# Patient Record
Sex: MALE | Race: WHITE | NOT HISPANIC OR LATINO | Employment: UNEMPLOYED | URBAN - METROPOLITAN AREA
[De-identification: names, ages, dates, MRNs, and addresses within clinical notes are randomized per-mention and may not be internally consistent; named-entity substitution may affect disease eponyms.]

---

## 2019-08-14 ENCOUNTER — APPOINTMENT (OUTPATIENT)
Dept: CT IMAGING | Facility: HOSPITAL | Age: 64
End: 2019-08-14

## 2019-08-14 ENCOUNTER — APPOINTMENT (OUTPATIENT)
Dept: GENERAL RADIOLOGY | Facility: HOSPITAL | Age: 64
End: 2019-08-14

## 2019-08-14 ENCOUNTER — HOSPITAL ENCOUNTER (INPATIENT)
Facility: HOSPITAL | Age: 64
LOS: 6 days | End: 2019-08-20
Attending: EMERGENCY MEDICINE | Admitting: HOSPITALIST

## 2019-08-14 DIAGNOSIS — I48.91 ATRIAL FIBRILLATION WITH RAPID VENTRICULAR RESPONSE (HCC): ICD-10-CM

## 2019-08-14 DIAGNOSIS — F20.9 CHRONIC SCHIZOPHRENIA (HCC): ICD-10-CM

## 2019-08-14 DIAGNOSIS — I95.1 ORTHOSTATIC HYPOTENSION: ICD-10-CM

## 2019-08-14 DIAGNOSIS — R55 SYNCOPE AND COLLAPSE: Primary | ICD-10-CM

## 2019-08-14 PROBLEM — I10 HYPERTENSION: Chronic | Status: ACTIVE | Noted: 2019-08-14

## 2019-08-14 LAB
ALBUMIN SERPL-MCNC: 3.6 G/DL (ref 3.5–5.2)
ALBUMIN/GLOB SERPL: 1.4 G/DL
ALP SERPL-CCNC: 86 U/L (ref 39–117)
ALT SERPL W P-5'-P-CCNC: 46 U/L (ref 1–41)
ANION GAP SERPL CALCULATED.3IONS-SCNC: 10.3 MMOL/L (ref 5–15)
AST SERPL-CCNC: 27 U/L (ref 1–40)
BASOPHILS # BLD AUTO: 0.02 10*3/MM3 (ref 0–0.2)
BASOPHILS NFR BLD AUTO: 0.3 % (ref 0–1.5)
BILIRUB SERPL-MCNC: 0.6 MG/DL (ref 0.2–1.2)
BILIRUB UR QL STRIP: NEGATIVE
BUN BLD-MCNC: 11 MG/DL (ref 8–23)
BUN/CREAT SERPL: 15.1 (ref 7–25)
CALCIUM SPEC-SCNC: 8.9 MG/DL (ref 8.6–10.5)
CHLORIDE SERPL-SCNC: 101 MMOL/L (ref 98–107)
CLARITY UR: CLEAR
CO2 SERPL-SCNC: 26.7 MMOL/L (ref 22–29)
COLOR UR: YELLOW
CREAT BLD-MCNC: 0.73 MG/DL (ref 0.76–1.27)
DEPRECATED RDW RBC AUTO: 49.8 FL (ref 37–54)
EOSINOPHIL # BLD AUTO: 0.19 10*3/MM3 (ref 0–0.4)
EOSINOPHIL NFR BLD AUTO: 2.5 % (ref 0.3–6.2)
ERYTHROCYTE [DISTWIDTH] IN BLOOD BY AUTOMATED COUNT: 14.4 % (ref 12.3–15.4)
GFR SERPL CREATININE-BSD FRML MDRD: 109 ML/MIN/1.73
GFR SERPL CREATININE-BSD FRML MDRD: 132 ML/MIN/1.73
GLOBULIN UR ELPH-MCNC: 2.5 GM/DL
GLUCOSE BLD-MCNC: 103 MG/DL (ref 65–99)
GLUCOSE UR STRIP-MCNC: NEGATIVE MG/DL
HCT VFR BLD AUTO: 42.1 % (ref 37.5–51)
HGB BLD-MCNC: 13.7 G/DL (ref 13–17.7)
HGB UR QL STRIP.AUTO: NEGATIVE
HOLD SPECIMEN: NORMAL
HOLD SPECIMEN: NORMAL
IMM GRANULOCYTES # BLD AUTO: 0.07 10*3/MM3 (ref 0–0.05)
IMM GRANULOCYTES NFR BLD AUTO: 0.9 % (ref 0–0.5)
INR PPP: 1.03 (ref 0.9–1.1)
KETONES UR QL STRIP: NEGATIVE
LEUKOCYTE ESTERASE UR QL STRIP.AUTO: NEGATIVE
LYMPHOCYTES # BLD AUTO: 0.94 10*3/MM3 (ref 0.7–3.1)
LYMPHOCYTES NFR BLD AUTO: 12.5 % (ref 19.6–45.3)
MAGNESIUM SERPL-MCNC: 2.1 MG/DL (ref 1.6–2.4)
MCH RBC QN AUTO: 30.7 PG (ref 26.6–33)
MCHC RBC AUTO-ENTMCNC: 32.5 G/DL (ref 31.5–35.7)
MCV RBC AUTO: 94.4 FL (ref 79–97)
MONOCYTES # BLD AUTO: 0.61 10*3/MM3 (ref 0.1–0.9)
MONOCYTES NFR BLD AUTO: 8.1 % (ref 5–12)
NEUTROPHILS # BLD AUTO: 5.67 10*3/MM3 (ref 1.7–7)
NEUTROPHILS NFR BLD AUTO: 75.7 % (ref 42.7–76)
NITRITE UR QL STRIP: NEGATIVE
NRBC BLD AUTO-RTO: 0 /100 WBC (ref 0–0.2)
PH UR STRIP.AUTO: 5.5 [PH] (ref 5–8)
PLATELET # BLD AUTO: 181 10*3/MM3 (ref 140–450)
PMV BLD AUTO: 12.4 FL (ref 6–12)
POTASSIUM BLD-SCNC: 3.7 MMOL/L (ref 3.5–5.2)
PROT SERPL-MCNC: 6.1 G/DL (ref 6–8.5)
PROT UR QL STRIP: NEGATIVE
PROTHROMBIN TIME: 13.2 SECONDS (ref 11.7–14.2)
RBC # BLD AUTO: 4.46 10*6/MM3 (ref 4.14–5.8)
SODIUM BLD-SCNC: 138 MMOL/L (ref 136–145)
SP GR UR STRIP: 1.01 (ref 1–1.03)
T4 FREE SERPL-MCNC: 1.54 NG/DL (ref 0.93–1.7)
TROPONIN T SERPL-MCNC: <0.01 NG/ML (ref 0–0.03)
TSH SERPL DL<=0.05 MIU/L-ACNC: 3.59 MIU/ML (ref 0.27–4.2)
UROBILINOGEN UR QL STRIP: NORMAL
WBC NRBC COR # BLD: 7.5 10*3/MM3 (ref 3.4–10.8)
WHOLE BLOOD HOLD SPECIMEN: NORMAL
WHOLE BLOOD HOLD SPECIMEN: NORMAL

## 2019-08-14 PROCEDURE — 85610 PROTHROMBIN TIME: CPT | Performed by: EMERGENCY MEDICINE

## 2019-08-14 PROCEDURE — 71045 X-RAY EXAM CHEST 1 VIEW: CPT

## 2019-08-14 PROCEDURE — 93005 ELECTROCARDIOGRAM TRACING: CPT | Performed by: EMERGENCY MEDICINE

## 2019-08-14 PROCEDURE — 84439 ASSAY OF FREE THYROXINE: CPT | Performed by: EMERGENCY MEDICINE

## 2019-08-14 PROCEDURE — 83735 ASSAY OF MAGNESIUM: CPT | Performed by: EMERGENCY MEDICINE

## 2019-08-14 PROCEDURE — 73502 X-RAY EXAM HIP UNI 2-3 VIEWS: CPT

## 2019-08-14 PROCEDURE — 81003 URINALYSIS AUTO W/O SCOPE: CPT | Performed by: EMERGENCY MEDICINE

## 2019-08-14 PROCEDURE — 80053 COMPREHEN METABOLIC PANEL: CPT | Performed by: EMERGENCY MEDICINE

## 2019-08-14 PROCEDURE — 99285 EMERGENCY DEPT VISIT HI MDM: CPT

## 2019-08-14 PROCEDURE — 70450 CT HEAD/BRAIN W/O DYE: CPT

## 2019-08-14 PROCEDURE — 84484 ASSAY OF TROPONIN QUANT: CPT | Performed by: EMERGENCY MEDICINE

## 2019-08-14 PROCEDURE — 85025 COMPLETE CBC W/AUTO DIFF WBC: CPT | Performed by: EMERGENCY MEDICINE

## 2019-08-14 PROCEDURE — 84443 ASSAY THYROID STIM HORMONE: CPT | Performed by: EMERGENCY MEDICINE

## 2019-08-14 PROCEDURE — 93010 ELECTROCARDIOGRAM REPORT: CPT | Performed by: INTERNAL MEDICINE

## 2019-08-14 RX ORDER — ACETAMINOPHEN 325 MG/1
650 TABLET ORAL EVERY 4 HOURS PRN
Status: DISCONTINUED | OUTPATIENT
Start: 2019-08-14 | End: 2019-08-20 | Stop reason: HOSPADM

## 2019-08-14 RX ORDER — SODIUM CHLORIDE, SODIUM LACTATE, POTASSIUM CHLORIDE, CALCIUM CHLORIDE 600; 310; 30; 20 MG/100ML; MG/100ML; MG/100ML; MG/100ML
125 INJECTION, SOLUTION INTRAVENOUS CONTINUOUS
Status: DISCONTINUED | OUTPATIENT
Start: 2019-08-14 | End: 2019-08-15

## 2019-08-14 RX ORDER — SODIUM CHLORIDE 0.9 % (FLUSH) 0.9 %
3 SYRINGE (ML) INJECTION EVERY 12 HOURS SCHEDULED
Status: DISCONTINUED | OUTPATIENT
Start: 2019-08-14 | End: 2019-08-20 | Stop reason: HOSPADM

## 2019-08-14 RX ORDER — METOPROLOL SUCCINATE 50 MG/1
150 TABLET, EXTENDED RELEASE ORAL DAILY
COMMUNITY
End: 2019-08-20 | Stop reason: HOSPADM

## 2019-08-14 RX ORDER — ACETAMINOPHEN 160 MG/5ML
650 SOLUTION ORAL EVERY 4 HOURS PRN
Status: DISCONTINUED | OUTPATIENT
Start: 2019-08-14 | End: 2019-08-20 | Stop reason: HOSPADM

## 2019-08-14 RX ORDER — ONDANSETRON 2 MG/ML
4 INJECTION INTRAMUSCULAR; INTRAVENOUS EVERY 6 HOURS PRN
Status: DISCONTINUED | OUTPATIENT
Start: 2019-08-14 | End: 2019-08-20 | Stop reason: HOSPADM

## 2019-08-14 RX ORDER — ASPIRIN 81 MG/1
81 TABLET ORAL DAILY
COMMUNITY
End: 2019-08-20 | Stop reason: HOSPADM

## 2019-08-14 RX ORDER — SODIUM CHLORIDE 0.9 % (FLUSH) 0.9 %
3-10 SYRINGE (ML) INJECTION AS NEEDED
Status: DISCONTINUED | OUTPATIENT
Start: 2019-08-14 | End: 2019-08-20 | Stop reason: HOSPADM

## 2019-08-14 RX ORDER — PANTOPRAZOLE SODIUM 40 MG/1
40 TABLET, DELAYED RELEASE ORAL DAILY
Status: DISCONTINUED | OUTPATIENT
Start: 2019-08-14 | End: 2019-08-20 | Stop reason: HOSPADM

## 2019-08-14 RX ORDER — PANTOPRAZOLE SODIUM 40 MG/1
40 TABLET, DELAYED RELEASE ORAL DAILY
COMMUNITY

## 2019-08-14 RX ORDER — ACETAMINOPHEN 650 MG/1
650 SUPPOSITORY RECTAL EVERY 4 HOURS PRN
Status: DISCONTINUED | OUTPATIENT
Start: 2019-08-14 | End: 2019-08-20 | Stop reason: HOSPADM

## 2019-08-14 RX ORDER — RISPERIDONE 3 MG/1
6 TABLET ORAL NIGHTLY
Status: DISCONTINUED | OUTPATIENT
Start: 2019-08-14 | End: 2019-08-20 | Stop reason: HOSPADM

## 2019-08-14 RX ORDER — ASPIRIN 81 MG/1
81 TABLET ORAL DAILY
Status: DISCONTINUED | OUTPATIENT
Start: 2019-08-14 | End: 2019-08-17

## 2019-08-14 RX ORDER — RISPERIDONE 3 MG/1
5 TABLET ORAL NIGHTLY
COMMUNITY

## 2019-08-14 RX ORDER — NITROGLYCERIN 0.4 MG/1
0.4 TABLET SUBLINGUAL
Status: DISCONTINUED | OUTPATIENT
Start: 2019-08-14 | End: 2019-08-20 | Stop reason: HOSPADM

## 2019-08-14 RX ADMIN — SODIUM CHLORIDE, POTASSIUM CHLORIDE, SODIUM LACTATE AND CALCIUM CHLORIDE 125 ML/HR: 600; 310; 30; 20 INJECTION, SOLUTION INTRAVENOUS at 18:30

## 2019-08-14 RX ADMIN — RISPERIDONE 6 MG: 3 TABLET ORAL at 20:51

## 2019-08-14 RX ADMIN — POTASSIUM & SODIUM PHOSPHATES POWDER PACK 280-160-250 MG 1 PACKET: 280-160-250 PACK at 20:51

## 2019-08-14 RX ADMIN — SODIUM CHLORIDE, PRESERVATIVE FREE 3 ML: 5 INJECTION INTRAVENOUS at 21:22

## 2019-08-14 RX ADMIN — SODIUM CHLORIDE 2466 ML: 9 INJECTION, SOLUTION INTRAVENOUS at 10:57

## 2019-08-14 RX ADMIN — PANTOPRAZOLE SODIUM 40 MG: 40 TABLET, DELAYED RELEASE ORAL at 18:30

## 2019-08-14 RX ADMIN — SODIUM CHLORIDE 500 ML: 9 INJECTION, SOLUTION INTRAVENOUS at 07:56

## 2019-08-14 RX ADMIN — ASPIRIN 81 MG: 81 TABLET, COATED ORAL at 18:30

## 2019-08-14 NOTE — PLAN OF CARE
Problem: Patient Care Overview  Goal: Plan of Care Review  Outcome: Ongoing (interventions implemented as appropriate)   08/14/19 1900   Coping/Psychosocial   Plan of Care Reviewed With patient   Plan of Care Review   Progress improving   OTHER   Outcome Summary Patient has improved since admit, no sign and symptoms since present on the unit. Echo, fluids, and cardio consult ordered.        Problem: Fall Risk (Adult)  Goal: Identify Related Risk Factors and Signs and Symptoms  Outcome: Outcome(s) achieved Date Met: 08/14/19    Goal: Absence of Fall  Outcome: Ongoing (interventions implemented as appropriate)      Problem: Skin Injury Risk (Adult)  Goal: Identify Related Risk Factors and Signs and Symptoms  Outcome: Outcome(s) achieved Date Met: 08/14/19    Goal: Skin Health and Integrity  Outcome: Ongoing (interventions implemented as appropriate)      Problem: Syncope (Adult)  Goal: Identify Related Risk Factors and Signs and Symptoms  Outcome: Outcome(s) achieved Date Met: 08/14/19    Goal: Physical Safety/Health Maintenance  Outcome: Ongoing (interventions implemented as appropriate)    Goal: Optimal Emotional/Functional Gem  Outcome: Ongoing (interventions implemented as appropriate)

## 2019-08-14 NOTE — ED PROVIDER NOTES
" EMERGENCY DEPARTMENT ENCOUNTER    CHIEF COMPLAINT  Chief Complaint: syncope  History given by: patient, staff  History limited by: none  Room Number: 10/10  PMD: Provider, No Known      HPI:  Pt is a 64 y.o. male who presents to the ED via EMS s/p syncopal episode at [facility]. Staff report that the pt had gotten up to get routine intake labs done when he \"passed out\" and fell backward. The LOC was brief and the pt c/o L hip pain and some vague dizziness. Staff evaluated his BP that was 102/90. Upon standing, the BP dropped to 90/70. Pt was recently admitted to the facility from [facility] for f/u on schizophrenia.    PAST MEDICAL HISTORY  Active Ambulatory Problems     Diagnosis Date Noted   • No Active Ambulatory Problems     Resolved Ambulatory Problems     Diagnosis Date Noted   • No Resolved Ambulatory Problems     Past Medical History:   Diagnosis Date   • A-fib (CMS/HCC)    • [place] esophagus    • GERD (gastroesophageal reflux disease)    • Hx of schizophrenia    • Hyperlipidemia    • Hypertension    • Hypophosphatemia    • Hypothyroidism    • Umbilical hernia        PAST SURGICAL HISTORY  History reviewed. No pertinent surgical history.    FAMILY HISTORY  History reviewed. No pertinent family history.    SOCIAL HISTORY  Social History     Socioeconomic History   • Marital status: Single     Spouse name: Not on file   • Number of children: Not on file   • Years of education: Not on file   • Highest education level: Not on file   Tobacco Use   • Smoking status: Unknown If Ever Smoked       ALLERGIES  Penicillins and Latex    REVIEW OF SYSTEMS  Review of Systems   Constitutional: Negative for activity change, appetite change and fever.   HENT: Negative for congestion and sore throat.    Eyes: Negative.    Respiratory: Negative for cough and shortness of breath.    Cardiovascular: Negative for chest pain and leg swelling.   Gastrointestinal: Negative for abdominal pain, diarrhea and vomiting. "   Endocrine: Negative.    Genitourinary: Negative for decreased urine volume and dysuria.   Musculoskeletal: Negative for neck pain.        + L hip pain   Skin: Negative for rash and wound.   Allergic/Immunologic: Negative.    Neurological: Positive for dizziness and syncope. Negative for weakness, numbness and headaches.   Hematological: Negative.    Psychiatric/Behavioral: Negative.    All other systems reviewed and are negative.      PHYSICAL EXAM  ED Triage Vitals   Temp Heart Rate Resp BP SpO2   08/14/19 0733 08/14/19 0733 08/14/19 0746 08/14/19 0733 08/14/19 0733   98.3 °F (36.8 °C) 82 18 104/63 96 %      Temp src Heart Rate Source Patient Position BP Location FiO2 (%)   08/14/19 0733 -- -- -- --   Tympanic           Physical Exam   Constitutional: No distress.   HENT:   Head: Normocephalic and atraumatic.   Mouth/Throat: Mucous membranes are dry.   Hematoma to the lower occiput   Eyes: Pupils are equal, round, and reactive to light.   Pale conjunctiva   Neck: Normal range of motion.   Cardiovascular: Normal rate. An irregularly irregular rhythm present.   No murmur heard.  HR 80s   Pulmonary/Chest: Effort normal and breath sounds normal. No respiratory distress.   Abdominal: Soft. Bowel sounds are normal. He exhibits no distension. There is no tenderness. There is no rebound and no guarding.   Musculoskeletal: Normal range of motion. He exhibits no edema.   No calf tenderness   Neurological: He is alert. He has normal sensation and normal strength.   No focal neurological deficits, follows commands, moves all extremities.   Skin: Skin is warm and dry. There is pallor.   Psychiatric: Affect normal.       LAB RESULTS  Lab Results (last 24 hours)     Procedure Component Value Units Date/Time    CBC & Differential [[id number]] Collected:  08/14/19 0753    Specimen:  Blood Updated:  08/14/19 0822    Narrative:       The following orders were created for panel order CBC & Differential.  Procedure                                Abnormality         Status                     ---------                               -----------         ------                     CBC Auto Differential[[id number]]        Abnormal            Final result                 Please view results for these tests on the individual orders.    Comprehensive Metabolic Panel [[id number]]  (Abnormal) Collected:  08/14/19 0753    Specimen:  Blood Updated:  08/14/19 0841     Glucose 103 mg/dL      BUN 11 mg/dL      Creatinine 0.73 mg/dL      Sodium 138 mmol/L      Potassium 3.7 mmol/L      Chloride 101 mmol/L      CO2 26.7 mmol/L      Calcium 8.9 mg/dL      Total Protein 6.1 g/dL      Albumin 3.60 g/dL      ALT (SGPT) 46 U/L      AST (SGOT) 27 U/L      Alkaline Phosphatase 86 U/L      Total Bilirubin 0.6 mg/dL      eGFR Non African Amer 109 mL/min/1.73      eGFR  African Amer 132 mL/min/1.73      Globulin 2.5 gm/dL      A/G Ratio 1.4 g/dL      BUN/Creatinine Ratio 15.1     Anion Gap 10.3 mmol/L     Narrative:       GFR Normal >60  Chronic Kidney Disease <60  Kidney Failure <15    Protime-INR [[id number]]  (Normal) Collected:  08/14/19 0753    Specimen:  Blood Updated:  08/14/19 0831     Protime 13.2 Seconds      INR 1.03    Troponin [[id number]]  (Normal) Collected:  08/14/19 0753    Specimen:  Blood Updated:  08/14/19 0842     Troponin T <0.010 ng/mL     Narrative:       Troponin T Reference Range:  <= 0.03 ng/mL-   Negative for AMI  >0.03 ng/mL-     Abnormal for myocardial necrosis.  Clinicians would have to utilize clinical acumen, EKG, Troponin and serial changes to determine if it is an Acute Myocardial Infarction or myocardial injury due to an underlying chronic condition.     Magnesium [[id number]]  (Normal) Collected:  08/14/19 0753    Specimen:  Blood Updated:  08/14/19 0840     Magnesium 2.1 mg/dL     TSH [[id number]]  (Normal) Collected:  08/14/19 0753    Specimen:  Blood Updated:  08/14/19 0847     TSH 3.590 mIU/mL     T4, Free [[id number]]  (Normal) Collected:   08/14/19 0753    Specimen:  Blood Updated:  08/14/19 0847     Free T4 1.54 ng/dL     CBC Auto Differential [[id number]]  (Abnormal) Collected:  08/14/19 0753    Specimen:  Blood Updated:  08/14/19 0822     WBC 7.50 10*3/mm3      RBC 4.46 10*6/mm3      Hemoglobin 13.7 g/dL      Hematocrit 42.1 %      MCV 94.4 fL      MCH 30.7 pg      MCHC 32.5 g/dL      RDW 14.4 %      RDW-SD 49.8 fl      MPV 12.4 fL      Platelets 181 10*3/mm3      Neutrophil % 75.7 %      Lymphocyte % 12.5 %      Monocyte % 8.1 %      Eosinophil % 2.5 %      Basophil % 0.3 %      Immature Grans % 0.9 %      Neutrophils, Absolute 5.67 10*3/mm3      Lymphocytes, Absolute 0.94 10*3/mm3      Monocytes, Absolute 0.61 10*3/mm3      Eosinophils, Absolute 0.19 10*3/mm3      Basophils, Absolute 0.02 10*3/mm3      Immature Grans, Absolute 0.07 10*3/mm3      nRBC 0.0 /100 WBC     Urinalysis With Microscopic If Indicated (No Culture) - Urine, Clean Catch [[id number]]  (Normal) Collected:  08/14/19 1032    Specimen:  Urine, Clean Catch Updated:  08/14/19 1054     Color, UA Yellow     Appearance, UA Clear     pH, UA 5.5     Specific Gravity, UA 1.012     Glucose, UA Negative     Ketones, UA Negative     Bilirubin, UA Negative     Blood, UA Negative     Protein, UA Negative     Leuk Esterase, UA Negative     Nitrite, UA Negative     Urobilinogen, UA 1.0 E.U./dL    Narrative:       Urine microscopic not indicated.          I ordered the above labs and reviewed the results    RADIOLOGY  CT Head Without Contrast   Final Result   No evidence for acute intracranial abnormality.       This report was finalized on 8/14/2019 10:38 AM by Dr. Devyn Sharma M.D.          XR Hip With or Without Pelvis 2 - 3 View Left   Preliminary Result   1. Mild cardiomegaly.   2. Increased density in the left lung base. Consider correlation with   any prior outside studies. If none are available short-term follow-up CT   of the chest with contrast suggested.       AP PELVIS AND LEFT  HIP 2 VIEWS       No acute bone, joint or soft tissue abnormalities are seen.              XR Chest 1 View   Preliminary Result   1. Mild cardiomegaly.   2. Increased density in the left lung base. Consider correlation with   any prior outside studies. If none are available short-term follow-up CT   of the chest with contrast suggested.       AP PELVIS AND LEFT HIP 2 VIEWS       No acute bone, joint or soft tissue abnormalities are seen.                   I ordered the above noted radiological studies. Interpreted by radiologist. Reviewed by me in PACS.       PROCEDURES  Procedures  EKG    EKG time: 0758  Rhythm/Rate: afib 97   No Acute Ischemia  Non-Specific ST-T changes    No prior for comparison.     Interpreted Contemporaneously by me.  Independently viewed by me      PROGRESS AND CONSULTS     1030  Rechecked the patient who is in NAD and is resting comfortably. Discussed that the pt's BP suggests he is orthostatic. Pt denies dizziness during standing when taking the vitals. Pt told the plan to administer more IVF and possibly change his Toprol dose. Made pt and staff aware that there is no evidence of acute fx and labs are unremarkable.     1345  BP- 105/81 HR- 80 Temp- 98.3 °F (36.8 °C) (Tympanic) O2 sat- 96%  Rechecked the patient who is in NAD and is resting comfortably. Pt feels unchanged. Pt told the plan to continue to monitor his BP to see if it improves. Discussed labs being unremarkable.    1410  After 2.5 L of fluid and 6 hours in the emergency room, the patient is still orthostatic drop in his blood pressure into the 90s and heart rate increasing into the 120s in atrial fibrillation.  Thus I feel he needs admission for his syncopal episode and orthostatic hypotension with rapid A. Fib.    1430  Discussed the case with Sharron Lou from [facility] and we will admit the patient to a telemetry bed to Dr. Quinones.  I discussed the case with his caregivers in the room and the patient is not on a 72-hour hold.   The patient agrees for admission to the hospital for further care    1450  Patient's caregivers from [facility] are going to leave now that he has been admitted.  I have discussed the case with our charge nurse and the ED director, and will place the patient on a 72-hour hold for his safety while we are admitting him and medically stabilizing him.    MEDICAL DECISION MAKING  Results were reviewed/discussed with the patient and they were also made aware of online access. Pt also made aware that some labs, such as cultures, will not be resulted during ER visit and follow up with PMD is necessary.     MDM  Number of Diagnoses or Management Options     Amount and/or Complexity of Data Reviewed  Clinical lab tests: reviewed  Tests in the radiology section of CPT®: reviewed  Tests in the medicine section of CPT®: reviewed (See EKG procedure note.)  Independent visualization of images, tracings, or specimens: yes    Patient Progress  Patient progress: stable         DIAGNOSIS  Final diagnoses:   Syncope and collapse   Orthostatic hypotension   Atrial fibrillation with rapid ventricular response (CMS/HCC)   Chronic schizophrenia (CMS/HCC)       DISPOSITION  ADMISSION    Discussed treatment plan and reason for admission with pt/family and admitting physician.  Pt/family voiced understanding of the plan for admission for further testing/treatment as needed.     Latest Documented Vital Signs:  As of 2:34 PM  BP- 92/74 HR- 117 Temp- 98.3 °F (36.8 °C) (Tympanic) O2 sat- 96%    --  Documentation assistance provided by lazarus Walton for Dr. Pierce.  Information recorded by the scrbeckye was done at my direction and has been verified and validated by me.     Nyasia Walton  08/14/19 7645       Devyn Pierce MD  08/14/19 6025       Devyn Pierce MD  08/14/19 3844

## 2019-08-14 NOTE — H&P
Patient Name:  Carlos Kraus  YOB: 1955  MRN:  [id number]  Admit Date:  8/14/2019  Patient Care Team:  Provider, No Known as PCP - General      Subjective   History Present Illness     Chief Complaint   Patient presents with   • Syncope       Mr. Kraus is a 64 y.o. currently at [facility] with a history of atrial fibrillation, schizophrenia, hypertension, hyperlipidemia anemia and hypothyroidism that presents to [facility] after a witnessed syncopal episode this morning.  He was recently discharged from [facility] where he spent about a month in the psychiatry unit due to schizophrenia and needed medication adjustments.  He was apparently delusional and threatening his roommate.  Upon arrival to ED he was found to be in A. fib RVR with a heart rate of 130.  He was given IV and p.o. Cardizem with good response. Once stabilized he was started on metoprolol 25 mg every 8 which was titrated to effect and transitioned to metoprolol 150 mg daily.  This morning he was in line at [facility] for daily labs where he had a syncopal event. He was orthostatic at the facility and therefore EMS was called and he was transferred to our ED. He was given IV fluids and blood pressure came up but he still remained orthostatic. He currently denies dizziness, lightheadedness, chest pain, palpitations, SOA, edema, fever, chills, cough, nausea and vomiting.       Review of Systems   Constitutional: Negative for chills and fever.   HENT: Negative for congestion and dental problem.    Eyes: Negative.    Respiratory: Negative for chest tightness and shortness of breath.    Cardiovascular: Negative.    Gastrointestinal: Negative.    Endocrine: Negative.    Genitourinary: Negative for difficulty urinating and dysuria.   Musculoskeletal: Negative for back pain and gait problem.   Skin: Negative.    Allergic/Immunologic: Negative.    Neurological: Positive for dizziness, syncope and  light-headedness.   Hematological: Negative for adenopathy. Does not bruise/bleed easily.   Psychiatric/Behavioral: Negative for agitation and behavioral problems.        Personal History     Past Medical History:   Diagnosis Date   • A-fib (CMS/HCC)    • Atrial fibrillation (CMS/HCC) 8/14/2019   • [place] esophagus    • GERD (gastroesophageal reflux disease)    • Hx of schizophrenia    • Hyperlipidemia    • Hypertension    • Hypophosphatemia    • Hypothyroidism    • Schizophrenia (CMS/HCC) 8/14/2019   • Umbilical hernia      History reviewed. No pertinent surgical history.  History reviewed. No pertinent family history.  Social History     Tobacco Use   • Smoking status: Unknown If Ever Smoked   Substance Use Topics   • Alcohol use: Not on file   • Drug use: Not on file       (Not in a hospital admission)  Allergies:    Allergies   Allergen Reactions   • Penicillins Anaphylaxis   • Latex Hives       Objective    Objective     Vital Signs  Temp:  [98.3 °F (36.8 °C)] 98.3 °F (36.8 °C)  Heart Rate:  [] 77  Resp:  [18] 18  BP: ()/(63-94) 114/85  SpO2:  [93 %-96 %] 93 %  on  Flow (L/min):  [4] 4;   Device (Oxygen Therapy): nasal cannula  Body mass index is 31.42 kg/m².    Physical Exam   Constitutional: He is oriented to person, place, and time. He appears well-developed and well-nourished. No distress.   HENT:   Head: Normocephalic and atraumatic.   Eyes: Conjunctivae and EOM are normal.   Neck: Normal range of motion. Neck supple.   Cardiovascular: Normal rate and regular rhythm.   Pulmonary/Chest: Effort normal and breath sounds normal. No respiratory distress.   Abdominal: Soft. Bowel sounds are normal. He exhibits no distension. There is no tenderness.   Musculoskeletal: Normal range of motion. He exhibits no edema.   Neurological: He is alert and oriented to person, place, and time.   Skin: Skin is warm and dry.   Psychiatric: He has a normal mood and affect. His behavior is normal.       Results  Review:  I reviewed the patient's new clinical results.  I reviewed the patient's new imaging results and agree with the interpretation.  I reviewed the patient's other test results and agree with the interpretation  I personally viewed and interpreted the patient's EKG/Telemetry data  Discussed with ED provider.    Lab Results (last 24 hours)     Procedure Component Value Units Date/Time    CBC & Differential [[id number]] Collected:  08/14/19 0753    Specimen:  Blood Updated:  08/14/19 0822    Narrative:       The following orders were created for panel order CBC & Differential.  Procedure                               Abnormality         Status                     ---------                               -----------         ------                     CBC Auto Differential[[id number]]        Abnormal            Final result                 Please view results for these tests on the individual orders.    Comprehensive Metabolic Panel [[id number]]  (Abnormal) Collected:  08/14/19 0753    Specimen:  Blood Updated:  08/14/19 0841     Glucose 103 mg/dL      BUN 11 mg/dL      Creatinine 0.73 mg/dL      Sodium 138 mmol/L      Potassium 3.7 mmol/L      Chloride 101 mmol/L      CO2 26.7 mmol/L      Calcium 8.9 mg/dL      Total Protein 6.1 g/dL      Albumin 3.60 g/dL      ALT (SGPT) 46 U/L      AST (SGOT) 27 U/L      Alkaline Phosphatase 86 U/L      Total Bilirubin 0.6 mg/dL      eGFR Non African Amer 109 mL/min/1.73      eGFR  African Amer 132 mL/min/1.73      Globulin 2.5 gm/dL      A/G Ratio 1.4 g/dL      BUN/Creatinine Ratio 15.1     Anion Gap 10.3 mmol/L     Narrative:       GFR Normal >60  Chronic Kidney Disease <60  Kidney Failure <15    Protime-INR [[id number]]  (Normal) Collected:  08/14/19 0753    Specimen:  Blood Updated:  08/14/19 0831     Protime 13.2 Seconds      INR 1.03    Troponin [[id number]]  (Normal) Collected:  08/14/19 0753    Specimen:  Blood Updated:  08/14/19 0842     Troponin T <0.010 ng/mL     Narrative:        Troponin T Reference Range:  <= 0.03 ng/mL-   Negative for AMI  >0.03 ng/mL-     Abnormal for myocardial necrosis.  Clinicians would have to utilize clinical acumen, EKG, Troponin and serial changes to determine if it is an Acute Myocardial Infarction or myocardial injury due to an underlying chronic condition.     Magnesium [[id number]]  (Normal) Collected:  08/14/19 0753    Specimen:  Blood Updated:  08/14/19 0840     Magnesium 2.1 mg/dL     TSH [[id number]]  (Normal) Collected:  08/14/19 0753    Specimen:  Blood Updated:  08/14/19 0847     TSH 3.590 mIU/mL     T4, Free [[id number]]  (Normal) Collected:  08/14/19 0753    Specimen:  Blood Updated:  08/14/19 0847     Free T4 1.54 ng/dL     CBC Auto Differential [[id number]]  (Abnormal) Collected:  08/14/19 0753    Specimen:  Blood Updated:  08/14/19 0822     WBC 7.50 10*3/mm3      RBC 4.46 10*6/mm3      Hemoglobin 13.7 g/dL      Hematocrit 42.1 %      MCV 94.4 fL      MCH 30.7 pg      MCHC 32.5 g/dL      RDW 14.4 %      RDW-SD 49.8 fl      MPV 12.4 fL      Platelets 181 10*3/mm3      Neutrophil % 75.7 %      Lymphocyte % 12.5 %      Monocyte % 8.1 %      Eosinophil % 2.5 %      Basophil % 0.3 %      Immature Grans % 0.9 %      Neutrophils, Absolute 5.67 10*3/mm3      Lymphocytes, Absolute 0.94 10*3/mm3      Monocytes, Absolute 0.61 10*3/mm3      Eosinophils, Absolute 0.19 10*3/mm3      Basophils, Absolute 0.02 10*3/mm3      Immature Grans, Absolute 0.07 10*3/mm3      nRBC 0.0 /100 WBC     Urinalysis With Microscopic If Indicated (No Culture) - Urine, Clean Catch [[id number]]  (Normal) Collected:  08/14/19 1032    Specimen:  Urine, Clean Catch Updated:  08/14/19 1054     Color, UA Yellow     Appearance, UA Clear     pH, UA 5.5     Specific Gravity, UA 1.012     Glucose, UA Negative     Ketones, UA Negative     Bilirubin, UA Negative     Blood, UA Negative     Protein, UA Negative     Leuk Esterase, UA Negative     Nitrite, UA Negative     Urobilinogen, UA 1.0  E.U./dL    Narrative:       Urine microscopic not indicated.          Imaging Results (last 24 hours)     Procedure Component Value Units Date/Time    CT Head Without Contrast [[id number]] Collected:  08/14/19 1033     Updated:  08/14/19 1042    Narrative:       CT HEAD WITHOUT CONTRAST     HISTORY: Syncope.     TECHNIQUE:  Head CT includes axial imaging from the base of skull to the  vertex without intravenous contrast. Radiation dose reduction techniques  were utilized, including automated exposure control and exposure  modulation based on body size.     COMPARISON: None.     FINDINGS: There are no abnormal areas of increased attenuation  intraaxially to suggest hemorrhage. No extra-axial fluid collection is  observed. There is no evidence for cerebral edema, mass effect or shift  of the midline structures. Ventricles appear within normal limits for  size and configuration. There is generalized calvarial thickening. Mild  bilateral maxillary, sphenoid, and ethmoid sinus mucosal thickening is  present.       Impression:       No evidence for acute intracranial abnormality.     This report was finalized on 8/14/2019 10:38 AM by Dr. Devyn Sharma M.D.       XR Hip With or Without Pelvis 2 - 3 View Left [[id number]] Collected:  08/14/19 0850     Updated:  08/14/19 0850    Narrative:       XR HIP WITH OR WITHOUT PELVIS 2-3 VIEW LEFT-, XR CHEST 1 VIEW-   08/14/2019     HISTORY: Fell, left hip pain. Shortness of breath.     AP CHEST: Heart size is mildly enlarged. There is increased density in  the left base likely related to pleural fluid, atelectasis, infiltrate  and/or pleural scarring. Left upper lung and right lung appear clear.     There are no previous studies available for comparison.     No pneumothorax is seen.       Impression:       1. Mild cardiomegaly.  2. Increased density in the left lung base. Consider correlation with  any prior outside studies. If none are available short-term follow-up CT  of the  chest with contrast suggested.     AP PELVIS AND LEFT HIP 2 VIEWS     No acute bone, joint or soft tissue abnormalities are seen.          XR Chest 1 View [[id number]] Collected:  08/14/19 0850     Updated:  08/14/19 0850    Narrative:       XR HIP WITH OR WITHOUT PELVIS 2-3 VIEW LEFT-, XR CHEST 1 VIEW-   08/14/2019     HISTORY: Fell, left hip pain. Shortness of breath.     AP CHEST: Heart size is mildly enlarged. There is increased density in  the left base likely related to pleural fluid, atelectasis, infiltrate  and/or pleural scarring. Left upper lung and right lung appear clear.     There are no previous studies available for comparison.     No pneumothorax is seen.       Impression:       1. Mild cardiomegaly.  2. Increased density in the left lung base. Consider correlation with  any prior outside studies. If none are available short-term follow-up CT  of the chest with contrast suggested.     AP PELVIS AND LEFT HIP 2 VIEWS     No acute bone, joint or soft tissue abnormalities are seen.                     ECG 12 Lead   Preliminary Result   HEART RATE= 97  bpm   RR Interval= 615  ms   ID Interval=   ms   P Horizontal Axis=   deg   P Front Axis=   deg   QRSD Interval= 104  ms   QT Interval= 368  ms   QRS Axis= 42  deg   T Wave Axis= 26  deg   - ABNORMAL ECG -   Atrial fibrillation   Electronically Signed By:    Date and Time of Study: 2019-08-14 07:58:02           Assessment/Plan     Active Hospital Problems    Diagnosis POA   • **Syncope and collapse [R55] Yes   • Schizophrenia (CMS/HCC) [F20.9] Unknown   • Atrial fibrillation (CMS/HCC) [I48.91] Unknown   - admit to tele  - IVFs  - consult cardiology   - Last echo 7/18/2019 showed moderate left atrial enlargement, normal ventricular size and function, EF 60%  - hold BP meds   - orthostatics twice per shift     I discussed the patients findings and my recommendations with patient and nursing staff and Dr. Pierce.    VTE Prophylaxis - SCDs .  Code Status - Full  code.       Layla Lou, CARIDAD  [facility]  08/14/19  3:47 PM

## 2019-08-14 NOTE — PROGRESS NOTES
Clinical Pharmacy Services: Medication History    Carlos Kraus is a 64 y.o. male presenting to [facility] for   Chief Complaint   Patient presents with   • Syncope       He  has a past medical history of A-fib (CMS/HCC), [place] esophagus, GERD (gastroesophageal reflux disease), schizophrenia, Hyperlipidemia, Hypertension, Hypophosphatemia, Hypothyroidism, and Umbilical hernia.    Allergies as of 08/14/2019 - Reviewed 08/14/2019   Allergen Reaction Noted   • Penicillins Anaphylaxis 08/14/2019   • Latex Hives 08/14/2019       Medication information was obtained from: [facility]  Pharmacy and Phone Number:     Prior to Admission Medications     Prescriptions Last Dose Informant Patient Reported? Taking?    aspirin 81 MG EC tablet  Other Yes Yes    Take 81 mg by mouth Daily.    metoprolol succinate XL (TOPROL-XL) 50 MG 24 hr tablet  Other Yes Yes    Take 150 mg by mouth Daily.    pantoprazole (PROTONIX) 40 MG EC tablet  Other Yes Yes    Take 40 mg by mouth Daily.    potassium & sodium phosphates (PHOS-NAK) 280-160-250 MG pack packet  Other Yes Yes    Take 1 packet by mouth Every 8 (Eight) Hours.    risperiDONE (risperDAL) 3 MG tablet  Other Yes Yes    Take 6 mg by mouth Every Night.            Medication notes: All medications added per discharge instructions from [facility] 8/13/19    This medication list is complete to the best of my knowledge as of 8/14/2019    Please call if questions.    Angelique Mg, Medication History Technician  8/14/2019 3:27 PM

## 2019-08-14 NOTE — ED NOTES
Patient to er per ems from [facility]. Reported patient passed out today after standing. Patient c/o pain in left hip. Reported no blood thinners at this time. Patient has hx of afib.      Quentin Luna RN  08/14/19 0733

## 2019-08-15 ENCOUNTER — APPOINTMENT (OUTPATIENT)
Dept: CARDIOLOGY | Facility: HOSPITAL | Age: 64
End: 2019-08-15

## 2019-08-15 LAB
ANION GAP SERPL CALCULATED.3IONS-SCNC: 10.3 MMOL/L (ref 5–15)
AORTIC DIMENSIONLESS INDEX: 0.9 (DI)
BASOPHILS # BLD AUTO: 0.02 10*3/MM3 (ref 0–0.2)
BASOPHILS NFR BLD AUTO: 0.3 % (ref 0–1.5)
BH CV ECHO MEAS - ACS: 2.3 CM
BH CV ECHO MEAS - AO MAX PG (FULL): 1.6 MMHG
BH CV ECHO MEAS - AO MAX PG: 5.7 MMHG
BH CV ECHO MEAS - AO MEAN PG (FULL): 1 MMHG
BH CV ECHO MEAS - AO MEAN PG: 3 MMHG
BH CV ECHO MEAS - AO ROOT AREA (BSA CORRECTED): 1.6
BH CV ECHO MEAS - AO ROOT AREA: 11.3 CM^2
BH CV ECHO MEAS - AO ROOT DIAM: 3.8 CM
BH CV ECHO MEAS - AO V2 MAX: 119 CM/SEC
BH CV ECHO MEAS - AO V2 MEAN: 78.8 CM/SEC
BH CV ECHO MEAS - AO V2 VTI: 21.4 CM
BH CV ECHO MEAS - AVA(I,A): 3.8 CM^2
BH CV ECHO MEAS - AVA(I,D): 3.8 CM^2
BH CV ECHO MEAS - AVA(V,A): 3.5 CM^2
BH CV ECHO MEAS - AVA(V,D): 3.5 CM^2
BH CV ECHO MEAS - BSA(HAYCOCK): 2.4 M^2
BH CV ECHO MEAS - BSA: 2.4 M^2
BH CV ECHO MEAS - BZI_BMI: 31.5 KILOGRAMS/M^2
BH CV ECHO MEAS - BZI_METRIC_HEIGHT: 188 CM
BH CV ECHO MEAS - BZI_METRIC_WEIGHT: 111.1 KG
BH CV ECHO MEAS - EDV(CUBED): 103.8 ML
BH CV ECHO MEAS - EDV(MOD-SP2): 107 ML
BH CV ECHO MEAS - EDV(MOD-SP4): 92 ML
BH CV ECHO MEAS - EDV(TEICH): 102.4 ML
BH CV ECHO MEAS - EF(CUBED): 58.7 %
BH CV ECHO MEAS - EF(MOD-BP): 57 %
BH CV ECHO MEAS - EF(MOD-SP2): 57.9 %
BH CV ECHO MEAS - EF(MOD-SP4): 57.6 %
BH CV ECHO MEAS - EF(TEICH): 50.3 %
BH CV ECHO MEAS - ESV(CUBED): 42.9 ML
BH CV ECHO MEAS - ESV(MOD-SP2): 45 ML
BH CV ECHO MEAS - ESV(MOD-SP4): 39 ML
BH CV ECHO MEAS - ESV(TEICH): 50.9 ML
BH CV ECHO MEAS - FS: 25.5 %
BH CV ECHO MEAS - IVS/LVPW: 1.1
BH CV ECHO MEAS - IVSD: 1.1 CM
BH CV ECHO MEAS - LAT PEAK E' VEL: 15 CM/SEC
BH CV ECHO MEAS - LV DIASTOLIC VOL/BSA (35-75): 38.8 ML/M^2
BH CV ECHO MEAS - LV MASS(C)D: 175.8 GRAMS
BH CV ECHO MEAS - LV MASS(C)DI: 74.2 GRAMS/M^2
BH CV ECHO MEAS - LV MAX PG: 4.1 MMHG
BH CV ECHO MEAS - LV MEAN PG: 2 MMHG
BH CV ECHO MEAS - LV SYSTOLIC VOL/BSA (12-30): 16.5 ML/M^2
BH CV ECHO MEAS - LV V1 MAX: 101 CM/SEC
BH CV ECHO MEAS - LV V1 MEAN: 67.1 CM/SEC
BH CV ECHO MEAS - LV V1 VTI: 19.8 CM
BH CV ECHO MEAS - LVIDD: 4.7 CM
BH CV ECHO MEAS - LVIDS: 3.5 CM
BH CV ECHO MEAS - LVLD AP2: 7.9 CM
BH CV ECHO MEAS - LVLD AP4: 7.1 CM
BH CV ECHO MEAS - LVLS AP2: 6.7 CM
BH CV ECHO MEAS - LVLS AP4: 5.9 CM
BH CV ECHO MEAS - LVOT AREA (M): 4.2 CM^2
BH CV ECHO MEAS - LVOT AREA: 4.2 CM^2
BH CV ECHO MEAS - LVOT DIAM: 2.3 CM
BH CV ECHO MEAS - LVPWD: 1 CM
BH CV ECHO MEAS - MED PEAK E' VEL: 11 CM/SEC
BH CV ECHO MEAS - MV DEC SLOPE: 409 CM/SEC^2
BH CV ECHO MEAS - MV DEC TIME: 150 SEC
BH CV ECHO MEAS - MV E MAX VEL: 99.9 CM/SEC
BH CV ECHO MEAS - MV MAX PG: 4.5 MMHG
BH CV ECHO MEAS - MV MEAN PG: 2 MMHG
BH CV ECHO MEAS - MV P1/2T MAX VEL: 103 CM/SEC
BH CV ECHO MEAS - MV P1/2T: 73.8 MSEC
BH CV ECHO MEAS - MV V2 MAX: 106 CM/SEC
BH CV ECHO MEAS - MV V2 MEAN: 54.4 CM/SEC
BH CV ECHO MEAS - MV V2 VTI: 21.4 CM
BH CV ECHO MEAS - MVA P1/2T LCG: 2.1 CM^2
BH CV ECHO MEAS - MVA(P1/2T): 3 CM^2
BH CV ECHO MEAS - MVA(VTI): 3.8 CM^2
BH CV ECHO MEAS - PA ACC TIME: 0.14 SEC
BH CV ECHO MEAS - PA MAX PG (FULL): 0.97 MMHG
BH CV ECHO MEAS - PA MAX PG: 2.9 MMHG
BH CV ECHO MEAS - PA PR(ACCEL): 16 MMHG
BH CV ECHO MEAS - PA V2 MAX: 84.5 CM/SEC
BH CV ECHO MEAS - PULM DIAS VEL: 68.9 CM/SEC
BH CV ECHO MEAS - PULM S/D: 0.59
BH CV ECHO MEAS - PULM SYS VEL: 40.7 CM/SEC
BH CV ECHO MEAS - PVA(V,A): 3.1 CM^2
BH CV ECHO MEAS - PVA(V,D): 3.1 CM^2
BH CV ECHO MEAS - QP/QS: 0.55
BH CV ECHO MEAS - RAP SYSTOLE: 3 MMHG
BH CV ECHO MEAS - RV MAX PG: 1.9 MMHG
BH CV ECHO MEAS - RV MEAN PG: 1 MMHG
BH CV ECHO MEAS - RV V1 MAX: 68.7 CM/SEC
BH CV ECHO MEAS - RV V1 MEAN: 42.6 CM/SEC
BH CV ECHO MEAS - RV V1 VTI: 11.9 CM
BH CV ECHO MEAS - RVOT AREA: 3.8 CM^2
BH CV ECHO MEAS - RVOT DIAM: 2.2 CM
BH CV ECHO MEAS - RVSP: 24 MMHG
BH CV ECHO MEAS - SI(AO): 102.5 ML/M^2
BH CV ECHO MEAS - SI(CUBED): 25.7 ML/M^2
BH CV ECHO MEAS - SI(LVOT): 34.7 ML/M^2
BH CV ECHO MEAS - SI(MOD-SP2): 26.2 ML/M^2
BH CV ECHO MEAS - SI(MOD-SP4): 22.4 ML/M^2
BH CV ECHO MEAS - SI(TEICH): 21.7 ML/M^2
BH CV ECHO MEAS - SV(AO): 242.7 ML
BH CV ECHO MEAS - SV(CUBED): 60.9 ML
BH CV ECHO MEAS - SV(LVOT): 82.3 ML
BH CV ECHO MEAS - SV(MOD-SP2): 62 ML
BH CV ECHO MEAS - SV(MOD-SP4): 53 ML
BH CV ECHO MEAS - SV(RVOT): 45.2 ML
BH CV ECHO MEAS - SV(TEICH): 51.5 ML
BH CV ECHO MEAS - TAPSE (>1.6): 1.7 CM2
BH CV ECHO MEAS - TR MAX VEL: 228 CM/SEC
BH CV ECHO MEASUREMENTS AVERAGE E/E' RATIO: 7.68
BH CV VAS BP RIGHT ARM: NORMAL MMHG
BH CV XLRA - RV BASE: 3.7 CM
BH CV XLRA - TDI S': 13 CM/SEC
BUN BLD-MCNC: 9 MG/DL (ref 8–23)
BUN/CREAT SERPL: 13.2 (ref 7–25)
CALCIUM SPEC-SCNC: 8.7 MG/DL (ref 8.6–10.5)
CHLORIDE SERPL-SCNC: 109 MMOL/L (ref 98–107)
CO2 SERPL-SCNC: 23.7 MMOL/L (ref 22–29)
CREAT BLD-MCNC: 0.68 MG/DL (ref 0.76–1.27)
DEPRECATED RDW RBC AUTO: 49.1 FL (ref 37–54)
EOSINOPHIL # BLD AUTO: 0.27 10*3/MM3 (ref 0–0.4)
EOSINOPHIL NFR BLD AUTO: 4.6 % (ref 0.3–6.2)
ERYTHROCYTE [DISTWIDTH] IN BLOOD BY AUTOMATED COUNT: 14.3 % (ref 12.3–15.4)
GFR SERPL CREATININE-BSD FRML MDRD: 118 ML/MIN/1.73
GLUCOSE BLD-MCNC: 112 MG/DL (ref 65–99)
HCT VFR BLD AUTO: 37.2 % (ref 37.5–51)
HGB BLD-MCNC: 12.2 G/DL (ref 13–17.7)
IMM GRANULOCYTES # BLD AUTO: 0.02 10*3/MM3 (ref 0–0.05)
IMM GRANULOCYTES NFR BLD AUTO: 0.3 % (ref 0–0.5)
LEFT ATRIUM VOLUME INDEX: 43 ML/M2
LEFT ATRIUM VOLUME: 97 CM3
LYMPHOCYTES # BLD AUTO: 1.02 10*3/MM3 (ref 0.7–3.1)
LYMPHOCYTES NFR BLD AUTO: 17.6 % (ref 19.6–45.3)
MAXIMAL PREDICTED HEART RATE: 158 BPM
MCH RBC QN AUTO: 31 PG (ref 26.6–33)
MCHC RBC AUTO-ENTMCNC: 32.8 G/DL (ref 31.5–35.7)
MCV RBC AUTO: 94.7 FL (ref 79–97)
MONOCYTES # BLD AUTO: 0.52 10*3/MM3 (ref 0.1–0.9)
MONOCYTES NFR BLD AUTO: 9 % (ref 5–12)
NEUTROPHILS # BLD AUTO: 3.96 10*3/MM3 (ref 1.7–7)
NEUTROPHILS NFR BLD AUTO: 68.2 % (ref 42.7–76)
NRBC BLD AUTO-RTO: 0 /100 WBC (ref 0–0.2)
PLATELET # BLD AUTO: 152 10*3/MM3 (ref 140–450)
PMV BLD AUTO: 11.8 FL (ref 6–12)
POTASSIUM BLD-SCNC: 3.5 MMOL/L (ref 3.5–5.2)
RBC # BLD AUTO: 3.93 10*6/MM3 (ref 4.14–5.8)
SODIUM BLD-SCNC: 143 MMOL/L (ref 136–145)
STRESS TARGET HR: 134 BPM
WBC NRBC COR # BLD: 5.81 10*3/MM3 (ref 3.4–10.8)

## 2019-08-15 PROCEDURE — 63710000001 DIPHENHYDRAMINE PER 50 MG: Performed by: NURSE PRACTITIONER

## 2019-08-15 PROCEDURE — 99223 1ST HOSP IP/OBS HIGH 75: CPT | Performed by: INTERNAL MEDICINE

## 2019-08-15 PROCEDURE — 93306 TTE W/DOPPLER COMPLETE: CPT

## 2019-08-15 PROCEDURE — 93306 TTE W/DOPPLER COMPLETE: CPT | Performed by: INTERNAL MEDICINE

## 2019-08-15 PROCEDURE — 80048 BASIC METABOLIC PNL TOTAL CA: CPT | Performed by: NURSE PRACTITIONER

## 2019-08-15 PROCEDURE — 85025 COMPLETE CBC W/AUTO DIFF WBC: CPT | Performed by: NURSE PRACTITIONER

## 2019-08-15 RX ORDER — DIPHENHYDRAMINE HCL 25 MG
25 CAPSULE ORAL EVERY 6 HOURS PRN
Status: DISCONTINUED | OUTPATIENT
Start: 2019-08-15 | End: 2019-08-20 | Stop reason: HOSPADM

## 2019-08-15 RX ADMIN — DIPHENHYDRAMINE HYDROCHLORIDE 25 MG: 25 CAPSULE ORAL at 20:19

## 2019-08-15 RX ADMIN — PANTOPRAZOLE SODIUM 40 MG: 40 TABLET, DELAYED RELEASE ORAL at 08:15

## 2019-08-15 RX ADMIN — RISPERIDONE 6 MG: 3 TABLET ORAL at 20:19

## 2019-08-15 RX ADMIN — POTASSIUM & SODIUM PHOSPHATES POWDER PACK 280-160-250 MG 1 PACKET: 280-160-250 PACK at 08:16

## 2019-08-15 RX ADMIN — DIPHENHYDRAMINE HYDROCHLORIDE 25 MG: 25 CAPSULE ORAL at 14:14

## 2019-08-15 RX ADMIN — POTASSIUM & SODIUM PHOSPHATES POWDER PACK 280-160-250 MG 1 PACKET: 280-160-250 PACK at 18:31

## 2019-08-15 RX ADMIN — SODIUM CHLORIDE, PRESERVATIVE FREE 3 ML: 5 INJECTION INTRAVENOUS at 08:15

## 2019-08-15 RX ADMIN — SODIUM CHLORIDE, POTASSIUM CHLORIDE, SODIUM LACTATE AND CALCIUM CHLORIDE 125 ML/HR: 600; 310; 30; 20 INJECTION, SOLUTION INTRAVENOUS at 06:38

## 2019-08-15 RX ADMIN — ASPIRIN 81 MG: 81 TABLET, COATED ORAL at 08:15

## 2019-08-15 RX ADMIN — SODIUM CHLORIDE, PRESERVATIVE FREE 3 ML: 5 INJECTION INTRAVENOUS at 20:22

## 2019-08-15 NOTE — PROGRESS NOTES
Name: Carlos Kraus ADMIT: 2019   : 1955  PCP: Provider, No Known    MRN: [id number] LOS: 1 days   AGE/SEX: 64 y.o. male  ROOM: [facility]/     Subjective   Subjective   no new complaints. asymptomatic with HR and BP.    denies chest pain, palpitations, SOA, edema, dizziness, lightheadedness, nausea and vomiting      Objective   Objective   Vital Signs  Temp:  [97.3 °F (36.3 °C)-98.4 °F (36.9 °C)] 97.3 °F (36.3 °C)  Heart Rate:  [] 110  Resp:  [16-20] 16  BP: ()/(63-96) 93/63  SpO2:  [93 %-98 %] 94 %  on   ;   Device (Oxygen Therapy): room air  Body mass index is 31.4 kg/m².  Physical Exam   Constitutional: He is oriented to person, place, and time. He appears well-developed and well-nourished. No distress.   HENT:   Head: Normocephalic and atraumatic.   Cardiovascular: An irregularly irregular rhythm present. Tachycardia present.   Pulmonary/Chest: Effort normal and breath sounds normal. No respiratory distress.   Musculoskeletal: Normal range of motion. He exhibits no edema.   Neurological: He is alert and oriented to person, place, and time.   Psychiatric: He has a normal mood and affect. His behavior is normal.   Nursing note and vitals reviewed.      Results Review:       I reviewed the patient's new clinical results.  Results from last 7 days   Lab Units 08/15/19  0429 19  0753   WBC 10*3/mm3 5.81 7.50   HEMOGLOBIN g/dL 12.2* 13.7   PLATELETS 10*3/mm3 152 181     Results from last 7 days   Lab Units 08/15/19  0429 19  0753   SODIUM mmol/L 143 138   POTASSIUM mmol/L 3.5 3.7   CHLORIDE mmol/L 109* 101   CO2 mmol/L 23.7 26.7   BUN mg/dL 9 11   CREATININE mg/dL 0.68* 0.73*   GLUCOSE mg/dL 112* 103*   Estimated Creatinine Clearance: 149.3 mL/min (A) (by C-G formula based on SCr of 0.68 mg/dL (L)).  Results from last 7 days   Lab Units 19  0753   ALBUMIN g/dL 3.60   BILIRUBIN mg/dL 0.6   ALK PHOS U/L 86   AST (SGOT) U/L 27   ALT (SGPT) U/L 46*     Results from last 7 days    Lab Units 08/15/19  0429 08/14/19  0753   CALCIUM mg/dL 8.7 8.9   ALBUMIN g/dL  --  3.60   MAGNESIUM mg/dL  --  2.1       No results found for: HGBA1C, POCGLU      aspirin 81 mg Oral Daily   pantoprazole 40 mg Oral Daily   potassium & sodium phosphates 1 packet Oral Q8H   risperiDONE 6 mg Oral Nightly   sodium chloride 3 mL Intravenous Q12H       lactated ringers 125 mL/hr Last Rate: 125 mL/hr (08/15/19 0638)   Diet Regular       Assessment/Plan     Active Hospital Problems    Diagnosis  POA   • **Syncope and collapse [R55]  Yes   • Schizophrenia (CMS/HCC) [F20.9]  Yes   • Atrial fibrillation (CMS/HCC) [I48.91]  Yes   • Hypertension [I10]  Yes   • Orthostatic hypotension [I95.1]  Yes      Resolved Hospital Problems   No resolved problems to display.     - cardiology following and will await further recommendations   - echo ordered   - HR remains uncontrolled but must continue to hold meds due to hypotension, will defer to cardiology   - patient has requested benadryl for his sinuses       VTE Prophylaxis - SCDs  Code Status - Full code    CARIDAD Power  [facility]  08/15/19  11:49 AM

## 2019-08-15 NOTE — CONSULTS
Patient Name: Carlos Kraus  Age/Sex: 64 y.o. male  : 1955  MRN: [id number]    Date of Admission: 2019  Date of Encounter Visit: 08/15/19  Encounter Provider: Miguelangel Benson MD  Place of Service: [facility] CARDIOLOGY      Referring Provider: Christopher Quinones MD  Patient Care Team:  Provider, No Known as PCP - General    Subjective:   Consulted for: Atrial fibrillation and Syncope    Chief Complaint: Syncope    History of Present Illness:  Carlos Karus is a 64 y.o. male patient with a history of schizophrenia, hypertension, and  Hyperlipidemia.  He presented to the ED after a syncopal episode.  [facility] and was geting up to get his routine labs drawn and he passed out and fell backwards.  He had brief LOC and fetl dizzy.  B/P at that time was 102/90 and dropped to 90/70.      On arrival to the ED his B/P was 104/63 with HR 82.  EKG showed atrial fibrillation with controlled rate.  Orthostatic B/P obtained and showed B/P lying 129/94, standing 111/83, and standing 87/68. Labs  Included Mg 2.1, TSH 3.590, Hgb 13.7, troponin negative, BUN/Creat 11/0.73.  CT of head was negative for acute changes. CXR showed mild cardiomegaly with increased density in the left lung base. He takes Toprol at home.  He was given 2.5 liters of IVF's in the ED and after 6 hours was still orthostatic and his HR was up to the 120's.  He was admitted for further evaluation.     He is on a 72 hour hold for safety since he came from [facility].     We have been consulted for his syncope and atrial fibrillation. The patient states he has seen a cardiologist in the past but he does not remember which doctor he saw.  He is not on anticoagulation.  He does remember feeling dizzy prior to his syncopal episode.  He states it has never happened before.           The patient really is a very little help with his history.  He does not recall being in the  [facility] which we believe he was in prior to [facility].  He does state that he may have seen a cardiologist in the past but he has no recollection of what the issue was.  He reports that he has not been taking any cardiac medications but then states that he was in [facility] because he ran out of his heart medicines.      Past Medical History:  Past Medical History:   Diagnosis Date   • A-fib (CMS/[facility])    • Atrial fibrillation (CMS/[facility]) 8/14/2019   • [place] esophagus    • GERD (gastroesophageal reflux disease)    • Hx of schizophrenia    • Hyperlipidemia    • Hypertension    • Hypophosphatemia    • Hypothyroidism    • Schizophrenia (CMS/[facility]) 8/14/2019   • Syncope and collapse 08/14/2019   • Umbilical hernia        History reviewed. No pertinent surgical history.    Home Medications:   Medications Prior to Admission   Medication Sig Dispense Refill Last Dose   • aspirin 81 MG EC tablet Take 81 mg by mouth Daily.      • metoprolol succinate XL (TOPROL-XL) 50 MG 24 hr tablet Take 150 mg by mouth Daily.      • pantoprazole (PROTONIX) 40 MG EC tablet Take 40 mg by mouth Daily.      • potassium & sodium phosphates (PHOS-NAK) 280-160-250 MG pack packet Take 1 packet by mouth Every 8 (Eight) Hours.      • risperiDONE (risperDAL) 3 MG tablet Take 6 mg by mouth Every Night.          Allergies:  Allergies   Allergen Reactions   • Penicillins Anaphylaxis   • Latex Hives       Past Social History:  Social History     Socioeconomic History   • Marital status: Single     Spouse name: Not on file   • Number of children: Not on file   • Years of education: Not on file   • Highest education level: Not on file   Tobacco Use   • Smoking status: Unknown If Ever Smoked   • Smokeless tobacco: Never Used   Substance and Sexual Activity   • Sexual activity: Defer        Past Family History:  History reviewed. No pertinent family history.    Review of Systems: All systems reviewed. Pertinent positives identified in HPI.  All other systems are negative.     REVIEW OF SYSTEMS:   CONSTITUTIONAL: No weight loss, fever, chills, weakness or fatigue.   HEENT: Eyes: No visual loss, blurred vision, double vision or yellow sclerae. Ears, Nose, Throat: No hearing loss, sneezing, congestion, runny nose or sore throat.   SKIN: No rash or itching.     RESPIRATORY: No shortness of breath, hemoptysis, cough or sputum.   GASTROINTESTINAL: No anorexia, nausea, vomiting or diarrhea. No abdominal pain, bright red blood per rectum or melena.  GENITOURINARY: No burning on urination, hematuria or increased frequency.  NEUROLOGICAL: No headache, dizziness, syncope, paralysis, ataxia, numbness or tingling in the extremities. No change in bowel or bladder control.   MUSCULOSKELETAL: No muscle, back pain, joint pain or stiffness.   HEMATOLOGIC: No anemia, bleeding or bruising.   LYMPHATICS: No enlarged nodes. No history of splenectomy.   PSYCHIATRIC: No history of depression, anxiety, hallucinations.   ENDOCRINOLOGIC: No reports of sweating, cold or heat intolerance. No polyuria or polydipsia.       Objective:     Objective:  Temp:  [97.4 °F (36.3 °C)-98.4 °F (36.9 °C)] 98.4 °F (36.9 °C)  Heart Rate:  [] 104  Resp:  [16-20] 20  BP: ()/(68-96) 112/96    Intake/Output Summary (Last 24 hours) at 8/15/2019 0741  Last data filed at 8/15/2019 0700  Gross per 24 hour   Intake 3766.42 ml   Output --   Net 3766.42 ml     Body mass index is 31.4 kg/m².      08/14/19  0746   Weight: 111 kg (244 lb 11.2 oz)           Physical Exam:   Physical Exam   Constitutional: He appears well-developed and well-nourished.   HENT:   Head: Normocephalic.   Eyes: Pupils are equal, round, and reactive to light.   Neck: Normal range of motion. No JVD present. Carotid bruit is not present. No thyromegaly present.   Cardiovascular: S1 normal, S2 normal, normal heart sounds and intact distal pulses. An irregularly irregular rhythm present. Tachycardia present. Exam reveals no  gallop and no friction rub.   No murmur heard.  Pulmonary/Chest: Effort normal and breath sounds normal.   Abdominal: Soft. Bowel sounds are normal.   Musculoskeletal: He exhibits no edema.   Neurological: He is alert.   Skin: Skin is warm, dry and intact. No erythema.   Psychiatric: He has a normal mood and affect.   Vitals reviewed.        Lab Review:     Results from last 7 days   Lab Units 08/15/19  0429 08/14/19  0753   SODIUM mmol/L 143 138   POTASSIUM mmol/L 3.5 3.7   CHLORIDE mmol/L 109* 101   CO2 mmol/L 23.7 26.7   BUN mg/dL 9 11   CREATININE mg/dL 0.68* 0.73*   GLUCOSE mg/dL 112* 103*   CALCIUM mg/dL 8.7 8.9       Results from last 7 days   Lab Units 08/14/19  0753   TROPONIN T ng/mL <0.010     Results from last 7 days   Lab Units 08/15/19  0429   WBC 10*3/mm3 5.81   HEMOGLOBIN g/dL 12.2*   HEMATOCRIT % 37.2*   PLATELETS 10*3/mm3 152     Results from last 7 days   Lab Units 08/14/19  0753   INR  1.03         Results from last 7 days   Lab Units 08/14/19  0753   MAGNESIUM mg/dL 2.1                 Results from last 7 days   Lab Units 08/14/19  0753   TSH mIU/mL 3.590       Imaging:      Imaging Results (most recent)     Procedure Component Value Units Date/Time    XR Chest 1 View [[id number]] Collected:  08/14/19 0850     Updated:  08/14/19 1548    Narrative:       XR HIP WITH OR WITHOUT PELVIS 2-3 VIEW LEFT-, XR CHEST 1 VIEW-   08/14/2019     HISTORY: Fell, left hip pain. Shortness of breath.     AP CHEST: Heart size is mildly enlarged. There is increased density in  the left base likely related to pleural fluid, atelectasis, infiltrate  and/or pleural scarring. Left upper lung and right lung appear clear.     There are no previous studies available for comparison.     No pneumothorax is seen.       Impression:       1. Mild cardiomegaly.  2. Increased density in the left lung base. Consider correlation with  any prior outside studies. If none are available short-term follow-up CT  of the chest with  contrast suggested.     AP PELVIS AND LEFT HIP 2 VIEWS     No acute bone, joint or soft tissue abnormalities are seen.     This report was finalized on 8/14/2019 3:45 PM by Dr. Gucci Hines M.D.       XR Hip With or Without Pelvis 2 - 3 View Left [[id number]] Collected:  08/14/19 0850     Updated:  08/14/19 1548    Narrative:       XR HIP WITH OR WITHOUT PELVIS 2-3 VIEW LEFT-, XR CHEST 1 VIEW-   08/14/2019     HISTORY: Fell, left hip pain. Shortness of breath.     AP CHEST: Heart size is mildly enlarged. There is increased density in  the left base likely related to pleural fluid, atelectasis, infiltrate  and/or pleural scarring. Left upper lung and right lung appear clear.     There are no previous studies available for comparison.     No pneumothorax is seen.       Impression:       1. Mild cardiomegaly.  2. Increased density in the left lung base. Consider correlation with  any prior outside studies. If none are available short-term follow-up CT  of the chest with contrast suggested.     AP PELVIS AND LEFT HIP 2 VIEWS     No acute bone, joint or soft tissue abnormalities are seen.     This report was finalized on 8/14/2019 3:45 PM by Dr. Gucci Hines M.D.       CT Head Without Contrast [[id number]] Collected:  08/14/19 1033     Updated:  08/14/19 1042    Narrative:       CT HEAD WITHOUT CONTRAST     HISTORY: Syncope.     TECHNIQUE:  Head CT includes axial imaging from the base of skull to the  vertex without intravenous contrast. Radiation dose reduction techniques  were utilized, including automated exposure control and exposure  modulation based on body size.     COMPARISON: None.     FINDINGS: There are no abnormal areas of increased attenuation  intraaxially to suggest hemorrhage. No extra-axial fluid collection is  observed. There is no evidence for cerebral edema, mass effect or shift  of the midline structures. Ventricles appear within normal limits for  size and configuration. There is  generalized calvarial thickening. Mild  bilateral maxillary, sphenoid, and ethmoid sinus mucosal thickening is  present.       Impression:       No evidence for acute intracranial abnormality.     This report was finalized on 8/14/2019 10:38 AM by Dr. Devyn Sharma M.D.                 Telemetry          EKG:         Baseline:     No baseline EKG found    I personally viewed and interpreted the patient's EKG/Telemetry data.    Assessment:   Assessment/Plan         Syncope and collapse    Schizophrenia (CMS/HCC)    Atrial fibrillation (CMS/HCC)    Hypertension    Orthostatic hypotension    1.  Near syncope: The patient reports that he felt dizzy and lightheaded and fell backwards but he does not report loss of consciousness.    2.  Orthostatic hypotension: Uncertain at this point as to the etiology.  This might be medication induced in part.    3.  Atrial fibrillation: This might be a new diagnosis.  We will have to investigate his previous hospitalization which we believe was at the [facility] to find out if there is been any history of atrial fibrillation.  It appears that he has been on metoprolol probably for hypertension.  We will need to investigate his situation to see if he is reliable enough or in a facility that can administer anticoagulation appropriately.  However his [id number]: 1  Which makes him a fairly low risk for thromboembolism.  Plan:     1.  Echocardiogram: The patient has cardiomegaly on chest x-ray.  Would also help determine what the probability of converting him back to sinus rhythm.  2.  Try to obtain history from previous hospitalizations.    Thank you for allowing me to participate in the care of Carlos Kraus. Feel free to contact me directly with any further questions or concerns.    Miguelangel Benson MD  [facility]  08/15/19  7:41 AM    EMR Dragon/Transcription disclaimer:   Much of this encounter note is an electronic transcription/translation  of spoken language to printed text. The electronic translation of spoken language may permit erroneous, or at times, nonsensical words or phrases to be inadvertently transcribed; Although I have reviewed the note for such errors, some may still exist.     The portion of the history entered by Anali Joyce RN has been verified by me.  Additional entries by myself or made after direct evaluation of the patient.

## 2019-08-15 NOTE — PLAN OF CARE
Problem: Patient Care Overview  Goal: Plan of Care Review  Outcome: Ongoing (interventions implemented as appropriate)   08/15/19 1902   Coping/Psychosocial   Plan of Care Reviewed With patient   Plan of Care Review   Progress no change   OTHER   Outcome Summary Pt A&Ox4, VSS - HR still all over the place highest rate in the 160s-170s but never sustaining. Pt continues to be asymptomatic. Sitter at bedside. Awaiting plan from cardiologist. Echo showed EF 57%. Added PRN Benadryl for itching. Will continue to monitor.

## 2019-08-15 NOTE — PLAN OF CARE
Problem: Patient Care Overview  Goal: Plan of Care Review  Outcome: Ongoing (interventions implemented as appropriate)   08/15/19 0598   Coping/Psychosocial   Plan of Care Reviewed With patient   Plan of Care Review   Progress improving   OTHER   Outcome Summary hr increasingly variable as the night has progressed; hr noted at times to be in in 150's with activity but is not sustained; pt asymptommatic; sitter at bedside; pt alert to self and place; will continue to closely monitor     Goal: Individualization and Mutuality  Outcome: Ongoing (interventions implemented as appropriate)    Goal: Discharge Needs Assessment  Outcome: Ongoing (interventions implemented as appropriate)      Problem: Fall Risk (Adult)  Goal: Absence of Fall  Outcome: Ongoing (interventions implemented as appropriate)      Problem: Skin Injury Risk (Adult)  Goal: Skin Health and Integrity  Outcome: Ongoing (interventions implemented as appropriate)      Problem: Syncope (Adult)  Goal: Physical Safety/Health Maintenance  Outcome: Ongoing (interventions implemented as appropriate)    Goal: Optimal Emotional/Functional [place]  Outcome: Ongoing (interventions implemented as appropriate)

## 2019-08-16 PROBLEM — E87.6 HYPOKALEMIA: Status: ACTIVE | Noted: 2019-08-16

## 2019-08-16 LAB
ANION GAP SERPL CALCULATED.3IONS-SCNC: 11.6 MMOL/L (ref 5–15)
BUN BLD-MCNC: 8 MG/DL (ref 8–23)
BUN/CREAT SERPL: 11.4 (ref 7–25)
CALCIUM SPEC-SCNC: 8.6 MG/DL (ref 8.6–10.5)
CHLORIDE SERPL-SCNC: 108 MMOL/L (ref 98–107)
CO2 SERPL-SCNC: 24.4 MMOL/L (ref 22–29)
CREAT BLD-MCNC: 0.7 MG/DL (ref 0.76–1.27)
DEPRECATED RDW RBC AUTO: 49.8 FL (ref 37–54)
ERYTHROCYTE [DISTWIDTH] IN BLOOD BY AUTOMATED COUNT: 14.3 % (ref 12.3–15.4)
GFR SERPL CREATININE-BSD FRML MDRD: 114 ML/MIN/1.73
GLUCOSE BLD-MCNC: 108 MG/DL (ref 65–99)
HCT VFR BLD AUTO: 35.5 % (ref 37.5–51)
HGB BLD-MCNC: 11.6 G/DL (ref 13–17.7)
MCH RBC QN AUTO: 31.4 PG (ref 26.6–33)
MCHC RBC AUTO-ENTMCNC: 32.7 G/DL (ref 31.5–35.7)
MCV RBC AUTO: 95.9 FL (ref 79–97)
PLATELET # BLD AUTO: 146 10*3/MM3 (ref 140–450)
PMV BLD AUTO: 11.9 FL (ref 6–12)
POTASSIUM BLD-SCNC: 3.3 MMOL/L (ref 3.5–5.2)
RBC # BLD AUTO: 3.7 10*6/MM3 (ref 4.14–5.8)
SODIUM BLD-SCNC: 144 MMOL/L (ref 136–145)
WBC NRBC COR # BLD: 5.53 10*3/MM3 (ref 3.4–10.8)

## 2019-08-16 PROCEDURE — 94799 UNLISTED PULMONARY SVC/PX: CPT

## 2019-08-16 PROCEDURE — 99232 SBSQ HOSP IP/OBS MODERATE 35: CPT | Performed by: NURSE PRACTITIONER

## 2019-08-16 PROCEDURE — 85027 COMPLETE CBC AUTOMATED: CPT | Performed by: HOSPITALIST

## 2019-08-16 PROCEDURE — 80048 BASIC METABOLIC PNL TOTAL CA: CPT | Performed by: HOSPITALIST

## 2019-08-16 PROCEDURE — 25010000002 DIGOXIN PER 500 MCG: Performed by: NURSE PRACTITIONER

## 2019-08-16 PROCEDURE — 63710000001 DIPHENHYDRAMINE PER 50 MG: Performed by: NURSE PRACTITIONER

## 2019-08-16 RX ORDER — DIGOXIN 125 MCG
125 TABLET ORAL
Status: DISCONTINUED | OUTPATIENT
Start: 2019-08-16 | End: 2019-08-18

## 2019-08-16 RX ORDER — DIGOXIN 0.25 MG/ML
250 INJECTION INTRAMUSCULAR; INTRAVENOUS EVERY 6 HOURS
Status: COMPLETED | OUTPATIENT
Start: 2019-08-16 | End: 2019-08-16

## 2019-08-16 RX ORDER — DIGOXIN 0.25 MG/ML
500 INJECTION INTRAMUSCULAR; INTRAVENOUS ONCE
Status: COMPLETED | OUTPATIENT
Start: 2019-08-16 | End: 2019-08-16

## 2019-08-16 RX ORDER — POTASSIUM CHLORIDE 750 MG/1
40 CAPSULE, EXTENDED RELEASE ORAL AS NEEDED
Status: DISCONTINUED | OUTPATIENT
Start: 2019-08-16 | End: 2019-08-20 | Stop reason: HOSPADM

## 2019-08-16 RX ADMIN — POTASSIUM & SODIUM PHOSPHATES POWDER PACK 280-160-250 MG 1 PACKET: 280-160-250 PACK at 01:40

## 2019-08-16 RX ADMIN — POTASSIUM & SODIUM PHOSPHATES POWDER PACK 280-160-250 MG 1 PACKET: 280-160-250 PACK at 18:34

## 2019-08-16 RX ADMIN — ASPIRIN 81 MG: 81 TABLET, COATED ORAL at 09:26

## 2019-08-16 RX ADMIN — DIPHENHYDRAMINE HYDROCHLORIDE 25 MG: 25 CAPSULE ORAL at 22:18

## 2019-08-16 RX ADMIN — DIGOXIN 250 MCG: 0.25 INJECTION INTRAMUSCULAR; INTRAVENOUS at 22:18

## 2019-08-16 RX ADMIN — POTASSIUM CHLORIDE 40 MEQ: 750 CAPSULE, EXTENDED RELEASE ORAL at 09:26

## 2019-08-16 RX ADMIN — SODIUM CHLORIDE, PRESERVATIVE FREE 3 ML: 5 INJECTION INTRAVENOUS at 21:19

## 2019-08-16 RX ADMIN — POTASSIUM CHLORIDE 40 MEQ: 750 CAPSULE, EXTENDED RELEASE ORAL at 20:35

## 2019-08-16 RX ADMIN — PANTOPRAZOLE SODIUM 40 MG: 40 TABLET, DELAYED RELEASE ORAL at 09:26

## 2019-08-16 RX ADMIN — RISPERIDONE 6 MG: 3 TABLET ORAL at 20:35

## 2019-08-16 RX ADMIN — DIPHENHYDRAMINE HYDROCHLORIDE 25 MG: 25 CAPSULE ORAL at 09:26

## 2019-08-16 RX ADMIN — DIGOXIN 500 MCG: 0.25 INJECTION INTRAMUSCULAR; INTRAVENOUS at 11:34

## 2019-08-16 RX ADMIN — DIGOXIN 125 MCG: 125 TABLET ORAL at 11:40

## 2019-08-16 RX ADMIN — POTASSIUM & SODIUM PHOSPHATES POWDER PACK 280-160-250 MG 1 PACKET: 280-160-250 PACK at 09:26

## 2019-08-16 RX ADMIN — DIGOXIN 250 MCG: 0.25 INJECTION INTRAMUSCULAR; INTRAVENOUS at 17:58

## 2019-08-16 RX ADMIN — SODIUM CHLORIDE, PRESERVATIVE FREE 3 ML: 5 INJECTION INTRAVENOUS at 11:34

## 2019-08-16 NOTE — PLAN OF CARE
Problem: Patient Care Overview  Goal: Plan of Care Review   08/16/19 1444   Coping/Psychosocial   Plan of Care Reviewed With patient   Plan of Care Review   Progress improving   OTHER   Outcome Summary Digoxin was started for better pressure support and HR reduction. This AM was still hypotensive, and HR up to 180's when walking. He complains of no S/S.        Problem: Fall Risk (Adult)  Goal: Absence of Fall  Outcome: Ongoing (interventions implemented as appropriate)      Problem: Skin Injury Risk (Adult)  Goal: Skin Health and Integrity  Outcome: Ongoing (interventions implemented as appropriate)      Problem: Syncope (Adult)  Goal: Physical Safety/Health Maintenance  Outcome: Ongoing (interventions implemented as appropriate)    Goal: Optimal Emotional/Functional [place]  Outcome: Ongoing (interventions implemented as appropriate)

## 2019-08-16 NOTE — PROGRESS NOTES
"    Patient Name: Carlos Kraus  :1955  64 y.o.      Patient Care Team:  Provider, No Known as PCP - General    Chief Complaint: follow up atrial fibrillation    Interval History: I reviewed records from [facility] He had persistent atrial fibrillation at that time. He had an echo with normal LV function and mild to moderate LAE. He was placed on beta blocker. This was titrated for HR control and he was discharged on metoprolol XL 150mg daily as well as ASA. Per notes, he has a documented history of medication noncompliance. He was discharged previously and did not take any medications following discharge. He was discharged from [facility] to [facility] and has been there since then.     HR is poorly controlled. It jumps up to 180's when he stands up. BP is low.      Objective   Vital Signs  Temp:  [97.7 °F (36.5 °C)-98.4 °F (36.9 °C)] 98.3 °F (36.8 °C)  Heart Rate:  [] 195  Resp:  [0-18] 0  BP: ()/() 100/79    Intake/Output Summary (Last 24 hours) at 2019 1112  Last data filed at 2019 0938  Gross per 24 hour   Intake 890 ml   Output --   Net 890 ml     Flowsheet Rows      First Filed Value   Admission Height  188 cm (74\") Documented at 2019 0746   Admission Weight  111 kg (244 lb 11.2 oz) Documented at 2019 0746          Physical Exam:   General Appearance:    Alert, cooperative, in no acute distress   Lungs:     Clear to auscultation.  Normal respiratory effort and rate.      Heart:    irregular rhythm and normal rate, normal S1 and S2, no murmurs, gallops or rubs.     Chest Wall:    No abnormalities observed   Abdomen:     Soft, nontender, positive bowel sounds.     Extremities:   no cyanosis, clubbing or edema.  No marked joint deformities.  Adequate musculoskeletal strength.       Results Review:    Results from last 7 days   Lab Units 19  0342   SODIUM mmol/L 144   POTASSIUM mmol/L 3.3*   CHLORIDE mmol/L 108*   CO2 mmol/L 24.4   BUN mg/dL 8   CREATININE mg/dL " 0.70*   GLUCOSE mg/dL 108*   CALCIUM mg/dL 8.6     Results from last 7 days   Lab Units 08/14/19  0753   TROPONIN T ng/mL <0.010     Results from last 7 days   Lab Units 08/16/19  0342   WBC 10*3/mm3 5.53   HEMOGLOBIN g/dL 11.6*   HEMATOCRIT % 35.5*   PLATELETS 10*3/mm3 146     Results from last 7 days   Lab Units 08/14/19  0753   INR  1.03     Results from last 7 days   Lab Units 08/14/19  0753   MAGNESIUM mg/dL 2.1                   Medication Review:     aspirin 81 mg Oral Daily   digoxin 250 mcg Intravenous Q6H   digoxin 500 mcg Intravenous Once   digoxin 125 mcg Oral Daily   pantoprazole 40 mg Oral Daily   potassium & sodium phosphates 1 packet Oral Q8H   risperiDONE 6 mg Oral Nightly   sodium chloride 3 mL Intravenous Q12H             Assessment/Plan   1. Atrial fibrillation - likely persistent. He was diagnosed with this in July at [facility]. He was discharged on beta blocker and ASA. BP now low. Will load with digoxin.     2. Orthostatic hypotension - still pressure. HR not helping. Rate control as above. Defer further intervention to primary team.     3. Normal LV function, mild to moderate LAE    4. Schizophrenia with documented medication noncompliance. He will be returning to [facility] at discharge. Not sure what long term plan will be.  He has a CHADs 2 Vasc score of 1 and is relatively low risk for thromboembolism. Continue ASA.     CARIDAD Emerson  [facility]  08/16/19  11:12 AM

## 2019-08-16 NOTE — PROGRESS NOTES
Continued Stay Note  [facility]     Patient Name: Carlos Kraus  MRN: [id number]  Today's Date: 8/16/2019    Admit Date: 8/14/2019    Discharge Plan     Row Name 08/16/19 1328       Plan    Plan  Return to [facility]     Patient/Family in Agreement with Plan  yes    Plan Comments  CCP discussed with [facility]; patient has 60 [address] court order to return to [facility]. CCP spoke with admission/[facility]; disclosed they do not provide transportation for patient and he will need to arrive by ambulance. MD to contact [facility] for medical clearance. CCP will need to fax labs, meds, notes and d/c to  F: [phone]. CCP requested [facility] provide copy of court order for patient to return there. Radha/Admissions state they will fax to CCP. Cary STINSON         Discharge Codes    No documentation.             SHANTELL Ureña

## 2019-08-16 NOTE — PROGRESS NOTES
Name: Carlos Kraus ADMIT: 2019   : 1955  PCP: Provider, No Known    MRN: [id number] LOS: 2 days   AGE/SEX: 64 y.o. male  ROOM: [facility]     Subjective   Subjective   No new complaints. Symptomatic today with elevated HR and low BP.    Denies chest pain, SOA, edema, nausea and vomiting +dizziness, lightheadedness and palpitations earlier     Objective   Objective   Vital Signs  Temp:  [97.7 °F (36.5 °C)-98.4 °F (36.9 °C)] 98.3 °F (36.8 °C)  Heart Rate:  [] 195  Resp:  [0-18] 0  BP: ()/() 100/79  SpO2:  [79 %-97 %] 88 %  on  Flow (L/min):  [2] 2;   Device (Oxygen Therapy): room air  Body mass index is 31.46 kg/m².  Physical Exam   Constitutional: He is oriented to person, place, and time. He appears well-developed and well-nourished. No distress.   HENT:   Head: Normocephalic and atraumatic.   Cardiovascular: An irregularly irregular rhythm present. Tachycardia present.   Pulmonary/Chest: Effort normal and breath sounds normal. No respiratory distress.   Musculoskeletal: Normal range of motion. He exhibits no edema.   Neurological: He is alert and oriented to person, place, and time.   Psychiatric: He has a normal mood and affect. His behavior is normal.   Nursing note and vitals reviewed.      Results Review:       I reviewed the patient's new clinical results.  Results from last 7 days   Lab Units 19  0342 08/15/19  0429 08/14/19  0753   WBC 10*3/mm3 5.53 5.81 7.50   HEMOGLOBIN g/dL 11.6* 12.2* 13.7   PLATELETS 10*3/mm3 146 152 181     Results from last 7 days   Lab Units 19  0342 08/15/19  0429 19  0753   SODIUM mmol/L 144 143 138   POTASSIUM mmol/L 3.3* 3.5 3.7   CHLORIDE mmol/L 108* 109* 101   CO2 mmol/L 24.4 23.7 26.7   BUN mg/dL 8 9 11   CREATININE mg/dL 0.70* 0.68* 0.73*   GLUCOSE mg/dL 108* 112* 103*   Estimated Creatinine Clearance: 145 mL/min (A) (by C-G formula based on SCr of 0.7 mg/dL (L)).  Results from last 7 days   Lab Units 19  0753   ALBUMIN  g/dL 3.60   BILIRUBIN mg/dL 0.6   ALK PHOS U/L 86   AST (SGOT) U/L 27   ALT (SGPT) U/L 46*     Results from last 7 days   Lab Units 08/16/19  0342 08/15/19  0429 08/14/19  0753   CALCIUM mg/dL 8.6 8.7 8.9   ALBUMIN g/dL  --   --  3.60   MAGNESIUM mg/dL  --   --  2.1       No results found for: HGBA1C, POCGLU      aspirin 81 mg Oral Daily   digoxin 250 mcg Intravenous Q6H   digoxin 500 mcg Intravenous Once   digoxin 125 mcg Oral Daily   pantoprazole 40 mg Oral Daily   potassium & sodium phosphates 1 packet Oral Q8H   risperiDONE 6 mg Oral Nightly   sodium chloride 3 mL Intravenous Q12H      Diet Regular       Assessment/Plan     Active Hospital Problems    Diagnosis  POA   • **Syncope and collapse [R55]  Yes   • Hypokalemia [E87.6]  Unknown   • Schizophrenia (CMS/HCC) [F20.9]  Yes   • Atrial fibrillation (CMS/HCC) [I48.91]  Yes   • Hypertension [I10]  Yes   • Orthostatic hypotension [I95.1]  Yes      Resolved Hospital Problems   No resolved problems to display.     - cardiology following  - echo unremarkable   - HR still labile. Still orthostatic, now symptomatic with c/o palpitations, dizziness and lightheadedness upon standing.   - patient has requested benadryl for his sinuses   - replace K+      VTE Prophylaxis - SCDs  Code Status - Full code    CARIDAD Power  [facility]  08/16/19  10:52 AM    I personally interviewed and examined the patient and the physical findings as per above plus my exam revealed a pleasant gentleman with flat affect resting comfortably in bed. At present he denies any chest pain, SOA, or palp, but at present his HR is in 80's and BP is okay bc he is lying flat.      I agree with assessment and plan as per above. Appreciate Card attention to pt.    Christopher Quinones MD   8/16/2019  1:42 PM       Skin normal color for race, warm, dry and intact. No evidence of rash.

## 2019-08-16 NOTE — PROGRESS NOTES
Discharge Planning Assessment  [facility]     Patient Name: Carlos Kraus  MRN: [id number]  Today's Date: 8/16/2019    Admit Date: 8/14/2019    Discharge Needs Assessment     Row Name 08/16/19 1016       Living Environment    Lives With  facility resident    Provides Primary Care For  no one, unable/limited ability to care for self       Transition Planning    Patient/Family Anticipated Services at Transition  mental health services       Discharge Needs Assessment    Concerns to be Addressed  cognitive/perceptual;legal    Outpatient/Agency/Support Group Needs  psychiatric facility    Discharge Facility/Level of Care Needs  psychiatric facility    Current Discharge Risk  psychiatric illness        Discharge Plan     Row Name 08/16/19 0952       Plan    Plan  From [facility]     Patient/Family in Agreement with Plan  yes    Plan Comments  CCP spoke with [facility] ([phone]) Jhonatan; patient was discharged from [facility] after a month stay to [facility] and has been there for approximately 2 days and then transferred to [facility] due to Syncope episode. Per Jhonatan; MD will need to call [facility] for medical clearance before patient can return; recommend that MD call one day prior to discharge. CCP will need to fax discharge summary/notes to [phone]. Per Jhonatan/[facility]; patient does not have legal guardian on file but has a sister listed as emergency contact. CCP made outbound call to Luzshae Grimes [phone] (message stating call could not be completed at this time; CCP attempted 2xs). CCP discussed with ulises/Renato who will assist with determining if patient has MIW/involuntary status. CCP will continue to attempt to contact patient's sister and follow up with ulises. Cary Feng JOSUE     Final Discharge Disposition Code  --        Destination      No service coordination in this encounter.      Durable Medical Equipment      No service coordination in this encounter.      Dialysis/Infusion      No service  coordination in this encounter.      Home Medical Care      No service coordination in this encounter.      Therapy      No service coordination in this encounter.      Community Resources      No service coordination in this encounter.          Demographic Summary     Row Name 08/16/19 1016       General Information    Admission Type  inpatient    Arrived From  psychiatric facility        Functional Status     Row Name 08/16/19 1016       Functional Status    Usual Activity Tolerance  good       Functional Status, IADL    Medications  independent    Meal Preparation  independent    Housekeeping  independent    Laundry  independent    Shopping  independent        Psychosocial    No documentation.       Abuse/Neglect    No documentation.       Legal    No documentation.       Substance Abuse    No documentation.       Patient Forms    No documentation.           SHANTELL Ureña

## 2019-08-16 NOTE — PLAN OF CARE
Problem: Patient Care Overview  Goal: Plan of Care Review  Outcome: Ongoing (interventions implemented as appropriate)   08/16/19 1208   Coping/Psychosocial   Plan of Care Reviewed With patient   Plan of Care Review   Progress improving   OTHER   Outcome Summary Pt a/o x4. HR jumps to 160s upon exertion but does not sustain. Pt is asymptomatic. Orthostatic vitals q shift. Sitter bedside. PRN benadryl for itching. No new complaints tonight. Will continue to monitor.     Goal: Individualization and Mutuality  Outcome: Ongoing (interventions implemented as appropriate)    Goal: Discharge Needs Assessment  Outcome: Ongoing (interventions implemented as appropriate)    Goal: Interprofessional Rounds/Family Conf  Outcome: Ongoing (interventions implemented as appropriate)      Problem: Fall Risk (Adult)  Goal: Absence of Fall  Outcome: Ongoing (interventions implemented as appropriate)      Problem: Skin Injury Risk (Adult)  Goal: Skin Health and Integrity  Outcome: Ongoing (interventions implemented as appropriate)      Problem: Syncope (Adult)  Goal: Physical Safety/Health Maintenance  Outcome: Ongoing (interventions implemented as appropriate)    Goal: Optimal Emotional/Functional [place]  Outcome: Ongoing (interventions implemented as appropriate)

## 2019-08-16 NOTE — CONSULTS
Contacted by DANIA Townsend who reports pt had been at [facility] for a month, transferred to [facility], and was there for two days prior to being sent to our hospital for Syncope. Concerned about status, contact [facility] at [phone]. Jhonatan in admissions reports that pt was admitted to [facility] on a 60 day court ordered treatment, and is to return there upon medical clearance.     Once pt is ready for discharge- records including labs, physician notes, and any other pertinent medical information should be faxed to [phone].  Then a physician from [facility] will need to call number above ([phone]) to report medical clearance and answer any further questions they may have. Following this, pt should be accepted for transfer back to [facility] to complete his court mandated treatment.

## 2019-08-17 LAB
ANION GAP SERPL CALCULATED.3IONS-SCNC: 10 MMOL/L (ref 5–15)
BUN BLD-MCNC: 8 MG/DL (ref 8–23)
BUN/CREAT SERPL: 11.9 (ref 7–25)
CALCIUM SPEC-SCNC: 8.8 MG/DL (ref 8.6–10.5)
CHLORIDE SERPL-SCNC: 107 MMOL/L (ref 98–107)
CO2 SERPL-SCNC: 24 MMOL/L (ref 22–29)
CREAT BLD-MCNC: 0.67 MG/DL (ref 0.76–1.27)
DEPRECATED RDW RBC AUTO: 50.6 FL (ref 37–54)
ERYTHROCYTE [DISTWIDTH] IN BLOOD BY AUTOMATED COUNT: 14.5 % (ref 12.3–15.4)
GFR SERPL CREATININE-BSD FRML MDRD: 120 ML/MIN/1.73
GLUCOSE BLD-MCNC: 104 MG/DL (ref 65–99)
HCT VFR BLD AUTO: 38.4 % (ref 37.5–51)
HGB BLD-MCNC: 12.4 G/DL (ref 13–17.7)
MCH RBC QN AUTO: 30.8 PG (ref 26.6–33)
MCHC RBC AUTO-ENTMCNC: 32.3 G/DL (ref 31.5–35.7)
MCV RBC AUTO: 95.5 FL (ref 79–97)
PLATELET # BLD AUTO: 146 10*3/MM3 (ref 140–450)
PMV BLD AUTO: 12.3 FL (ref 6–12)
POTASSIUM BLD-SCNC: 4 MMOL/L (ref 3.5–5.2)
RBC # BLD AUTO: 4.02 10*6/MM3 (ref 4.14–5.8)
SODIUM BLD-SCNC: 141 MMOL/L (ref 136–145)
WBC NRBC COR # BLD: 8.09 10*3/MM3 (ref 3.4–10.8)

## 2019-08-17 PROCEDURE — 93010 ELECTROCARDIOGRAM REPORT: CPT | Performed by: INTERNAL MEDICINE

## 2019-08-17 PROCEDURE — 80048 BASIC METABOLIC PNL TOTAL CA: CPT | Performed by: NURSE PRACTITIONER

## 2019-08-17 PROCEDURE — 99233 SBSQ HOSP IP/OBS HIGH 50: CPT | Performed by: INTERNAL MEDICINE

## 2019-08-17 PROCEDURE — 93005 ELECTROCARDIOGRAM TRACING: CPT | Performed by: INTERNAL MEDICINE

## 2019-08-17 PROCEDURE — 85027 COMPLETE CBC AUTOMATED: CPT | Performed by: HOSPITALIST

## 2019-08-17 PROCEDURE — 63710000001 DIPHENHYDRAMINE PER 50 MG: Performed by: NURSE PRACTITIONER

## 2019-08-17 RX ORDER — LOPERAMIDE HYDROCHLORIDE 2 MG/1
2 CAPSULE ORAL ONCE
Status: COMPLETED | OUTPATIENT
Start: 2019-08-17 | End: 2019-08-17

## 2019-08-17 RX ADMIN — SODIUM CHLORIDE, PRESERVATIVE FREE 3 ML: 5 INJECTION INTRAVENOUS at 22:37

## 2019-08-17 RX ADMIN — POTASSIUM & SODIUM PHOSPHATES POWDER PACK 280-160-250 MG 1 PACKET: 280-160-250 PACK at 21:38

## 2019-08-17 RX ADMIN — ASPIRIN 81 MG: 81 TABLET, COATED ORAL at 08:51

## 2019-08-17 RX ADMIN — METOPROLOL TARTRATE 25 MG: 25 TABLET ORAL at 21:38

## 2019-08-17 RX ADMIN — POTASSIUM & SODIUM PHOSPHATES POWDER PACK 280-160-250 MG 1 PACKET: 280-160-250 PACK at 14:46

## 2019-08-17 RX ADMIN — POTASSIUM & SODIUM PHOSPHATES POWDER PACK 280-160-250 MG 1 PACKET: 280-160-250 PACK at 06:13

## 2019-08-17 RX ADMIN — PANTOPRAZOLE SODIUM 40 MG: 40 TABLET, DELAYED RELEASE ORAL at 08:51

## 2019-08-17 RX ADMIN — RISPERIDONE 6 MG: 3 TABLET ORAL at 21:38

## 2019-08-17 RX ADMIN — DIGOXIN 125 MCG: 125 TABLET ORAL at 12:15

## 2019-08-17 RX ADMIN — DIPHENHYDRAMINE HYDROCHLORIDE 25 MG: 25 CAPSULE ORAL at 21:38

## 2019-08-17 RX ADMIN — METOPROLOL TARTRATE 25 MG: 25 TABLET ORAL at 09:41

## 2019-08-17 RX ADMIN — ACETAMINOPHEN 650 MG: 325 TABLET ORAL at 02:44

## 2019-08-17 RX ADMIN — LOPERAMIDE HYDROCHLORIDE 2 MG: 2 CAPSULE ORAL at 17:46

## 2019-08-17 NOTE — NURSING NOTE
Call placed to Dr. Quinones regarding patient having multiple diarrheal episodes. Awaiting call back

## 2019-08-17 NOTE — PLAN OF CARE
Problem: Patient Care Overview  Goal: Plan of Care Review  Outcome: Ongoing (interventions implemented as appropriate)   08/17/19 1246   Coping/Psychosocial   Plan of Care Reviewed With patient   Plan of Care Review   Progress improving     Goal: Individualization and Mutuality  Outcome: Ongoing (interventions implemented as appropriate)    Goal: Discharge Needs Assessment  Outcome: Ongoing (interventions implemented as appropriate)    Goal: Interprofessional Rounds/Family Conf  Outcome: Ongoing (interventions implemented as appropriate)      Problem: Fall Risk (Adult)  Goal: Absence of Fall  Outcome: Ongoing (interventions implemented as appropriate)      Problem: Skin Injury Risk (Adult)  Goal: Skin Health and Integrity  Outcome: Ongoing (interventions implemented as appropriate)      Problem: Syncope (Adult)  Goal: Physical Safety/Health Maintenance  Outcome: Ongoing (interventions implemented as appropriate)    Goal: Optimal Emotional/Functional [place]  Outcome: Ongoing (interventions implemented as appropriate)

## 2019-08-17 NOTE — PROGRESS NOTES
"Carlos Kraus  1955 64 y.o.  [id number]      Patient Care Team:  Provider, No Known as PCP - General    CC: Persistent atrial fibrillation, schizophrenia, normal LV function, chads 2 Vascor of 1    Interval History: No complaints      Objective   Vital Signs  Temp:  [97.7 °F (36.5 °C)-98.3 °F (36.8 °C)] 98.1 °F (36.7 °C)  Heart Rate:  [] 131  Resp:  [13-18] 18  BP: ()/() 109/83    Intake/Output Summary (Last 24 hours) at 8/17/2019 0924  Last data filed at 8/17/2019 0838  Gross per 24 hour   Intake 1280 ml   Output --   Net 1280 ml     Flowsheet Rows      First Filed Value   Admission Height  188 cm (74\") Documented at 08/14/2019 0746   Admission Weight  111 kg (244 lb 11.2 oz) Documented at 08/14/2019 0746          Physical Exam:   General Appearance:    Alert,oriented, in no acute distress   Lungs:     Clear to auscultation,BS are equal    Heart:    Normal S1 and S2, iRRR without murmur, gallop or rub   HEENT:    Sclerae are clear, no JVD or adenopathy   Abdomen:     Normal bowel sounds, soft non-tender, non-distended, no HSM   Extremities:   Moves all extremities well, no edema, no cyanosis, no             Redness, no rash     Medication Review:        aspirin 81 mg Oral Daily   digoxin 125 mcg Oral Daily   metoprolol tartrate 25 mg Oral Q12H   pantoprazole 40 mg Oral Daily   potassium & sodium phosphates 1 packet Oral Q8H   risperiDONE 6 mg Oral Nightly   sodium chloride 3 mL Intravenous Q12H            I reviewed the patient's new clinical results.  I personally viewed and interpreted the patient's EKG/Telemetry data    Assessment/Plan  Active Hospital Problems    Diagnosis  POA   • **Syncope and collapse [R55]  Yes   • Hypokalemia [E87.6]  Unknown   • Schizophrenia (CMS/HCC) [F20.9]  Yes   • Atrial fibrillation (CMS/HCC) [I48.91]  Yes   • Hypertension [I10]  Yes   • Orthostatic hypotension [I95.1]  Yes      Resolved Hospital Problems   No resolved problems to display.       This is a " terrible social situation mainly he has persistent A. fib his rate is been a little bit difficult to control because his blood pressure is lowish so we have him on digoxin we will get a put him on a low-dose of the beta-blocker I do not think there is any role for anticoagulation for him fortunately because that would be a disaster for this celia he has a chads 2 Vascor of 1.  I also do not think there is a role for aspirin here the bigger issue is good to be the social issue and whether he will take his medicines because if he does not he runs a risk at some time of coming in with a tachycardia mediated cardiomyopathy    Nelson Wylie MD  08/17/19  9:24 AM

## 2019-08-17 NOTE — PROGRESS NOTES
"   LOS: 3 days   Patient Care Team:  Provider, No Known as PCP - General    Chief Complaint: SOA with exertion    Subjective     Feels a little better today. No symptoms while laying in bed. Still getting SOA with ambulation        Subjective:  Symptoms:  Stable.  He reports shortness of breath.  No malaise, cough, chest pain, weakness, headache, chest pressure, anorexia, diarrhea or anxiety.    Diet:  Adequate intake.  No nausea or vomiting.    Activity level: Impaired due to weakness.    Pain:  He reports no pain.        History taken from: patient chart    Objective     Vital Signs  Temp:  [97.7 °F (36.5 °C)-98.1 °F (36.7 °C)] 98.1 °F (36.7 °C)  Heart Rate:  [] 70  Resp:  [13-18] 16  BP: ()/(49-94) 112/74    Objective:  General Appearance:  Comfortable and in no acute distress.    Vital signs: (most recent): Blood pressure 112/74, pulse 70, temperature 98.1 °F (36.7 °C), temperature source Oral, resp. rate 16, height 188 cm (74\"), weight 111 kg (245 lb), SpO2 98 %.  Vital signs are normal.  No fever.  (HR and BP somewhat improved).    Output: Producing urine and producing stool.    HEENT: Normal HEENT exam.    Lungs:  Normal effort and normal respiratory rate.  Breath sounds clear to auscultation.    Heart: Tachycardia.  Irregular rhythm.    Abdomen: Abdomen is soft.  Bowel sounds are normal.   There is no abdominal tenderness.     Extremities: There is no dependent edema.    Pulses: Distal pulses are intact.    Neurological: Patient is alert and oriented to person, place and time.    Skin:  Warm and dry.  No rash.             Results Review:     I reviewed the patient's new clinical results.  I reviewed the patient's other test results and agree with the interpretation  I personally viewed and interpreted the patient's EKG/Telemetry data  Discussed with pt    Medication Review: reviewed    Assessment/Plan       Syncope and collapse    Schizophrenia (CMS/HCC)    Atrial fibrillation (CMS/HCC)    " "Hypertension    Orthostatic hypotension    Hypokalemia          Plan:   (- Card following, now on Metoprolol and Digoxin, no AC or ASA recommended  - Echo unremarkable   - HR still labile. Still orthostatic, now symptomatic with c/o palpitations, dizziness and lightheadedness upon standing.  - will try compression stockings, pt says he won't wear them if they're \"too tight\"   - replaced K+         ).       Christopher Quinones MD  08/17/19  1:01 PM    Time: 20min      "

## 2019-08-18 LAB
ANION GAP SERPL CALCULATED.3IONS-SCNC: 10.4 MMOL/L (ref 5–15)
BUN BLD-MCNC: 6 MG/DL (ref 8–23)
BUN/CREAT SERPL: 8.3 (ref 7–25)
CALCIUM SPEC-SCNC: 8.9 MG/DL (ref 8.6–10.5)
CHLORIDE SERPL-SCNC: 104 MMOL/L (ref 98–107)
CO2 SERPL-SCNC: 23.6 MMOL/L (ref 22–29)
CREAT BLD-MCNC: 0.72 MG/DL (ref 0.76–1.27)
DEPRECATED RDW RBC AUTO: 50.8 FL (ref 37–54)
ERYTHROCYTE [DISTWIDTH] IN BLOOD BY AUTOMATED COUNT: 14.6 % (ref 12.3–15.4)
GFR SERPL CREATININE-BSD FRML MDRD: 111 ML/MIN/1.73
GLUCOSE BLD-MCNC: 99 MG/DL (ref 65–99)
HCT VFR BLD AUTO: 39 % (ref 37.5–51)
HGB BLD-MCNC: 12.5 G/DL (ref 13–17.7)
MCH RBC QN AUTO: 30.6 PG (ref 26.6–33)
MCHC RBC AUTO-ENTMCNC: 32.1 G/DL (ref 31.5–35.7)
MCV RBC AUTO: 95.6 FL (ref 79–97)
PLATELET # BLD AUTO: 148 10*3/MM3 (ref 140–450)
PMV BLD AUTO: 12.6 FL (ref 6–12)
POTASSIUM BLD-SCNC: 3.9 MMOL/L (ref 3.5–5.2)
RBC # BLD AUTO: 4.08 10*6/MM3 (ref 4.14–5.8)
SODIUM BLD-SCNC: 138 MMOL/L (ref 136–145)
WBC NRBC COR # BLD: 7.03 10*3/MM3 (ref 3.4–10.8)

## 2019-08-18 PROCEDURE — 99232 SBSQ HOSP IP/OBS MODERATE 35: CPT | Performed by: INTERNAL MEDICINE

## 2019-08-18 PROCEDURE — 63710000001 DIPHENHYDRAMINE PER 50 MG: Performed by: NURSE PRACTITIONER

## 2019-08-18 PROCEDURE — 80048 BASIC METABOLIC PNL TOTAL CA: CPT | Performed by: HOSPITALIST

## 2019-08-18 PROCEDURE — 85027 COMPLETE CBC AUTOMATED: CPT | Performed by: HOSPITALIST

## 2019-08-18 RX ORDER — DIGOXIN 250 MCG
250 TABLET ORAL
Status: DISCONTINUED | OUTPATIENT
Start: 2019-08-19 | End: 2019-08-20 | Stop reason: HOSPADM

## 2019-08-18 RX ADMIN — METOPROLOL TARTRATE 12.5 MG: 25 TABLET ORAL at 20:11

## 2019-08-18 RX ADMIN — PANTOPRAZOLE SODIUM 40 MG: 40 TABLET, DELAYED RELEASE ORAL at 09:32

## 2019-08-18 RX ADMIN — SODIUM CHLORIDE, PRESERVATIVE FREE 3 ML: 5 INJECTION INTRAVENOUS at 09:32

## 2019-08-18 RX ADMIN — POTASSIUM & SODIUM PHOSPHATES POWDER PACK 280-160-250 MG 1 PACKET: 280-160-250 PACK at 06:30

## 2019-08-18 RX ADMIN — ACETAMINOPHEN 650 MG: 325 TABLET ORAL at 06:30

## 2019-08-18 RX ADMIN — POTASSIUM & SODIUM PHOSPHATES POWDER PACK 280-160-250 MG 1 PACKET: 280-160-250 PACK at 13:44

## 2019-08-18 RX ADMIN — METOPROLOL TARTRATE 25 MG: 25 TABLET ORAL at 09:32

## 2019-08-18 RX ADMIN — POTASSIUM & SODIUM PHOSPHATES POWDER PACK 280-160-250 MG 1 PACKET: 280-160-250 PACK at 22:52

## 2019-08-18 RX ADMIN — DIPHENHYDRAMINE HYDROCHLORIDE 25 MG: 25 CAPSULE ORAL at 20:11

## 2019-08-18 RX ADMIN — SODIUM CHLORIDE, PRESERVATIVE FREE 3 ML: 5 INJECTION INTRAVENOUS at 20:14

## 2019-08-18 RX ADMIN — DIGOXIN 125 MCG: 125 TABLET ORAL at 13:44

## 2019-08-18 RX ADMIN — RISPERIDONE 6 MG: 3 TABLET ORAL at 20:11

## 2019-08-18 NOTE — PLAN OF CARE
Problem: Patient Care Overview  Goal: Plan of Care Review  Outcome: Ongoing (interventions implemented as appropriate)   08/18/19 6417   Coping/Psychosocial   Plan of Care Reviewed With patient   Plan of Care Review   Progress improving   OTHER   Outcome Summary BP is okay on current metoprolol dose, digoxin stopped, and cardiology is okay with discharge tomorrow.        Problem: Fall Risk (Adult)  Goal: Absence of Fall  Outcome: Ongoing (interventions implemented as appropriate)      Problem: Skin Injury Risk (Adult)  Goal: Skin Health and Integrity  Outcome: Ongoing (interventions implemented as appropriate)      Problem: Syncope (Adult)  Goal: Physical Safety/Health Maintenance  Outcome: Ongoing (interventions implemented as appropriate)    Goal: Optimal Emotional/Functional [place]  Outcome: Ongoing (interventions implemented as appropriate)

## 2019-08-18 NOTE — PROGRESS NOTES
"   LOS: 4 days   Patient Care Team:  Provider, No Known as PCP - General    Chief Complaint: SOA with exertion    Subjective     Feels a little better today. No symptoms while laying in bed. Still getting SOA with ambulation. Diarrhea is better since getting dose of Imodium        Subjective:  Symptoms:  Stable.  He reports shortness of breath (with exertion).  No malaise, cough, chest pain, weakness, headache, chest pressure, anorexia, diarrhea or anxiety.    Diet:  Adequate intake.  No nausea or vomiting.    Activity level: Impaired due to weakness.    Pain:  He reports no pain.        History taken from: patient chart    Objective     Vital Signs  Temp:  [97.9 °F (36.6 °C)-98.1 °F (36.7 °C)] 98.1 °F (36.7 °C)  Heart Rate:  [] 105  Resp:  [18-20] 18  BP: ()/(63-98) 114/98    Objective:  General Appearance:  Comfortable and in no acute distress.    Vital signs: (most recent): Blood pressure 114/98, pulse 105, temperature 98.1 °F (36.7 °C), temperature source Oral, resp. rate 18, height 188 cm (74\"), weight 111 kg (245 lb), SpO2 94 %.  Vital signs are normal.  No fever.  (HR and BP somewhat improved).    Output: Producing urine and producing stool.    HEENT: Normal HEENT exam.    Lungs:  Normal effort and normal respiratory rate.  Breath sounds clear to auscultation.    Heart: Normal rate.  Irregular rhythm.    Abdomen: Abdomen is soft.  Bowel sounds are normal.   There is no abdominal tenderness.     Extremities: There is no dependent edema.    Pulses: Distal pulses are intact.    Neurological: Patient is alert and oriented to person, place and time.    Skin:  Warm and dry.  No rash.             Results Review:     I reviewed the patient's new clinical results.  I reviewed the patient's other test results and agree with the interpretation  I personally viewed and interpreted the patient's EKG/Telemetry data  Discussed with pt and RN    Medication Review: reviewed     Assessment/Plan       Syncope and " "collapse    Schizophrenia (CMS/HCC)    Atrial fibrillation (CMS/HCC)    Hypertension    Orthostatic hypotension    Hypokalemia          Plan:   (- Card following, now on Metoprolol and Digoxin, no AC or ASA recommended  - Echo unremarkable   - HR still labile. Still orthostatic and symptomatic with c/o palpitations, dizziness and lightheadedness upon standing, dose of metoprolol decreased and dose of Digoxin increased today per Card  - trying compression stockings, pt says he won't wear them if they're \"too tight\"   - replaced K+         ).       Christopher Quinones MD  08/18/19  3:13 PM    Time: 20min      "

## 2019-08-18 NOTE — PROGRESS NOTES
"Carlos Kraus  1955 64 y.o.  [id number]      Patient Care Team:  Provider, No Known as PCP - General    CC: Persistent atrial fibrillation, schizophrenia, normal LV function, chads 2 Vascor of 1    Interval History: No complaints      Objective   Vital Signs  Temp:  [97.5 °F (36.4 °C)-98.1 °F (36.7 °C)] 97.9 °F (36.6 °C)  Heart Rate:  [] 97  Resp:  [16-20] 20  BP: ()/(63-82) 83/66    Intake/Output Summary (Last 24 hours) at 8/18/2019 1311  Last data filed at 8/18/2019 1153  Gross per 24 hour   Intake 1390 ml   Output --   Net 1390 ml     Flowsheet Rows      First Filed Value   Admission Height  188 cm (74\") Documented at 08/14/2019 0746   Admission Weight  111 kg (244 lb 11.2 oz) Documented at 08/14/2019 0746          Physical Exam:   General Appearance:    Alert,oriented, in no acute distress   Lungs:     Clear to auscultation,BS are equal    Heart:    Normal S1 and S2, iRRR without murmur, gallop or rub   HEENT:    Sclerae are clear, no JVD or adenopathy   Abdomen:     Normal bowel sounds, soft non-tender, non-distended, no HSM   Extremities:   Moves all extremities well, no edema, no cyanosis, no             Redness, no rash     Medication Review:        [START ON 8/19/2019] digoxin 250 mcg Oral Daily   metoprolol tartrate 12.5 mg Oral Q12H   pantoprazole 40 mg Oral Daily   potassium & sodium phosphates 1 packet Oral Q8H   risperiDONE 6 mg Oral Nightly   sodium chloride 3 mL Intravenous Q12H            I reviewed the patient's new clinical results.  I personally viewed and interpreted the patient's EKG/Telemetry data    Assessment/Plan  Active Hospital Problems    Diagnosis  POA   • **Syncope and collapse [R55]  Yes   • Hypokalemia [E87.6]  Unknown   • Schizophrenia (CMS/HCC) [F20.9]  Yes   • Atrial fibrillation (CMS/HCC) [I48.91]  Yes   • Hypertension [I10]  Yes   • Orthostatic hypotension [I95.1]  Yes      Resolved Hospital Problems   No resolved problems to display.       This is a terrible " social situation mainly he has persistent A. fib his rate is been a little bit difficult to control because his blood pressure is lowish I am going to decrease his Lopressor to 12-1/2 twice a day and increase his digoxin to 250 mics a day.  I think he can be discharged tomorrow and he does not require anticoagulation at this point  Nelson Wylie MD  08/18/19  1:11 PM

## 2019-08-18 NOTE — PLAN OF CARE
Problem: Patient Care Overview  Goal: Plan of Care Review  Outcome: Ongoing (interventions implemented as appropriate)   08/18/19 0527   Coping/Psychosocial   Plan of Care Reviewed With patient   Plan of Care Review   Progress improving   OTHER   Outcome Summary Pt's HR lowered to WNL through the night and sustained. SItter at bedside. Will continue to monitor.        Problem: Fall Risk (Adult)  Goal: Absence of Fall  Outcome: Ongoing (interventions implemented as appropriate)   08/18/19 0527   Fall Risk (Adult)   Absence of Fall making progress toward outcome       Problem: Syncope (Adult)  Goal: Optimal Emotional/Functional [place]  Outcome: Ongoing (interventions implemented as appropriate)   08/18/19 0527   Syncope (Adult)   Optimal Emotional/Functional [place] making progress toward outcome

## 2019-08-19 LAB
ANION GAP SERPL CALCULATED.3IONS-SCNC: 10.4 MMOL/L (ref 5–15)
BUN BLD-MCNC: 8 MG/DL (ref 8–23)
BUN/CREAT SERPL: 9.9 (ref 7–25)
CALCIUM SPEC-SCNC: 8.5 MG/DL (ref 8.6–10.5)
CHLORIDE SERPL-SCNC: 104 MMOL/L (ref 98–107)
CO2 SERPL-SCNC: 24.6 MMOL/L (ref 22–29)
CREAT BLD-MCNC: 0.81 MG/DL (ref 0.76–1.27)
GFR SERPL CREATININE-BSD FRML MDRD: 97 ML/MIN/1.73
GLUCOSE BLD-MCNC: 114 MG/DL (ref 65–99)
POTASSIUM BLD-SCNC: 3.8 MMOL/L (ref 3.5–5.2)
SODIUM BLD-SCNC: 139 MMOL/L (ref 136–145)

## 2019-08-19 PROCEDURE — 80048 BASIC METABOLIC PNL TOTAL CA: CPT | Performed by: HOSPITALIST

## 2019-08-19 PROCEDURE — 99231 SBSQ HOSP IP/OBS SF/LOW 25: CPT | Performed by: NURSE PRACTITIONER

## 2019-08-19 PROCEDURE — 63710000001 DIPHENHYDRAMINE PER 50 MG: Performed by: NURSE PRACTITIONER

## 2019-08-19 PROCEDURE — 25010000002 ONDANSETRON PER 1 MG: Performed by: NURSE PRACTITIONER

## 2019-08-19 RX ADMIN — ACETAMINOPHEN 650 MG: 325 TABLET ORAL at 05:52

## 2019-08-19 RX ADMIN — METOPROLOL TARTRATE 12.5 MG: 25 TABLET ORAL at 08:47

## 2019-08-19 RX ADMIN — SODIUM CHLORIDE, PRESERVATIVE FREE 3 ML: 5 INJECTION INTRAVENOUS at 08:48

## 2019-08-19 RX ADMIN — SODIUM CHLORIDE, PRESERVATIVE FREE 3 ML: 5 INJECTION INTRAVENOUS at 21:24

## 2019-08-19 RX ADMIN — RISPERIDONE 6 MG: 3 TABLET ORAL at 21:21

## 2019-08-19 RX ADMIN — METOPROLOL TARTRATE 12.5 MG: 25 TABLET ORAL at 21:21

## 2019-08-19 RX ADMIN — PANTOPRAZOLE SODIUM 40 MG: 40 TABLET, DELAYED RELEASE ORAL at 08:48

## 2019-08-19 RX ADMIN — ONDANSETRON 4 MG: 2 INJECTION INTRAMUSCULAR; INTRAVENOUS at 08:59

## 2019-08-19 RX ADMIN — POTASSIUM & SODIUM PHOSPHATES POWDER PACK 280-160-250 MG 1 PACKET: 280-160-250 PACK at 05:48

## 2019-08-19 RX ADMIN — DIPHENHYDRAMINE HYDROCHLORIDE 25 MG: 25 CAPSULE ORAL at 21:21

## 2019-08-19 RX ADMIN — ACETAMINOPHEN 650 MG: 325 TABLET ORAL at 21:21

## 2019-08-19 RX ADMIN — POTASSIUM & SODIUM PHOSPHATES POWDER PACK 280-160-250 MG 1 PACKET: 280-160-250 PACK at 14:03

## 2019-08-19 RX ADMIN — POTASSIUM & SODIUM PHOSPHATES POWDER PACK 280-160-250 MG 1 PACKET: 280-160-250 PACK at 21:20

## 2019-08-19 RX ADMIN — DIGOXIN 250 MCG: 250 TABLET ORAL at 11:36

## 2019-08-19 NOTE — PROGRESS NOTES
Continued Stay Note  [facility]     Patient Name: Carlos Kraus  MRN: [id number]  Today's Date: 8/19/2019    Admit Date: 8/14/2019    Discharge Plan     Row Name 08/19/19 1301       Plan    Plan  Return to [facility]     Plan Comments  Dr Quinones has called and spoken with admissions.  Requested information has been faxed to [phone].  Called and spoke with Jhonatan at [facility]  [phone] with admissions and he will return call when once they review........................Alicia Guan RN        Discharge Codes    No documentation.             Alicia Guan RN

## 2019-08-19 NOTE — PROGRESS NOTES
"    Patient Name: Carlos Kraus  :1955  64 y.o.      Patient Care Team:  Provider, No Known as PCP - General    Chief Complaint: follow up atrial fibrillation    Interval History: Hr has improved. In the 80's at rest. Bp better. Orthostatics this morning were stable.     Objective   Vital Signs  Temp:  [97.6 °F (36.4 °C)-98.2 °F (36.8 °C)] 97.6 °F (36.4 °C)  Heart Rate:  [] 89  Resp:  [16-18] 18  BP: ()/(67-88) 96/71    Intake/Output Summary (Last 24 hours) at 2019 1342  Last data filed at 2019  Gross per 24 hour   Intake 660 ml   Output --   Net 660 ml     Flowsheet Rows      First Filed Value   Admission Height  188 cm (74\") Documented at 2019 0746   Admission Weight  111 kg (244 lb 11.2 oz) Documented at 2019 0746          Physical Exam:   General Appearance:    Alert, cooperative, in no acute distress   Lungs:     Clear to auscultation.  Normal respiratory effort and rate.      Heart:    irregular rhythm and normal rate, normal S1 and S2, no murmurs, gallops or rubs.     Chest Wall:    No abnormalities observed   Abdomen:     Soft, nontender, positive bowel sounds.     Extremities:   no cyanosis, clubbing or edema.  No marked joint deformities.  Adequate musculoskeletal strength.       Results Review:    Results from last 7 days   Lab Units 19  0305   SODIUM mmol/L 139   POTASSIUM mmol/L 3.8   CHLORIDE mmol/L 104   CO2 mmol/L 24.6   BUN mg/dL 8   CREATININE mg/dL 0.81   GLUCOSE mg/dL 114*   CALCIUM mg/dL 8.5*     Results from last 7 days   Lab Units 19  0753   TROPONIN T ng/mL <0.010     Results from last 7 days   Lab Units 19  0519   WBC 10*3/mm3 7.03   HEMOGLOBIN g/dL 12.5*   HEMATOCRIT % 39.0   PLATELETS 10*3/mm3 148     Results from last 7 days   Lab Units 19  0753   INR  1.03     Results from last 7 days   Lab Units 19  0753   MAGNESIUM mg/dL 2.1                   Medication Review:     digoxin 250 mcg Oral Daily   metoprolol " tartrate 12.5 mg Oral Q12H   pantoprazole 40 mg Oral Daily   potassium & sodium phosphates 1 packet Oral Q8H   risperiDONE 6 mg Oral Nightly   sodium chloride 3 mL Intravenous Q12H             Assessment/Plan   1. Atrial fibrillation - likely persistent. He was diagnosed with this in July at [facility]. Continue digoxin and low dose beta blocker. No AC. .  He has a CHADs 2 Vasc score of 1 and is relatively low risk for thromboembolism. ASA was stopped.     2. Orthostatic hypotension - better    3. Normal LV function, mild to moderate LAE    4. Schizophrenia with documented medication noncompliance. He will be returning to [facility] at discharge. Social issues make this a very challenging care.     Ok to discharge from cardiac standpoint. Will sign off. Please call with any additional questions.     CARIDAD Emerson  [facility]  08/19/19  1:42 PM

## 2019-08-19 NOTE — NURSING NOTE
D/w Jyoti RN on 4S- pt to return to [facility] tomorrow for 60 day hold that is court ordered.  Will get fixated on things but never angry, no behavior issues per RN.

## 2019-08-19 NOTE — PROGRESS NOTES
Continued Stay Note  [facility]     Patient Name: Carlos Kraus  MRN: [id number]  Today's Date: 8/19/2019    Admit Date: 8/14/2019    Discharge Plan     Row Name 08/19/19 4077       Plan    Plan  Return to [facility] 8/20 at 1130 via [facility]    Patient/Family in Agreement with Plan  yes    Plan Comments  Spoke with Jhonatan at [facility].  He has reviewed clinicals sent per fax.  He states they could accept back 8/2.  [facility] is scheduled for  at 1130....................Alicia Guan RN    Row Name 08/19/19 1301       Plan    Plan  Return to [facility]     Plan Comments  Dr Quinones has called and spoken with admissions.  Requested information has been faxed to [phone].  Called and spoke with Jhonatan at [facility]  [phone] with admissions and he will return call when once they review........................Alicia Guan RN        Discharge Codes    No documentation.             Alicia Guan RN

## 2019-08-19 NOTE — PROGRESS NOTES
"   LOS: 5 days   Patient Care Team:  Provider, No Known as PCP - General    Chief Complaint: dizziness    Subjective     Feels a little better today. No symptoms while laying in bed. Gets a little dizzy when walking around. Diarrhea is better since getting dose of Imodium        Subjective:  Symptoms:  Stable.  No shortness of breath, malaise, cough, chest pain, weakness, headache, chest pressure, anorexia, diarrhea or anxiety.    Diet:  Adequate intake.  No nausea or vomiting.    Activity level: Impaired due to weakness.    Pain:  He reports no pain.        History taken from: patient chart RN    Objective     Vital Signs  Temp:  [97.6 °F (36.4 °C)-98.2 °F (36.8 °C)] 97.9 °F (36.6 °C)  Heart Rate:  [] 92  Resp:  [16-18] 18  BP: ()/(67-88) 105/80    Objective:  General Appearance:  Comfortable and in no acute distress.    Vital signs: (most recent): Blood pressure 105/80, pulse 92, temperature 97.9 °F (36.6 °C), temperature source Oral, resp. rate 18, height 188 cm (74\"), weight 111 kg (245 lb), SpO2 93 %.  Vital signs are normal.  No fever.  (HR and BP somewhat improved).    Output: Producing urine and producing stool.    HEENT: Normal HEENT exam.    Lungs:  Normal effort and normal respiratory rate.  Breath sounds clear to auscultation.    Heart: Normal rate.  Regular rhythm.    Abdomen: Abdomen is soft.  Bowel sounds are normal.   There is no abdominal tenderness.     Extremities: There is no dependent edema.    Pulses: Distal pulses are intact.    Neurological: Patient is alert and oriented to person, place and time.    Skin:  Warm and dry.  No rash.             Results Review:     I reviewed the patient's new clinical results.  I reviewed the patient's other test results and agree with the interpretation  I personally viewed and interpreted the patient's EKG/Telemetry data  Discussed with pt, RN, and CCP    Medication Review: reviewed    Assessment/Plan       Syncope and collapse    Schizophrenia " "(CMS/[facility])    Atrial fibrillation (CMS/[facility])    Hypertension    Orthostatic hypotension    Hypokalemia          Plan:   (- Card following, now on Metoprolol and Digoxin, no AC or ASA recommended  - Echo unremarkable   - HR less labile. Still mildly orthostatic, dose of metoprolol decreased and dose of Digoxin increased per Card  - trying compression stockings, pt says he won't wear them if they're \"too tight\"   - replaced K+  - okay to dc per Card, waiting on [facility] to accept         ).       Christopher Quinones MD  08/19/19  3:23 PM    Time: 20min      "

## 2019-08-19 NOTE — PLAN OF CARE
Problem: Patient Care Overview  Goal: Plan of Care Review  Outcome: Ongoing (interventions implemented as appropriate)   08/19/19 1431   OTHER   Outcome Summary Orthostatics completed. Will continue to monitor BP and heart rate.     Goal: Individualization and Mutuality  Outcome: Ongoing (interventions implemented as appropriate)    Goal: Discharge Needs Assessment  Outcome: Ongoing (interventions implemented as appropriate)    Goal: Interprofessional Rounds/Family Conf  Outcome: Ongoing (interventions implemented as appropriate)      Problem: Skin Injury Risk (Adult)  Goal: Skin Health and Integrity  Outcome: Ongoing (interventions implemented as appropriate)      Problem: Syncope (Adult)  Goal: Physical Safety/Health Maintenance  Outcome: Ongoing (interventions implemented as appropriate)

## 2019-08-19 NOTE — PROGRESS NOTES
[facility]    Physicians Statement of Medical Necessity for Ambulance Transportation    It is medically necessary for:    Patient Name: Carlos Kraus    Insurance Information:      To be transported by ambulance:    From (if nursing facility, specify level of care: skilled, [facility], etc): [facility]    To (specify level of care if nursing facility): [facility]    Date of Service: 8/19/2019     For dialysis patients state date dialysis began: na    Diagnosis: syncope and collapse    Past Medical/Surgical History:  Past Medical History:   Diagnosis Date   • A-fib (CMS/[facility])    • Atrial fibrillation (CMS/[facility]) 8/14/2019   • [place] esophagus    • GERD (gastroesophageal reflux disease)    • Hx of schizophrenia    • Hyperlipidemia    • Hypertension    • Hypokalemia 8/16/2019   • Hypophosphatemia    • Hypothyroidism    • Schizophrenia (CMS/[facility]) 8/14/2019   • Syncope and collapse 08/14/2019   • Umbilical hernia       History reviewed. No pertinent surgical history.     Current Objective Medical Evidence(including physical exam finding to support reason for limitations):    Confused/combative: may require restraints    Other: needs supervision/ present mental inquest    Physician Signature:           (RN,NP,PA,CAN, Discharge Planner)Alicia Guan RN   Date/Time: 8/19/2019 1:17 PM       Printed Name:    __________________________________    [facility]   Phone: [phone] Phone: [phone]   Fax: [phone] Fax: [phone]

## 2019-08-19 NOTE — PLAN OF CARE
Problem: Patient Care Overview  Goal: Plan of Care Review  Outcome: Ongoing (interventions implemented as appropriate)   08/19/19 0006 08/19/19 0523   Coping/Psychosocial   Plan of Care Reviewed With patient patient   Plan of Care Review   Progress --  improving   OTHER   Outcome Summary --  VSS. Sitter bedside. Pt has been very pleasant tonight with no significant changes. Orthostatics completed. Will continue to monitor     Goal: Individualization and Mutuality  Outcome: Ongoing (interventions implemented as appropriate)    Goal: Discharge Needs Assessment  Outcome: Ongoing (interventions implemented as appropriate)    Goal: Interprofessional Rounds/Family Conf  Outcome: Ongoing (interventions implemented as appropriate)      Problem: Skin Injury Risk (Adult)  Goal: Skin Health and Integrity  Outcome: Ongoing (interventions implemented as appropriate)      Problem: Syncope (Adult)  Goal: Physical Safety/Health Maintenance  Outcome: Ongoing (interventions implemented as appropriate)

## 2019-08-20 VITALS
HEART RATE: 109 BPM | BODY MASS INDEX: 31.44 KG/M2 | WEIGHT: 245 LBS | SYSTOLIC BLOOD PRESSURE: 110 MMHG | OXYGEN SATURATION: 93 % | RESPIRATION RATE: 18 BRPM | DIASTOLIC BLOOD PRESSURE: 73 MMHG | HEIGHT: 74 IN | TEMPERATURE: 97.8 F

## 2019-08-20 LAB
ALBUMIN SERPL-MCNC: 3.5 G/DL (ref 3.5–5.2)
ALBUMIN/GLOB SERPL: 1.6 G/DL
ALP SERPL-CCNC: 73 U/L (ref 39–117)
ALT SERPL W P-5'-P-CCNC: 23 U/L (ref 1–41)
ANION GAP SERPL CALCULATED.3IONS-SCNC: 10.5 MMOL/L (ref 5–15)
AST SERPL-CCNC: 15 U/L (ref 1–40)
BASOPHILS # BLD AUTO: 0.02 10*3/MM3 (ref 0–0.2)
BASOPHILS NFR BLD AUTO: 0.4 % (ref 0–1.5)
BILIRUB SERPL-MCNC: 0.5 MG/DL (ref 0.2–1.2)
BUN BLD-MCNC: 9 MG/DL (ref 8–23)
BUN/CREAT SERPL: 12 (ref 7–25)
CALCIUM SPEC-SCNC: 8.9 MG/DL (ref 8.6–10.5)
CHLORIDE SERPL-SCNC: 106 MMOL/L (ref 98–107)
CO2 SERPL-SCNC: 25.5 MMOL/L (ref 22–29)
CREAT BLD-MCNC: 0.75 MG/DL (ref 0.76–1.27)
DEPRECATED RDW RBC AUTO: 50.7 FL (ref 37–54)
EOSINOPHIL # BLD AUTO: 0.36 10*3/MM3 (ref 0–0.4)
EOSINOPHIL NFR BLD AUTO: 6.7 % (ref 0.3–6.2)
ERYTHROCYTE [DISTWIDTH] IN BLOOD BY AUTOMATED COUNT: 14.5 % (ref 12.3–15.4)
GFR SERPL CREATININE-BSD FRML MDRD: 106 ML/MIN/1.73
GLOBULIN UR ELPH-MCNC: 2.2 GM/DL
GLUCOSE BLD-MCNC: 97 MG/DL (ref 65–99)
HCT VFR BLD AUTO: 37.7 % (ref 37.5–51)
HGB BLD-MCNC: 12.3 G/DL (ref 13–17.7)
IMM GRANULOCYTES # BLD AUTO: 0.01 10*3/MM3 (ref 0–0.05)
IMM GRANULOCYTES NFR BLD AUTO: 0.2 % (ref 0–0.5)
LYMPHOCYTES # BLD AUTO: 1.35 10*3/MM3 (ref 0.7–3.1)
LYMPHOCYTES NFR BLD AUTO: 25 % (ref 19.6–45.3)
MCH RBC QN AUTO: 31.4 PG (ref 26.6–33)
MCHC RBC AUTO-ENTMCNC: 32.6 G/DL (ref 31.5–35.7)
MCV RBC AUTO: 96.2 FL (ref 79–97)
MONOCYTES # BLD AUTO: 0.53 10*3/MM3 (ref 0.1–0.9)
MONOCYTES NFR BLD AUTO: 9.8 % (ref 5–12)
NEUTROPHILS # BLD AUTO: 3.14 10*3/MM3 (ref 1.7–7)
NEUTROPHILS NFR BLD AUTO: 57.9 % (ref 42.7–76)
NRBC BLD AUTO-RTO: 0.2 /100 WBC (ref 0–0.2)
PLATELET # BLD AUTO: 142 10*3/MM3 (ref 140–450)
PMV BLD AUTO: 11.7 FL (ref 6–12)
POTASSIUM BLD-SCNC: 3.9 MMOL/L (ref 3.5–5.2)
PROT SERPL-MCNC: 5.7 G/DL (ref 6–8.5)
RBC # BLD AUTO: 3.92 10*6/MM3 (ref 4.14–5.8)
SODIUM BLD-SCNC: 142 MMOL/L (ref 136–145)
WBC NRBC COR # BLD: 5.41 10*3/MM3 (ref 3.4–10.8)

## 2019-08-20 PROCEDURE — 85025 COMPLETE CBC W/AUTO DIFF WBC: CPT | Performed by: HOSPITALIST

## 2019-08-20 PROCEDURE — 80053 COMPREHEN METABOLIC PANEL: CPT | Performed by: HOSPITALIST

## 2019-08-20 RX ORDER — DIGOXIN 250 MCG
250 TABLET ORAL
Qty: 30 TABLET | Refills: 0 | Status: SHIPPED | OUTPATIENT
Start: 2019-08-20

## 2019-08-20 RX ADMIN — PANTOPRAZOLE SODIUM 40 MG: 40 TABLET, DELAYED RELEASE ORAL at 09:32

## 2019-08-20 RX ADMIN — METOPROLOL TARTRATE 12.5 MG: 25 TABLET ORAL at 09:32

## 2019-08-20 RX ADMIN — SODIUM CHLORIDE, PRESERVATIVE FREE 3 ML: 5 INJECTION INTRAVENOUS at 09:33

## 2019-08-20 RX ADMIN — DIGOXIN 250 MCG: 250 TABLET ORAL at 11:43

## 2019-08-20 RX ADMIN — POTASSIUM & SODIUM PHOSPHATES POWDER PACK 280-160-250 MG 1 PACKET: 280-160-250 PACK at 06:09

## 2019-08-20 NOTE — PLAN OF CARE
"Problem: Patient Care Overview  Goal: Plan of Care Review  Outcome: Ongoing (interventions implemented as appropriate)   08/19/19 0523 08/19/19 2120 08/20/19 0354   Coping/Psychosocial   Plan of Care Reviewed With --  patient --    Plan of Care Review   Progress improving --  --    OTHER   Outcome Summary --  --  Orthostatics completed with a significant drop while standing. Pt stated the he \"doesnt feel right in the head\" during last rounding. RN tried getting more info from pt regarding that and pt wouldnt elaborate. Pt has been pleasant tonight with no other complaints. HR has been less labile. Sitter bedside. Will continue to monitor closely.     Goal: Individualization and Mutuality  Outcome: Ongoing (interventions implemented as appropriate)    Goal: Discharge Needs Assessment  Outcome: Ongoing (interventions implemented as appropriate)    Goal: Interprofessional Rounds/Family Conf  Outcome: Ongoing (interventions implemented as appropriate)      Problem: Skin Injury Risk (Adult)  Goal: Skin Health and Integrity  Outcome: Ongoing (interventions implemented as appropriate)        "

## 2019-08-20 NOTE — PLAN OF CARE
Problem: Patient Care Overview  Goal: Plan of Care Review  Outcome: Ongoing (interventions implemented as appropriate)   08/19/19 0523 08/20/19 0023 08/20/19 1134   Coping/Psychosocial   Plan of Care Reviewed With --  patient --    Plan of Care Review   Progress improving --  --    OTHER   Outcome Summary --  --  patient being discharged     Goal: Individualization and Mutuality  Outcome: Ongoing (interventions implemented as appropriate)    Goal: Discharge Needs Assessment  Outcome: Ongoing (interventions implemented as appropriate)    Goal: Interprofessional Rounds/Family Conf  Outcome: Ongoing (interventions implemented as appropriate)      Problem: Skin Injury Risk (Adult)  Goal: Skin Health and Integrity  Outcome: Ongoing (interventions implemented as appropriate)      Problem: Syncope (Adult)  Goal: Physical Safety/Health Maintenance  Outcome: Ongoing (interventions implemented as appropriate)

## 2019-08-20 NOTE — NURSING NOTE
Called and spoke with Dr. Brady regarding pt's IV access. Dr. Brady stated it was ok to leave the IV out d/t probable d/c tomorrow.

## 2019-08-20 NOTE — DISCHARGE SUMMARY
"    Patient Name: Carlos Kraus  : 1955  MRN: [id number]    Date of Admission: 2019  Date of Discharge:  2019  Primary Care Physician: Provider, No Known      Chief Complaint:   Syncope      Discharge Diagnoses     Active Hospital Problems    Diagnosis  POA   • **Syncope and collapse [R55]  Yes   • Hypokalemia [E87.6]  Unknown   • Schizophrenia (CMS/HCC) [F20.9]  Yes   • Atrial fibrillation (CMS/HCC) [I48.91]  Yes   • Hypertension [I10]  Yes   • Orthostatic hypotension [I95.1]  Yes      Resolved Hospital Problems   No resolved problems to display.        Hospital Course     Mr. Kraus is a 64 y.o. gentleman currently at [facility] with a history of atrial fibrillation, schizophrenia, hypertension, hyperlipidemia, anemia, and hypothyroidism that presented to [facility] after a witnessed syncopal episode. He was found to be hypotensive and in Afib with RVR. He was recently hospitalized at [facility] for schizophrenia and new onset Afib. He was discharged to [facility] on 150mg of metoprolol XL daily. He was given IVFs here and Card consulted. Currently he is much improved on low dose BID metoprolol and fairly high dose of Digoxin. He is rate-controlled. He does not need anticoagulation per Card service. He remains orthostatic. I have had long d/w pt regarding taking great care when rising from lying or seated position. Might benefit from compression hose, but pt says he would not wear \"if they are too tight\".     Day of Discharge     Feels the same today    Physical Exam:  Temp:  [97.4 °F (36.3 °C)-97.9 °F (36.6 °C)] 97.8 °F (36.6 °C)  Heart Rate:  [] 83  Resp:  [18] 18  BP: ()/(62-86) 109/73  Body mass index is 31.46 kg/m².  Physical Exam  General Appearance:  Comfortable and in no acute distress.     Vital signs are normal.  No fever.  (HR and BP somewhat improved, still orthostatic).    Output: Producing urine and producing stool.    HEENT: Normal HEENT exam.  "   Lungs:  Normal effort and normal respiratory rate.  Breath sounds clear to auscultation.    Heart: Normal rate.  Regular rhythm.    Abdomen: Abdomen is soft.  Bowel sounds are normal.   There is no abdominal tenderness.     Extremities: There is no dependent edema.    Pulses: Distal pulses are intact.    Neurological: Patient is alert and oriented to person, place and time.    Skin:  Warm and dry.  No rash.     Consultants     Consult Orders (all) (From admission, onward)    Start     Ordered    08/17/19 1307  Inpatient Case Management  Consult  Once     Provider:  (Not yet assigned)    08/17/19 1306    08/16/19 0943  Inpatient Access Center Consult  Once     Comments:  Patient is here under mental inquest warrent   Provider:  (Not yet assigned)    08/16/19 0942    08/14/19 1500  Inpatient Cardiology Consult  Once     Specialty:  Cardiology  Provider:  Lety Boateng MD    08/14/19 1500    08/14/19 1405  [facility] (on-call MD unless specified) Details  Once     Specialty:  Hospitalist  Provider:  (Not yet assigned)    08/14/19 1404        Procedures     * Surgery not found *    Imaging Results (all)     Procedure Component Value Units Date/Time    XR Chest 1 View [[id number]] Collected:  08/14/19 0850     Updated:  08/14/19 1548    Narrative:       XR HIP WITH OR WITHOUT PELVIS 2-3 VIEW LEFT-, XR CHEST 1 VIEW-   08/14/2019     HISTORY: Fell, left hip pain. Shortness of breath.     AP CHEST: Heart size is mildly enlarged. There is increased density in  the left base likely related to pleural fluid, atelectasis, infiltrate  and/or pleural scarring. Left upper lung and right lung appear clear.     There are no previous studies available for comparison.     No pneumothorax is seen.       Impression:       1. Mild cardiomegaly.  2. Increased density in the left lung base. Consider correlation with  any prior outside studies. If none are available short-term follow-up CT  of the chest with contrast  suggested.     AP PELVIS AND LEFT HIP 2 VIEWS     No acute bone, joint or soft tissue abnormalities are seen.     This report was finalized on 8/14/2019 3:45 PM by Dr. Gucci Hines M.D.       XR Hip With or Without Pelvis 2 - 3 View Left [[id number]] Collected:  08/14/19 0850     Updated:  08/14/19 1548    Narrative:       XR HIP WITH OR WITHOUT PELVIS 2-3 VIEW LEFT-, XR CHEST 1 VIEW-   08/14/2019     HISTORY: Fell, left hip pain. Shortness of breath.     AP CHEST: Heart size is mildly enlarged. There is increased density in  the left base likely related to pleural fluid, atelectasis, infiltrate  and/or pleural scarring. Left upper lung and right lung appear clear.     There are no previous studies available for comparison.     No pneumothorax is seen.       Impression:       1. Mild cardiomegaly.  2. Increased density in the left lung base. Consider correlation with  any prior outside studies. If none are available short-term follow-up CT  of the chest with contrast suggested.     AP PELVIS AND LEFT HIP 2 VIEWS     No acute bone, joint or soft tissue abnormalities are seen.     This report was finalized on 8/14/2019 3:45 PM by Dr. Gucci Hines M.D.       CT Head Without Contrast [[id number]] Collected:  08/14/19 1033     Updated:  08/14/19 1042    Narrative:       CT HEAD WITHOUT CONTRAST     HISTORY: Syncope.     TECHNIQUE:  Head CT includes axial imaging from the base of skull to the  vertex without intravenous contrast. Radiation dose reduction techniques  were utilized, including automated exposure control and exposure  modulation based on body size.     COMPARISON: None.     FINDINGS: There are no abnormal areas of increased attenuation  intraaxially to suggest hemorrhage. No extra-axial fluid collection is  observed. There is no evidence for cerebral edema, mass effect or shift  of the midline structures. Ventricles appear within normal limits for  size and configuration. There is generalized  calvarial thickening. Mild  bilateral maxillary, sphenoid, and ethmoid sinus mucosal thickening is  present.       Impression:       No evidence for acute intracranial abnormality.     This report was finalized on 8/14/2019 10:38 AM by Dr. Devyn Sharma M.D.                  Results for orders placed during the hospital encounter of 08/14/19   Adult Transthoracic Echo Complete W/ Cont if Necessary Per Protocol    Narrative · Left ventricular systolic function is normal. Calculated EF = 57.0%.   Estimated EF was in agreement with the calculated EF. Normal left   ventricular cavity size noted. All left ventricular wall segments contract   normally. Left ventricular wall thickness is consistent with mild   concentric hypertrophy. Left ventricular diastolic function was   indeterminate.  · Left atrial cavity size is mild-to-moderately dilated.        Pertinent Labs     Results from last 7 days   Lab Units 08/20/19 0314 08/18/19 0519 08/17/19 0445 08/16/19  0342   WBC 10*3/mm3 5.41 7.03 8.09 5.53   HEMOGLOBIN g/dL 12.3* 12.5* 12.4* 11.6*   PLATELETS 10*3/mm3 142 148 146 146     Results from last 7 days   Lab Units 08/20/19 0314 08/19/19  0305 08/18/19 0519 08/17/19  0445   SODIUM mmol/L 142 139 138 141   POTASSIUM mmol/L 3.9 3.8 3.9 4.0   CHLORIDE mmol/L 106 104 104 107   CO2 mmol/L 25.5 24.6 23.6 24.0   BUN mg/dL 9 8 6* 8   CREATININE mg/dL 0.75* 0.81 0.72* 0.67*   GLUCOSE mg/dL 97 114* 99 104*   Estimated Creatinine Clearance: 135.3 mL/min (A) (by C-G formula based on SCr of 0.75 mg/dL (L)).  Results from last 7 days   Lab Units 08/20/19 0314 08/14/19  0753   ALBUMIN g/dL 3.50 3.60   BILIRUBIN mg/dL 0.5 0.6   ALK PHOS U/L 73 86   AST (SGOT) U/L 15 27   ALT (SGPT) U/L 23 46*     Results from last 7 days   Lab Units 08/20/19 0314 08/19/19  0305 08/18/19 0519 08/17/19  0445  08/14/19  0753   CALCIUM mg/dL 8.9 8.5* 8.9 8.8   < > 8.9   ALBUMIN g/dL 3.50  --   --   --   --  3.60   MAGNESIUM mg/dL  --   --   --    --   --  2.1    < > = values in this interval not displayed.       Results from last 7 days   Lab Units 08/14/19  0753   TROPONIN T ng/mL <0.010           Invalid input(s): LDLCALC        Test Results Pending at Discharge   None    Discharge Details        Discharge Medications      New Medications      Instructions Start Date   digoxin 250 MCG tablet  Commonly known as:  LANOXIN   250 mcg, Oral, Daily Digoxin      metoprolol tartrate 25 MG tablet  Commonly known as:  LOPRESSOR   12.5 mg, Oral, Every 12 Hours Scheduled         Continue These Medications      Instructions Start Date   pantoprazole 40 MG EC tablet  Commonly known as:  PROTONIX   40 mg, Oral, Daily      potassium & sodium phosphates 280-160-250 MG pack packet  Commonly known as:  PHOS-NAK   1 packet, Oral, Every 8 Hours      risperiDONE 3 MG tablet  Commonly known as:  risperDAL   6 mg, Oral, Nightly         Stop These Medications    aspirin 81 MG EC tablet     metoprolol succinate XL 50 MG 24 hr tablet  Commonly known as:  TOPROL-XL            Allergies   Allergen Reactions   • Penicillins Anaphylaxis   • Latex Hives         Discharge Disposition:  Psychiatric Hospital or Unit ([facility])    Discharge Diet:  Diet Order   Procedures   • Diet Regular       Discharge Activity:       CODE STATUS:    Code Status and Medical Interventions:   Ordered at: 08/14/19 1559     Code Status:    CPR     Medical Interventions (Level of Support Prior to Arrest):    Full       No future appointments.  Additional Instructions for the Follow-ups that You Need to Schedule     Discharge Follow-up with PCP   As directed       Currently Documented PCP:    Lisseth, No Known    PCP Phone Number:    [phone]     Follow Up Details:  medical staff at facility         Discharge Follow-up with Specified Provider: [facility]; 1 Month   As directed      To:  [facility]    Follow Up:  1 Month           Follow-up Information     Provider, No Known  .    Why:  medical staff at facility  Contact information:  [facility] [place] [address]  [phone]                   Additional Instructions for the Follow-ups that You Need to Schedule     Discharge Follow-up with PCP   As directed       Currently Documented PCP:    Provider, No Known    PCP Phone Number:    [phone]     Follow Up Details:  medical staff at facility         Discharge Follow-up with Specified Provider: [facility]; 1 Month   As directed      To:  [facility]    Follow Up:  1 Month           Time Spent on Discharge:  Greater than 30 minutes      Christopher Quinones MD  [facility]  08/20/19  11:07 AM

## 2019-08-20 NOTE — PROGRESS NOTES
Continued Stay Note  [facility]     Patient Name: Carlos Kraus  MRN: [id number]  Today's Date: 8/20/2019    Admit Date: 8/14/2019    Discharge Plan     Row Name 08/20/19 1110       Plan    Plan  [facility] via [facility]    Plan Comments  Spoke with Jhonatan and he accepts today at [facility].  Ambulance is scheduled for  at 1130.  Report and fax numbers on packet.  Attempted to call sisterLuz at [phone] to notify and number is not working at this time......................Alicia Guan RN        Discharge Codes    No documentation.       Expected Discharge Date and Time     Expected Discharge Date Expected Discharge Time    Aug 20, 2019             Alicia Guan RN

## 2019-08-21 NOTE — PROGRESS NOTES
Case Management Discharge Note    Final Note: [facility] via [facility] transport    Destination      No service has been selected for the patient.      Durable Medical Equipment      No service has been selected for the patient.      Dialysis/Infusion      No service has been selected for the patient.      Home Medical Care      No service has been selected for the patient.      Therapy      No service has been selected for the patient.      Community Resources      No service has been selected for the patient.             Final Discharge Disposition Code: 65 - psychiatric hospital or unit

## 2022-02-03 ENCOUNTER — HOSPITAL ENCOUNTER (INPATIENT)
Facility: HOSPITAL | Age: 67
LOS: 16 days | Discharge: HOME | End: 2022-02-19
Attending: EMERGENCY MEDICINE | Admitting: STUDENT IN AN ORGANIZED HEALTH CARE EDUCATION/TRAINING PROGRAM

## 2022-02-03 ENCOUNTER — APPOINTMENT (OUTPATIENT)
Dept: GENERAL RADIOLOGY | Facility: HOSPITAL | Age: 67
End: 2022-02-03

## 2022-02-03 ENCOUNTER — APPOINTMENT (OUTPATIENT)
Dept: CT IMAGING | Facility: HOSPITAL | Age: 67
End: 2022-02-03

## 2022-02-03 DIAGNOSIS — K85.90 ACUTE PANCREATITIS, UNSPECIFIED COMPLICATION STATUS, UNSPECIFIED PANCREATITIS TYPE: ICD-10-CM

## 2022-02-03 DIAGNOSIS — R10.84 GENERALIZED ABDOMINAL PAIN: Primary | ICD-10-CM

## 2022-02-03 DIAGNOSIS — K56.609 SMALL BOWEL OBSTRUCTION: ICD-10-CM

## 2022-02-03 DIAGNOSIS — I48.91 ATRIAL FIBRILLATION WITH RVR: ICD-10-CM

## 2022-02-03 LAB
ALBUMIN SERPL-MCNC: 4.7 G/DL (ref 3.5–5.2)
ALBUMIN/GLOB SERPL: 1.7 G/DL
ALP SERPL-CCNC: 58 U/L (ref 39–117)
ALT SERPL W P-5'-P-CCNC: 324 U/L (ref 1–41)
ANION GAP SERPL CALCULATED.3IONS-SCNC: 21.3 MMOL/L (ref 5–15)
AST SERPL-CCNC: 164 U/L (ref 1–40)
BACTERIA UR QL AUTO: ABNORMAL /HPF
BASOPHILS # BLD AUTO: 0.05 10*3/MM3 (ref 0–0.2)
BASOPHILS NFR BLD AUTO: 0.2 % (ref 0–1.5)
BILIRUB SERPL-MCNC: 2.2 MG/DL (ref 0–1.2)
BILIRUB UR QL STRIP: ABNORMAL
BUN SERPL-MCNC: 14 MG/DL (ref 8–23)
BUN/CREAT SERPL: 11.6 (ref 7–25)
CALCIUM SPEC-SCNC: 9.1 MG/DL (ref 8.6–10.5)
CHLORIDE SERPL-SCNC: 95 MMOL/L (ref 98–107)
CLARITY UR: ABNORMAL
CO2 SERPL-SCNC: 14.7 MMOL/L (ref 22–29)
COLOR UR: ABNORMAL
CREAT SERPL-MCNC: 1.21 MG/DL (ref 0.76–1.27)
DEPRECATED RDW RBC AUTO: 42.7 FL (ref 37–54)
DIGOXIN SERPL-MCNC: 0.5 NG/ML (ref 0.6–1.2)
EOSINOPHIL # BLD AUTO: 0 10*3/MM3 (ref 0–0.4)
EOSINOPHIL NFR BLD AUTO: 0 % (ref 0.3–6.2)
ERYTHROCYTE [DISTWIDTH] IN BLOOD BY AUTOMATED COUNT: 12.4 % (ref 12.3–15.4)
GFR SERPL CREATININE-BSD FRML MDRD: 60 ML/MIN/1.73
GLOBULIN UR ELPH-MCNC: 2.8 GM/DL
GLUCOSE SERPL-MCNC: 185 MG/DL (ref 65–99)
GLUCOSE UR STRIP-MCNC: NEGATIVE MG/DL
HCT VFR BLD AUTO: 49.7 % (ref 37.5–51)
HGB BLD-MCNC: 17 G/DL (ref 13–17.7)
HGB UR QL STRIP.AUTO: NEGATIVE
HYALINE CASTS UR QL AUTO: ABNORMAL /LPF
IMM GRANULOCYTES # BLD AUTO: 0.18 10*3/MM3 (ref 0–0.05)
IMM GRANULOCYTES NFR BLD AUTO: 0.7 % (ref 0–0.5)
KETONES UR QL STRIP: ABNORMAL
LEUKOCYTE ESTERASE UR QL STRIP.AUTO: ABNORMAL
LIPASE SERPL-CCNC: 1188 U/L (ref 13–60)
LYMPHOCYTES # BLD AUTO: 0.72 10*3/MM3 (ref 0.7–3.1)
LYMPHOCYTES NFR BLD AUTO: 2.9 % (ref 19.6–45.3)
MCH RBC QN AUTO: 31.8 PG (ref 26.6–33)
MCHC RBC AUTO-ENTMCNC: 34.2 G/DL (ref 31.5–35.7)
MCV RBC AUTO: 92.9 FL (ref 79–97)
MONOCYTES # BLD AUTO: 1.42 10*3/MM3 (ref 0.1–0.9)
MONOCYTES NFR BLD AUTO: 5.7 % (ref 5–12)
NEUTROPHILS NFR BLD AUTO: 22.59 10*3/MM3 (ref 1.7–7)
NEUTROPHILS NFR BLD AUTO: 90.5 % (ref 42.7–76)
NITRITE UR QL STRIP: NEGATIVE
NRBC BLD AUTO-RTO: 0 /100 WBC (ref 0–0.2)
NT-PROBNP SERPL-MCNC: 785 PG/ML (ref 0–900)
PH UR STRIP.AUTO: <=5 [PH] (ref 5–8)
PLATELET # BLD AUTO: 225 10*3/MM3 (ref 140–450)
PMV BLD AUTO: 11.7 FL (ref 6–12)
POTASSIUM SERPL-SCNC: 3.6 MMOL/L (ref 3.5–5.2)
PROT SERPL-MCNC: 7.5 G/DL (ref 6–8.5)
PROT UR QL STRIP: ABNORMAL
QT INTERVAL: 315 MS
RBC # BLD AUTO: 5.35 10*6/MM3 (ref 4.14–5.8)
RBC # UR STRIP: ABNORMAL /HPF
REF LAB TEST METHOD: ABNORMAL
SARS-COV-2 RNA PNL SPEC NAA+PROBE: NOT DETECTED
SODIUM SERPL-SCNC: 131 MMOL/L (ref 136–145)
SP GR UR STRIP: >=1.03 (ref 1–1.03)
SQUAMOUS #/AREA URNS HPF: ABNORMAL /HPF
TROPONIN T SERPL-MCNC: <0.01 NG/ML (ref 0–0.03)
UROBILINOGEN UR QL STRIP: ABNORMAL
WBC # UR STRIP: ABNORMAL /HPF
WBC NRBC COR # BLD: 24.96 10*3/MM3 (ref 3.4–10.8)

## 2022-02-03 PROCEDURE — 99232 SBSQ HOSP IP/OBS MODERATE 35: CPT | Performed by: STUDENT IN AN ORGANIZED HEALTH CARE EDUCATION/TRAINING PROGRAM

## 2022-02-03 PROCEDURE — 81001 URINALYSIS AUTO W/SCOPE: CPT | Performed by: EMERGENCY MEDICINE

## 2022-02-03 PROCEDURE — 93005 ELECTROCARDIOGRAM TRACING: CPT | Performed by: EMERGENCY MEDICINE

## 2022-02-03 PROCEDURE — 25010000002 DIGOXIN PER 500 MCG: Performed by: EMERGENCY MEDICINE

## 2022-02-03 PROCEDURE — 74018 RADEX ABDOMEN 1 VIEW: CPT

## 2022-02-03 PROCEDURE — 25010000002 SODIUM CHLORIDE 0.9 % WITH KCL 20 MEQ 20-0.9 MEQ/L-% SOLUTION: Performed by: INTERNAL MEDICINE

## 2022-02-03 PROCEDURE — 25010000002 MIDAZOLAM PER 1 MG: Performed by: EMERGENCY MEDICINE

## 2022-02-03 PROCEDURE — 71045 X-RAY EXAM CHEST 1 VIEW: CPT

## 2022-02-03 PROCEDURE — 25010000002 IOPAMIDOL 61 % SOLUTION: Performed by: EMERGENCY MEDICINE

## 2022-02-03 PROCEDURE — 87635 SARS-COV-2 COVID-19 AMP PRB: CPT | Performed by: EMERGENCY MEDICINE

## 2022-02-03 PROCEDURE — 25010000002 ENOXAPARIN PER 10 MG: Performed by: INTERNAL MEDICINE

## 2022-02-03 PROCEDURE — 25010000002 ONDANSETRON PER 1 MG: Performed by: EMERGENCY MEDICINE

## 2022-02-03 PROCEDURE — 99284 EMERGENCY DEPT VISIT MOD MDM: CPT

## 2022-02-03 PROCEDURE — 85025 COMPLETE CBC W/AUTO DIFF WBC: CPT | Performed by: EMERGENCY MEDICINE

## 2022-02-03 PROCEDURE — 25010000002 ONDANSETRON PER 1 MG: Performed by: INTERNAL MEDICINE

## 2022-02-03 PROCEDURE — 80162 ASSAY OF DIGOXIN TOTAL: CPT | Performed by: EMERGENCY MEDICINE

## 2022-02-03 PROCEDURE — 83880 ASSAY OF NATRIURETIC PEPTIDE: CPT | Performed by: EMERGENCY MEDICINE

## 2022-02-03 PROCEDURE — 80053 COMPREHEN METABOLIC PANEL: CPT | Performed by: EMERGENCY MEDICINE

## 2022-02-03 PROCEDURE — 83690 ASSAY OF LIPASE: CPT | Performed by: EMERGENCY MEDICINE

## 2022-02-03 PROCEDURE — 25010000002 HYDROMORPHONE 1 MG/ML SOLUTION: Performed by: EMERGENCY MEDICINE

## 2022-02-03 PROCEDURE — 74177 CT ABD & PELVIS W/CONTRAST: CPT

## 2022-02-03 PROCEDURE — 70491 CT SOFT TISSUE NECK W/DYE: CPT

## 2022-02-03 PROCEDURE — 25010000002 IOPAMIDOL 61 % SOLUTION: Performed by: INTERNAL MEDICINE

## 2022-02-03 PROCEDURE — 93010 ELECTROCARDIOGRAM REPORT: CPT | Performed by: INTERNAL MEDICINE

## 2022-02-03 PROCEDURE — 84484 ASSAY OF TROPONIN QUANT: CPT | Performed by: EMERGENCY MEDICINE

## 2022-02-03 RX ORDER — CETIRIZINE HYDROCHLORIDE 10 MG/1
10 TABLET ORAL DAILY
COMMUNITY

## 2022-02-03 RX ORDER — SIMVASTATIN 20 MG
20 TABLET ORAL NIGHTLY
COMMUNITY

## 2022-02-03 RX ORDER — MORPHINE SULFATE 2 MG/ML
2 INJECTION, SOLUTION INTRAMUSCULAR; INTRAVENOUS ONCE
Status: COMPLETED | OUTPATIENT
Start: 2022-02-04 | End: 2022-02-03

## 2022-02-03 RX ORDER — SODIUM CHLORIDE AND POTASSIUM CHLORIDE 150; 900 MG/100ML; MG/100ML
125 INJECTION, SOLUTION INTRAVENOUS CONTINUOUS
Status: DISPENSED | OUTPATIENT
Start: 2022-02-03 | End: 2022-02-09

## 2022-02-03 RX ORDER — DIGOXIN 0.25 MG/ML
500 INJECTION INTRAMUSCULAR; INTRAVENOUS ONCE
Status: COMPLETED | OUTPATIENT
Start: 2022-02-03 | End: 2022-02-03

## 2022-02-03 RX ORDER — ASPIRIN 81 MG/1
81 TABLET ORAL DAILY
Status: DISCONTINUED | OUTPATIENT
Start: 2022-02-04 | End: 2022-02-19 | Stop reason: HOSPADM

## 2022-02-03 RX ORDER — SODIUM CHLORIDE 0.9 % (FLUSH) 0.9 %
10 SYRINGE (ML) INJECTION AS NEEDED
Status: DISCONTINUED | OUTPATIENT
Start: 2022-02-03 | End: 2022-02-19 | Stop reason: HOSPADM

## 2022-02-03 RX ORDER — ONDANSETRON 2 MG/ML
4 INJECTION INTRAMUSCULAR; INTRAVENOUS EVERY 6 HOURS PRN
Status: DISCONTINUED | OUTPATIENT
Start: 2022-02-03 | End: 2022-02-19 | Stop reason: HOSPADM

## 2022-02-03 RX ORDER — DIGOXIN 250 MCG
250 TABLET ORAL
Status: DISCONTINUED | OUTPATIENT
Start: 2022-02-04 | End: 2022-02-19 | Stop reason: HOSPADM

## 2022-02-03 RX ORDER — SENNOSIDES 8.6 MG
650 CAPSULE ORAL EVERY 8 HOURS PRN
COMMUNITY

## 2022-02-03 RX ORDER — SODIUM CHLORIDE 9 MG/ML
150 INJECTION, SOLUTION INTRAVENOUS CONTINUOUS
Status: DISCONTINUED | OUTPATIENT
Start: 2022-02-03 | End: 2022-02-03

## 2022-02-03 RX ORDER — ONDANSETRON 4 MG/1
4 TABLET, FILM COATED ORAL EVERY 6 HOURS PRN
Status: DISCONTINUED | OUTPATIENT
Start: 2022-02-03 | End: 2022-02-19 | Stop reason: HOSPADM

## 2022-02-03 RX ORDER — ACETAMINOPHEN 325 MG/1
650 TABLET ORAL EVERY 4 HOURS PRN
Status: DISCONTINUED | OUTPATIENT
Start: 2022-02-03 | End: 2022-02-19 | Stop reason: HOSPADM

## 2022-02-03 RX ORDER — NITROGLYCERIN 0.4 MG/1
0.4 TABLET SUBLINGUAL
Status: DISCONTINUED | OUTPATIENT
Start: 2022-02-03 | End: 2022-02-19 | Stop reason: HOSPADM

## 2022-02-03 RX ORDER — ASPIRIN 81 MG/1
81 TABLET ORAL DAILY
COMMUNITY

## 2022-02-03 RX ORDER — DILTIAZEM HYDROCHLORIDE 5 MG/ML
10 INJECTION INTRAVENOUS ONCE
Status: COMPLETED | OUTPATIENT
Start: 2022-02-03 | End: 2022-02-03

## 2022-02-03 RX ORDER — DILTIAZEM HCL IN NACL,ISO-OSM 125 MG/125
5-15 PLASTIC BAG, INJECTION (ML) INTRAVENOUS
Status: DISCONTINUED | OUTPATIENT
Start: 2022-02-03 | End: 2022-02-06

## 2022-02-03 RX ORDER — MULTIPLE VITAMINS W/ MINERALS TAB 9MG-400MCG
1 TAB ORAL DAILY
COMMUNITY

## 2022-02-03 RX ORDER — ONDANSETRON 2 MG/ML
4 INJECTION INTRAMUSCULAR; INTRAVENOUS ONCE
Status: COMPLETED | OUTPATIENT
Start: 2022-02-03 | End: 2022-02-03

## 2022-02-03 RX ORDER — UREA 10 %
3 LOTION (ML) TOPICAL NIGHTLY PRN
Status: DISCONTINUED | OUTPATIENT
Start: 2022-02-03 | End: 2022-02-19 | Stop reason: HOSPADM

## 2022-02-03 RX ORDER — MIDAZOLAM HYDROCHLORIDE 1 MG/ML
2 INJECTION INTRAMUSCULAR; INTRAVENOUS ONCE
Status: COMPLETED | OUTPATIENT
Start: 2022-02-03 | End: 2022-02-03

## 2022-02-03 RX ADMIN — ONDANSETRON 4 MG: 2 INJECTION INTRAMUSCULAR; INTRAVENOUS at 15:15

## 2022-02-03 RX ADMIN — POTASSIUM CHLORIDE AND SODIUM CHLORIDE 100 ML/HR: 900; 150 INJECTION, SOLUTION INTRAVENOUS at 19:22

## 2022-02-03 RX ADMIN — MORPHINE SULFATE 2 MG: 2 INJECTION, SOLUTION INTRAMUSCULAR; INTRAVENOUS at 23:25

## 2022-02-03 RX ADMIN — ENOXAPARIN SODIUM 40 MG: 100 INJECTION SUBCUTANEOUS at 19:53

## 2022-02-03 RX ADMIN — MIDAZOLAM 1 MG: 1 INJECTION INTRAMUSCULAR; INTRAVENOUS at 15:41

## 2022-02-03 RX ADMIN — IOPAMIDOL 85 ML: 612 INJECTION, SOLUTION INTRAVENOUS at 13:26

## 2022-02-03 RX ADMIN — SODIUM CHLORIDE 1000 ML: 9 INJECTION, SOLUTION INTRAVENOUS at 11:47

## 2022-02-03 RX ADMIN — HYDROMORPHONE HYDROCHLORIDE 1 MG: 1 INJECTION, SOLUTION INTRAMUSCULAR; INTRAVENOUS; SUBCUTANEOUS at 15:15

## 2022-02-03 RX ADMIN — SODIUM CHLORIDE 1000 ML: 9 INJECTION, SOLUTION INTRAVENOUS at 12:43

## 2022-02-03 RX ADMIN — DILTIAZEM HYDROCHLORIDE 10 MG: 5 INJECTION INTRAVENOUS at 12:05

## 2022-02-03 RX ADMIN — ONDANSETRON 4 MG: 2 INJECTION INTRAMUSCULAR; INTRAVENOUS at 20:24

## 2022-02-03 RX ADMIN — Medication 5 MG/HR: at 21:20

## 2022-02-03 RX ADMIN — DIGOXIN 500 MCG: 250 INJECTION, SOLUTION INTRAMUSCULAR; INTRAVENOUS; PARENTERAL at 15:05

## 2022-02-03 RX ADMIN — IOPAMIDOL 95 ML: 612 INJECTION, SOLUTION INTRAVENOUS at 14:40

## 2022-02-03 RX ADMIN — Medication 5 MG/HR: at 12:07

## 2022-02-03 NOTE — CONSULTS
General Surgery consult    Summary:    Mr. Carlos Kraus is a 67 y.o. year old gentleman with a small bowel obstruction.  NG tube placed just prior to my arrival in the ER, already putting out about 2 L.  Benign abdominal exam, heart rate improved with NG placement and decompression.  No indication for urgent surgical intervention.  Will monitor overnight, recheck labs in the morning.    Chief Complaint:    Abdominal pain    History of Present Illness:    Mr. Carlos Kraus is a 67 y.o. year old gentleman who presented to the ER earlier today with abdominal pain for 1 day.  He complains of nausea as well is a few episodes of vomiting.  He has a caregiver that is not present on my exam.  Mr. Kraus has a history of schizophrenia.    Past Medical History:   • Atrial fibrillation  • History of schizophrenia  • Hypertension  • Hyperlipidemia    Past Surgical History:    • He is unable to tell me but has a large midline incision    Family History:    • Unable to obtain    Social History:    Unable to obtain    Allergies:   Allergies   Allergen Reactions   • Penicillins Anaphylaxis   • Latex Hives       Medications:     Current Facility-Administered Medications:   •  dilTIAZem (CARDIZEM) 125 mg in 125 mL 0.7% sodium chloride  infusion, 5-15 mg/hr, Intravenous, Titrated, Babatunde Marr MD, Last Rate: 15 mL/hr at 02/03/22 1457, 15 mg/hr at 02/03/22 1457  •  [COMPLETED] Insert peripheral IV, , , Once **AND** sodium chloride 0.9 % flush 10 mL, 10 mL, Intravenous, PRN, Babatunde Marr MD    Radiology/Endoscopy:    • CT abdomen pelvis reviewed; massive gastric distention, small bowel obstruction in the distal small bowel    Labs:    • White blood cell count 24 hemoglobin 17 platelets 225 creatinine 1.21 CO2 14 lipase 1100    Review of Systems:   Influenza-like illness: no fever, no  cough, no  sore throat, no  body aches, no loss of sense of taste or smell, no known exposure to person with Covid-19.  Constitutional: Negative for  fevers or chills  HENT: Negative for hearing loss or runny nose  Eyes: Negative for vision changes or scleral icterus  Respiratory: Negative for cough or shortness of breath  Cardiovascular: Negative for chest pain or heart palpitations  Gastrointestinal: Positive for abdominal pain, nausea, vomiting; negative for constipation, melena, or hematochezia  Genitourinary: Negative for hematuria or dysuria  Musculoskeletal: Negative for joint swelling or gait instability  Neurologic: Negative for tremors or seizures  Psychiatric: Negative for suicidal ideations or depression  All other systems reviewed and negative    Physical Exam:   • Constitutional: Well-developed, well-nourished, no acute distress  • Vital signs: Temperature 98.7 heart rate 103 respiratory rate 20 blood pressure 124/96 oxygen 85%  • Eyes: Conjunctiva normal, sclera nonicteric  • ENMT: Hearing grossly normal, oral mucosa moist  • Neck: Supple, trachea midline  • Respiratory: Clear to auscultation, on room air, normal inspiratory effort  • Cardiovascular: Regular rate, no peripheral edema, no jugular venous distention  • Gastrointestinal: Soft, nontender, distended, tympanic, no peritoneal signs  • Skin:  Warm, dry, no rash on visualized skin surfaces  • Musculoskeletal: Symmetric strength, normal gait  • Psychiatric: Poor historian    PERLA HERNDON M.D.  General and Endoscopic Surgery  [facility]    [address], [address]  [place], [place], [facility]  P: [phone]  F: [phone]

## 2022-02-03 NOTE — ED PROVIDER NOTES
" EMERGENCY DEPARTMENT ENCOUNTER    Room Number:  28/28  Date of encounter:  2/3/2022  PCP: Provider, No Known  Historian: Patient, caregiver at bedside    I used full protective equipment while examining this patient.  This includes face mask, gloves and protective eyewear.  I washed my hands before entering the room and immediately upon leaving the room      HPI:  Chief Complaint: Abdominal pain  A complete HPI/ROS/PMH/PSH/SH/FH are unobtainable due to: Patient is very poor historian    Context: Carlos Kraus is a 67 y.o. male who presents to the ED c/o abdominal pain. 67-year-old male presents with diffuse abdominal pain, onset yesterday. Symptoms have been constant but wax and wane. Symptoms worsened by nothing, improved by nothing. He does report nausea and several episodes of vomiting. Denies diarrhea. Reports slight difficulty urinating. States he was able to urinate this morning.  Patient is a fairly poor historian and cannot recall having similar episodes. His caregiver states that he has had prior admissions for same and has had \"hernias\" that have caused him abdominal pain.  Caregiver states that patient generally lives at a nursing home but has been out with him recently.      MEDICAL RECORD REVIEW  I reviewed prior medical records note the patient was last hospitalized in 2019 and has a history of schizophrenia, atrial fibrillation and syncope. According to last documentation, patient was not anticoagulated and is rate controlled with digoxin and metoprolol.    PAST MEDICAL HISTORY  Active Ambulatory Problems     Diagnosis Date Noted   • Syncope and collapse 08/14/2019   • Schizophrenia ([facility]) 08/14/2019   • Atrial fibrillation ([facility]) 08/14/2019   • Hypertension 08/14/2019   • Orthostatic hypotension 08/14/2019   • Hypokalemia 08/16/2019     Resolved Ambulatory Problems     Diagnosis Date Noted   • No Resolved Ambulatory Problems     Past Medical History:   Diagnosis Date   • A-fib ([facility])    • [place] " esophagus    • GERD (gastroesophageal reflux disease)    • Hx of schizophrenia    • Hyperlipidemia    • Hypophosphatemia    • Hypothyroidism    • Umbilical hernia          PAST SURGICAL HISTORY  History reviewed. No pertinent surgical history.      FAMILY HISTORY  History reviewed. No pertinent family history.      SOCIAL HISTORY  Social History     Socioeconomic History   • Marital status: Single   Tobacco Use   • Smoking status: Former Smoker     Types: Cigarettes   • Smokeless tobacco: Never Used   • Tobacco comment: quit 30-40 years ago    Substance and Sexual Activity   • Sexual activity: Defer         ALLERGIES  Penicillins and Latex       REVIEW OF SYSTEMS  Review of Systems   Constitutional: Negative.  Negative for fever.   HENT: Negative.  Negative for sore throat.    Eyes: Negative.    Respiratory: Positive for shortness of breath. Negative for cough.    Cardiovascular: Negative.  Negative for chest pain.   Gastrointestinal: Positive for abdominal pain, nausea and vomiting.   Genitourinary: Positive for difficulty urinating. Negative for dysuria.   Musculoskeletal: Negative.  Negative for back pain.   Skin: Negative.  Negative for rash.   Neurological: Negative.  Negative for headaches.   All other systems reviewed and are negative.          PHYSICAL EXAM    I have reviewed the triage vital signs and nursing notes.    ED Triage Vitals   Temp Heart Rate Resp BP SpO2   02/03/22 1121 02/03/22 1121 02/03/22 1121 02/03/22 1138 02/03/22 1121   98.2 °F (36.8 °C) 76 18 (!) 161/123 96 %      Temp src Heart Rate Source Patient Position BP Location FiO2 (%)   02/03/22 1121 02/03/22 1121 -- -- --   Tympanic Monitor          Physical Exam  GENERAL: Alert male in mild distress. Triage vitals reviewed and notable for blood pressure of 161/123. Initial pulse 76.  HENT: nares patent  EYES: no scleral icterus  CV: Tachycardic and irregular with pulse in the 130s to 150s  RESPIRATORY: normal effort, mild tachypnea  ABDOMEN:  soft, obese with mild diffuse tenderness to palpation  MUSCULOSKELETAL: no deformity-no significant swelling or tenderness to palpation  NEURO: Strength sensation and coordination are grossly intact.  Speech and mentation are unremarkable  SKIN: warm, dry      LAB RESULTS  Recent Results (from the past 24 hour(s))   Comprehensive Metabolic Panel    Collection Time: 02/03/22 11:45 AM    Specimen: Blood   Result Value Ref Range    Glucose 185 (H) 65 - 99 mg/dL    BUN 14 8 - 23 mg/dL    Creatinine 1.21 0.76 - 1.27 mg/dL    Sodium 131 (L) 136 - 145 mmol/L    Potassium 3.6 3.5 - 5.2 mmol/L    Chloride 95 (L) 98 - 107 mmol/L    CO2 14.7 (L) 22.0 - 29.0 mmol/L    Calcium 9.1 8.6 - 10.5 mg/dL    Total Protein 7.5 6.0 - 8.5 g/dL    Albumin 4.70 3.50 - 5.20 g/dL    ALT (SGPT) 324 (H) 1 - 41 U/L    AST (SGOT) 164 (H) 1 - 40 U/L    Alkaline Phosphatase 58 39 - 117 U/L    Total Bilirubin 2.2 (H) 0.0 - 1.2 mg/dL    eGFR Non African Amer 60 (L) >60 mL/min/1.73    Globulin 2.8 gm/dL    A/G Ratio 1.7 g/dL    BUN/Creatinine Ratio 11.6 7.0 - 25.0    Anion Gap 21.3 (H) 5.0 - 15.0 mmol/L   Lipase    Collection Time: 02/03/22 11:45 AM    Specimen: Blood   Result Value Ref Range    Lipase 1,188 (H) 13 - 60 U/L   CBC Auto Differential    Collection Time: 02/03/22 11:45 AM    Specimen: Blood   Result Value Ref Range    WBC 24.96 (H) 3.40 - 10.80 10*3/mm3    RBC 5.35 4.14 - 5.80 10*6/mm3    Hemoglobin 17.0 13.0 - 17.7 g/dL    Hematocrit 49.7 37.5 - 51.0 %    MCV 92.9 79.0 - 97.0 fL    MCH 31.8 26.6 - 33.0 pg    MCHC 34.2 31.5 - 35.7 g/dL    RDW 12.4 12.3 - 15.4 %    RDW-SD 42.7 37.0 - 54.0 fl    MPV 11.7 6.0 - 12.0 fL    Platelets 225 140 - 450 10*3/mm3    Neutrophil % 90.5 (H) 42.7 - 76.0 %    Lymphocyte % 2.9 (L) 19.6 - 45.3 %    Monocyte % 5.7 5.0 - 12.0 %    Eosinophil % 0.0 (L) 0.3 - 6.2 %    Basophil % 0.2 0.0 - 1.5 %    Immature Grans % 0.7 (H) 0.0 - 0.5 %    Neutrophils, Absolute 22.59 (H) 1.70 - 7.00 10*3/mm3    Lymphocytes,  Absolute 0.72 0.70 - 3.10 10*3/mm3    Monocytes, Absolute 1.42 (H) 0.10 - 0.90 10*3/mm3    Eosinophils, Absolute 0.00 0.00 - 0.40 10*3/mm3    Basophils, Absolute 0.05 0.00 - 0.20 10*3/mm3    Immature Grans, Absolute 0.18 (H) 0.00 - 0.05 10*3/mm3    nRBC 0.0 0.0 - 0.2 /100 WBC   Digoxin Level    Collection Time: 02/03/22 11:45 AM    Specimen: Blood   Result Value Ref Range    Digoxin 0.50 (L) 0.60 - 1.20 ng/mL   Troponin    Collection Time: 02/03/22 11:45 AM    Specimen: Blood   Result Value Ref Range    Troponin T <0.010 0.000 - 0.030 ng/mL   BNP    Collection Time: 02/03/22 11:45 AM    Specimen: Blood   Result Value Ref Range    proBNP 785.0 0.0 - 900.0 pg/mL   COVID-19,[facility] IN-HOUSE CEPHEID/JAROD NP SWAB IN TRANSPORT MEDIA 8-12 HR TAT - Swab, Nasopharynx    Collection Time: 02/03/22 12:12 PM    Specimen: Nasopharynx; Swab   Result Value Ref Range    COVID19 Not Detected Not Detected - Ref. Range   ECG 12 Lead    Collection Time: 02/03/22 12:16 PM   Result Value Ref Range    QT Interval 315 ms   Urinalysis With Microscopic If Indicated (No Culture) - Urine, Clean Catch    Collection Time: 02/03/22 12:41 PM    Specimen: Urine, Clean Catch   Result Value Ref Range    Color, UA Dark Yellow (A) Yellow, Straw    Appearance, UA Cloudy (A) Clear    pH, UA <=5.0 5.0 - 8.0    Specific Gravity, UA >=1.030 1.005 - 1.030    Glucose, UA Negative Negative    Ketones, UA Trace (A) Negative    Bilirubin, UA Small (1+) (A) Negative    Blood, UA Negative Negative    Protein,  mg/dL (2+) (A) Negative    Leuk Esterase, UA Trace (A) Negative    Nitrite, UA Negative Negative    Urobilinogen, UA 0.2 E.U./dL 0.2 - 1.0 E.U./dL   Urinalysis, Microscopic Only - Urine, Clean Catch    Collection Time: 02/03/22 12:41 PM    Specimen: Urine, Clean Catch   Result Value Ref Range    RBC, UA None Seen None Seen, 0-2 /HPF    WBC, UA 0-2 None Seen, 0-2 /HPF    Bacteria, UA 1+ (A) None Seen /HPF    Squamous Epithelial Cells, UA None Seen None  Seen, 0-2 /HPF    Hyaline Casts, UA 3-6 None Seen /LPF    Methodology Manual Light Microscopy        Ordered the above labs and independently reviewed the results.      RADIOLOGY  CT Soft Tissue Neck With Contrast    Result Date: 2/3/2022  CT NECK WITH CONTRAST  HISTORY: Stridor, status post nasogastric tube insertion attempt.  COMPARISON: None.  TECHNIQUE: A CT examination of the neck was performed after the intravenous administration of contrast. The study was hampered significantly by patient motion. The patient was scanned twice and there was patient motion on both scans.  FINDINGS:  There is narrowing of the distal trachea on both scans. This is likely related to breathing. Clinical correlation is recommended. When able, further evaluation could be performed with a dedicated CT examination of the chest.  The parotid, submandibular and thyroid glands appear unremarkable. There is no evidence of pneumomediastinum or pathologic adenopathy.      1.  This study is degraded by patient motion. There is no evidence of pneumomediastinum or of obvious esophageal injury. A mucosal injury or esophageal injury cannot be excluded. 2.  Narrowing of the distal trachea is appreciated which likely is related to respiration. Clinical correlation is recommended. When able, further evaluation could be performed with a dedicated CT examination of the chest with a full breath-hold as indicated.  The above information was called to and discussed with Dr. Marr.  Radiation dose reduction techniques were utilized, including automated exposure control and exposure modulation based on body size.        CT Abdomen Pelvis With Contrast    Result Date: 2/3/2022  CT ABDOMEN AND PELVIS WITH CONTRAST  HISTORY: Abdominal pain with elevated lipase.  TECHNIQUE: Axial CT images of the abdomen and pelvis were obtained following administration of intravenous contrast. The patient was not given oral contrast Coronal and sagittal reformats were  obtained.  COMPARISON: None.  FINDINGS: There is marked distention of the stomach with an air-fluid level. There is distention of small bowel loop air-fluid levels measuring up to 5.5 cm. These demonstrate a somewhat abrupt transition within the distal ileal loops just proximal to the terminal ileum within the right lower quadrant. This is best demonstrated on image 131/132 axial. The terminal ileum is completely collapsed. The colon is completely collapsed. The dilated duodenum demonstrates peripheral foci of air favored to represent trapped air and pseudopneumatosis.  The liver demonstrates normal attenuation. There is mild intrahepatic and extrahepatic biliary dilatation status post cholecystectomy. Diffuse peripancreatic stranding and fluid are present and most suggestive of pancreatitis. The splenoportal confluence is patent. The spleen is normal. Bilateral adrenal glands and kidneys are normal. Thickening of Gerota's fascia and fluid are seen within bilateral paracolic gutters. The prostate gland is mildly enlarged. Bilateral adrenal glands and kidneys are normal. No pathological retroperitoneal lymphadenopathy. Moderate calcified atherosclerotic plaque is seen within the abdominal aorta and its branches.  There are bilateral small layering pleural effusions with subsegmental lower lobe atelectasis. Small pericardial effusion is present      1. Complete distal small bowel obstruction. This is favored to be secondary to adhesive disease. There is massive distention of the stomach and duodenum and insertion of NG tube is recommended. 2. CT findings suggesting pancreatitis.  These findings were discussed with Dr. Marr by telephone at the time of dictation.  Radiation dose reduction techniques were utilized, including automated exposure control and exposure modulation based on body size.       XR Chest 1 View    Result Date: 2/3/2022  XR CHEST 1 VW-  Clinical: Shortness of breath, possible aspiration  COMPARISON  8/14/2019  FINDINGS: There is a very shallow inspiratory effort with bibasilar atelectasis/infiltrates. There is cardiac enlargement, no pulmonary edema. No gross pleural effusion is demonstrated. Along the lateral mid lung zone, there is again a curvilinear density again seen which could represent pleural thickening, small loculated pleural effusion, peripheral parenchymal scarring and/or focal persistent consolidation. This appears slightly less conspicuous compared to the previous examination. No mediastinal abnormality is demonstrated.  Partially within the field-of-view is a moderately distended segment of hepatic flexure and considerable amount of gas demonstrated within the gastric lumen. There are monitoring leads superimposing the chest.  CONCLUSION: 1. Very low inspiratory effort with bibasilar infiltrate/atelectasis. 2. Chronic pleural-parenchymal change along the lateral left mid lung zone as described above. 3. Cardiomegaly. 4. Moderately distended segment of hepatic flexure and gaseous distention of the stomach.  This report was finalized on 2/3/2022 3:36 PM by Dr. Cuco Geiger M.D.        I ordered the above noted radiological studies. Reviewed by me and discussed with radiologist.  See dictation for official radiology interpretation.      PROCEDURES  Critical Care  Performed by: Babatunde Marr MD  Authorized by: Babatunde Marr MD     Critical care provider statement:     Critical care time (minutes):  65    Critical care time was exclusive of:  Separately billable procedures and treating other patients    Critical care was necessary to treat or prevent imminent or life-threatening deterioration of the following conditions:  Circulatory failure    Critical care was time spent personally by me on the following activities:  Development of treatment plan with patient or surrogate, evaluation of patient's response to treatment, obtaining history from patient or surrogate, review of old charts,  re-evaluation of patient's condition, pulse oximetry, ordering and review of radiographic studies, ordering and review of laboratory studies and ordering and performing treatments and interventions          MEDICATIONS GIVEN IN ER    Medications   sodium chloride 0.9 % flush 10 mL (has no administration in time range)   dilTIAZem (CARDIZEM) 125 mg in 125 mL 0.7% sodium chloride  infusion (15 mg/hr Intravenous Rate/Dose Change 2/3/22 1457)   sodium chloride 0.9 % bolus 1,000 mL (0 mL Intravenous Stopped 2/3/22 1242)   dilTIAZem (CARDIZEM) injection 10 mg (10 mg Intravenous Given 2/3/22 1205)   sodium chloride 0.9 % bolus 1,000 mL (0 mL Intravenous Stopped 2/3/22 1358)   iopamidol (ISOVUE-300) 61 % injection 100 mL (85 mL Intravenous Given 2/3/22 1326)   iopamidol (ISOVUE-300) 61 % injection 95 mL (95 mL Intravenous Given by Other 2/3/22 1440)   digoxin (LANOXIN) injection 500 mcg (500 mcg Intravenous Given 2/3/22 1505)   HYDROmorphone (DILAUDID) injection 1 mg (1 mg Intravenous Given 2/3/22 1515)   ondansetron (ZOFRAN) injection 4 mg (4 mg Intravenous Given 2/3/22 1515)   midazolam (VERSED) injection 2 mg (1 mg Intravenous Given 2/3/22 1541)         PROGRESS, DATA ANALYSIS, CONSULTS, AND MEDICAL DECISION MAKING    All labs have been independently reviewed by me.  All radiology studies have been reviewed by me and discussed with radiologist dictating the report.   EKG's independently viewed and interpreted by me.  Discussion below represents my analysis of pertinent findings related to patient's condition, differential diagnosis, treatment plan and final disposition.      ED Course as of 02/03/22 1607   Thu Feb 03, 2022   1158 [id number]-old male with history of schizophrenia, A. fib and prior ventral hernias presents with diffuse abdominal pain. On exam he is fairly tachycardic and appears to be in A. fib with a rate in the 130s to 150s. Also pretty hypertensive.  We'll go ahead and start Cardizem drip and bolus to  help with heart rate and elevated blood pressure.  Differential diagnosis of abdominal pain would include but is not limited to the following:  Bowel obstruction  Gastroenteritis  Cholecystitis  Bowel infarction  Diverticulitis [DB]   1239 EKG          EKG time: 1216  Rhythm/Rate: A. fib 144  P waves and ID: Atrial fibrillation  QRS, axis: Normal axis, LVH  ST and T waves: Nonspecific ST and T wave changes    Interpreted Contemporaneously by me, independently viewed  When compared to 2019 A. fib is faster   [DB]   1240 Labs reviewed and notable for elevated white count of 24.9, concerning for possible infection.  Patient also has significantly elevated lipase of 1188 consistent with likely pancreatitis.  LFTs also show abnormality with ALT of 324 and AST of 164.  Total bili is 2.2 suggesting that this could be gallstone pancreatitis. [DB]   1242 At this point we will go ahead and get a CT scan of the abdomen for pancreatitis and leukocytosis.  Patient has been started on IV Cardizem drip and bolus.  We will go ahead and give 1 L fluid as he is probably on the dry side. [DB]   1242 And with Dr. Sushila Hanna who reports small bowel obstruction with clear transition point near the terminal ileum there is marked abdominal distention and patient may benefit from NG tube.  There is also findings of acute pancreatitis.  I discussed results of testing with patient at bedside as well as his friend.  We will go ahead and place an NG tube and consult general surgery and internal medicine. [DB]   1350 I discussed treatment and evaluation this patient with Dr. Vic Bauer who will admit on behalf of [facility]. [DB]   1416 NG tube insertion was attempted at ED bedside.  There was a return of reported fluid initially but patient had trouble breathing and tube was pulled back.  After tube insertion patient has had stridor and lower O2 saturations.  Patient is able to speak clearly and denies pain in the neck or throat.  I cannot  feel any subcutaneous emphysema.  We will get CT scan of the neck soft tissue to help rule out any iatrogenic injury status post attempted NG tube insertion. [DB]   6483 I discussed CT neck results with Dr. Mccann.  There is no obvious traumatic or iatrogenic injury noted status post NG tube insertion.  He reports that there could be some narrowing of the trachea but this could be related to breathing.  At this point patient is maintaining O2 saturations but is requiring 100% nonrebreather mask.  Heart rate did jump up and is running in the 160s currently.  We will go up on the diltiazem and add some IV digoxin to help maintain heart rate closer to the 120s or 100s.  At this point would hold on telemetry admission as patient may need ICU if we cannot improve his vitals. [DB]   1523 I reviewed the portable chest x-ray at the bedside and note that there is pretty significant gastric distention to the point that seems to be limiting the diaphragmatic excursion.  I suspect patient's respiratory distress is as much related to gastric distention is anything else.  I really truly believe patient needs an NG tube but patient did not tolerate very well at all.  He has had some medication for pain and we will go ahead and give a sedative and try her best to get an NG tube down as I think that is our best way to improve his respiratory status. [DB]   1543 Discussed evaluation this patient with Dr. Birdie Agosto who is on-call for general surgery and will see in consultation. [DB]   154 I went in bedside with nursing staff and watch the insertion of the NG tube.  NG tube insertion went well although we have not gotten any significant amount of fluid back from the NG tube suction.  All floor withdrawing air and decompressing the stomach.  Patient tolerated procedure well and is able to talk and breathe without any difficulties.  We have taken him off the 100% nonrebreather mask and now he is on nasal cannula oxygen seen for  breathing better. [DB]   1557 I check back with patient roughly 10 minutes after NG tube exertion and he is resting quietly.  O2 saturations are mid 90s on 4 L nasal cannula.  NG tube is now producing large amounts of fluid and we are up close to 800 mL of fluid and rising quickly.  At this point I do believe patient is stable to go to a telemetry bed and not ICU. [DB]      ED Course User Index  [DB] Babatunde Marr MD       AS OF 16:07 EST VITALS:    BP - (!) 204/117  HR - (!) 149  TEMP - 98.2 °F (36.8 °C) (Tympanic)  O2 SATS - (!) 88%      DIAGNOSIS  Final diagnoses:   Generalized abdominal pain   Acute pancreatitis, unspecified complication status, unspecified pancreatitis type   Small bowel obstruction (HCC)   Atrial fibrillation with RVR (HCC)         DISPOSITION  Admission         Babatunde Marr MD  02/03/22 6747

## 2022-02-03 NOTE — PLAN OF CARE
Goal Outcome Evaluation:  Plan of Care Reviewed With: patient           Outcome Summary: Pt admitted to room.  Meds discussed with friend that pt was staying with.  VSS.  NG tube to LWS.  VSS.  A-fib on monitor and cont with CArdizem drip.  Will cont to monitor.

## 2022-02-03 NOTE — ED NOTES
Attempted to put in NG tube on patient. While inserting tube, patient regurgitated fluids and oxygen saturation started to drop. Patient insisted that we do not continue to place NG tube. Oxygen saturations 77% on RA. Placed on 15L NRB.      Eloy Ham, RN  02/03/22 [id number]

## 2022-02-03 NOTE — ED TRIAGE NOTES
Pt complains of generalized abdominal pain with N/V that started yesterday. Patient masked at arrival and triage staff wore all appropriate PPE during entire encounter with patient.

## 2022-02-03 NOTE — ED NOTES
This RN at bedside assisting when NADIYA Lyons, attempted NG tube placement.  Pt tolerated with chin to chest, sipping water to encourage placement.  O2 sats down to 80% after placement; NADIYA Lyons pulled NG tube, applied O2 via BVM.  Pt requested no more attempts at placement.  MD aware.     Dennis Camacho, RN  02/03/22 8915

## 2022-02-03 NOTE — PROGRESS NOTES
"Pharmacy Consult - dough    [facility] Efra has been consulted for pharmacy to dose enoxaparin for VTE Prophylaxis per 's request.     Relevant clinical data and objective history reviewed:  67 y.o. male 188 cm (74\") 111 kg (245 lb)    Home Anticoagulation: none    Past Medical History:   Diagnosis Date    A-fib (HCC)     Atrial fibrillation (HCC) 8/14/2019    [place] esophagus     GERD (gastroesophageal reflux disease)     Hx of schizophrenia     Hyperlipidemia     Hypertension     Hypokalemia 8/16/2019    Hypophosphatemia     Hypothyroidism     Schizophrenia (HCC) 8/14/2019    Syncope and collapse 08/14/2019    Umbilical hernia      is allergic to penicillins and latex.    Lab Results   Component Value Date    WBC 24.96 (H) 02/03/2022    HGB 17.0 02/03/2022    HCT 49.7 02/03/2022    MCV 92.9 02/03/2022     02/03/2022     Lab Results   Component Value Date    GLUCOSE 185 (H) 02/03/2022    CALCIUM 9.1 02/03/2022     (L) 02/03/2022    K 3.6 02/03/2022    CO2 14.7 (L) 02/03/2022    CL 95 (L) 02/03/2022    BUN 14 02/03/2022    CREATININE 1.21 02/03/2022    EGFRIFAFRI 132 08/14/2019    EGFRIFNONA 60 (L) 02/03/2022    BCR 11.6 02/03/2022    ANIONGAP 21.3 (H) 02/03/2022       Estimated Creatinine Clearance: 80.7 mL/min (by C-G formula based on SCr of 1.21 mg/dL).    Active Inpatient Anticoagulation Orders: none    Assessment/Plan    Will start patient on 40 mg subcutaneous every 24 hours, adjusted for renal function. Labs to be ordered. Pharmacy will continue to follow.     Agustin Woody [facility]   "

## 2022-02-04 ENCOUNTER — APPOINTMENT (OUTPATIENT)
Dept: GENERAL RADIOLOGY | Facility: HOSPITAL | Age: 67
End: 2022-02-04

## 2022-02-04 ENCOUNTER — ANESTHESIA (OUTPATIENT)
Dept: PERIOP | Facility: HOSPITAL | Age: 67
End: 2022-02-04

## 2022-02-04 ENCOUNTER — ANESTHESIA EVENT (OUTPATIENT)
Dept: PERIOP | Facility: HOSPITAL | Age: 67
End: 2022-02-04

## 2022-02-04 LAB
ANION GAP SERPL CALCULATED.3IONS-SCNC: 12.9 MMOL/L (ref 5–15)
BUN SERPL-MCNC: 21 MG/DL (ref 8–23)
BUN/CREAT SERPL: 27.3 (ref 7–25)
CALCIUM SPEC-SCNC: 7.9 MG/DL (ref 8.6–10.5)
CHLORIDE SERPL-SCNC: 97 MMOL/L (ref 98–107)
CO2 SERPL-SCNC: 19.1 MMOL/L (ref 22–29)
CREAT SERPL-MCNC: 0.77 MG/DL (ref 0.76–1.27)
DEPRECATED RDW RBC AUTO: 42.1 FL (ref 37–54)
ERYTHROCYTE [DISTWIDTH] IN BLOOD BY AUTOMATED COUNT: 12.6 % (ref 12.3–15.4)
GFR SERPL CREATININE-BSD FRML MDRD: 101 ML/MIN/1.73
GLUCOSE SERPL-MCNC: 120 MG/DL (ref 65–99)
HCT VFR BLD AUTO: 47.1 % (ref 37.5–51)
HGB BLD-MCNC: 16.1 G/DL (ref 13–17.7)
MAGNESIUM SERPL-MCNC: 1.5 MG/DL (ref 1.6–2.4)
MCH RBC QN AUTO: 31.3 PG (ref 26.6–33)
MCHC RBC AUTO-ENTMCNC: 34.2 G/DL (ref 31.5–35.7)
MCV RBC AUTO: 91.5 FL (ref 79–97)
PHOSPHATE SERPL-MCNC: 1.1 MG/DL (ref 2.5–4.5)
PLATELET # BLD AUTO: 181 10*3/MM3 (ref 140–450)
PMV BLD AUTO: 11.9 FL (ref 6–12)
POTASSIUM SERPL-SCNC: 4.4 MMOL/L (ref 3.5–5.2)
RBC # BLD AUTO: 5.15 10*6/MM3 (ref 4.14–5.8)
SODIUM SERPL-SCNC: 129 MMOL/L (ref 136–145)
TSH SERPL DL<=0.05 MIU/L-ACNC: 1.36 UIU/ML (ref 0.27–4.2)
WBC NRBC COR # BLD: 19.14 10*3/MM3 (ref 3.4–10.8)

## 2022-02-04 PROCEDURE — C9290 INJ, BUPIVACAINE LIPOSOME: HCPCS | Performed by: STUDENT IN AN ORGANIZED HEALTH CARE EDUCATION/TRAINING PROGRAM

## 2022-02-04 PROCEDURE — 83735 ASSAY OF MAGNESIUM: CPT | Performed by: STUDENT IN AN ORGANIZED HEALTH CARE EDUCATION/TRAINING PROGRAM

## 2022-02-04 PROCEDURE — 85027 COMPLETE CBC AUTOMATED: CPT | Performed by: INTERNAL MEDICINE

## 2022-02-04 PROCEDURE — 25010000002 SUCCINYLCHOLINE PER 20 MG: Performed by: NURSE ANESTHETIST, CERTIFIED REGISTERED

## 2022-02-04 PROCEDURE — 25010000002 HYDROMORPHONE 1 MG/ML SOLUTION: Performed by: STUDENT IN AN ORGANIZED HEALTH CARE EDUCATION/TRAINING PROGRAM

## 2022-02-04 PROCEDURE — 25010000002 ONDANSETRON PER 1 MG: Performed by: NURSE ANESTHETIST, CERTIFIED REGISTERED

## 2022-02-04 PROCEDURE — 97165 OT EVAL LOW COMPLEX 30 MIN: CPT

## 2022-02-04 PROCEDURE — 25010000002 MAGNESIUM SULFATE 2 GM/50ML SOLUTION: Performed by: STUDENT IN AN ORGANIZED HEALTH CARE EDUCATION/TRAINING PROGRAM

## 2022-02-04 PROCEDURE — 25010000002 HYDROMORPHONE PER 4 MG: Performed by: NURSE ANESTHETIST, CERTIFIED REGISTERED

## 2022-02-04 PROCEDURE — 97530 THERAPEUTIC ACTIVITIES: CPT

## 2022-02-04 PROCEDURE — 99233 SBSQ HOSP IP/OBS HIGH 50: CPT | Performed by: STUDENT IN AN ORGANIZED HEALTH CARE EDUCATION/TRAINING PROGRAM

## 2022-02-04 PROCEDURE — 84100 ASSAY OF PHOSPHORUS: CPT | Performed by: STUDENT IN AN ORGANIZED HEALTH CARE EDUCATION/TRAINING PROGRAM

## 2022-02-04 PROCEDURE — 0DN80ZZ RELEASE SMALL INTESTINE, OPEN APPROACH: ICD-10-PCS | Performed by: STUDENT IN AN ORGANIZED HEALTH CARE EDUCATION/TRAINING PROGRAM

## 2022-02-04 PROCEDURE — 71045 X-RAY EXAM CHEST 1 VIEW: CPT

## 2022-02-04 PROCEDURE — 25010000002 MORPHINE PER 10 MG: Performed by: NURSE PRACTITIONER

## 2022-02-04 PROCEDURE — 25010000002 PROPOFOL 10 MG/ML EMULSION: Performed by: NURSE ANESTHETIST, CERTIFIED REGISTERED

## 2022-02-04 PROCEDURE — 44005 FREEING OF BOWEL ADHESION: CPT | Performed by: STUDENT IN AN ORGANIZED HEALTH CARE EDUCATION/TRAINING PROGRAM

## 2022-02-04 PROCEDURE — 80048 BASIC METABOLIC PNL TOTAL CA: CPT | Performed by: INTERNAL MEDICINE

## 2022-02-04 PROCEDURE — 84443 ASSAY THYROID STIM HORMONE: CPT | Performed by: STUDENT IN AN ORGANIZED HEALTH CARE EDUCATION/TRAINING PROGRAM

## 2022-02-04 PROCEDURE — 0 BUPIVACAINE LIPOSOME 1.3 % SUSPENSION: Performed by: STUDENT IN AN ORGANIZED HEALTH CARE EDUCATION/TRAINING PROGRAM

## 2022-02-04 PROCEDURE — 25010000002 SODIUM CHLORIDE 0.9 % WITH KCL 20 MEQ 20-0.9 MEQ/L-% SOLUTION: Performed by: INTERNAL MEDICINE

## 2022-02-04 PROCEDURE — 25010000002 FENTANYL CITRATE (PF) 50 MCG/ML SOLUTION: Performed by: NURSE ANESTHETIST, CERTIFIED REGISTERED

## 2022-02-04 PROCEDURE — 25010000002 SODIUM CHLORIDE 0.9 % WITH KCL 20 MEQ 20-0.9 MEQ/L-% SOLUTION: Performed by: STUDENT IN AN ORGANIZED HEALTH CARE EDUCATION/TRAINING PROGRAM

## 2022-02-04 PROCEDURE — 99222 1ST HOSP IP/OBS MODERATE 55: CPT | Performed by: INTERNAL MEDICINE

## 2022-02-04 PROCEDURE — 25010000002 DEXAMETHASONE PER 1 MG: Performed by: NURSE ANESTHETIST, CERTIFIED REGISTERED

## 2022-02-04 PROCEDURE — 74018 RADEX ABDOMEN 1 VIEW: CPT

## 2022-02-04 RX ORDER — BUPIVACAINE HYDROCHLORIDE AND EPINEPHRINE 5; 5 MG/ML; UG/ML
INJECTION, SOLUTION PERINEURAL AS NEEDED
Status: DISCONTINUED | OUTPATIENT
Start: 2022-02-04 | End: 2022-02-04 | Stop reason: HOSPADM

## 2022-02-04 RX ORDER — MAGNESIUM SULFATE HEPTAHYDRATE 40 MG/ML
2 INJECTION, SOLUTION INTRAVENOUS ONCE
Status: COMPLETED | OUTPATIENT
Start: 2022-02-04 | End: 2022-02-04

## 2022-02-04 RX ORDER — HYDROMORPHONE HYDROCHLORIDE 1 MG/ML
0.5 INJECTION, SOLUTION INTRAMUSCULAR; INTRAVENOUS; SUBCUTANEOUS
Status: DISCONTINUED | OUTPATIENT
Start: 2022-02-04 | End: 2022-02-04 | Stop reason: SDUPTHER

## 2022-02-04 RX ORDER — HYDROMORPHONE HYDROCHLORIDE 1 MG/ML
0.5 INJECTION, SOLUTION INTRAMUSCULAR; INTRAVENOUS; SUBCUTANEOUS
Status: DISCONTINUED | OUTPATIENT
Start: 2022-02-04 | End: 2022-02-04 | Stop reason: HOSPADM

## 2022-02-04 RX ORDER — ONDANSETRON 2 MG/ML
4 INJECTION INTRAMUSCULAR; INTRAVENOUS ONCE AS NEEDED
Status: DISCONTINUED | OUTPATIENT
Start: 2022-02-04 | End: 2022-02-04 | Stop reason: HOSPADM

## 2022-02-04 RX ORDER — LIDOCAINE HYDROCHLORIDE 10 MG/ML
0.5 INJECTION, SOLUTION EPIDURAL; INFILTRATION; INTRACAUDAL; PERINEURAL ONCE AS NEEDED
Status: DISCONTINUED | OUTPATIENT
Start: 2022-02-04 | End: 2022-02-04 | Stop reason: HOSPADM

## 2022-02-04 RX ORDER — SODIUM CHLORIDE 0.9 % (FLUSH) 0.9 %
3 SYRINGE (ML) INJECTION EVERY 12 HOURS SCHEDULED
Status: DISCONTINUED | OUTPATIENT
Start: 2022-02-04 | End: 2022-02-04 | Stop reason: HOSPADM

## 2022-02-04 RX ORDER — ONDANSETRON 2 MG/ML
INJECTION INTRAMUSCULAR; INTRAVENOUS AS NEEDED
Status: DISCONTINUED | OUTPATIENT
Start: 2022-02-04 | End: 2022-02-04 | Stop reason: SURG

## 2022-02-04 RX ORDER — SODIUM CHLORIDE, SODIUM LACTATE, POTASSIUM CHLORIDE, CALCIUM CHLORIDE 600; 310; 30; 20 MG/100ML; MG/100ML; MG/100ML; MG/100ML
9 INJECTION, SOLUTION INTRAVENOUS CONTINUOUS
Status: DISCONTINUED | OUTPATIENT
Start: 2022-02-04 | End: 2022-02-07

## 2022-02-04 RX ORDER — FENTANYL CITRATE 50 UG/ML
50 INJECTION, SOLUTION INTRAMUSCULAR; INTRAVENOUS
Status: DISCONTINUED | OUTPATIENT
Start: 2022-02-04 | End: 2022-02-04 | Stop reason: HOSPADM

## 2022-02-04 RX ORDER — DEXAMETHASONE SODIUM PHOSPHATE 10 MG/ML
INJECTION INTRAMUSCULAR; INTRAVENOUS AS NEEDED
Status: DISCONTINUED | OUTPATIENT
Start: 2022-02-04 | End: 2022-02-04 | Stop reason: SURG

## 2022-02-04 RX ORDER — ROCURONIUM BROMIDE 10 MG/ML
INJECTION, SOLUTION INTRAVENOUS AS NEEDED
Status: DISCONTINUED | OUTPATIENT
Start: 2022-02-04 | End: 2022-02-04 | Stop reason: SURG

## 2022-02-04 RX ORDER — HYDRALAZINE HYDROCHLORIDE 20 MG/ML
5 INJECTION INTRAMUSCULAR; INTRAVENOUS
Status: DISCONTINUED | OUTPATIENT
Start: 2022-02-04 | End: 2022-02-04 | Stop reason: HOSPADM

## 2022-02-04 RX ORDER — PROPOFOL 10 MG/ML
VIAL (ML) INTRAVENOUS AS NEEDED
Status: DISCONTINUED | OUTPATIENT
Start: 2022-02-04 | End: 2022-02-04 | Stop reason: SURG

## 2022-02-04 RX ORDER — DIPHENHYDRAMINE HCL 25 MG
25 CAPSULE ORAL
Status: DISCONTINUED | OUTPATIENT
Start: 2022-02-04 | End: 2022-02-04 | Stop reason: HOSPADM

## 2022-02-04 RX ORDER — SUCCINYLCHOLINE CHLORIDE 20 MG/ML
INJECTION INTRAMUSCULAR; INTRAVENOUS AS NEEDED
Status: DISCONTINUED | OUTPATIENT
Start: 2022-02-04 | End: 2022-02-04 | Stop reason: SURG

## 2022-02-04 RX ORDER — MAGNESIUM HYDROXIDE 1200 MG/15ML
LIQUID ORAL AS NEEDED
Status: DISCONTINUED | OUTPATIENT
Start: 2022-02-04 | End: 2022-02-04 | Stop reason: HOSPADM

## 2022-02-04 RX ORDER — MIDAZOLAM HYDROCHLORIDE 1 MG/ML
0.5 INJECTION INTRAMUSCULAR; INTRAVENOUS
Status: DISCONTINUED | OUTPATIENT
Start: 2022-02-04 | End: 2022-02-04 | Stop reason: HOSPADM

## 2022-02-04 RX ORDER — MIDAZOLAM HYDROCHLORIDE 1 MG/ML
1 INJECTION INTRAMUSCULAR; INTRAVENOUS
Status: DISCONTINUED | OUTPATIENT
Start: 2022-02-04 | End: 2022-02-04 | Stop reason: HOSPADM

## 2022-02-04 RX ORDER — DIPHENHYDRAMINE HYDROCHLORIDE 50 MG/ML
12.5 INJECTION INTRAMUSCULAR; INTRAVENOUS
Status: DISCONTINUED | OUTPATIENT
Start: 2022-02-04 | End: 2022-02-04 | Stop reason: HOSPADM

## 2022-02-04 RX ORDER — NALOXONE HCL 0.4 MG/ML
0.2 VIAL (ML) INJECTION AS NEEDED
Status: DISCONTINUED | OUTPATIENT
Start: 2022-02-04 | End: 2022-02-04 | Stop reason: HOSPADM

## 2022-02-04 RX ORDER — EPHEDRINE SULFATE 50 MG/ML
5 INJECTION, SOLUTION INTRAVENOUS ONCE AS NEEDED
Status: DISCONTINUED | OUTPATIENT
Start: 2022-02-04 | End: 2022-02-04 | Stop reason: HOSPADM

## 2022-02-04 RX ORDER — FENTANYL CITRATE 50 UG/ML
INJECTION, SOLUTION INTRAMUSCULAR; INTRAVENOUS AS NEEDED
Status: DISCONTINUED | OUTPATIENT
Start: 2022-02-04 | End: 2022-02-04 | Stop reason: SURG

## 2022-02-04 RX ORDER — LABETALOL HYDROCHLORIDE 5 MG/ML
5 INJECTION, SOLUTION INTRAVENOUS
Status: DISCONTINUED | OUTPATIENT
Start: 2022-02-04 | End: 2022-02-04 | Stop reason: HOSPADM

## 2022-02-04 RX ORDER — SODIUM CHLORIDE 0.9 % (FLUSH) 0.9 %
3-10 SYRINGE (ML) INJECTION AS NEEDED
Status: DISCONTINUED | OUTPATIENT
Start: 2022-02-04 | End: 2022-02-04 | Stop reason: HOSPADM

## 2022-02-04 RX ORDER — PROMETHAZINE HYDROCHLORIDE 25 MG/1
25 TABLET ORAL ONCE AS NEEDED
Status: DISCONTINUED | OUTPATIENT
Start: 2022-02-04 | End: 2022-02-04 | Stop reason: HOSPADM

## 2022-02-04 RX ORDER — PROMETHAZINE HYDROCHLORIDE 25 MG/1
25 SUPPOSITORY RECTAL ONCE AS NEEDED
Status: DISCONTINUED | OUTPATIENT
Start: 2022-02-04 | End: 2022-02-04 | Stop reason: HOSPADM

## 2022-02-04 RX ORDER — FAMOTIDINE 10 MG/ML
20 INJECTION, SOLUTION INTRAVENOUS ONCE
Status: COMPLETED | OUTPATIENT
Start: 2022-02-04 | End: 2022-02-04

## 2022-02-04 RX ADMIN — Medication 5 MG/HR: at 23:56

## 2022-02-04 RX ADMIN — POTASSIUM CHLORIDE AND SODIUM CHLORIDE 100 ML/HR: 900; 150 INJECTION, SOLUTION INTRAVENOUS at 05:13

## 2022-02-04 RX ADMIN — FENTANYL CITRATE 50 MCG: 0.05 INJECTION, SOLUTION INTRAMUSCULAR; INTRAVENOUS at 16:36

## 2022-02-04 RX ADMIN — ONDANSETRON 4 MG: 2 INJECTION INTRAMUSCULAR; INTRAVENOUS at 17:14

## 2022-02-04 RX ADMIN — SODIUM CHLORIDE, POTASSIUM CHLORIDE, SODIUM LACTATE AND CALCIUM CHLORIDE 9 ML/HR: 600; 310; 30; 20 INJECTION, SOLUTION INTRAVENOUS at 15:10

## 2022-02-04 RX ADMIN — MAGNESIUM SULFATE HEPTAHYDRATE 2 G: 2 INJECTION, SOLUTION INTRAVENOUS at 08:24

## 2022-02-04 RX ADMIN — SUGAMMADEX 200 MG: 100 INJECTION, SOLUTION INTRAVENOUS at 17:32

## 2022-02-04 RX ADMIN — DEXAMETHASONE SODIUM PHOSPHATE 8 MG: 10 INJECTION INTRAMUSCULAR; INTRAVENOUS at 17:09

## 2022-02-04 RX ADMIN — ROCURONIUM BROMIDE 30 MG: 50 INJECTION INTRAVENOUS at 16:43

## 2022-02-04 RX ADMIN — FENTANYL CITRATE 50 MCG: 50 INJECTION INTRAMUSCULAR; INTRAVENOUS at 18:33

## 2022-02-04 RX ADMIN — ROCURONIUM BROMIDE 10 MG: 50 INJECTION INTRAVENOUS at 16:36

## 2022-02-04 RX ADMIN — SODIUM CHLORIDE, PRESERVATIVE FREE 10 ML: 5 INJECTION INTRAVENOUS at 08:25

## 2022-02-04 RX ADMIN — ASPIRIN 81 MG: 81 TABLET, COATED ORAL at 08:24

## 2022-02-04 RX ADMIN — SUGAMMADEX 200 MG: 100 INJECTION, SOLUTION INTRAVENOUS at 17:21

## 2022-02-04 RX ADMIN — SODIUM CHLORIDE, POTASSIUM CHLORIDE, SODIUM LACTATE AND CALCIUM CHLORIDE 9 ML/HR: 600; 310; 30; 20 INJECTION, SOLUTION INTRAVENOUS at 18:40

## 2022-02-04 RX ADMIN — POTASSIUM CHLORIDE AND SODIUM CHLORIDE 100 ML/HR: 900; 150 INJECTION, SOLUTION INTRAVENOUS at 19:49

## 2022-02-04 RX ADMIN — SODIUM PHOSPHATE, MONOBASIC, MONOHYDRATE 20 MMOL: 276; 142 INJECTION, SOLUTION INTRAVENOUS at 10:31

## 2022-02-04 RX ADMIN — HYDROMORPHONE HYDROCHLORIDE 0.5 MG: 1 INJECTION, SOLUTION INTRAMUSCULAR; INTRAVENOUS; SUBCUTANEOUS at 20:51

## 2022-02-04 RX ADMIN — ROCURONIUM BROMIDE 10 MG: 50 INJECTION INTRAVENOUS at 16:56

## 2022-02-04 RX ADMIN — METRONIDAZOLE 500 MG: 500 INJECTION, SOLUTION INTRAVENOUS at 15:09

## 2022-02-04 RX ADMIN — FAMOTIDINE 20 MG: 10 INJECTION INTRAVENOUS at 15:09

## 2022-02-04 RX ADMIN — HYDROMORPHONE HYDROCHLORIDE 0.5 MG: 1 INJECTION, SOLUTION INTRAMUSCULAR; INTRAVENOUS; SUBCUTANEOUS at 18:48

## 2022-02-04 RX ADMIN — ACETAMINOPHEN 650 MG: 325 TABLET, FILM COATED ORAL at 08:24

## 2022-02-04 RX ADMIN — FENTANYL CITRATE 50 MCG: 0.05 INJECTION, SOLUTION INTRAMUSCULAR; INTRAVENOUS at 16:56

## 2022-02-04 RX ADMIN — METRONIDAZOLE 500 MG: 500 INJECTION, SOLUTION INTRAVENOUS at 23:55

## 2022-02-04 RX ADMIN — SUCCINYLCHOLINE CHLORIDE 140 MG: 20 INJECTION, SOLUTION INTRAMUSCULAR; INTRAVENOUS; PARENTERAL at 16:36

## 2022-02-04 RX ADMIN — PROPOFOL 160 MG: 10 INJECTION, EMULSION INTRAVENOUS at 16:36

## 2022-02-04 NOTE — PAYOR COMM NOTE
"Cary Núñez (67 y.o. Male)     ATTN: NOTICE OF INPATIENT ADMISSION: ID# [id number]    PLEASE REPLY TO UR DEPT: [phone],  [phone]              Date of Birth Social Security Number Address Home Phone MRN    1955  [address]  [facility] [address] [phone] [id number]    Advent Marital Status             None Single       Admission Date Admission Type Admitting Provider Attending Provider Department, Room/Bed    2/3/22 Emergency StinglYesenia MD Mamedov, Bokhodir S, MD [address], [address]/1    Discharge Date Discharge Disposition Discharge Destination                         Attending Provider: Dana Zurita MD    Allergies: Penicillins, Latex    Isolation: None   Infection: None   Code Status: CPR   Advance Care Planning Activity    Ht: 188 cm (74\")   Wt: 111 kg (245 lb)    Admission Cmt: None   Principal Problem: None                Active Insurance as of 2/3/2022     Primary Coverage     Payor Plan Insurance Group Employer/Plan Group    MEDICARE MEDICARE A & B      Payor Plan Address Payor Plan Phone Number Payor Plan Fax Number Effective Dates    [address] [phone]  8/1/1995 - None Entered    [facility] [id number]       Subscriber Name Subscriber Birth Date Member ID       CARY NÚÑEZ 1955 [id number]           Secondary Coverage     Payor Plan Insurance Group Employer/Plan Group    [employer] [place] WELL[place]ARE [place]ICAID      Payor Plan Address Payor Plan Phone Number Payor Plan Fax Number Effective Dates    [address] [address] [phone]  2/3/2022 - None Entered    [facility] [id number]       Subscriber Name Subscriber Birth Date Member ID       CARY NÚÑEZ 1955 [id number]                 Emergency Contacts      (Rel.) Home Phone Work Phone Mobile Phone    Luz Grimes (Sister) [phone] -- --    JESUS AUSTIN (Friend) [phone] -- [phone]               History & Physical      Stingl, Yesenia Greenberg MD " at 22          HISTORY AND PHYSICAL   [facility]        Date of Admission: 2/3/2022  Patient Identification:  Name: Carlos Kraus  Age: 67 y.o.  Sex: male  :  1955  MRN: [id number]                     Primary Care Physician: Provider, No Known    Chief Complaint:  67 year old gentleman who presented to the emergency room with abdominal pain, nausea and vomiting which started yesterday; no diarrhea; no fever or chills; he says he has had one prior abdominal surgery; he lives in a nursing home but has a caregiver who takes him out on day trips; the patient cannot give a good history and the caregiver has left    History of Present Illness:   As above    Past Medical History:  Past Medical History:   Diagnosis Date   • A-fib (HCC)    • Atrial fibrillation (HCC) 2019   • [place] esophagus    • GERD (gastroesophageal reflux disease)    • Hx of schizophrenia    • Hyperlipidemia    • Hypertension    • Hypokalemia 2019   • Hypophosphatemia    • Hypothyroidism    • Schizophrenia (HCC) 2019   • Syncope and collapse 2019   • Umbilical hernia      Past Surgical History:  History reviewed. No pertinent surgical history.   Home Meds:  Medications Prior to Admission   Medication Sig Dispense Refill Last Dose   • acetaminophen (TYLENOL) 650 MG 8 hr tablet Take 650 mg by mouth Every 8 (Eight) Hours As Needed for Mild Pain .   2022 at Unknown time   • aspirin 81 MG EC tablet Take 81 mg by mouth Daily.   2/3/2022 at Unknown time   • cetirizine (zyrTEC) 10 MG tablet Take 10 mg by mouth Daily.   2/3/2022 at Unknown time   • digoxin (LANOXIN) 250 MCG tablet Take 1 tablet by mouth Daily. 30 tablet 0 2/3/2022 at Unknown time   • multivitamin with minerals (TAB-A-LÓPEZ PO) Take 1 tablet by mouth Daily.   2/3/2022 at Unknown time   • pantoprazole (PROTONIX) 40 MG EC tablet Take 40 mg by mouth Daily.   2/3/2022 at Unknown time   • risperiDONE (risperDAL) 3 MG tablet Take 5 mg by mouth  "Every Night.   2022 at Unknown time   • simvastatin (ZOCOR) 20 MG tablet Take 20 mg by mouth Every Night.   2022 at Unknown time       Allergies:  Allergies   Allergen Reactions   • Penicillins Anaphylaxis   • Latex Hives     Immunizations:    There is no immunization history on file for this patient.  Social History:   Social History     Social History Narrative   • Not on file     Social History     Socioeconomic History   • Marital status: Single   Tobacco Use   • Smoking status: Former Smoker     Types: Cigarettes   • Smokeless tobacco: Never Used   • Tobacco comment: quit 30-40 years ago    Substance and Sexual Activity   • Sexual activity: Defer       Family History:  History reviewed. No pertinent family history.     Review of Systems     Not obtainable      Objective:  T Max 24 hrs: Temp (24hrs), Av.3 °F (36.8 °C), Min:97.9 °F (36.6 °C), Max:98.7 °F (37.1 °C)    Vitals Ranges:   Temp:  [97.9 °F (36.6 °C)-98.7 °F (37.1 °C)] 97.9 °F (36.6 °C)  Heart Rate:  [] 90  Resp:  [18-28] 20  BP: (124-204)/() 141/92      Exam:  /92 (BP Location: Left arm, Patient Position: Lying)   Pulse 90   Temp 97.9 °F (36.6 °C) (Oral)   Resp 20   Ht 188 cm (74\")   Wt 111 kg (245 lb)   SpO2 97%   BMI 31.46 kg/m²     General Appearance:    drowsy, cooperative, no distress, appears stated age; chronically ill appearing   Head:    Normocephalic, without obvious abnormality, atraumatic   Eyes:    PERRL, conjunctivae/corneas clear, EOM's intact, both eyes   Ears:    Normal external ear canals, both ears   Nose:   Nares normal, septum midline, mucosa normal, no drainage    or sinus tenderness   Throat:   Lips, mucosa, and tongue normal   Neck:   Supple, symmetrical, trachea midline, no adenopathy;     thyroid:  no enlargement/tenderness/nodules; no carotid    bruit or JVD   Back:     Symmetric, no curvature, ROM normal, no CVA tenderness   Lungs:     Decreased breath sounds bilaterally, respirations " unlabored   Chest Wall:    No tenderness or deformity    Heart:    Regular rate and rhythm, S1 and S2 normal, no murmur, rub   or gallop   Abdomen:     Soft, nontender, bowel sounds active all four quadrants,     no masses, no hepatomegaly, no splenomegaly   Extremities:   Extremities normal, atraumatic, no cyanosis or edema   Pulses:   2+ and symmetric all extremities   Skin:   Skin color, texture, turgor normal, no rashes or lesions               .    Data Review:  Labs in chart were reviewed.  WBC   Date Value Ref Range Status   02/03/2022 24.96 (H) 3.40 - 10.80 10*3/mm3 Final     Hemoglobin   Date Value Ref Range Status   02/03/2022 17.0 13.0 - 17.7 g/dL Final     Hematocrit   Date Value Ref Range Status   02/03/2022 49.7 37.5 - 51.0 % Final     Platelets   Date Value Ref Range Status   02/03/2022 225 140 - 450 10*3/mm3 Final     Sodium   Date Value Ref Range Status   02/03/2022 131 (L) 136 - 145 mmol/L Final     Potassium   Date Value Ref Range Status   02/03/2022 3.6 3.5 - 5.2 mmol/L Final     Chloride   Date Value Ref Range Status   02/03/2022 95 (L) 98 - 107 mmol/L Final     CO2   Date Value Ref Range Status   02/03/2022 14.7 (L) 22.0 - 29.0 mmol/L Final     BUN   Date Value Ref Range Status   02/03/2022 14 8 - 23 mg/dL Final     Creatinine   Date Value Ref Range Status   02/03/2022 1.21 0.76 - 1.27 mg/dL Final     Glucose   Date Value Ref Range Status   02/03/2022 185 (H) 65 - 99 mg/dL Final     Calcium   Date Value Ref Range Status   02/03/2022 9.1 8.6 - 10.5 mg/dL Final     AST (SGOT)   Date Value Ref Range Status   02/03/2022 164 (H) 1 - 40 U/L Final     ALT (SGPT)   Date Value Ref Range Status   02/03/2022 324 (H) 1 - 41 U/L Final     Alkaline Phosphatase   Date Value Ref Range Status   02/03/2022 58 39 - 117 U/L Final     No results found for: APTT, INR             Imaging Results (All)     Procedure Component Value Units Date/Time    XR Abdomen KUB [[id number]] Collected: 02/03/22 1608     Updated:  02/03/22 1612    Narrative:      XR ABDOMEN KUB-     Clinical: Nasogastric tube placement     FINDINGS: Nasogastric tube tip superimposes the proximal gastric body.  This position is satisfactory. For optimal positioning in the mid to  distal stomach it can be advanced 6 to 8 cm. Gastric and bowel  distention again demonstrated.     This report was finalized on 2/3/2022 4:09 PM by Dr. Cuco Geiger M.D.       XR Chest 1 View [[id number]] Collected: 02/03/22 1530     Updated: 02/03/22 1539    Narrative:      XR CHEST 1 VW-     Clinical: Shortness of breath, possible aspiration     COMPARISON 8/14/2019     FINDINGS: There is a very shallow inspiratory effort with bibasilar  atelectasis/infiltrates. There is cardiac enlargement, no pulmonary  edema. No gross pleural effusion is demonstrated. Along the lateral mid  lung zone, there is again a curvilinear density again seen which could  represent pleural thickening, small loculated pleural effusion,  peripheral parenchymal scarring and/or focal persistent consolidation.  This appears slightly less conspicuous compared to the previous  examination. No mediastinal abnormality is demonstrated.     Partially within the field-of-view is a moderately distended segment of  hepatic flexure and considerable amount of gas demonstrated within the  gastric lumen. There are monitoring leads superimposing the chest.     CONCLUSION:  1. Very low inspiratory effort with bibasilar infiltrate/atelectasis.  2. Chronic pleural-parenchymal change along the lateral left mid lung  zone as described above.  3. Cardiomegaly.  4. Moderately distended segment of hepatic flexure and gaseous  distention of the stomach.     This report was finalized on 2/3/2022 3:36 PM by Dr. Cuco Geiger M.D.       CT Soft Tissue Neck With Contrast [[id number]] Collected: 02/03/22 1520     Updated: 02/03/22 1520    Narrative:      CT NECK WITH CONTRAST     HISTORY: Stridor, status post nasogastric tube insertion  attempt.     COMPARISON: None.     TECHNIQUE: A CT examination of the neck was performed after the  intravenous administration of contrast. The study was hampered  significantly by patient motion. The patient was scanned twice and there  was patient motion on both scans.     FINDINGS:  There is narrowing of the distal trachea on both scans. This  is likely related to breathing. Clinical correlation is recommended.  When able, further evaluation could be performed with a dedicated CT  examination of the chest.     The parotid, submandibular and thyroid glands appear unremarkable. There  is no evidence of pneumomediastinum or pathologic adenopathy.       Impression:      1.  This study is degraded by patient motion. There is no evidence of  pneumomediastinum or of obvious esophageal injury. A mucosal injury or  esophageal injury cannot be excluded.  2.  Narrowing of the distal trachea is appreciated which likely is  related to respiration. Clinical correlation is recommended. When able,  further evaluation could be performed with a dedicated CT examination of  the chest with a full breath-hold as indicated.     The above information was called to and discussed with Dr. Marr.      Radiation dose reduction techniques were utilized, including automated  exposure control and exposure modulation based on body size.             CT Abdomen Pelvis With Contrast [[id number]] Collected: 02/03/22 1410     Updated: 02/03/22 1410    Narrative:      CT ABDOMEN AND PELVIS WITH CONTRAST     HISTORY: Abdominal pain with elevated lipase.     TECHNIQUE: Axial CT images of the abdomen and pelvis were obtained  following administration of intravenous contrast. The patient was not  given oral contrast Coronal and sagittal reformats were obtained.     COMPARISON: None.     FINDINGS: There is marked distention of the stomach with an air-fluid  level. There is distention of small bowel loop air-fluid levels  measuring up to 5.5 cm. These  demonstrate a somewhat abrupt transition  within the distal ileal loops just proximal to the terminal ileum within  the right lower quadrant. This is best demonstrated on image 131/132  axial. The terminal ileum is completely collapsed. The colon is  completely collapsed. The dilated duodenum demonstrates peripheral foci  of air favored to represent trapped air and pseudopneumatosis.     The liver demonstrates normal attenuation. There is mild intrahepatic  and extrahepatic biliary dilatation status post cholecystectomy. Diffuse  peripancreatic stranding and fluid are present and most suggestive of  pancreatitis. The splenoportal confluence is patent. The spleen is  normal. Bilateral adrenal glands and kidneys are normal. Thickening of  Gerota's fascia and fluid are seen within bilateral paracolic gutters.  The prostate gland is mildly enlarged. Bilateral adrenal glands and  kidneys are normal. No pathological retroperitoneal lymphadenopathy.  Moderate calcified atherosclerotic plaque is seen within the abdominal  aorta and its branches.     There are bilateral small layering pleural effusions with subsegmental  lower lobe atelectasis. Small pericardial effusion is present       Impression:      1. Complete distal small bowel obstruction. This is favored to be  secondary to adhesive disease. There is massive distention of the  stomach and duodenum and insertion of NG tube is recommended.  2. CT findings suggesting pancreatitis.     These findings were discussed with Dr. Marr by telephone at the time  of dictation.     Radiation dose reduction techniques were utilized, including automated  exposure control and exposure modulation based on body size.              Patient Active Problem List   Diagnosis Code   • Syncope and collapse R55   • Schizophrenia (HCC) F20.9   • Atrial fibrillation (HCC) I48.91   • Hypertension I10   • Orthostatic hypotension I95.1   • Hypokalemia E87.6   • Generalized abdominal pain R10.84        Assessment:  Active Hospital Problems    Diagnosis  POA   • Generalized abdominal pain [R10.84]  Yes      Resolved Hospital Problems   No resolved problems to display.   nausea and vomiting  Small bowel obstruction  Pancreatitis  Atrial fibrillation with rvr    Plan:  Conservative management with ng tube and fluids  Appreciate input from surgery  Trend lipase  Monitor on telemetry  Heart rate is now controlled  DKarleew patient, nurse and ED provider    Yesenia Bauer MD  2/3/2022  19:54 EST      Electronically signed by Yesenia Bauer MD at 02/03/22 1958          Emergency Department Notes      Mandi Calles, RN at 02/03/22 1120        Pt complains of generalized abdominal pain with N/V that started yesterday. Patient masked at arrival and triage staff wore all appropriate PPE during entire encounter with patient.       Electronically signed by Mandi Calles RN at 02/03/22 1121     Babatunde Marr MD at 02/03/22 1153      Procedure Orders    1. Critical Care [[id number]] ordered by Babatunde Marr MD                EMERGENCY DEPARTMENT ENCOUNTER    Room Number:  28/28  Date of encounter:  2/3/2022  PCP: Provider, No Known  Historian: Patient, caregiver at bedside    I used full protective equipment while examining this patient.  This includes face mask, gloves and protective eyewear.  I washed my hands before entering the room and immediately upon leaving the room      HPI:  Chief Complaint: Abdominal pain  A complete HPI/ROS/PMH/PSH/SH/FH are unobtainable due to: Patient is very poor historian    Context: Carlos Kraus is a 67 y.o. male who presents to the ED c/o abdominal pain. 67-year-old male presents with diffuse abdominal pain, onset yesterday. Symptoms have been constant but wax and wane. Symptoms worsened by nothing, improved by nothing. He does report nausea and several episodes of vomiting. Denies diarrhea. Reports slight difficulty urinating. States he was able to urinate this  "morning.  Patient is a fairly poor historian and cannot recall having similar episodes. His caregiver states that he has had prior admissions for same and has had \"hernias\" that have caused him abdominal pain.  Caregiver states that patient generally lives at a nursing home but has been out with him recently.      MEDICAL RECORD REVIEW  I reviewed prior medical records note the patient was last hospitalized in 2019 and has a history of schizophrenia, atrial fibrillation and syncope. According to last documentation, patient was not anticoagulated and is rate controlled with digoxin and metoprolol.    PAST MEDICAL HISTORY  Active Ambulatory Problems     Diagnosis Date Noted   • Syncope and collapse 08/14/2019   • Schizophrenia ([facility]) 08/14/2019   • Atrial fibrillation ([facility]) 08/14/2019   • Hypertension 08/14/2019   • Orthostatic hypotension 08/14/2019   • Hypokalemia 08/16/2019     Resolved Ambulatory Problems     Diagnosis Date Noted   • No Resolved Ambulatory Problems     Past Medical History:   Diagnosis Date   • A-fib ([facility])    • [place] esophagus    • GERD (gastroesophageal reflux disease)    • Hx of schizophrenia    • Hyperlipidemia    • Hypophosphatemia    • Hypothyroidism    • Umbilical hernia          PAST SURGICAL HISTORY  History reviewed. No pertinent surgical history.      FAMILY HISTORY  History reviewed. No pertinent family history.      SOCIAL HISTORY  Social History     Socioeconomic History   • Marital status: Single   Tobacco Use   • Smoking status: Former Smoker     Types: Cigarettes   • Smokeless tobacco: Never Used   • Tobacco comment: quit 30-40 years ago    Substance and Sexual Activity   • Sexual activity: Defer         ALLERGIES  Penicillins and Latex       REVIEW OF SYSTEMS  Review of Systems   Constitutional: Negative.  Negative for fever.   HENT: Negative.  Negative for sore throat.    Eyes: Negative.    Respiratory: Positive for shortness of breath. Negative for cough.    Cardiovascular: " Negative.  Negative for chest pain.   Gastrointestinal: Positive for abdominal pain, nausea and vomiting.   Genitourinary: Positive for difficulty urinating. Negative for dysuria.   Musculoskeletal: Negative.  Negative for back pain.   Skin: Negative.  Negative for rash.   Neurological: Negative.  Negative for headaches.   All other systems reviewed and are negative.          PHYSICAL EXAM    I have reviewed the triage vital signs and nursing notes.    ED Triage Vitals   Temp Heart Rate Resp BP SpO2   02/03/22 1121 02/03/22 1121 02/03/22 1121 02/03/22 1138 02/03/22 1121   98.2 °F (36.8 °C) 76 18 (!) 161/123 96 %      Temp src Heart Rate Source Patient Position BP Location FiO2 (%)   02/03/22 1121 02/03/22 1121 -- -- --   Tympanic Monitor          Physical Exam  GENERAL: Alert male in mild distress. Triage vitals reviewed and notable for blood pressure of 161/123. Initial pulse 76.  HENT: nares patent  EYES: no scleral icterus  CV: Tachycardic and irregular with pulse in the 130s to 150s  RESPIRATORY: normal effort, mild tachypnea  ABDOMEN: soft, obese with mild diffuse tenderness to palpation  MUSCULOSKELETAL: no deformity-no significant swelling or tenderness to palpation  NEURO: Strength sensation and coordination are grossly intact.  Speech and mentation are unremarkable  SKIN: warm, dry      LAB RESULTS  Recent Results (from the past 24 hour(s))   Comprehensive Metabolic Panel    Collection Time: 02/03/22 11:45 AM    Specimen: Blood   Result Value Ref Range    Glucose 185 (H) 65 - 99 mg/dL    BUN 14 8 - 23 mg/dL    Creatinine 1.21 0.76 - 1.27 mg/dL    Sodium 131 (L) 136 - 145 mmol/L    Potassium 3.6 3.5 - 5.2 mmol/L    Chloride 95 (L) 98 - 107 mmol/L    CO2 14.7 (L) 22.0 - 29.0 mmol/L    Calcium 9.1 8.6 - 10.5 mg/dL    Total Protein 7.5 6.0 - 8.5 g/dL    Albumin 4.70 3.50 - 5.20 g/dL    ALT (SGPT) 324 (H) 1 - 41 U/L    AST (SGOT) 164 (H) 1 - 40 U/L    Alkaline Phosphatase 58 39 - 117 U/L    Total Bilirubin 2.2  (H) 0.0 - 1.2 mg/dL    eGFR Non African Amer 60 (L) >60 mL/min/1.73    Globulin 2.8 gm/dL    A/G Ratio 1.7 g/dL    BUN/Creatinine Ratio 11.6 7.0 - 25.0    Anion Gap 21.3 (H) 5.0 - 15.0 mmol/L   Lipase    Collection Time: 02/03/22 11:45 AM    Specimen: Blood   Result Value Ref Range    Lipase 1,188 (H) 13 - 60 U/L   CBC Auto Differential    Collection Time: 02/03/22 11:45 AM    Specimen: Blood   Result Value Ref Range    WBC 24.96 (H) 3.40 - 10.80 10*3/mm3    RBC 5.35 4.14 - 5.80 10*6/mm3    Hemoglobin 17.0 13.0 - 17.7 g/dL    Hematocrit 49.7 37.5 - 51.0 %    MCV 92.9 79.0 - 97.0 fL    MCH 31.8 26.6 - 33.0 pg    MCHC 34.2 31.5 - 35.7 g/dL    RDW 12.4 12.3 - 15.4 %    RDW-SD 42.7 37.0 - 54.0 fl    MPV 11.7 6.0 - 12.0 fL    Platelets 225 140 - 450 10*3/mm3    Neutrophil % 90.5 (H) 42.7 - 76.0 %    Lymphocyte % 2.9 (L) 19.6 - 45.3 %    Monocyte % 5.7 5.0 - 12.0 %    Eosinophil % 0.0 (L) 0.3 - 6.2 %    Basophil % 0.2 0.0 - 1.5 %    Immature Grans % 0.7 (H) 0.0 - 0.5 %    Neutrophils, Absolute 22.59 (H) 1.70 - 7.00 10*3/mm3    Lymphocytes, Absolute 0.72 0.70 - 3.10 10*3/mm3    Monocytes, Absolute 1.42 (H) 0.10 - 0.90 10*3/mm3    Eosinophils, Absolute 0.00 0.00 - 0.40 10*3/mm3    Basophils, Absolute 0.05 0.00 - 0.20 10*3/mm3    Immature Grans, Absolute 0.18 (H) 0.00 - 0.05 10*3/mm3    nRBC 0.0 0.0 - 0.2 /100 WBC   Digoxin Level    Collection Time: 02/03/22 11:45 AM    Specimen: Blood   Result Value Ref Range    Digoxin 0.50 (L) 0.60 - 1.20 ng/mL   Troponin    Collection Time: 02/03/22 11:45 AM    Specimen: Blood   Result Value Ref Range    Troponin T <0.010 0.000 - 0.030 ng/mL   BNP    Collection Time: 02/03/22 11:45 AM    Specimen: Blood   Result Value Ref Range    proBNP 785.0 0.0 - 900.0 pg/mL   COVID-19,RONNIE JEREZU IN-HOUSE CEPHEID/JAROD NP SWAB IN TRANSPORT MEDIA 8-12 HR TAT - Swab, Nasopharynx    Collection Time: 02/03/22 12:12 PM    Specimen: Nasopharynx; Swab   Result Value Ref Range    COVID19 Not Detected Not Detected  - Ref. Range   ECG 12 Lead    Collection Time: 02/03/22 12:16 PM   Result Value Ref Range    QT Interval 315 ms   Urinalysis With Microscopic If Indicated (No Culture) - Urine, Clean Catch    Collection Time: 02/03/22 12:41 PM    Specimen: Urine, Clean Catch   Result Value Ref Range    Color, UA Dark Yellow (A) Yellow, Straw    Appearance, UA Cloudy (A) Clear    pH, UA <=5.0 5.0 - 8.0    Specific Gravity, UA >=1.030 1.005 - 1.030    Glucose, UA Negative Negative    Ketones, UA Trace (A) Negative    Bilirubin, UA Small (1+) (A) Negative    Blood, UA Negative Negative    Protein,  mg/dL (2+) (A) Negative    Leuk Esterase, UA Trace (A) Negative    Nitrite, UA Negative Negative    Urobilinogen, UA 0.2 E.U./dL 0.2 - 1.0 E.U./dL   Urinalysis, Microscopic Only - Urine, Clean Catch    Collection Time: 02/03/22 12:41 PM    Specimen: Urine, Clean Catch   Result Value Ref Range    RBC, UA None Seen None Seen, 0-2 /HPF    WBC, UA 0-2 None Seen, 0-2 /HPF    Bacteria, UA 1+ (A) None Seen /HPF    Squamous Epithelial Cells, UA None Seen None Seen, 0-2 /HPF    Hyaline Casts, UA 3-6 None Seen /LPF    Methodology Manual Light Microscopy        Ordered the above labs and independently reviewed the results.      RADIOLOGY  CT Soft Tissue Neck With Contrast    Result Date: 2/3/2022  CT NECK WITH CONTRAST  HISTORY: Stridor, status post nasogastric tube insertion attempt.  COMPARISON: None.  TECHNIQUE: A CT examination of the neck was performed after the intravenous administration of contrast. The study was hampered significantly by patient motion. The patient was scanned twice and there was patient motion on both scans.  FINDINGS:  There is narrowing of the distal trachea on both scans. This is likely related to breathing. Clinical correlation is recommended. When able, further evaluation could be performed with a dedicated CT examination of the chest.  The parotid, submandibular and thyroid glands appear unremarkable. There is no  evidence of pneumomediastinum or pathologic adenopathy.      1.  This study is degraded by patient motion. There is no evidence of pneumomediastinum or of obvious esophageal injury. A mucosal injury or esophageal injury cannot be excluded. 2.  Narrowing of the distal trachea is appreciated which likely is related to respiration. Clinical correlation is recommended. When able, further evaluation could be performed with a dedicated CT examination of the chest with a full breath-hold as indicated.  The above information was called to and discussed with Dr. Marr.  Radiation dose reduction techniques were utilized, including automated exposure control and exposure modulation based on body size.        CT Abdomen Pelvis With Contrast    Result Date: 2/3/2022  CT ABDOMEN AND PELVIS WITH CONTRAST  HISTORY: Abdominal pain with elevated lipase.  TECHNIQUE: Axial CT images of the abdomen and pelvis were obtained following administration of intravenous contrast. The patient was not given oral contrast Coronal and sagittal reformats were obtained.  COMPARISON: None.  FINDINGS: There is marked distention of the stomach with an air-fluid level. There is distention of small bowel loop air-fluid levels measuring up to 5.5 cm. These demonstrate a somewhat abrupt transition within the distal ileal loops just proximal to the terminal ileum within the right lower quadrant. This is best demonstrated on image 131/132 axial. The terminal ileum is completely collapsed. The colon is completely collapsed. The dilated duodenum demonstrates peripheral foci of air favored to represent trapped air and pseudopneumatosis.  The liver demonstrates normal attenuation. There is mild intrahepatic and extrahepatic biliary dilatation status post cholecystectomy. Diffuse peripancreatic stranding and fluid are present and most suggestive of pancreatitis. The splenoportal confluence is patent. The spleen is normal. Bilateral adrenal glands and kidneys are  normal. Thickening of Gerota's fascia and fluid are seen within bilateral paracolic gutters. The prostate gland is mildly enlarged. Bilateral adrenal glands and kidneys are normal. No pathological retroperitoneal lymphadenopathy. Moderate calcified atherosclerotic plaque is seen within the abdominal aorta and its branches.  There are bilateral small layering pleural effusions with subsegmental lower lobe atelectasis. Small pericardial effusion is present      1. Complete distal small bowel obstruction. This is favored to be secondary to adhesive disease. There is massive distention of the stomach and duodenum and insertion of NG tube is recommended. 2. CT findings suggesting pancreatitis.  These findings were discussed with Dr. Marr by telephone at the time of dictation.  Radiation dose reduction techniques were utilized, including automated exposure control and exposure modulation based on body size.       XR Chest 1 View    Result Date: 2/3/2022  XR CHEST 1 VW-  Clinical: Shortness of breath, possible aspiration  COMPARISON 8/14/2019  FINDINGS: There is a very shallow inspiratory effort with bibasilar atelectasis/infiltrates. There is cardiac enlargement, no pulmonary edema. No gross pleural effusion is demonstrated. Along the lateral mid lung zone, there is again a curvilinear density again seen which could represent pleural thickening, small loculated pleural effusion, peripheral parenchymal scarring and/or focal persistent consolidation. This appears slightly less conspicuous compared to the previous examination. No mediastinal abnormality is demonstrated.  Partially within the field-of-view is a moderately distended segment of hepatic flexure and considerable amount of gas demonstrated within the gastric lumen. There are monitoring leads superimposing the chest.  CONCLUSION: 1. Very low inspiratory effort with bibasilar infiltrate/atelectasis. 2. Chronic pleural-parenchymal change along the lateral left mid  lung zone as described above. 3. Cardiomegaly. 4. Moderately distended segment of hepatic flexure and gaseous distention of the stomach.  This report was finalized on 2/3/2022 3:36 PM by Dr. Cuco Geiger M.D.        I ordered the above noted radiological studies. Reviewed by me and discussed with radiologist.  See dictation for official radiology interpretation.      PROCEDURES  Critical Care  Performed by: Babatunde Marr MD  Authorized by: Babatunde Marr MD     Critical care provider statement:     Critical care time (minutes):  65    Critical care time was exclusive of:  Separately billable procedures and treating other patients    Critical care was necessary to treat or prevent imminent or life-threatening deterioration of the following conditions:  Circulatory failure    Critical care was time spent personally by me on the following activities:  Development of treatment plan with patient or surrogate, evaluation of patient's response to treatment, obtaining history from patient or surrogate, review of old charts, re-evaluation of patient's condition, pulse oximetry, ordering and review of radiographic studies, ordering and review of laboratory studies and ordering and performing treatments and interventions          MEDICATIONS GIVEN IN ER    Medications   sodium chloride 0.9 % flush 10 mL (has no administration in time range)   dilTIAZem (CARDIZEM) 125 mg in 125 mL 0.7% sodium chloride  infusion (15 mg/hr Intravenous Rate/Dose Change 2/3/22 1457)   sodium chloride 0.9 % bolus 1,000 mL (0 mL Intravenous Stopped 2/3/22 1242)   dilTIAZem (CARDIZEM) injection 10 mg (10 mg Intravenous Given 2/3/22 1205)   sodium chloride 0.9 % bolus 1,000 mL (0 mL Intravenous Stopped 2/3/22 1358)   iopamidol (ISOVUE-300) 61 % injection 100 mL (85 mL Intravenous Given 2/3/22 1326)   iopamidol (ISOVUE-300) 61 % injection 95 mL (95 mL Intravenous Given by Other 2/3/22 1440)   digoxin (LANOXIN) injection 500 mcg (500 mcg  Intravenous Given 2/3/22 1505)   HYDROmorphone (DILAUDID) injection 1 mg (1 mg Intravenous Given 2/3/22 1515)   ondansetron (ZOFRAN) injection 4 mg (4 mg Intravenous Given 2/3/22 1515)   midazolam (VERSED) injection 2 mg (1 mg Intravenous Given 2/3/22 1541)         PROGRESS, DATA ANALYSIS, CONSULTS, AND MEDICAL DECISION MAKING    All labs have been independently reviewed by me.  All radiology studies have been reviewed by me and discussed with radiologist dictating the report.   EKG's independently viewed and interpreted by me.  Discussion below represents my analysis of pertinent findings related to patient's condition, differential diagnosis, treatment plan and final disposition.      ED Course as of 02/03/22 1607   Thu Feb 03, 2022   [id number]-old male with history of schizophrenia, A. fib and prior ventral hernias presents with diffuse abdominal pain. On exam he is fairly tachycardic and appears to be in A. fib with a rate in the 130s to 150s. Also pretty hypertensive.  We'll go ahead and start Cardizem drip and bolus to help with heart rate and elevated blood pressure.  Differential diagnosis of abdominal pain would include but is not limited to the following:  Bowel obstruction  Gastroenteritis  Cholecystitis  Bowel infarction  Diverticulitis [DB]   1239 EKG          EKG time: 1216  Rhythm/Rate: A. fib 144  P waves and UT: Atrial fibrillation  QRS, axis: Normal axis, LVH  ST and T waves: Nonspecific ST and T wave changes    Interpreted Contemporaneously by me, independently viewed  When compared to 2019 A. fib is faster   [DB]   1240 Labs reviewed and notable for elevated white count of 24.9, concerning for possible infection.  Patient also has significantly elevated lipase of 1188 consistent with likely pancreatitis.  LFTs also show abnormality with ALT of 324 and AST of 164.  Total bili is 2.2 suggesting that this could be gallstone pancreatitis. [DB]   1242 At this point we will go ahead and get a CT  scan of the abdomen for pancreatitis and leukocytosis.  Patient has been started on IV Cardizem drip and bolus.  We will go ahead and give 1 L fluid as he is probably on the dry side. [DB]   1242 And with Dr. Sushila Hanna who reports small bowel obstruction with clear transition point near the terminal ileum there is marked abdominal distention and patient may benefit from NG tube.  There is also findings of acute pancreatitis.  I discussed results of testing with patient at bedside as well as his friend.  We will go ahead and place an NG tube and consult general surgery and internal medicine. [DB]   1350 I discussed treatment and evaluation this patient with Dr. Vic Bauer who will admit on behalf of [facility]. [DB]   1416 NG tube insertion was attempted at ED bedside.  There was a return of reported fluid initially but patient had trouble breathing and tube was pulled back.  After tube insertion patient has had stridor and lower O2 saturations.  Patient is able to speak clearly and denies pain in the neck or throat.  I cannot feel any subcutaneous emphysema.  We will get CT scan of the neck soft tissue to help rule out any iatrogenic injury status post attempted NG tube insertion. [DB]   1454 I discussed CT neck results with Dr. Mccann.  There is no obvious traumatic or iatrogenic injury noted status post NG tube insertion.  He reports that there could be some narrowing of the trachea but this could be related to breathing.  At this point patient is maintaining O2 saturations but is requiring 100% nonrebreather mask.  Heart rate did jump up and is running in the 160s currently.  We will go up on the diltiazem and add some IV digoxin to help maintain heart rate closer to the 120s or 100s.  At this point would hold on telemetry admission as patient may need ICU if we cannot improve his vitals. [DB]   1523 I reviewed the portable chest x-ray at the bedside and note that there is pretty significant gastric distention  to the point that seems to be limiting the diaphragmatic excursion.  I suspect patient's respiratory distress is as much related to gastric distention is anything else.  I really truly believe patient needs an NG tube but patient did not tolerate very well at all.  He has had some medication for pain and we will go ahead and give a sedative and try her best to get an NG tube down as I think that is our best way to improve his respiratory status. [DB]   1543 Discussed evaluation this patient with Dr. Birdie Agosto who is on-call for general surgery and will see in consultation. [DB]   1548 I went in bedside with nursing staff and watch the insertion of the NG tube.  NG tube insertion went well although we have not gotten any significant amount of fluid back from the NG tube suction.  All floor withdrawing air and decompressing the stomach.  Patient tolerated procedure well and is able to talk and breathe without any difficulties.  We have taken him off the 100% nonrebreather mask and now he is on nasal cannula oxygen seen for breathing better. [DB]   1557 I check back with patient roughly 10 minutes after NG tube exertion and he is resting quietly.  O2 saturations are mid 90s on 4 L nasal cannula.  NG tube is now producing large amounts of fluid and we are up close to 800 mL of fluid and rising quickly.  At this point I do believe patient is stable to go to a telemetry bed and not ICU. [DB]      ED Course User Index  [DB] Babatunde Marr MD       AS OF 16:07 EST VITALS:    BP - (!) 204/117  HR - (!) 149  TEMP - 98.2 °F (36.8 °C) (Tympanic)  O2 SATS - (!) 88%      DIAGNOSIS  Final diagnoses:   Generalized abdominal pain   Acute pancreatitis, unspecified complication status, unspecified pancreatitis type   Small bowel obstruction (HCC)   Atrial fibrillation with RVR (HCC)         DISPOSITION  Admission         Babatunde Marr MD  02/03/22 7197      Electronically signed by Babatunde Marr MD at 02/03/22 4872      Eloy Ham, RN at 02/03/22 1426        Attempted to put in NG tube on patient. While inserting tube, patient regurgitated fluids and oxygen saturation started to drop. Patient insisted that we do not continue to place NG tube. Oxygen saturations 77% on RA. Placed on 15L NRB.      Eloy Ham, NADIYA  02/03/22 1428      Electronically signed by Eloy Ham RN at 02/03/22 1428     Dennis Camacho RN at 02/03/22 1427        This RN at bedside assisting when NADIYA Lyons, attempted NG tube placement.  Pt tolerated with chin to chest, sipping water to encourage placement.  O2 sats down to 80% after placement; NADIYA Lyons pulled NG tube, applied O2 via BVM.  Pt requested no more attempts at placement.  MD aware.     Dennis Camacho RN  02/03/22 1448      Electronically signed by Dennis Camacho RN at 02/03/22 1448     Eloy Ham RN at 02/03/22 1604        NG tube placed 61cm at the RIGHT nare     Eloy Ham RN  02/03/22 1605      Electronically signed by Eloy Ham RN at 02/03/22 1605

## 2022-02-04 NOTE — CASE MANAGEMENT/SOCIAL WORK
Discharge Planning Assessment  [facility]     Patient Name: Carlos Kraus  MRN: [id number]  Today's Date: 2/4/2022    Admit Date: 2/3/2022     Discharge Needs Assessment     Row Name 02/04/22 0918       Living Environment    Lives With facility resident    Name(s) of Who Lives With Patient Pt lives at [facility] in [place], [place]    Current Living Arrangements extended care facility    Primary Care Provided by --  Staff at [facility]    Provides Primary Care For no one, unable/limited ability to care for self; no one    Family Caregiver if Needed friend(s)    Family Caregiver Names Roommate  ( Jimmie Vela [phone])    Quality of Family Relationships --    Able to Return to Prior Arrangements yes    Living Arrangement Comments Pt lives at [facility]       Resource/Environmental Concerns    Resource/Environmental Concerns none       Transition Planning    Patient/Family Anticipates Transition to long-term care facility    Patient/Family Anticipated Services at Transition none    Transportation Anticipated family or friend will provide       Discharge Needs Assessment    Readmission Within the Last 30 Days no previous admission in last 30 days    Equipment Currently Used at Home none    Concerns to be Addressed no discharge needs identified; denies needs/concerns at this time    Anticipated Changes Related to Illness none    Equipment Needed After Discharge none               Discharge Plan     Row Name 02/04/22 0931       Plan    Plan Plan return to [facility].   DENISE Robins RN    Patient/Family in Agreement with Plan yes    Plan Comments FACE SHEET VERIFIED/ IM LETTER SIGNED.  Spoke with pt at bedside.  Pt is from [facility] in [place], [place]  Pt was staying with his friend ( Jimmie Vela [phone]).   Called and spoke with Jimmie who states he picks pt up from [facility] and brings him to his apartment.  Pt and Jimmie have a long term friendship.  Called and spoke with the   (Stephanie  [phone]) of [facility] who states friend (Jimmie) comes and gets pt once a month for a week.  Pt is high functioning at [facility]. Pt denies any needs.   Plan return to [facility].   DENISE Robins RN              Continued Care and Services - Admitted Since 2/3/2022    Coordination has not been started for this encounter.          Demographic Summary     Row Name 02/04/22 0917       General Information    Admission Type inpatient    Arrived From emergency department    Required Notices Provided Important Message from Medicare    Referral Source admission list    Reason for Consult discharge planning    Preferred Language English     Used During This Interaction no               Functional Status     Row Name 02/04/22 0917       Functional Status    Usual Activity Tolerance moderate    Current Activity Tolerance moderate       Functional Status, IADL    Medications assistive person    Meal Preparation assistive person; completely dependent    Housekeeping completely dependent    Laundry completely dependent    Shopping completely dependent       Mental Status    General Appearance WDL WDL               Psychosocial    No documentation.                Abuse/Neglect    No documentation.                Legal    No documentation.                Substance Abuse    No documentation.                Patient Forms    No documentation.                   Yoanna Robins, RN

## 2022-02-04 NOTE — PLAN OF CARE
Goal Outcome Evaluation:      Pt is Disoriented to time. Pt had complaints of pain. RN placed a call out to obtain IV pain medication for pt. Pt stated he had relief. Cardizem drip titrated per orders. Pt on 15L nasal cannula throughout shift. Pt vital signs are within normal limits at this time.

## 2022-02-04 NOTE — PROGRESS NOTES
Name: Carlos Kraus ADMIT: 2/3/2022   : 1955  PCP: Provider, No Known    MRN: [id number] LOS: 1 days   AGE/SEX: 67 y.o. male  ROOM: [facility]/     Subjective   Subjective   Discussed with RN.   Patient continued to have significant output from NG tube overnight, brown in color.  Repeat x-ray was obtained which showed persistent small bowel obstruction.  Prior to be seen patient removed his NG tube.  Was evaluated by surgery, NG tube to be placed back, and tentative plan for surgery this afternoon.  Heart rate and blood pressure improved.  Patient continues to complain of abdominal pain, largely unchanged per patient.    Review of Systems   As above  Objective   Objective   Vital Signs  Temp:  [97.9 °F (36.6 °C)-99.1 °F (37.3 °C)] 98.8 °F (37.1 °C)  Heart Rate:  [] 116  Resp:  [18-23] 22  BP: (124-204)/() 141/95  SpO2:  [85 %-97 %] 93 %  on  Flow (L/min):  [15] 15;   Device (Oxygen Therapy): room air  Body mass index is 31.46 kg/m².  Physical Exam    General: Alert and oriented x3, in mild distress,, ill-appearing  HEENT: Normocephalic, atraumatic  CV: Tachycardic, irregular from.  No rubs or gallops.  Lungs: Clear to auscultation bilaterally, no crackles or wheezes  Abdomen: Distended, generalized tenderness to palpation.  Hypoactive bowel sounds.  Extremities: No significant peripheral edema , no cyanosis     Results Review     I reviewed the patient's new clinical results.  Results from last 7 days   Lab Units 22  0500 22  1145   WBC 10*3/mm3 19.14* 24.96*   HEMOGLOBIN g/dL 16.1 17.0   PLATELETS 10*3/mm3 181 225     Results from last 7 days   Lab Units 22  0500 22  1145   SODIUM mmol/L 129* 131*   POTASSIUM mmol/L 4.4 3.6   CHLORIDE mmol/L 97* 95*   CO2 mmol/L 19.1* 14.7*   BUN mg/dL 21 14   CREATININE mg/dL 0.77 1.21   GLUCOSE mg/dL 120* 185*   Estimated Creatinine Clearance: 122 mL/min (by C-G formula based on SCr of 0.77 mg/dL).  Results from last 7 days   Lab Units  02/03/22  1145   ALBUMIN g/dL 4.70   BILIRUBIN mg/dL 2.2*   ALK PHOS U/L 58   AST (SGOT) U/L 164*   ALT (SGPT) U/L 324*     Results from last 7 days   Lab Units 02/04/22  0500 02/03/22  1145   CALCIUM mg/dL 7.9* 9.1   ALBUMIN g/dL  --  4.70   MAGNESIUM mg/dL 1.5*  --    PHOSPHORUS mg/dL 1.1*  --        COVID19   Date Value Ref Range Status   02/03/2022 Not Detected Not Detected - Ref. Range Final     No results found for: HGBA1C, POCGLU    XR Abdomen KUB  Narrative: KUB     HISTORY: Abdominal distention follow-up. Small bowel obstruction.     TECHNIQUE: Two x-rays of the abdomen and pelvis are provided. These are  correlated with other recent abdominal imaging.     FINDINGS: There is a nasogastric tube with its proximal port near the  level of the gastroesophageal junction. Mild atelectasis is observed in  the lung bases.     Gaseous dilatation of small bowel loops to almost 6 cm is observed. No  intraperitoneal free air is identified.     Impression: Persistent small bowel obstruction.     This report was finalized on 2/4/2022 10:20 AM by Dr. Christopher Muñoz M.D.     CT Abdomen Pelvis With Contrast  Narrative: CT ABDOMEN AND PELVIS WITH CONTRAST     HISTORY: Abdominal pain with elevated lipase.     TECHNIQUE: Axial CT images of the abdomen and pelvis were obtained  following administration of intravenous contrast. The patient was not  given oral contrast Coronal and sagittal reformats were obtained.     COMPARISON: None.     FINDINGS: There is marked distention of the stomach with an air-fluid  level. There is distention of small bowel loop air-fluid levels  measuring up to 5.5 cm. These demonstrate a somewhat abrupt transition  within the distal ileal loops just proximal to the terminal ileum within  the right lower quadrant. This is best demonstrated on image 131/132  axial. The terminal ileum is completely collapsed. The colon is  completely collapsed. The dilated duodenum demonstrates peripheral foci  of air  favored to represent trapped air and pseudopneumatosis.     The liver demonstrates normal attenuation. There is mild intrahepatic  and extrahepatic biliary dilatation status post cholecystectomy. Diffuse  peripancreatic stranding and fluid are present and most suggestive of  pancreatitis. The splenoportal confluence is patent. The spleen is  normal. Bilateral adrenal glands and kidneys are normal. Thickening of  Gerota's fascia and fluid are seen within bilateral paracolic gutters.  The prostate gland is mildly enlarged. Bilateral adrenal glands and  kidneys are normal. No pathological retroperitoneal lymphadenopathy.  Moderate calcified atherosclerotic plaque is seen within the abdominal  aorta and its branches.     There are bilateral small layering pleural effusions with subsegmental  lower lobe atelectasis. Small pericardial effusion is present     Impression: 1. Complete distal small bowel obstruction. This is favored to be  secondary to adhesive disease. There is massive distention of the  stomach and duodenum and insertion of NG tube is recommended.  2. CT findings suggesting pancreatitis.     These findings were discussed with Dr. Marr by telephone at the time  of dictation.     Radiation dose reduction techniques were utilized, including automated  exposure control and exposure modulation based on body size.     This report was finalized on 2/4/2022 9:52 AM by Dr. Mica Ratliff M.D.     CT Soft Tissue Neck With Contrast  Narrative: CT NECK WITH CONTRAST     HISTORY: Stridor, status post nasogastric tube insertion attempt.     COMPARISON: None.     TECHNIQUE: A CT examination of the neck was performed after the  intravenous administration of contrast. The study was hampered  significantly by patient motion. The patient was scanned twice and there  was patient motion on both scans.     FINDINGS:  There is narrowing of the distal trachea on both scans. This  is likely related to breathing. Clinical  correlation is recommended.  When able, further evaluation could be performed with a dedicated CT  examination of the chest.     The parotid, submandibular and thyroid glands appear unremarkable. There  is no evidence of pneumomediastinum or pathologic adenopathy.     Impression: 1.  This study is degraded by patient motion. There is no evidence of  pneumomediastinum or of obvious esophageal injury. A mucosal injury or  esophageal injury cannot be excluded.  2.  Narrowing of the distal trachea is appreciated which likely is  related to respiration. Clinical correlation is recommended. When able,  further evaluation could be performed with a dedicated CT examination of  the chest with a full breath-hold as indicated.     The above information was called to and discussed with Dr. Marr.      Radiation dose reduction techniques were utilized, including automated  exposure control and exposure modulation based on body size.     This report was finalized on 2/4/2022 8:21 AM by Dr. Clemente Mccann M.D.       Scheduled Medications  aspirin, 81 mg, Oral, Daily  digoxin, 250 mcg, Oral, Daily  enoxaparin, 40 mg, Subcutaneous, Nightly  risperiDONE, 5 mg, Oral, Nightly  sodium phosphate IVPB, 20 mmol, Intravenous, Once    Infusions  dilTIAZem, 5-15 mg/hr, Last Rate: 5 mg/hr (02/04/22 0224)  sodium chloride 0.9 % with KCl 20 mEq, 100 mL/hr, Last Rate: 100 mL/hr (02/04/22 0513)    Diet  NPO Diet       Assessment/Plan     Active Hospital Problems    Diagnosis  POA   • **SBO (small bowel obstruction) (HCC) [K56.609]  Unknown   • GERD (gastroesophageal reflux disease) [K21.9]  Unknown   • Hypothyroidism [E03.9]  Unknown   • Hyperlipidemia [E78.5]  Unknown   • Hypophosphatemia [E83.39]  Unknown   • Generalized abdominal pain [R10.84]  Yes   • Hypertension [I10]  Yes   • Atrial fibrillation (HCC) [I48.91]  Yes      Resolved Hospital Problems   No resolved problems to display.     67-year-old male with a history of atrial fibrillation,  schizophrenia, hypertension, hyperlipidemia, hypothyroidism, presented to the ED with abdominal pain, nausea, and vomiting.  Was found to have small bowel obstruction.      Small bowel obstruction  -NG tube was placed, having significant output.  -General surgery consulted  -Persistent small bowel obstruction on x-ray  -Supportive care, IV fluids, antiemetics.  -Plan for surgery in the afternoon    Atrial fibrillation with RVR: Cardiology consulted, on Cardizem drip, heart rate still elevated but improved.  On digoxin 250 mcg daily.  Schizophrenia.  Continue home risperidone 5 mg nightly.   Hypertension: BP acutely acceptable: Continue to monitor.  Hypothyroidism?  Per chart review patient has history of hypothyroidism but not on replacement.  Order TSH.  Hypophosphatemia/hypomagnesemia.  Phosphorus is very low at 1.1 this morning.  Magnesium was 1.5.  Ordered IV replacements.  Elevated transaminases: AST and ALTelevated at 324 and 164 respectively.  Likely secondary to severe obstruction.  Ordered hepatitis panel in the a.m.  Daily CMP.  If not improving after surgery will order further work-up.  Hyponatremia: Likely secondary to nausea vomiting and low p.o. fluid intake.  Continue IV normal saline.  Monitor daily.    DVT prophylaxis: 80s, holding regulation secondary to surgery  CODE STATUS: Full code    Dana Zurita MD  [facility]  02/04/22  13:36 EST

## 2022-02-04 NOTE — THERAPY EVALUATION
Patient Name: Carlos Kraus  : 1955    MRN: [id number]                              Today's Date: 2022       Admit Date: 2/3/2022    Visit Dx:     ICD-10-CM ICD-9-CM   1. Generalized abdominal pain  R10.84 789.07   2. Acute pancreatitis, unspecified complication status, unspecified pancreatitis type  K85.90 577.0   3. Small bowel obstruction (HCC)  K56.609 560.9   4. Atrial fibrillation with RVR (HCC)  I48.91 427.31     Patient Active Problem List   Diagnosis   • Syncope and collapse   • Schizophrenia (HCC)   • Atrial fibrillation (HCC)   • Hypertension   • Orthostatic hypotension   • Hypokalemia   • Generalized abdominal pain     Past Medical History:   Diagnosis Date   • A-fib (HCC)    • Atrial fibrillation (HCC) 2019   • [place] esophagus    • GERD (gastroesophageal reflux disease)    • Hx of schizophrenia    • Hyperlipidemia    • Hypertension    • Hypokalemia 2019   • Hypophosphatemia    • Hypothyroidism    • Schizophrenia (HCC) 2019   • Syncope and collapse 2019   • Umbilical hernia      History reviewed. No pertinent surgical history.   General Information     Row Name 22 1210          OT Time and Intention    Document Type evaluation  -BL     Mode of Treatment individual therapy; occupational therapy  -BL     Row Name 22 1210          General Information    Patient Profile Reviewed yes  -BL     Existing Precautions/Restrictions fall  -BL     Barriers to Rehab previous functional deficit  -BL     Row Name 22 1210          Living Environment    Lives With facility resident  -BL     Row Name 22 1210          Cognition    Orientation Status (Cognition) oriented x 3  -BL     Row Name 22 1210          Safety Issues, Functional Mobility    Safety Issues Affecting Function (Mobility) insight into deficits/self-awareness; awareness of need for assistance  -BL     Impairments Affecting Function (Mobility) strength; pain; endurance/activity tolerance  -BL            User Key  (r) = Recorded By, (t) = Taken By, (c) = Cosigned By    Initials Name Provider Type    Domenico Carmen OT Occupational Therapist                 Mobility/ADL's     Row Name 02/04/22 1212          Bed Mobility    Bed Mobility supine-sit  -BL     Supine-Sit [place] (Bed Mobility) standby assist  -     Row Name 02/04/22 1212          Transfers    Transfers sit-stand transfer; bed-chair transfer  -BL     Bed-Chair [place] (Transfers) contact guard  -     Assistive Device (Bed-Chair Transfers) --  HHA x1  -BL     Sit-Stand [place] (Transfers) contact guard  -BL     Row Name 02/04/22 1212          Sit-Stand Transfer    Assistive Device (Sit-Stand Transfers) --  HHA x1  -BL     Row Name 02/04/22 1212          Activities of Daily Living    BADL Assessment/Intervention lower body dressing; grooming  -BL     [facility] Name 02/04/22 1212          Lower Body Dressing Assessment/Training    [place] Level (Lower Body Dressing) don; doff; socks; modified independence  -BL     Position (Lower Body Dressing) supported sitting  -BL     Row Name 02/04/22 1212          Grooming Assessment/Training    [place] Level (Grooming) wash face, hands; set up  -BL     Position (Grooming) supported sitting  -BL           User Key  (r) = Recorded By, (t) = Taken By, (c) = Cosigned By    Initials Name Provider Type    Domenico Carmen OT Occupational Therapist               Obj/Interventions     Row Name 02/04/22 1215          Sensory Assessment (Somatosensory)    Sensory Assessment (Somatosensory) UE sensation intact  -\[facility] [place] [facility]\"" Name 02/04/22 1215          Vision Assessment/Intervention    Visual Impairment/Limitations WFL; corrective lenses for reading  -     Row Name 02/04/22 1215          Range of Motion Comprehensive    General Range of Motion bilateral upper extremity ROM WFL  -     Row Name 02/04/22 1215          Strength Comprehensive (MMT)    Comment, General Manual Muscle Testing (MMT)  Assessment BUE 3-/5  -BL     Row Name 02/04/22 1215          Motor Skills    Motor Skills coordination  -BL     Coordination WFL; bilateral; upper extremity; dysdiadochokinesia; dysmetria  -     Row Name 02/04/22 1215          Balance    Balance Assessment sitting static balance; sitting dynamic balance; sit to stand dynamic balance; standing static balance; standing dynamic balance  -BL     Static Sitting Balance WFL; unsupported; sitting, edge of bed  -BL     Dynamic Sitting Balance WFL; supported; sitting in chair  -BL     Sit to Stand Dynamic Balance WFL; supported; standing  -BL     Static Standing Balance WFL; supported; standing  -BL     Dynamic Standing Balance mild impairment; supported; standing  -BL     Balance Interventions standing; sitting; sit to stand; supported; static; dynamic; occupation based/functional task  -BL           User Key  (r) = Recorded By, (t) = Taken By, (c) = Cosigned By    Initials Name Provider Type     Domenico Polo OT Occupational Therapist               Goals/Plan     [facility] Name 02/04/22 1225          Dressing Goal 1 (OT)    Activity/Device (Dressing Goal 1, OT) lower body dressing  -BL     Miller/Cues Needed (Dressing Goal 1, OT) modified independence  -BL     Time Frame (Dressing Goal 1, OT) short term goal (STG); 1 day  -BL     Progress/Outcome (Dressing Goal 1, OT) goal met  -BL           User Key  (r) = Recorded By, (t) = Taken By, (c) = Cosigned By    Initials Name Provider Type     Domenico Polo OT Occupational Therapist               Clinical Impression     [facility] Name 02/04/22 1216          Pain Assessment    Additional Documentation Pain Scale: Numbers Pre/Post-Treatment (Group)  -BL     Row Name 02/04/22 1216          Pain Scale: Numbers Pre/Post-Treatment    Pretreatment Pain Rating 10/10  -BL     Posttreatment Pain Rating 10/10  -BL     Pain Location abdomen  -BL     Pain Intervention(s) Repositioned  -     Row Name 02/04/22 1216          Plan of  Care Review    Plan of Care Reviewed With patient  -BL     Progress improving  -BL     Outcome Summary Pt seen by OT this date for evaluation. Pt is a 68 y/o male admit to [facility] for small bowel obstruction presenting with abdominal pain and generalized weakness. Pt reports that he lives at facility with roommate and is independent with all ADLs/IADLs at baseline. Pt reports that he does not use any AD for functional mobility. Upon arrival pt lying supine in bed, 10/10 pain in abdomen, A&O x3. Pt SBA for sup>sit transition to sit EOB. Pt able to don socks on BLEs with Mod I. Pt performed sit>stand transition to transfer from bed>chair with HHA x1. Pt completed grooming task once sitting up in chair with S/U. Pt left up in chair, needs in reach, alarm on, RN present. Pt with no acute OT needs at this time, OT to sign off this date. OT wore mask, gloves, glasses, hand hygiene performed.  -BL     Row Name 02/04/22 1216          Therapy Assessment/Plan (OT)    Therapy Frequency (OT) evaluation only  -BL     Row Name 02/04/22 1216          Vital Signs    Pre Patient Position Supine  -BL     Intra Patient Position Standing  -BL     Post Patient Position Sitting  -BL     Row Name 02/04/22 1216          Positioning and Restraints    Pre-Treatment Position in bed  -BL     Post Treatment Position chair  -BL     In Chair sitting; call light within reach; encouraged to call for assist; exit alarm on  -BL           User Key  (r) = Recorded By, (t) = Taken By, (c) = Cosigned By    Initials Name Provider Type    Domenico Carmen, NIKHIL Occupational Therapist               Outcome Measures     Row Name 02/04/22 2216          How much help from another is currently needed...    Putting on and taking off regular lower body clothing? 4  -BL     Bathing (including washing, rinsing, and drying) 4  -BL     Toileting (which includes using toilet bed pan or urinal) 4  -BL     Putting on and taking off regular upper body clothing 4  -BL      Taking care of personal grooming (such as brushing teeth) 4  -BL     Eating meals 4  -BL     AM-PAC 6 Clicks Score (OT) 24  -BL     Row Name 02/04/22 0830          How much help from another person do you currently need...    Turning from your back to your side while in flat bed without using bedrails? 4  -RW     Moving from lying on back to sitting on the side of a flat bed without bedrails? 4  -RW     Moving to and from a bed to a chair (including a wheelchair)? 4  -RW     Standing up from a chair using your arms (e.g., wheelchair, bedside chair)? 4  -RW     Climbing 3-5 steps with a railing? 4  -RW     To walk in hospital room? 4  -RW     AM-PAC 6 Clicks Score (PT) 24  -RW     Row Name 02/04/22 1225          Functional Assessment    Outcome Measure Options AM-PAC 6 Clicks Daily Activity (OT)  -BL           User Key  (r) = Recorded By, (t) = Taken By, (c) = Cosigned By    Initials Name Provider Type    RW Dionicio Jennings, RN Registered Nurse    Domenico Carmen, NIKHIL Occupational Therapist                Occupational Therapy Education                 Title: PT OT SLP Therapies (In Progress)     Topic: Occupational Therapy (In Progress)     Point: ADL training (Done)     Description:   Instruct learner(s) on proper safety adaptation and remediation techniques during self care or transfers.   Instruct in proper use of assistive devices.              Learning Progress Summary           Patient Acceptance, E, VU by  at 2/4/2022 1226    Comment: The role of OT                   Point: Home exercise program (Not Started)     Description:   Instruct learner(s) on appropriate technique for monitoring, assisting and/or progressing therapeutic exercises/activities.              Learner Progress:  Not documented in this visit.          Point: Precautions (Not Started)     Description:   Instruct learner(s) on prescribed precautions during self-care and functional transfers.              Learner Progress:  Not documented in  this visit.          Point: Body mechanics (Not Started)     Description:   Instruct learner(s) on proper positioning and spine alignment during self-care, functional mobility activities and/or exercises.              Learner Progress:  Not documented in this visit.                      User Key     Initials Effective Dates Name Provider Type Discipline     01/05/21 -  Domenico Polo OT Occupational Therapist OT              OT Recommendation and Plan  Therapy Frequency (OT): evaluation only  Plan of Care Review  Plan of Care Reviewed With: patient  Progress: improving  Outcome Summary: Pt seen by OT this date for evaluation. Pt is a 66 y/o male admit to [facility] for small bowel obstruction presenting with abdominal pain and generalized weakness. Pt reports that he lives at facility with roommate and is independent with all ADLs/IADLs at baseline. Pt reports that he does not use any AD for functional mobility. Upon arrival pt lying supine in bed, 10/10 pain in abdomen, A&O x3. Pt SBA for sup>sit transition to sit EOB. Pt able to don socks on BLEs with Mod I. Pt performed sit>stand transition to transfer from bed>chair with HHA x1. Pt completed grooming task once sitting up in chair with S/U. Pt left up in chair, needs in reach, alarm on, RN present. Pt with no acute OT needs at this time, OT to sign off this date. OT wore mask, gloves, glasses, hand hygiene performed.     Time Calculation:    Time Calculation- OT     Row Name 02/04/22 1226             Time Calculation- OT    OT Start Time 0817  -BL      OT Stop Time 0834  -BL      OT Time Calculation (min) 17 min  -BL      Total Timed Code Minutes- OT 12 minute(s)  -BL      OT Received On 02/04/22  -BL              Timed Charges    74496 - OT Therapeutic Activity Minutes 12  -BL              Untimed Charges    OT Eval/Re-eval Minutes 5  -BL              Total Minutes    Timed Charges Total Minutes 12  -BL      Untimed Charges Total Minutes 5  -BL       Total Minutes  17  -BL            User Key  (r) = Recorded By, (t) = Taken By, (c) = Cosigned By    Initials Name Provider Type     Domenico Polo OT Occupational Therapist              Therapy Charges for Today     Code Description Service Date Service Provider Modifiers Qty    [id number]  OT THERAPEUTIC ACT EA 15 MIN 2/4/2022 Domenico Polo OT GO 1    [id number]  OT EVAL LOW COMPLEXITY 2 2/4/2022 Domenico Polo OT GO 1               Domenico Polo OT  2/4/2022

## 2022-02-04 NOTE — SIGNIFICANT NOTE
02/04/22 1228   OTHER   Discipline physical therapist   Rehab Time/Intention   Session Not Performed patient unavailable for evaluation   Recommendation   PT - Next Appointment 02/05/22     Checked on pt later this am. Per RN, pt pulled out NG tube. Pt also going for bowel resection surgery this afternoon. Per RN, to hold therapy as they are attempting to place NG tube and pt going to surgery. PT will continue to monitor and follow up with pt post surgery tomorrow.

## 2022-02-04 NOTE — PLAN OF CARE
Goal Outcome Evaluation:  Plan of Care Reviewed With: patient        Progress: improving  Outcome Summary: Pt seen by OT this date for evaluation. Pt is a 68 y/o male admit to [facility] for small bowel obstruction presenting with abdominal pain and generalized weakness. Pt reports that he lives at facility with roommate and is independent with all ADLs/IADLs at baseline. Pt reports that he does not use any AD for functional mobility. Upon arrival pt lying supine in bed, 10/10 pain in abdomen, A&O x3. Pt SBA for sup>sit transition to sit EOB. Pt able to don socks on BLEs with Mod I. Pt performed sit>stand transition to transfer from bed>chair with HHA x1. Pt completed grooming task once sitting up in chair with S/U. Pt left up in chair, needs in reach, alarm on, RN present. Pt with no acute OT needs at this time, OT to sign off this date. OT wore mask, gloves, glasses, hand hygiene performed.

## 2022-02-04 NOTE — PROGRESS NOTES
"General Surgery Progress Note    Summary: Mr. Carlos Kraus is a 67 y.o. year old gentleman with a high-grade small bowel obstruction.  Discussed with him in detail earlier today.  He has not had any improvement with decompression overnight.  Recommend exploratory laparotomy, lysis of adhesions, and possible bowel resection.  All risks (including bleeding, infection, damage surrounding structures, anastomotic leak), benefits, and alternatives were explained to the patient who agreed and wished to proceed.  He does keep removing his NG tube.  I plan to replace it in preop.    Interval Events: No improvement overnight.  Still feels distended with no flatus or bowel movement.  KUB still shows large distention of the small bowel.  NG tube put out over 2 L.    Vitals: /95 (BP Location: Left arm, Patient Position: Lying)   Pulse 116   Temp 98.8 °F (37.1 °C) (Oral)   Resp 22   Ht 188 cm (74\")   Wt 111 kg (245 lb)   SpO2 93%   BMI 31.46 kg/m²     GENERAL: alert, well appearing, and in no distress  HEENT: normocephalic, atraumatic, moist mucous membranes, clear sclerae   CHEST: no increased work of breathing, symmetric air entry  CARDIAC: regular rate and rhythm    ABDOMEN: Soft, distended, nontender, no peritoneal signs  EXTREMITIES: no cyanosis, clubbing, or edema   SKIN: Warm and moist, no rashes    Labs:  Results from last 7 days   Lab Units 02/04/22  0500 02/03/22  1145   WBC 10*3/mm3 19.14* 24.96*   HEMOGLOBIN g/dL 16.1 17.0   HEMATOCRIT % 47.1 49.7   PLATELETS 10*3/mm3 181 225     Results from last 7 days   Lab Units 02/04/22  0500 02/03/22  1145   SODIUM mmol/L 129* 131*   POTASSIUM mmol/L 4.4 3.6   CHLORIDE mmol/L 97* 95*   CO2 mmol/L 19.1* 14.7*   BUN mg/dL 21 14   CREATININE mg/dL 0.77 1.21   CALCIUM mg/dL 7.9* 9.1   BILIRUBIN mg/dL  --  2.2*   ALK PHOS U/L  --  58   ALT (SGPT) U/L  --  324*   AST (SGOT) U/L  --  164*   GLUCOSE mg/dL 120* 185*           PERLA HERNDON MD  General and Endoscopic " Surgery  [facility] [place], operated by [facility]    [address], [address]  [place], [place], [address]  P: [phone]  F: [phone]

## 2022-02-04 NOTE — ANESTHESIA PROCEDURE NOTES
Airway  Urgency: elective    Date/Time: 2/4/2022 4:37 PM  Airway not difficult    General Information and Staff    Patient location during procedure: OR  CRNA: Karen Lam CRNA    Indications and Patient Condition  Indications for airway management: airway protection    Preoxygenated: yes  MILS not maintained throughout  Mask difficulty assessment: 1 - vent by mask    Final Airway Details  Final airway type: endotracheal airway      Successful airway: ETT  Cuffed: yes   Successful intubation technique: direct laryngoscopy and RSI  Endotracheal tube insertion site: oral  Blade: Aarti  Blade size: 4  ETT size (mm): 7.5  Cormack-Lehane Classification: grade I - full view of glottis  Placement verified by: chest auscultation and capnometry   Measured from: lips  ETT/EBT  to lips (cm): 22  Number of attempts at approach: 1  Assessment: lips, teeth, and gum same as pre-op and atraumatic intubation    Additional Comments   ett cuff up at MOP

## 2022-02-04 NOTE — CONSULTS
[facility] Consult Note    Patient Name: Carlos Kraus  :1955  67 y.o.    Date of Admission: 2/3/2022  Date of Consultation:  22  Encounter Provider: Maggy Ross MD  Place of Service: [facility] CARDIOLOGY  Referring Provider: Yesenia Bauer MD  Patient Care Team:  Provider, No Known as PCP - General      Chief complaint: Abdominal pain    History of Present Illness:  This is a 67 year old male nursing home resident who has a caregiver with hypertension, hyperlipidemia, schizophrenia, atrial fibrillation that appears to be chronic, who is admitted with small bowel obstruction.      He presented to the emergency room yesterday with complaints of abdominal pain, nausea, and vomiting.  Work-up in the emergency room showed evidence of small bowel obstruction.  An NG tube was placed.  He was evaluated by surgery who recommended conservative management.    He was noted to be in atrial fibrillation with rapid ventricular rates on admission.  Since then he has been treated with IV digoxin and started on a diltiazem infusion.  Since then his heart rates have improved running anywhere from the 80s to 120s with activity.    This morning he denies any symptoms of chest pain or shortness of breath.  He continues to complain of abdominal discomfort.    Patient was previously evaluated by Dr. Benson in 2019 following a syncopal episode.  He was noted to be in atrial fibrillation at that time.  On review of his prior hospitalization at [facility] appears that he was in atrial fibrillation at that point.  Based on that it was suspected that his atrial fibrillation was persistent.  He had an echocardiogram performed during that admission that showed  EF of 57%, left atrial cavity mild to moderately dilated, and no significant valvular dysfunction.  It does not appear that he has had any cardiac follow-up since then.        CT of abd/pelvis on  2/3/22-  IMPRESSION:  1. Complete distal small bowel obstruction. This is favored to be  secondary to adhesive disease. There is massive distention of the  stomach and duodenum and insertion of NG tube is recommended.  2. CT findings suggesting pancreatitis.    CT soft tissue neck on 2/3/22-  IMPRESSION:  1.  This study is degraded by patient motion. There is no evidence of  pneumomediastinum or of obvious esophageal injury. A mucosal injury or  esophageal injury cannot be excluded.  2.  Narrowing of the distal trachea is appreciated which likely is  related to respiration. Clinical correlation is recommended. When able,  further evaluation could be performed with a dedicated CT examination of  the chest with a full breath-hold as indicated.    Previous Testing-  Echocardiogram on 8/15/19-  Interpretation Summary    · Left ventricular systolic function is normal. Calculated EF = 57.0%. Estimated EF was in agreement with the calculated EF. Normal left ventricular cavity size noted. All left ventricular wall segments contract normally. Left ventricular wall thickness is consistent with mild concentric hypertrophy. Left ventricular diastolic function was indeterminate.  · Left atrial cavity size is mild-to-moderately dilated.        Past Medical History:   Diagnosis Date   • A-fib (HCC)    • Atrial fibrillation ([facility]) 8/14/2019   • [place] esophagus    • GERD (gastroesophageal reflux disease)    • Hx of schizophrenia    • Hyperlipidemia    • Hypertension    • Hypokalemia 8/16/2019   • Hypophosphatemia    • Hypothyroidism    • Schizophrenia (HCC) 8/14/2019   • Syncope and collapse 08/14/2019   • Umbilical hernia        History reviewed. No pertinent surgical history.      Prior to Admission medications    Medication Sig Start Date End Date Taking? Authorizing Provider   acetaminophen (TYLENOL) 650 MG 8 hr tablet Take 650 mg by mouth Every 8 (Eight) Hours As Needed for Mild Pain .   Yes Provider, MD Mike   aspirin  81 MG EC tablet Take 81 mg by mouth Daily.   Yes ProviderMike MD   cetirizine (zyrTEC) 10 MG tablet Take 10 mg by mouth Daily.   Yes ProviderMike MD   digoxin (LANOXIN) 250 MCG tablet Take 1 tablet by mouth Daily. 8/20/19  Yes Christopher Quinones MD   multivitamin with minerals (TAB-A-LÓPEZ PO) Take 1 tablet by mouth Daily.   Yes ProviderMike MD   pantoprazole (PROTONIX) 40 MG EC tablet Take 40 mg by mouth Daily.   Yes Provider, MD Mike   risperiDONE (risperDAL) 3 MG tablet Take 5 mg by mouth Every Night.   Yes ProviderMike MD   simvastatin (ZOCOR) 20 MG tablet Take 20 mg by mouth Every Night.   Yes ProviderMike MD       Allergies   Allergen Reactions   • Penicillins Anaphylaxis   • Latex Hives       Social History     Socioeconomic History   • Marital status: Single   Tobacco Use   • Smoking status: Former Smoker     Types: Cigarettes   • Smokeless tobacco: Never Used   • Tobacco comment: quit 30-40 years ago    Substance and Sexual Activity   • Sexual activity: Defer       History reviewed. No pertinent family history.    REVIEW OF SYSTEMS:   All systems reviewed.  Pertinent positives identified in HPI.  All other systems are negative.      Objective:     Vitals:    02/03/22 2334 02/04/22 0223 02/04/22 0312 02/04/22 0735   BP: (!) 163/116 (!) 169/119 143/88 144/94   BP Location: Left arm Left arm Left arm Left arm   Patient Position: Lying Lying Lying Lying   Pulse: 96 89 108 113   Resp: 22 20 18 18   Temp: 99.1 °F (37.3 °C)   99.1 °F (37.3 °C)   TempSrc: Oral   Oral   SpO2: 96% 96% 95% 93%   Weight:       Height:         Body mass index is 31.46 kg/m².    General Appearance:    Alert, cooperative, in no acute distress   Head:    Normocephalic, without obvious abnormality, atraumatic   Eyes:            Lids and lashes normal, conjunctivae and sclerae normal, no icterus, no pallor, corneas clear, PERRLA   Ears:    Ears appear intact with no abnormalities noted   Neck:    No adenopathy, supple, trachea midline, no thyromegaly, no carotid bruit, no JVD   Lungs:     Clear to auscultation, respirations regular, even and unlabored    Heart:   Irregularly, irregular, normal S1 and S2, no murmur, no gallop, no rub, no click   Chest Wall:    No abnormalities observed   Abdomen:    Distended, hypoactive bowel sounds, mild discomfort with palpation.     Extremities:   Moves all extremities well, no edema, no cyanosis, no redness   Pulses:   Pulses palpable and equal bilaterally. Normal radial, carotid, femoral, dorsalis pedis and posterior tibial pulses bilaterally. Normal abdominal aorta   Skin:  Psychiatric:   No bleeding, bruising or rash    Alert and oriented x 3, normal mood and affect   Lab Review:     Results from last 7 days   Lab Units 02/04/22  0500 02/03/22  1145 02/03/22  1145   SODIUM mmol/L 129*   < > 131*   POTASSIUM mmol/L 4.4   < > 3.6   CHLORIDE mmol/L 97*   < > 95*   CO2 mmol/L 19.1*   < > 14.7*   BUN mg/dL 21   < > 14   CREATININE mg/dL 0.77   < > 1.21   CALCIUM mg/dL 7.9*   < > 9.1   BILIRUBIN mg/dL  --   --  2.2*   ALK PHOS U/L  --   --  58   ALT (SGPT) U/L  --   --  324*   AST (SGOT) U/L  --   --  164*   GLUCOSE mg/dL 120*   < > 185*    < > = values in this interval not displayed.     Results from last 7 days   Lab Units 02/03/22  1145   TROPONIN T ng/mL <0.010     Results from last 7 days   Lab Units 02/04/22  0500   WBC 10*3/mm3 19.14*   HEMOGLOBIN g/dL 16.1   HEMATOCRIT % 47.1   PLATELETS 10*3/mm3 181         Results from last 7 days   Lab Units 02/04/22  0500   MAGNESIUM mg/dL 1.5*                 EKG on 2/3/22-      Baseline EKG on 8/17/19-      I personally viewed and interpreted the patient's EKG/Telemetry data.        Assessment and Plan:       1.  Atrial fibrillation.  Appears to be chronic.  Normally well rate controlled.  Currently heart rates are elevated.  I suspect this is due to acute abdominal issues.    2.  Small bowel obstruction.  NG tube in place.   General surgery following.  3.  Schizophrenia.    4.  Hypertension.  Blood pressures are mildly elevated on diltiazem infusion.  5.  Hyperlipidemia.  6.  Acute hypoxic respiratory failure.  He required 15 L overnight but now is on room air without any significant issues.    -Continue diltiazem infusion while he is n.p.o.  Once he is able to take p.o. start him on oral AV sal blocking agents.  -It does not appear that he was on any anticoagulation as an outpatient.  Unclear if he is a good candidate for anticoagulation long-term.      Maggy Ross MD  02/04/22  11:35 EST

## 2022-02-04 NOTE — ANESTHESIA PREPROCEDURE EVALUATION
Anesthesia Evaluation     Patient summary reviewed and Nursing notes reviewed   no history of anesthetic complications:  NPO Solid Status: > 8 hours  NPO Liquid Status: > 2 hours           Airway   Mallampati: II  TM distance: >3 FB  Neck ROM: full  No difficulty expected  Dental    (+) poor dentition    Pulmonary    (+) shortness of breath, wheezes,   Cardiovascular     ECG reviewed  Rhythm: irregular  Rate: abnormal    (+) hypertension, dysrhythmias (chronic) Atrial Fib, hyperlipidemia,       Neuro/Psych  (+) syncope, psychiatric history Schizophrenia,     GI/Hepatic/Renal/Endo    (+)  GERD,  thyroid problem hypothyroidism    Musculoskeletal     Abdominal     Abdomen: tender.   Substance History      OB/GYN          Other        ROS/Med Hx Other: History of Present Illness:  This is a 67 year old male nursing home resident who has a caregiver with hypertension, hyperlipidemia, schizophrenia, chronic Afib, admitted for severe distal small bowel obstruction.     NG tube placed in holding by Dr. Agosto had 2L fluid return    Current labs reflect hyponatremia, hypophosphatemia     He was noted to be in atrial fibrillation with rapid ventricular rates on admission.  Since then he has been treated with IV digoxin and started on a diltiazem infusion.  Since then his heart rates have improved running anywhere from the 80s to 120s with activity.    O2 saturations low overnight per RN, on 15L/min, however RA this morning. After NG tube placement in holding, patient had increased O2 requirement with bilateral wheezes, sats 91-92% on 3L/min NC.      Phys Exam Other: Missing multiple, multiple broken teeth              Anesthesia Plan    ASA 4     general   (A line    I have reviewed the patient's history and chart with the patient, including all pertinent laboratory results and imaging. I have explained the risks of anesthesia including but not limited to dental damage, corneal abrasion, nerve injury, MI, stroke, aspiration,  "and death. Patient has agreed to proceed.     /95 (BP Location: Left arm, Patient Position: Lying)   Pulse 114   Temp 37.1 °C (98.8 °F) (Oral)   Resp (!) 30   Ht 188 cm (74\")   Wt 111 kg (245 lb)   3L/min NC, SpO2 91-92%   BMI 31.46 kg/m²   )  intravenous induction     Anesthetic plan, all risks, benefits, and alternatives have been provided, discussed and informed consent has been obtained with: patient.    Plan discussed with CRNA.        CODE STATUS:    Code Status (Patient has no pulse and is not breathing): CPR (Attempt to Resuscitate)  Medical Interventions (Patient has pulse or is breathing): Full      "

## 2022-02-04 NOTE — CASE MANAGEMENT/SOCIAL WORK
Continued Stay Note  [facility]     Patient Name: Carlos Kraus  MRN: [id number]  Today's Date: 2/4/2022    Admit Date: 2/3/2022     Discharge Plan     Row Name 02/04/22 0931       Plan    Plan Plan return to [facility].   DENISE Robins RN    Patient/Family in Agreement with Plan yes    Plan Comments FACE SHEET VERIFIED/ IM LETTER SIGNED.  Spoke with pt at bedside.  Pt is from [facility] in [facility].  Pt was staying with his friend ( Jimmie Dane [phone]).   Called and spoke with Jimmie who states he picks pt up from [facility] and brings him to his apartment.  Pt and Jimmie have a long term friendship.  Called and spoke with the  (Stephanie  [phone]) of [facility] who states friend (Jimmie) comes and gets pt once a month for a week.  Pt is high functioning at [facility]. Pt denies any needs.   Plan return to [facility].   DENISE Robins RN               Discharge Codes    No documentation.                     Yoanna Robins RN

## 2022-02-04 NOTE — SIGNIFICANT NOTE
02/04/22 0946   OTHER   Discipline physical therapist   Rehab Time/Intention   Session Not Performed patient unavailable for evaluation  (Pt currently with transport about to leave the floor. PT will follow up with pt as schedule allows.)   Recommendation   PT - Next Appointment 02/05/22

## 2022-02-04 NOTE — NURSING NOTE
"Pt asked if he could have water. RN educated the pt on his NPO status along with the fact that he has an NG tube. Pt stated he wants the tube taken out. RN talked to pt and explained that it would not be in his best interest that the pt has the tube removed. Pt states \"he feels better now\". RN explained it was because the NG tube was in place. Pt then stuck his hand up to his nose as if he was going to pull the tube out and stared at RN. Pt did not pull tube out at this time.   "

## 2022-02-04 NOTE — OP NOTE
OPERATIVE REPORT    DATE: 2/4/2022    SURGEON: Edyta Agosto MD     ASSISTANT: Chanelle Barajas, who was present for necessary suctioning, retracting, suturing throughout the procedure     OPERATION PERFORMED: Exploratory laparotomy, lysis of adhesions    PREOPERATIVE DIAGNOSIS: High-grade small bowel obstruction    POSTOPERATIVE DIAGNOSIS: Same    ANESTHESIA: General    SPECIMEN: None    DRAINS: None    BLOOD LOSS: Minimal    INDICATION FOR OPERATION: Mr. Carlos Kraus is a 67 y.o. year old gentleman who presented to the hospital last night with abdominal pain, nausea, and vomiting.  CT scan was consistent with a bowel obstruction.  He was decompressed with an NG tube overnight when his symptoms did not resolve, was taken to the operating room for exploratory laparotomy. All risks (including bleeding, infection, damage to surrounding structures, recurrent obstructions), benefits and alternatives were explained to the patient who agreed and wished to proceed. Informed consent was signed.     OPERATIVE COURSE: The patient was taken to the operating room, transferred onto the operating room table, and underwent general endotracheal anesthesia without incident.  A Escobar catheter was placed.  The patient was prepped and draped in sterile fashion. A time out was performed and preoperative antibiotics were given.  Midline incision was made with a scalpel.  Bovie electrocautery was used to dissect down to the level of the fascia which was entered under direct visualization.  A small midline hernia containing tainting omentum that was incorporated into the incision.  Upon entering the abdomen there was massively dilated small bowel and a small amount of serous fluid in the pelvis.  The bowel was run from the ligament of Treitz distally.  The very distal small bowel there was a small adhesion with a transition point.  This was lysed and the bowel easily opened up.  It was milked proximally to his NG tube due to the large distention  of small bowel.  The colon was examined in its entirety and appeared normal.  The NG tube placement was confirmed.  Exparel was injected in the peritoneal space.  The fascia was closed with 0 PDS sutures.  Skin was stapled closed.  The patient tolerated the procedure well. All needle and lap counts were correct at the end of the case. The patient was then awoken from anesthesia and taken to recovery for further monitoring.       Edyta Agosto MD   General and Endoscopic Surgery  [facility]    [address], [address]  [place], [place], [address]  P: [phone]  F: [phone]

## 2022-02-04 NOTE — ANESTHESIA PROCEDURE NOTES
Arterial Line      Patient reassessed immediately prior to procedure    Patient location during procedure: pre-op  Start time: 2/4/2022 3:02 PM  Stop Time:2/4/2022 3:05 PM       Line placed for respiratory failure, hemodynamic monitoring, MD/Surgeon request and ABGs/Labs/ISTAT.  Performed By   Anesthesiologist: Karissa Simental MD  Preanesthetic Checklist  Completed: patient identified, IV checked, site marked, risks and benefits discussed, surgical consent, monitors and equipment checked, pre-op evaluation and timeout performed  Arterial Line Prep   Sterile Tech: cap, gloves, sterile barriers and mask  Prep: ChloraPrep  Patient monitoring: EKG, continuous pulse oximetry and blood pressure monitoring  Arterial Line Procedure   Laterality:left  Location:  radial artery  Catheter size: 20 G   Guidance: ultrasound guided  PROCEDURE NOTE/ULTRASOUND INTERPRETATION.  Using ultrasound guidance the potential vascular sites for insertion of the catheter were visualized to determine the patency of the vessel to be used for vascular access.  After selecting the appropriate site for insertion, the needle was visualized under ultrasound being inserted into the radial artery, followed by ultrasound confirmation of wire and catheter placement. There were no abnormalities seen on ultrasound; an image was taken; and the patient tolerated the procedure with no complications.   Number of attempts: 1  Successful placement: yes  Post Assessment   Dressing Type: wrist guard applied, secured with tape and occlusive dressing applied.   Complications no  Circ/Move/Sens Assessment: normal and unchanged.   Patient Tolerance: patient tolerated the procedure well with no apparent complications  Additional Notes  Using ultrasound guidance the potential vascular sites for insertion of the catheter were visualized to determine the patency of the vessel to be used for vascular access.  After selecting the appropriate site for insertion, the  needle was visualized under ultrasound being inserted into the artery, followed by ultrasound confirmation of wire and catheter placement.  There were no abnormalities seen on ultrasound; an image was taken/ and the patient tolerated the procedure with no complications.

## 2022-02-04 NOTE — H&P
HISTORY AND PHYSICAL   [facility]        Date of Admission: 2/3/2022  Patient Identification:  Name: Carlos Kraus  Age: 67 y.o.  Sex: male  :  1955  MRN: [id number]                     Primary Care Physician: Provider, No Known    Chief Complaint:  67 year old gentleman who presented to the emergency room with abdominal pain, nausea and vomiting which started yesterday; no diarrhea; no fever or chills; he says he has had one prior abdominal surgery; he lives in a nursing home but has a caregiver who takes him out on day trips; the patient cannot give a good history and the caregiver has left    History of Present Illness:   As above    Past Medical History:  Past Medical History:   Diagnosis Date   • A-fib ([facility])    • Atrial fibrillation ([facility]) 2019   • [place] esophagus    • GERD (gastroesophageal reflux disease)    • Hx of schizophrenia    • Hyperlipidemia    • Hypertension    • Hypokalemia 2019   • Hypophosphatemia    • Hypothyroidism    • Schizophrenia (HCC) 2019   • Syncope and collapse 2019   • Umbilical hernia      Past Surgical History:  History reviewed. No pertinent surgical history.   Home Meds:  Medications Prior to Admission   Medication Sig Dispense Refill Last Dose   • acetaminophen (TYLENOL) 650 MG 8 hr tablet Take 650 mg by mouth Every 8 (Eight) Hours As Needed for Mild Pain .   2022 at Unknown time   • aspirin 81 MG EC tablet Take 81 mg by mouth Daily.   2/3/2022 at Unknown time   • cetirizine (zyrTEC) 10 MG tablet Take 10 mg by mouth Daily.   2/3/2022 at Unknown time   • digoxin (LANOXIN) 250 MCG tablet Take 1 tablet by mouth Daily. 30 tablet 0 2/3/2022 at Unknown time   • multivitamin with minerals (TAB-A-LÓPEZ PO) Take 1 tablet by mouth Daily.   2/3/2022 at Unknown time   • pantoprazole (PROTONIX) 40 MG EC tablet Take 40 mg by mouth Daily.   2/3/2022 at Unknown time   • risperiDONE (risperDAL) 3 MG tablet Take 5 mg by mouth Every Night.   2022 at  "Unknown time   • simvastatin (ZOCOR) 20 MG tablet Take 20 mg by mouth Every Night.   2022 at Unknown time       Allergies:  Allergies   Allergen Reactions   • Penicillins Anaphylaxis   • Latex Hives     Immunizations:    There is no immunization history on file for this patient.  Social History:   Social History     Social History Narrative   • Not on file     Social History     Socioeconomic History   • Marital status: Single   Tobacco Use   • Smoking status: Former Smoker     Types: Cigarettes   • Smokeless tobacco: Never Used   • Tobacco comment: quit 30-40 years ago    Substance and Sexual Activity   • Sexual activity: Defer       Family History:  History reviewed. No pertinent family history.     Review of Systems     Not obtainable      Objective:  T Max 24 hrs: Temp (24hrs), Av.3 °F (36.8 °C), Min:97.9 °F (36.6 °C), Max:98.7 °F (37.1 °C)    Vitals Ranges:   Temp:  [97.9 °F (36.6 °C)-98.7 °F (37.1 °C)] 97.9 °F (36.6 °C)  Heart Rate:  [] 90  Resp:  [18-28] 20  BP: (124-204)/() 141/92      Exam:  /92 (BP Location: Left arm, Patient Position: Lying)   Pulse 90   Temp 97.9 °F (36.6 °C) (Oral)   Resp 20   Ht 188 cm (74\")   Wt 111 kg (245 lb)   SpO2 97%   BMI 31.46 kg/m²     General Appearance:    drowsy, cooperative, no distress, appears stated age; chronically ill appearing   Head:    Normocephalic, without obvious abnormality, atraumatic   Eyes:    PERRL, conjunctivae/corneas clear, EOM's intact, both eyes   Ears:    Normal external ear canals, both ears   Nose:   Nares normal, septum midline, mucosa normal, no drainage    or sinus tenderness   Throat:   Lips, mucosa, and tongue normal   Neck:   Supple, symmetrical, trachea midline, no adenopathy;     thyroid:  no enlargement/tenderness/nodules; no carotid    bruit or JVD   Back:     Symmetric, no curvature, ROM normal, no CVA tenderness   Lungs:     Decreased breath sounds bilaterally, respirations unlabored   Chest Wall:    No " tenderness or deformity    Heart:    Regular rate and rhythm, S1 and S2 normal, no murmur, rub   or gallop   Abdomen:     Soft, nontender, bowel sounds active all four quadrants,     no masses, no hepatomegaly, no splenomegaly   Extremities:   Extremities normal, atraumatic, no cyanosis or edema   Pulses:   2+ and symmetric all extremities   Skin:   Skin color, texture, turgor normal, no rashes or lesions               .    Data Review:  Labs in chart were reviewed.  WBC   Date Value Ref Range Status   02/03/2022 24.96 (H) 3.40 - 10.80 10*3/mm3 Final     Hemoglobin   Date Value Ref Range Status   02/03/2022 17.0 13.0 - 17.7 g/dL Final     Hematocrit   Date Value Ref Range Status   02/03/2022 49.7 37.5 - 51.0 % Final     Platelets   Date Value Ref Range Status   02/03/2022 225 140 - 450 10*3/mm3 Final     Sodium   Date Value Ref Range Status   02/03/2022 131 (L) 136 - 145 mmol/L Final     Potassium   Date Value Ref Range Status   02/03/2022 3.6 3.5 - 5.2 mmol/L Final     Chloride   Date Value Ref Range Status   02/03/2022 95 (L) 98 - 107 mmol/L Final     CO2   Date Value Ref Range Status   02/03/2022 14.7 (L) 22.0 - 29.0 mmol/L Final     BUN   Date Value Ref Range Status   02/03/2022 14 8 - 23 mg/dL Final     Creatinine   Date Value Ref Range Status   02/03/2022 1.21 0.76 - 1.27 mg/dL Final     Glucose   Date Value Ref Range Status   02/03/2022 185 (H) 65 - 99 mg/dL Final     Calcium   Date Value Ref Range Status   02/03/2022 9.1 8.6 - 10.5 mg/dL Final     AST (SGOT)   Date Value Ref Range Status   02/03/2022 164 (H) 1 - 40 U/L Final     ALT (SGPT)   Date Value Ref Range Status   02/03/2022 324 (H) 1 - 41 U/L Final     Alkaline Phosphatase   Date Value Ref Range Status   02/03/2022 58 39 - 117 U/L Final     No results found for: APTT, INR             Imaging Results (All)     Procedure Component Value Units Date/Time    XR Abdomen KUB [[id number]] Collected: 02/03/22 1608     Updated: 02/03/22 1612    Narrative:       XR ABDOMEN KUB-     Clinical: Nasogastric tube placement     FINDINGS: Nasogastric tube tip superimposes the proximal gastric body.  This position is satisfactory. For optimal positioning in the mid to  distal stomach it can be advanced 6 to 8 cm. Gastric and bowel  distention again demonstrated.     This report was finalized on 2/3/2022 4:09 PM by Dr. Cuco Geiger M.D.       XR Chest 1 View [[id number]] Collected: 02/03/22 1530     Updated: 02/03/22 1539    Narrative:      XR CHEST 1 VW-     Clinical: Shortness of breath, possible aspiration     COMPARISON 8/14/2019     FINDINGS: There is a very shallow inspiratory effort with bibasilar  atelectasis/infiltrates. There is cardiac enlargement, no pulmonary  edema. No gross pleural effusion is demonstrated. Along the lateral mid  lung zone, there is again a curvilinear density again seen which could  represent pleural thickening, small loculated pleural effusion,  peripheral parenchymal scarring and/or focal persistent consolidation.  This appears slightly less conspicuous compared to the previous  examination. No mediastinal abnormality is demonstrated.     Partially within the field-of-view is a moderately distended segment of  hepatic flexure and considerable amount of gas demonstrated within the  gastric lumen. There are monitoring leads superimposing the chest.     CONCLUSION:  1. Very low inspiratory effort with bibasilar infiltrate/atelectasis.  2. Chronic pleural-parenchymal change along the lateral left mid lung  zone as described above.  3. Cardiomegaly.  4. Moderately distended segment of hepatic flexure and gaseous  distention of the stomach.     This report was finalized on 2/3/2022 3:36 PM by Dr. Cuco Geiger M.D.       CT Soft Tissue Neck With Contrast [[id number]] Collected: 02/03/22 1520     Updated: 02/03/22 1520    Narrative:      CT NECK WITH CONTRAST     HISTORY: Stridor, status post nasogastric tube insertion attempt.     COMPARISON: None.      TECHNIQUE: A CT examination of the neck was performed after the  intravenous administration of contrast. The study was hampered  significantly by patient motion. The patient was scanned twice and there  was patient motion on both scans.     FINDINGS:  There is narrowing of the distal trachea on both scans. This  is likely related to breathing. Clinical correlation is recommended.  When able, further evaluation could be performed with a dedicated CT  examination of the chest.     The parotid, submandibular and thyroid glands appear unremarkable. There  is no evidence of pneumomediastinum or pathologic adenopathy.       Impression:      1.  This study is degraded by patient motion. There is no evidence of  pneumomediastinum or of obvious esophageal injury. A mucosal injury or  esophageal injury cannot be excluded.  2.  Narrowing of the distal trachea is appreciated which likely is  related to respiration. Clinical correlation is recommended. When able,  further evaluation could be performed with a dedicated CT examination of  the chest with a full breath-hold as indicated.     The above information was called to and discussed with Dr. Marr.      Radiation dose reduction techniques were utilized, including automated  exposure control and exposure modulation based on body size.             CT Abdomen Pelvis With Contrast [[id number]] Collected: 02/03/22 1410     Updated: 02/03/22 1410    Narrative:      CT ABDOMEN AND PELVIS WITH CONTRAST     HISTORY: Abdominal pain with elevated lipase.     TECHNIQUE: Axial CT images of the abdomen and pelvis were obtained  following administration of intravenous contrast. The patient was not  given oral contrast Coronal and sagittal reformats were obtained.     COMPARISON: None.     FINDINGS: There is marked distention of the stomach with an air-fluid  level. There is distention of small bowel loop air-fluid levels  measuring up to 5.5 cm. These demonstrate a somewhat abrupt  transition  within the distal ileal loops just proximal to the terminal ileum within  the right lower quadrant. This is best demonstrated on image 131/132  axial. The terminal ileum is completely collapsed. The colon is  completely collapsed. The dilated duodenum demonstrates peripheral foci  of air favored to represent trapped air and pseudopneumatosis.     The liver demonstrates normal attenuation. There is mild intrahepatic  and extrahepatic biliary dilatation status post cholecystectomy. Diffuse  peripancreatic stranding and fluid are present and most suggestive of  pancreatitis. The splenoportal confluence is patent. The spleen is  normal. Bilateral adrenal glands and kidneys are normal. Thickening of  Gerota's fascia and fluid are seen within bilateral paracolic gutters.  The prostate gland is mildly enlarged. Bilateral adrenal glands and  kidneys are normal. No pathological retroperitoneal lymphadenopathy.  Moderate calcified atherosclerotic plaque is seen within the abdominal  aorta and its branches.     There are bilateral small layering pleural effusions with subsegmental  lower lobe atelectasis. Small pericardial effusion is present       Impression:      1. Complete distal small bowel obstruction. This is favored to be  secondary to adhesive disease. There is massive distention of the  stomach and duodenum and insertion of NG tube is recommended.  2. CT findings suggesting pancreatitis.     These findings were discussed with Dr. Marr by telephone at the time  of dictation.     Radiation dose reduction techniques were utilized, including automated  exposure control and exposure modulation based on body size.              Patient Active Problem List   Diagnosis Code   • Syncope and collapse R55   • Schizophrenia (HCC) F20.9   • Atrial fibrillation (HCC) I48.91   • Hypertension I10   • Orthostatic hypotension I95.1   • Hypokalemia E87.6   • Generalized abdominal pain R10.84       Assessment:  Active  Hospital Problems    Diagnosis  POA   • Generalized abdominal pain [R10.84]  Yes      Resolved Hospital Problems   No resolved problems to display.   nausea and vomiting  Small bowel obstruction  Pancreatitis  Atrial fibrillation with rvr    Plan:  Conservative management with ng tube and fluids  Appreciate input from surgery  Trend lipase  Monitor on telemetry  Heart rate is now controlled  IVYw patient, nurse and ED provider    Yesenia Bauer MD  2/3/2022  19:54 EST

## 2022-02-05 LAB
ALBUMIN SERPL-MCNC: 3.1 G/DL (ref 3.5–5.2)
ALBUMIN/GLOB SERPL: 1.1 G/DL
ALP SERPL-CCNC: 48 U/L (ref 39–117)
ALT SERPL W P-5'-P-CCNC: 99 U/L (ref 1–41)
ANION GAP SERPL CALCULATED.3IONS-SCNC: 10 MMOL/L (ref 5–15)
AST SERPL-CCNC: 52 U/L (ref 1–40)
BASOPHILS # BLD AUTO: 0.02 10*3/MM3 (ref 0–0.2)
BASOPHILS NFR BLD AUTO: 0.2 % (ref 0–1.5)
BILIRUB SERPL-MCNC: 0.9 MG/DL (ref 0–1.2)
BUN SERPL-MCNC: 23 MG/DL (ref 8–23)
BUN/CREAT SERPL: 28.4 (ref 7–25)
CALCIUM SPEC-SCNC: 7.2 MG/DL (ref 8.6–10.5)
CHLORIDE SERPL-SCNC: 100 MMOL/L (ref 98–107)
CO2 SERPL-SCNC: 24 MMOL/L (ref 22–29)
CREAT SERPL-MCNC: 0.81 MG/DL (ref 0.76–1.27)
DEPRECATED RDW RBC AUTO: 44.8 FL (ref 37–54)
EOSINOPHIL # BLD AUTO: 0 10*3/MM3 (ref 0–0.4)
EOSINOPHIL NFR BLD AUTO: 0 % (ref 0.3–6.2)
ERYTHROCYTE [DISTWIDTH] IN BLOOD BY AUTOMATED COUNT: 13.1 % (ref 12.3–15.4)
GFR SERPL CREATININE-BSD FRML MDRD: 96 ML/MIN/1.73
GLOBULIN UR ELPH-MCNC: 2.8 GM/DL
GLUCOSE SERPL-MCNC: 141 MG/DL (ref 65–99)
HAV IGM SERPL QL IA: NORMAL
HBV CORE IGM SERPL QL IA: NORMAL
HBV SURFACE AG SERPL QL IA: NORMAL
HCT VFR BLD AUTO: 43.8 % (ref 37.5–51)
HCV AB SER DONR QL: NORMAL
HGB BLD-MCNC: 14.9 G/DL (ref 13–17.7)
IMM GRANULOCYTES # BLD AUTO: 0.02 10*3/MM3 (ref 0–0.05)
IMM GRANULOCYTES NFR BLD AUTO: 0.2 % (ref 0–0.5)
LYMPHOCYTES # BLD AUTO: 0.47 10*3/MM3 (ref 0.7–3.1)
LYMPHOCYTES NFR BLD AUTO: 4.7 % (ref 19.6–45.3)
MAGNESIUM SERPL-MCNC: 2.2 MG/DL (ref 1.6–2.4)
MCH RBC QN AUTO: 31.8 PG (ref 26.6–33)
MCHC RBC AUTO-ENTMCNC: 34 G/DL (ref 31.5–35.7)
MCV RBC AUTO: 93.6 FL (ref 79–97)
MONOCYTES # BLD AUTO: 0.89 10*3/MM3 (ref 0.1–0.9)
MONOCYTES NFR BLD AUTO: 8.9 % (ref 5–12)
NEUTROPHILS NFR BLD AUTO: 8.65 10*3/MM3 (ref 1.7–7)
NEUTROPHILS NFR BLD AUTO: 86 % (ref 42.7–76)
NRBC BLD AUTO-RTO: 0 /100 WBC (ref 0–0.2)
PHOSPHATE SERPL-MCNC: 1.1 MG/DL (ref 2.5–4.5)
PLATELET # BLD AUTO: 173 10*3/MM3 (ref 140–450)
PMV BLD AUTO: 11.3 FL (ref 6–12)
POTASSIUM SERPL-SCNC: 4.2 MMOL/L (ref 3.5–5.2)
PROT SERPL-MCNC: 5.9 G/DL (ref 6–8.5)
RBC # BLD AUTO: 4.68 10*6/MM3 (ref 4.14–5.8)
SODIUM SERPL-SCNC: 134 MMOL/L (ref 136–145)
WBC NRBC COR # BLD: 10.05 10*3/MM3 (ref 3.4–10.8)

## 2022-02-05 PROCEDURE — 80053 COMPREHEN METABOLIC PANEL: CPT | Performed by: STUDENT IN AN ORGANIZED HEALTH CARE EDUCATION/TRAINING PROGRAM

## 2022-02-05 PROCEDURE — 97162 PT EVAL MOD COMPLEX 30 MIN: CPT

## 2022-02-05 PROCEDURE — 83735 ASSAY OF MAGNESIUM: CPT | Performed by: STUDENT IN AN ORGANIZED HEALTH CARE EDUCATION/TRAINING PROGRAM

## 2022-02-05 PROCEDURE — 85025 COMPLETE CBC W/AUTO DIFF WBC: CPT | Performed by: STUDENT IN AN ORGANIZED HEALTH CARE EDUCATION/TRAINING PROGRAM

## 2022-02-05 PROCEDURE — 25010000002 ENOXAPARIN PER 10 MG: Performed by: STUDENT IN AN ORGANIZED HEALTH CARE EDUCATION/TRAINING PROGRAM

## 2022-02-05 PROCEDURE — 80074 ACUTE HEPATITIS PANEL: CPT | Performed by: STUDENT IN AN ORGANIZED HEALTH CARE EDUCATION/TRAINING PROGRAM

## 2022-02-05 PROCEDURE — 97110 THERAPEUTIC EXERCISES: CPT

## 2022-02-05 PROCEDURE — 99232 SBSQ HOSP IP/OBS MODERATE 35: CPT | Performed by: NURSE PRACTITIONER

## 2022-02-05 PROCEDURE — 84100 ASSAY OF PHOSPHORUS: CPT | Performed by: STUDENT IN AN ORGANIZED HEALTH CARE EDUCATION/TRAINING PROGRAM

## 2022-02-05 PROCEDURE — 25010000002 SODIUM CHLORIDE 0.9 % WITH KCL 20 MEQ 20-0.9 MEQ/L-% SOLUTION: Performed by: STUDENT IN AN ORGANIZED HEALTH CARE EDUCATION/TRAINING PROGRAM

## 2022-02-05 PROCEDURE — 25010000002 HYDROMORPHONE 1 MG/ML SOLUTION: Performed by: STUDENT IN AN ORGANIZED HEALTH CARE EDUCATION/TRAINING PROGRAM

## 2022-02-05 RX ORDER — HYDRALAZINE HYDROCHLORIDE 20 MG/ML
20 INJECTION INTRAMUSCULAR; INTRAVENOUS EVERY 6 HOURS PRN
Status: DISCONTINUED | OUTPATIENT
Start: 2022-02-05 | End: 2022-02-19 | Stop reason: HOSPADM

## 2022-02-05 RX ADMIN — SODIUM PHOSPHATE, MONOBASIC, MONOHYDRATE 10 MMOL: 276; 142 INJECTION, SOLUTION INTRAVENOUS at 21:40

## 2022-02-05 RX ADMIN — METRONIDAZOLE 500 MG: 500 INJECTION, SOLUTION INTRAVENOUS at 06:48

## 2022-02-05 RX ADMIN — HYDROMORPHONE HYDROCHLORIDE 0.5 MG: 1 INJECTION, SOLUTION INTRAMUSCULAR; INTRAVENOUS; SUBCUTANEOUS at 12:53

## 2022-02-05 RX ADMIN — POTASSIUM CHLORIDE AND SODIUM CHLORIDE 100 ML/HR: 900; 150 INJECTION, SOLUTION INTRAVENOUS at 01:40

## 2022-02-05 RX ADMIN — METRONIDAZOLE 500 MG: 500 INJECTION, SOLUTION INTRAVENOUS at 23:36

## 2022-02-05 RX ADMIN — DIGOXIN 250 MCG: 250 TABLET ORAL at 12:07

## 2022-02-05 RX ADMIN — HYDROMORPHONE HYDROCHLORIDE 0.5 MG: 1 INJECTION, SOLUTION INTRAMUSCULAR; INTRAVENOUS; SUBCUTANEOUS at 02:27

## 2022-02-05 RX ADMIN — METRONIDAZOLE 500 MG: 500 INJECTION, SOLUTION INTRAVENOUS at 14:52

## 2022-02-05 RX ADMIN — HYDROMORPHONE HYDROCHLORIDE 0.5 MG: 1 INJECTION, SOLUTION INTRAMUSCULAR; INTRAVENOUS; SUBCUTANEOUS at 19:48

## 2022-02-05 RX ADMIN — HYDROMORPHONE HYDROCHLORIDE 0.5 MG: 1 INJECTION, SOLUTION INTRAMUSCULAR; INTRAVENOUS; SUBCUTANEOUS at 04:51

## 2022-02-05 RX ADMIN — POTASSIUM CHLORIDE AND SODIUM CHLORIDE 100 ML/HR: 900; 150 INJECTION, SOLUTION INTRAVENOUS at 23:35

## 2022-02-05 RX ADMIN — ENOXAPARIN SODIUM 40 MG: 100 INJECTION SUBCUTANEOUS at 23:36

## 2022-02-05 RX ADMIN — HYDROMORPHONE HYDROCHLORIDE 0.5 MG: 1 INJECTION, SOLUTION INTRAMUSCULAR; INTRAVENOUS; SUBCUTANEOUS at 08:41

## 2022-02-05 RX ADMIN — ASPIRIN 81 MG: 81 TABLET, COATED ORAL at 08:41

## 2022-02-05 RX ADMIN — SODIUM PHOSPHATE, MONOBASIC, MONOHYDRATE 30 MMOL: 276; 142 INJECTION, SOLUTION INTRAVENOUS at 11:00

## 2022-02-05 RX ADMIN — HYDROMORPHONE HYDROCHLORIDE 0.5 MG: 1 INJECTION, SOLUTION INTRAMUSCULAR; INTRAVENOUS; SUBCUTANEOUS at 23:41

## 2022-02-05 RX ADMIN — HYDROMORPHONE HYDROCHLORIDE 0.5 MG: 1 INJECTION, SOLUTION INTRAMUSCULAR; INTRAVENOUS; SUBCUTANEOUS at 17:21

## 2022-02-05 NOTE — PROGRESS NOTES
Name: Carlos Kraus ADMIT: 2/3/2022   : 1955  PCP: Provider, No Known    MRN: [id number] LOS: 2 days   AGE/SEX: 67 y.o. male  ROOM: E662/1     Subjective   Subjective   Patient seen this morning.  Status post Exploratory laparotomy and lysis of adhesions on  for small bowel obstruction.  This morning continues to complain of abdominal pain, however states he he passed some gas which he was not able to do yesterday.  NG tube in place.  Phosphorus still critically low at 1.1 this morning.  Ordered stat IV sodium phos replacement.  Review of Systems   As above  Objective   Objective   Vital Signs  Temp:  [98.1 °F (36.7 °C)-99.4 °F (37.4 °C)] 98.9 °F (37.2 °C)  Heart Rate:  [] 102  Resp:  [16-30] 18  BP: (122-151)/() 149/79  Arterial Line BP: (132-157)/(78-86) 132/78  SpO2:  [92 %-97 %] 93 %  on  Flow (L/min):  [3-4] 3;   Device (Oxygen Therapy): nasal cannula  Body mass index is 31.46 kg/m².  Physical Exam    General: Alert , not in distress, ill-appearing  HEENT: Normocephalic, atraumatic  CV: Tachycardic, irregular from.  No rubs or gallops.  Lungs: Clear to auscultation bilaterally, no crackles or wheezes  Abdomen: Distended, dressing from surgical and incision yesterday clean and intact.  Tenderness to palpation.  Hypoactive bowel sounds.  Extremities: No significant peripheral edema , no cyanosis     Results Review     I reviewed the patient's new clinical results.  Results from last 7 days   Lab Units 22  0807 22  0500 22  1145   WBC 10*3/mm3 10.05 19.14* 24.96*   HEMOGLOBIN g/dL 14.9 16.1 17.0   PLATELETS 10*3/mm3 173 181 225     Results from last 7 days   Lab Units 22  0807 22  0500 22  1145   SODIUM mmol/L 134* 129* 131*   POTASSIUM mmol/L 4.2 4.4 3.6   CHLORIDE mmol/L 100 97* 95*   CO2 mmol/L 24.0 19.1* 14.7*   BUN mg/dL 23 21 14   CREATININE mg/dL 0.81 0.77 1.21   GLUCOSE mg/dL 141* 120* 185*   Estimated Creatinine Clearance: 120.5 mL/min (by  C-G formula based on SCr of 0.81 mg/dL).  Results from last 7 days   Lab Units 02/05/22  0807 02/03/22  1145   ALBUMIN g/dL 3.10* 4.70   BILIRUBIN mg/dL 0.9 2.2*   ALK PHOS U/L 48 58   AST (SGOT) U/L 52* 164*   ALT (SGPT) U/L 99* 324*     Results from last 7 days   Lab Units 02/05/22  0807 02/04/22  0500 02/03/22  1145   CALCIUM mg/dL 7.2* 7.9* 9.1   ALBUMIN g/dL 3.10*  --  4.70   MAGNESIUM mg/dL 2.2 1.5*  --    PHOSPHORUS mg/dL 1.1* 1.1*  --        COVID19   Date Value Ref Range Status   02/03/2022 Not Detected Not Detected - Ref. Range Final     No results found for: HGBA1C, POCGLU    XR Chest 1 View  PORTABLE CHEST 02/04/2022 AT 3:36 PM     CLINICAL HISTORY: Preop chest x-ray     Compared to the previous chest dated 02/03/2022.     The lungs are somewhat poorly inflated. There is mild atelectasis at the  right lung base. No definite acute focal infiltrates are identified.  There are no pleural effusions. The heart is moderately enlarged and  unchanged. The pulmonary vasculature is limited.     IMPRESSIONS: Poor lung expansion. No evidence of acute disease within  the chest.     This report was finalized on 2/4/2022 3:52 PM by Dr. Babatunde Self M.D.     Arterial Line  Karissa Simental MD     2/4/2022  3:18 PM  Arterial Line    Patient reassessed immediately prior to procedure    Patient location during procedure: pre-op  Start time: 2/4/2022 3:02 PM  Stop Time:2/4/2022 3:05 PM       Line placed for respiratory failure, hemodynamic monitoring, MD/Surgeon   request and ABGs/Labs/ISTAT.  Performed By   Anesthesiologist: Karissa Simental MD  Preanesthetic Checklist  Completed: patient identified, IV checked, site marked, risks and benefits   discussed, surgical consent, monitors and equipment checked, pre-op   evaluation and timeout performed  Arterial Line Prep   Sterile Tech: cap, gloves, sterile barriers and mask  Prep: ChloraPrep  Patient monitoring: EKG, continuous pulse oximetry and blood pressure    monitoring  Arterial Line Procedure   Laterality:left  Location:  radial artery  Catheter size: 20 G   Guidance: ultrasound guided  PROCEDURE NOTE/ULTRASOUND INTERPRETATION.  Using ultrasound guidance the   potential vascular sites for insertion of the catheter were visualized to   determine the patency of the vessel to be used for vascular access.  After   selecting the appropriate site for insertion, the needle was visualized   under ultrasound being inserted into the radial artery, followed by   ultrasound confirmation of wire and catheter placement. There were no   abnormalities seen on ultrasound; an image was taken; and the patient   tolerated the procedure with no complications.   Number of attempts: 1  Successful placement: yes  Post Assessment   Dressing Type: wrist guard applied, secured with tape and occlusive   dressing applied.   Complications no  Circ/Move/Sens Assessment: normal and unchanged.   Patient Tolerance: patient tolerated the procedure well with no apparent   complications  Additional Notes  Using ultrasound guidance the potential vascular sites for insertion of   the catheter were visualized to determine the patency of the vessel to be   used for vascular access.  After selecting the appropriate site for   insertion, the needle was visualized under ultrasound being inserted into   the artery, followed by ultrasound confirmation of wire and catheter   placement.  There were no abnormalities seen on ultrasound; an image was   taken/ and the patient tolerated the procedure with no complications.     XR Abdomen KUB  Narrative: KUB     HISTORY: Abdominal distention follow-up. Small bowel obstruction.     TECHNIQUE: Two x-rays of the abdomen and pelvis are provided. These are  correlated with other recent abdominal imaging.     FINDINGS: There is a nasogastric tube with its proximal port near the  level of the gastroesophageal junction. Mild atelectasis is observed in  the lung bases.      Gaseous dilatation of small bowel loops to almost 6 cm is observed. No  intraperitoneal free air is identified.     Impression: Persistent small bowel obstruction.     This report was finalized on 2/4/2022 10:20 AM by Dr. Christopher Muñoz M.D.     CT Abdomen Pelvis With Contrast  Narrative: CT ABDOMEN AND PELVIS WITH CONTRAST     HISTORY: Abdominal pain with elevated lipase.     TECHNIQUE: Axial CT images of the abdomen and pelvis were obtained  following administration of intravenous contrast. The patient was not  given oral contrast Coronal and sagittal reformats were obtained.     COMPARISON: None.     FINDINGS: There is marked distention of the stomach with an air-fluid  level. There is distention of small bowel loop air-fluid levels  measuring up to 5.5 cm. These demonstrate a somewhat abrupt transition  within the distal ileal loops just proximal to the terminal ileum within  the right lower quadrant. This is best demonstrated on image 131/132  axial. The terminal ileum is completely collapsed. The colon is  completely collapsed. The dilated duodenum demonstrates peripheral foci  of air favored to represent trapped air and pseudopneumatosis.     The liver demonstrates normal attenuation. There is mild intrahepatic  and extrahepatic biliary dilatation status post cholecystectomy. Diffuse  peripancreatic stranding and fluid are present and most suggestive of  pancreatitis. The splenoportal confluence is patent. The spleen is  normal. Bilateral adrenal glands and kidneys are normal. Thickening of  Gerota's fascia and fluid are seen within bilateral paracolic gutters.  The prostate gland is mildly enlarged. Bilateral adrenal glands and  kidneys are normal. No pathological retroperitoneal lymphadenopathy.  Moderate calcified atherosclerotic plaque is seen within the abdominal  aorta and its branches.     There are bilateral small layering pleural effusions with subsegmental  lower lobe atelectasis. Small  pericardial effusion is present     Impression: 1. Complete distal small bowel obstruction. This is favored to be  secondary to adhesive disease. There is massive distention of the  stomach and duodenum and insertion of NG tube is recommended.  2. CT findings suggesting pancreatitis.     These findings were discussed with Dr. Marr by telephone at the time  of dictation.     Radiation dose reduction techniques were utilized, including automated  exposure control and exposure modulation based on body size.     This report was finalized on 2/4/2022 9:52 AM by Dr. Mica Ratliff M.D.     CT Soft Tissue Neck With Contrast  Narrative: CT NECK WITH CONTRAST     HISTORY: Stridor, status post nasogastric tube insertion attempt.     COMPARISON: None.     TECHNIQUE: A CT examination of the neck was performed after the  intravenous administration of contrast. The study was hampered  significantly by patient motion. The patient was scanned twice and there  was patient motion on both scans.     FINDINGS:  There is narrowing of the distal trachea on both scans. This  is likely related to breathing. Clinical correlation is recommended.  When able, further evaluation could be performed with a dedicated CT  examination of the chest.     The parotid, submandibular and thyroid glands appear unremarkable. There  is no evidence of pneumomediastinum or pathologic adenopathy.     Impression: 1.  This study is degraded by patient motion. There is no evidence of  pneumomediastinum or of obvious esophageal injury. A mucosal injury or  esophageal injury cannot be excluded.  2.  Narrowing of the distal trachea is appreciated which likely is  related to respiration. Clinical correlation is recommended. When able,  further evaluation could be performed with a dedicated CT examination of  the chest with a full breath-hold as indicated.     The above information was called to and discussed with Dr. Marr.      Radiation dose reduction  techniques were utilized, including automated  exposure control and exposure modulation based on body size.     This report was finalized on 2/4/2022 8:21 AM by Dr. Clemente Mccann M.D.       Scheduled Medications  aspirin, 81 mg, Oral, Daily  digoxin, 250 mcg, Oral, Daily  enoxaparin, 40 mg, Subcutaneous, Nightly  metroNIDAZOLE, 500 mg, Intravenous, Q8H  risperiDONE, 5 mg, Oral, Nightly  sodium phosphate IVPB, 30 mmol, Intravenous, Once    Infusions  dilTIAZem, 5-15 mg/hr, Last Rate: 5 mg/hr (02/04/22 2356)  lactated ringers, 9 mL/hr, Last Rate: 9 mL/hr (02/04/22 1840)  sodium chloride 0.9 % with KCl 20 mEq, 100 mL/hr, Last Rate: 100 mL/hr (02/05/22 0140)    Diet  NPO Diet       Assessment/Plan     Active Hospital Problems    Diagnosis  POA   • **SBO (small bowel obstruction) (HCC) [K56.609]  Unknown   • GERD (gastroesophageal reflux disease) [K21.9]  Unknown   • Hypothyroidism [E03.9]  Unknown   • Hyperlipidemia [E78.5]  Unknown   • Hypophosphatemia [E83.39]  Unknown   • Generalized abdominal pain [R10.84]  Yes   • Hypertension [I10]  Yes   • Atrial fibrillation (HCC) [I48.91]  Yes   • Schizophrenia (HCC) [F20.9]  Yes      Resolved Hospital Problems   No resolved problems to display.     67-year-old male with a history of atrial fibrillation, schizophrenia, hypertension, hyperlipidemia, hypothyroidism, presented to the ED with abdominal pain, nausea, and vomiting.  Was found to have small bowel obstruction.      Small bowel obstruction  -NG tube was placed, having significant output.  -General surgery consulted  -Persistent small bowel obstruction on x-ray  -Supportive care, IV fluids, antiemetics.  -Started on metronidazole 500 mg every 8 hours 05/04  -status post exploratory laparotomy and lysis of adhesions for high-grade small bowel obstruction 02/04  -WBC improving  -Continue NG tube    Atrial fibrillation with RVR: Cardiology consulted, on Cardizem drip, heart rate still elevated but improved.  On digoxin 250  mcg daily.  Schizophrenia.  Continue home risperidone 5 mg nightly.   Hypertension: BP acutely acceptable: Continue to monitor.  Hypothyroidism?  Per chart review patient has history of hypothyroidism but not on replacement.  TSH normal.  Hypophosphatemia: Repleted by IV sodium Phos.  Elevated transaminases: AST and ALTelevated at 324 and 164 respectively.  Likely secondary to severe obstruction.  Ordered hepatitis panel in the a.m.  Daily CMP.  If not improvin  g after surgery will order further work-up.  Hyponatremia: Likely secondary to nausea vomiting and low p.o. fluid intake.  Continue IV normal saline.  Resolved.    DVT prophylaxis: Lovenox  CODE STATUS: Full code    Dana Zurita MD  [facility]  02/05/22  11:36 EST

## 2022-02-05 NOTE — ANESTHESIA POSTPROCEDURE EVALUATION
Patient: Carlos Kraus    Procedure Summary     Date: 02/04/22 Room / Location: [facility] OR 03 / [facility] MAIN OR    Anesthesia Start: 1617 Anesthesia Stop: 1750    Procedure: LAPAROTOMY EXPLORATORY LYSIS OF ADHESIONS (N/A Abdomen) Diagnosis:     Surgeons: Edyta Agosto MD Provider: Lashawn Garcia MD    Anesthesia Type: general ASA Status: 4          Anesthesia Type: general    Vitals  Vitals Value Taken Time   /96 02/04/22 1931   Temp 37.1 °C (98.7 °F) 02/04/22 1745   Pulse 98 02/04/22 1943   Resp 20 02/04/22 1930   SpO2 95 % 02/04/22 1943   Vitals shown include unvalidated device data.        Post Anesthesia Care and Evaluation    Patient location during evaluation: bedside  Patient participation: complete - patient participated  Level of consciousness: awake and alert  Pain management: adequate  Airway patency: patent  Anesthetic complications: No anesthetic complications  PONV Status: controlled  Cardiovascular status: acceptable  Respiratory status: acceptable  Hydration status: acceptable

## 2022-02-05 NOTE — PROGRESS NOTES
HOSPITAL FOLLOW UP NOTE    Patient Name: Carlos Kraus  Patient : 1955        Date of Service:22  Provider of Service: CARIDAD Omalley  Place of Service: [facility]  Referral Provider: Yesenia Bauer, *          Follow Up: Atrial fibrillation with RVR    Interval Hx: Had surgery this morning.  Sleeping at this time. Appears comfortable.     OBJECTIVE  Temp:  [98.1 °F (36.7 °C)-99.4 °F (37.4 °C)] 98.9 °F (37.2 °C)  Heart Rate:  [] 102  Resp:  [16-30] 18  BP: (122-151)/() 149/79  Arterial Line BP: (132-157)/(78-86) 132/78     Intake/Output Summary (Last 24 hours) at 2022 0922  Last data filed at 2022 0442  Gross per 24 hour   Intake 2864 ml   Output 1220 ml   Net 1644 ml     Body mass index is 31.46 kg/m².      22  1643   Weight: 111 kg (245 lb)         Physical Exam:     General Appearance:    Alert, cooperative, in no acute distress           Neck:   no thyromegaly, no   carotid bruit, no JVD   Lungs:     Clear to auscultation,respirations regular, even and unlabored    Heart:   Irregular rhythm/rate, normal S1 and S2, no murmur, no gallop, no rub, no click       Abdomen:     Normal bowel sounds, soft, nontender, nondistended, no guarding, no rebound  Tenderness, G-tube to low wall suction with green output   Extremities:   Moves all extremities well, no edema, no cyanosis, no redness   Pulses:   Pulses palpable and equal bilaterally.          CURRENT MEDS    Scheduled Meds:aspirin, 81 mg, Oral, Daily  digoxin, 250 mcg, Oral, Daily  metroNIDAZOLE, 500 mg, Intravenous, Q8H  risperiDONE, 5 mg, Oral, Nightly      Continuous Infusions:dilTIAZem, 5-15 mg/hr, Last Rate: 5 mg/hr (22 0546)  lactated ringers, 9 mL/hr, Last Rate: 9 mL/hr (22 1840)  sodium chloride 0.9 % with KCl 20 mEq, 100 mL/hr, Last Rate: 100 mL/hr (22 0140)          Lab Review:   Results from last 7 days   Lab Units 22  0807 22  0500 22  1145  02/03/22  1145   SODIUM mmol/L 134* 129*   < > 131*   POTASSIUM mmol/L 4.2 4.4   < > 3.6   CHLORIDE mmol/L 100 97*   < > 95*   CO2 mmol/L 24.0 19.1*   < > 14.7*   BUN mg/dL 23 21   < > 14   CREATININE mg/dL 0.81 0.77   < > 1.21   GLUCOSE mg/dL 141* 120*   < > 185*   CALCIUM mg/dL 7.2* 7.9*   < > 9.1   AST (SGOT) U/L 52*  --   --  164*   ALT (SGPT) U/L 99*  --   --  324*    < > = values in this interval not displayed.         Results from last 7 days   Lab Units 02/05/22  0807 02/04/22  0500   WBC 10*3/mm3 10.05 19.14*   HEMOGLOBIN g/dL 14.9 16.1   HEMATOCRIT % 43.8 47.1   PLATELETS 10*3/mm3 173 181         Results from last 7 days   Lab Units 02/05/22  0807 02/04/22  0500   MAGNESIUM mg/dL 2.2 1.5*         Results from last 7 days   Lab Units 02/03/22  1145   PROBNP pg/mL 785.0     Results from last 7 days   Lab Units 02/04/22  0500   TSH uIU/mL 1.360           ASSESSMENT & PLAN    SBO (small bowel obstruction) (HCC)    Schizophrenia (HCC)    Atrial fibrillation (HCC)    Hypertension    Generalized abdominal pain    GERD (gastroesophageal reflux disease)    Hypothyroidism    Hyperlipidemia    Hypophosphatemia    1.  High-grade small bowel obstruction status post exploratory lap with lysis of adhesions this morning  2.  Chronic atrial fibrillation: A fib RVR on admission, controlled after given Cardizem bolus and IV digoxin.. Now controlled on Cardizem drip at 5mg/hr.  Unable to take p.o. due to NG tube.  Once he is able to start taking p.o., will start him on oral AV sal blocking agents.  It appears he is on digoxin 250 mcg tablets daily at home.  Dig level subtherapeutic on admission.  Will likely start him on beta-blocker.   3.  Hypertension:  Slightly elevated. As above, will add BB when taking po. Prn iv hydralazine order placed for systolic >160.   4.  Hypothyroidism: Normal TSH  5.  Schizophrenia    Judi Benito, APRN  02/05/22

## 2022-02-05 NOTE — THERAPY EVALUATION
Patient Name: Carlos Kraus  : 1955    MRN: [id number]                              Today's Date: 2022       Admit Date: 2/3/2022    Visit Dx:     ICD-10-CM ICD-9-CM   1. Generalized abdominal pain  R10.84 789.07   2. Acute pancreatitis, unspecified complication status, unspecified pancreatitis type  K85.90 577.0   3. Small bowel obstruction (HCC)  K56.609 560.9   4. Atrial fibrillation with RVR (HCC)  I48.91 427.31     Patient Active Problem List   Diagnosis   • Syncope and collapse   • Schizophrenia (HCC)   • Atrial fibrillation (HCC)   • Hypertension   • Orthostatic hypotension   • Hypokalemia   • Generalized abdominal pain   • GERD (gastroesophageal reflux disease)   • Hypothyroidism   • Hyperlipidemia   • Hypophosphatemia   • SBO (small bowel obstruction) (HCC)     Past Medical History:   Diagnosis Date   • A-fib (HCC)    • Atrial fibrillation (HCC) 2019   • [place] esophagus    • GERD (gastroesophageal reflux disease)    • Hx of schizophrenia    • Hyperlipidemia    • Hypertension    • Hypokalemia 2019   • Hypophosphatemia    • Hypothyroidism    • Schizophrenia (HCC) 2019   • Syncope and collapse 2019   • Umbilical hernia      History reviewed. No pertinent surgical history.   General Information     Row Name 22 1203          Physical Therapy Time and Intention    Document Type evaluation  -DJ     Mode of Treatment individual therapy; physical therapy  -DJ     Row Name 22 1203          General Information    Patient Profile Reviewed yes  -DJ     Prior Level of Function independent:; min assist:  pt poor historian  -DJ     Existing Precautions/Restrictions fall  -DJ     Barriers to Rehab medically complex; previous functional deficit; cognitive status  -DJ     Row Name 22 1203          Living Environment    Lives With facility resident  -DJ     Row Name 22 1203          Home Main Entrance    Number of Stairs, Main Entrance other (see comments)  pt unsure   -DJ     Stair Railings, Main Entrance other (see comments)  -DJ     Row Name 02/05/22 1203          Cognition    Orientation Status (Cognition) unable/difficult to assess  flat affect, slow to respond  -DJ     Row Name 02/05/22 1203          Safety Issues, Functional Mobility    Safety Issues Affecting Function (Mobility) insight into deficits/self-awareness; judgment; safety precaution awareness  -DJ     Impairments Affecting Function (Mobility) strength; endurance/activity tolerance  -DJ     Comment, Safety Issues/Impairments (Mobility) gt belt, nonskid socks  -DJ           User Key  (r) = Recorded By, (t) = Taken By, (c) = Cosigned By    Initials Name Provider Type    Pamela Turpin, PT Physical Therapist               Mobility     Row Name 02/05/22 1204          Bed Mobility    Bed Mobility supine-sit; sit-supine  -DJ     Supine-Sit [place] (Bed Mobility) minimum assist (75% patient effort); verbal cues  -DJ     Sit-Supine [place] (Bed Mobility) minimum assist (75% patient effort); 2 person assist; verbal cues  -DJ     Assistive Device (Bed Mobility) bed rails; head of bed elevated; draw sheet  -DJ     Comment (Bed Mobility) dependent to reposition self, vc for sequencing  -DJ     Row Name 02/05/22 1204          Transfers    Comment (Transfers) sit/stand from EOB x 2  -DJ     Row Name 02/05/22 1204          Bed-Chair Transfer    Bed-Chair [place] (Transfers) unable to assess  -DJ     Row Name 02/05/22 1204          Sit-Stand Transfer    Sit-Stand [place] (Transfers) minimum assist (75% patient effort); 1 person assist; 2 person assist; verbal cues; 1 person to manage equipment  -DJ     Assistive Device (Sit-Stand Transfers) walker, front-wheeled  1x with r wx, 1x without AD  -DJ     Row Name 02/05/22 1204          Gait/Stairs (Locomotion)    [place] Level (Gait) minimum assist (75% patient effort); 1 person assist; 1 person to manage equipment; verbal cues  -DJ     Assistive Device  (Gait) walker, front-wheeled  -DJ     Distance in Feet (Gait) 3-4 side steps toward HOB  -DJ     Deviations/Abnormal Patterns (Gait) festinating/shuffling; stride length decreased  -DJ     Bilateral Gait Deviations forward flexed posture  -DJ     [place] Level (Stairs) unable to assess  -DJ     Comment (Gait/Stairs) Pt took 4-5 small side steps toward HOB; unable to progress amb due to NG tube and tethered to wall. NG tube leaking upon standing - nsg staff notified  -DJ           User Key  (r) = Recorded By, (t) = Taken By, (c) = Cosigned By    Initials Name Provider Type    Pamela Turpin, SAMRI Physical Therapist               Obj/Interventions     Row Name 02/05/22 1207          Motor Skills    Motor Skills functional endurance  -DJ     Functional Endurance poor endurance  -DJ     Therapeutic Exercise other (see comments)  unable to instruct - IV tubing neede to be replaced by nsg  -DJ     Row Name 02/05/22 1207          Balance    Balance Assessment sitting static balance; standing static balance; standing dynamic balance  -DJ     Static Sitting Balance WFL; unsupported; sitting, edge of bed  -DJ     Static Standing Balance WFL; supported; standing  -DJ     Dynamic Standing Balance mild impairment; supported; standing  -DJ     Balance Interventions sitting; standing; sit to stand; supported; weight shifting activity  -DJ     Comment, Balance better with r wx  -DJ     Row Name 02/05/22 1207          Sensory Assessment (Somatosensory)    Sensory Assessment (Somatosensory) not tested  -DJ           User Key  (r) = Recorded By, (t) = Taken By, (c) = Cosigned By    Initials Name Provider Type    Pamela Turpin, PT Physical Therapist               Goals/Plan     Row Name 02/05/22 1218          Bed Mobility Goal 1 (PT)    Activity/Assistive Device (Bed Mobility Goal 1, PT) sit to supine; supine to sit  -DJ     [place] Level/Cues Needed (Bed Mobility Goal 1, PT) standby assist; verbal cues required  -DJ      Time Frame (Bed Mobility Goal 1, PT) 10 days  -DJ     Row Name 02/05/22 1218          Transfer Goal 1 (PT)    Activity/Assistive Device (Transfer Goal 1, PT) sit-to-stand/stand-to-sit  -DJ     [place] Level/Cues Needed (Transfer Goal 1, PT) verbal cues required; standby assist  -DJ     Time Frame (Transfer Goal 1, PT) 10 days  -DJ     Row Name 02/05/22 1218          Gait Training Goal 1 (PT)    Activity/Assistive Device (Gait Training Goal 1, PT) gait (walking locomotion); assistive device use; improve balance and speed; increase endurance/gait distance; increase energy conservation; walker, rolling  -DJ     [place] Level (Gait Training Goal 1, PT) contact guard assist  -DJ     Distance (Gait Training Goal 1, PT) >300'  -DJ     Time Frame (Gait Training Goal 1, PT) 10 days  -DJ           User Key  (r) = Recorded By, (t) = Taken By, (c) = Cosigned By    Initials Name Provider Type    Pamela Turpin, PT Physical Therapist               Clinical Impression     Row Name 02/05/22 1208          Pain    Additional Documentation Pain Scale: Numbers Pre/Post-Treatment (Group)  -DJ     Row Name 02/05/22 1208          Pain Scale: Numbers Pre/Post-Treatment    Pre/Posttreatment Pain Comment Pt with mult c/o NG tube and continuously asking for it to be removed, very distracted with NG tube  -DJ     Pain Intervention(s) Repositioned; Rest  -DJ     Row Name 02/05/22 1208          Plan of Care Review    Plan of Care Reviewed With patient  -DJ     Outcome Summary 68yo white male admitted 2/3/22 with abd pain and N/V, found to have SBO. PMH includes schizophrenia, afib, acid reflux, hgih cholesterol, hypothyroid, syncope, orthostatic hypotension. PLOF: Pt lived at [facility] with roommate?, states he did not use AD for mobility. Pt is poor historian. He is primarilry limited at this tie by generalized weakness and decreased activity tolerance that limits his functional activities. Today, he req CGA/min A for bed mobility, min A  of 2 for sit/stand from EOB x 2 trails. Upon, standing, pt's NG tube noted to be leaking- nsg notified. Pt able to stand EOB x 60-90 sec with fair balance and r wx. Pt took 4-5 side steps toward HOB. Pt very distracted by NG tube and constantly asks for it to be removed. He would benefit from skilled PT services for ther ex, gt/transfer training, bed mobility, balance, HEP instruction, and endurance as tolerated.  -DJ     Row Name 02/05/22 1208          Therapy Assessment/Plan (PT)    Patient/Family Therapy Goals Statement (PT) return to facility  -DJ     Rehab Potential (PT) good, to achieve stated therapy goals  -DJ     Criteria for Skilled Interventions Met (PT) yes; meets criteria; skilled treatment is necessary  -DJ     Row Name 02/05/22 1208          Vital Signs    Pre Patient Position Supine  -DJ     Intra Patient Position Standing  -DJ     Post Patient Position Supine  -DJ     Row Name 02/05/22 1208          Positioning and Restraints    Pre-Treatment Position in bed  -DJ     Post Treatment Position bed  -DJ     In Bed notified nsg; supine; call light within reach; encouraged to call for assist; with nsg  -DJ           User Key  (r) = Recorded By, (t) = Taken By, (c) = Cosigned By    Initials Name Provider Type    Pamela Turpin, PT Physical Therapist               Outcome Measures     Row Name 02/05/22 1215 02/05/22 0830       How much help from another person do you currently need...    Turning from your back to your side while in flat bed without using bedrails? 4  -DJ 4  -RW    Moving from lying on back to sitting on the side of a flat bed without bedrails? 3  -DJ 4  -RW    Moving to and from a bed to a chair (including a wheelchair)? 3  -DJ 4  -RW    Standing up from a chair using your arms (e.g., wheelchair, bedside chair)? 3  -DJ 4  -RW    Climbing 3-5 steps with a railing? 3  -DJ 4  -RW    To walk in hospital room? 3  -DJ 4  -RW    AM-PAC 6 Clicks Score (PT) 19  -DJ 24  -RW    Row Name 02/05/22 1217           Functional Assessment    Outcome Measure Options AM-PAC 6 Clicks Basic Mobility (PT)  -DJ           User Key  (r) = Recorded By, (t) = Taken By, (c) = Cosigned By    Initials Name Provider Type    Dionicio Florence, RN Registered Nurse    Pamela Turpin, PT Physical Therapist                             Physical Therapy Education                 Title: PT OT SLP Therapies (In Progress)     Topic: Physical Therapy (In Progress)     Point: Mobility training (In Progress)     Learning Progress Summary           Patient Acceptance, E, NL by MARYCRUZ at 2/5/2022 1216                   Point: Home exercise program (Not Started)     Learner Progress:  Not documented in this visit.          Point: Body mechanics (In Progress)     Learning Progress Summary           Patient Acceptance, E, NL by MARYCRUZ at 2/5/2022 1216                   Point: Precautions (In Progress)     Learning Progress Summary           Patient Acceptance, E, NL by MARYCRUZ at 2/5/2022 1216                               User Key     Initials Effective Dates Name Provider Type Discipline    MARYCRUZ 10/25/19 -  Pamela Otero PT Physical Therapist PT              PT Recommendation and Plan  Planned Therapy Interventions (PT): balance training, bed mobility training, gait training, home exercise program, strengthening, postural re-education, patient/family education, transfer training  Plan of Care Reviewed With: patient  Outcome Summary: 66yo white male admitted 2/3/22 with abd pain and N/V, found to have SBO. PMH includes schizophrenia, afib, acid reflux, hgih cholesterol, hypothyroid, syncope, orthostatic hypotension. PLOF: Pt lived at [facility] with roommate?, states he did not use AD for mobility. Pt is poor historian. He is primarilry limited at this tie by generalized weakness and decreased activity tolerance that limits his functional activities. Today, he req CGA/min A for bed mobility, min A of 2 for sit/stand from EOB x 2 trails. Upon, standing, pt's NG tube noted  to be leaking- nsg notified. Pt able to stand EOB x 60-90 sec with fair balance and r wx. Pt took 4-5 side steps toward HOB. Pt very distracted by NG tube and constantly asks for it to be removed. He would benefit from skilled PT services for ther ex, gt/transfer training, bed mobility, balance, HEP instruction, and endurance as tolerated.     Time Calculation:    PT Charges     Row Name 02/05/22 1219 02/05/22 1217          Time Calculation    Start Time -- 1056  -DJ     Stop Time -- 1117  -DJ     Time Calculation (min) -- 21 min  -DJ     PT Non-Billable Time (min) -- 10 min  -DJ     PT Received On -- 02/05/22  -MARYCRUZ     PT - Next Appointment -- 02/07/22  -MARYCRUZ     PT Goal Re-Cert Due Date 02/15/22  -MARYCRUZ --           User Key  (r) = Recorded By, (t) = Taken By, (c) = Cosigned By    Initials Name Provider Type    Pamela Turpin, SAMIR Physical Therapist              Therapy Charges for Today     Code Description Service Date Service Provider Modifiers Qty    45170460044 HC PT EVAL MOD COMPLEXITY 2 2/5/2022 Pamela Otero, PT GP 1    04443363471 HC PT THER PROC EA 15 MIN 2/5/2022 Pamela Otero, PT GP 1    34920401006 HC PT THER SUPP EA 15 MIN 2/5/2022 Pamela Otero, PT GP 1          PT G-Codes  Outcome Measure Options: AM-PAC 6 Clicks Basic Mobility (PT)  AM-PAC 6 Clicks Score (PT): 19  AM-PAC 6 Clicks Score (OT): 24    Pamela Otero PT  2/5/2022

## 2022-02-05 NOTE — PROGRESS NOTES
"General Surgery Progress Note    Summary: Mr. Carlos Kraus is a 67 y.o. year old gentleman postoperative day 1 for exploratory laparotomy and lysis of adhesions for high-grade small bowel obstruction. Overall doing okay. Continue NG tube until output decreases and has bowel function. Out of bed and ambulate as tolerated. Will DC [place] today. On Lovenox for DVT prophylaxis.    Interval Events: No acute events overnight. No flatus or bowel movement. Is not been out of bed yet. States he has no complaints.    Vitals: /79 (BP Location: Left arm, Patient Position: Lying)   Pulse 102   Temp 98.9 °F (37.2 °C) (Oral)   Resp 18   Ht 188 cm (74\")   Wt 111 kg (245 lb)   SpO2 93%   BMI 31.46 kg/m²     GENERAL: alert, well appearing, and in no distress  HEENT: normocephalic, atraumatic, moist mucous membranes, clear sclerae, NG with thick brown output  CHEST: no increased work of breathing, symmetric air entry  CARDIAC: regular rate and rhythm    ABDOMEN: Soft, nondistended, appropriately tender to palpation, dressing clean and dry  EXTREMITIES: no cyanosis, clubbing, or edema   SKIN: Warm and moist, no rashes    Labs:  Results from last 7 days   Lab Units 02/05/22  0807 02/04/22  0500 02/03/22  1145   WBC 10*3/mm3 10.05 19.14* 24.96*   HEMOGLOBIN g/dL 14.9 16.1 17.0   HEMATOCRIT % 43.8 47.1 49.7   PLATELETS 10*3/mm3 173 181 225     Results from last 7 days   Lab Units 02/05/22  0807 02/04/22  0500 02/03/22  1145   SODIUM mmol/L 134* 129* 131*   POTASSIUM mmol/L 4.2 4.4 3.6   CHLORIDE mmol/L 100 97* 95*   CO2 mmol/L 24.0 19.1* 14.7*   BUN mg/dL 23 21 14   CREATININE mg/dL 0.81 0.77 1.21   CALCIUM mg/dL 7.2* 7.9* 9.1   BILIRUBIN mg/dL 0.9  --  2.2*   ALK PHOS U/L 48  --  58   ALT (SGPT) U/L 99*  --  324*   AST (SGOT) U/L 52*  --  164*   GLUCOSE mg/dL 141* 120* 185*           PERLA HERNDON MD  General and Endoscopic Surgery  [facility]    [address], [address]  [place], [place], [address]  P: " [phone]  F: [phone]

## 2022-02-05 NOTE — PLAN OF CARE
Goal Outcome Evaluation:               Pt came up from post op. Pt disoriented to time. Pt medicated with PRN pain medication with relief. Pt NG tube to low wall suction. Pt [place] care complete. Pt cardizen drip infusing. Pt IV antibiotics infusing per orders. Pt IV fluids infusing per orders. Pt dressing intact at this time. Pt on 3 L nasal cannula throughout shift. Pt vital signs are within normal limits at this time.

## 2022-02-05 NOTE — PLAN OF CARE
Goal Outcome Evaluation:  Plan of Care Reviewed With: patient           Outcome Summary: 66yo white male admitted 2/3/22 with abd pain and N/V, found to have SBO. PMH includes schizophrenia, afib, acid reflux, hgih cholesterol, hypothyroid, syncope, orthostatic hypotension. PLOF: Pt lived at [facility] with roommate?, states he did not use AD for mobility. Pt is poor historian. He is primarilry limited at this [facility] by generalized weakness and decreased activity tolerance that limits his functional activities. Today, he req CGA/min A for bed mobility, min A of 2 for sit/stand from EOB x 2 trails. Upon, standing, pt's NG tube noted to be leaking- nsg notified. Pt able to stand EOB x 60-90 sec with fair balance and r wx. Pt took 4-5 side steps toward HOB. Pt very distracted by NG tube and constantly asks for it to be removed. He would benefit from skilled PT services for ther ex, gt/transfer training, bed mobility, balance, HEP instruction, and endurance as tolerated.  Patient was intermittently wearing a face mask during this therapy encounter. Therapist used appropriate personal protective equipment including eye protection, mask, and gloves.  Mask used was standard procedure mask. Appropriate PPE was worn during the entire therapy session. Hand hygiene was completed before and after therapy session. Patient is not in enhanced droplet precautions. PT Amber Pineda

## 2022-02-06 ENCOUNTER — APPOINTMENT (OUTPATIENT)
Dept: GENERAL RADIOLOGY | Facility: HOSPITAL | Age: 67
End: 2022-02-06

## 2022-02-06 LAB
ALBUMIN SERPL-MCNC: 3 G/DL (ref 3.5–5.2)
ALBUMIN/GLOB SERPL: 1 G/DL
ALP SERPL-CCNC: 47 U/L (ref 39–117)
ALT SERPL W P-5'-P-CCNC: 73 U/L (ref 1–41)
ANION GAP SERPL CALCULATED.3IONS-SCNC: 10.5 MMOL/L (ref 5–15)
AST SERPL-CCNC: 43 U/L (ref 1–40)
BASOPHILS # BLD AUTO: 0.02 10*3/MM3 (ref 0–0.2)
BASOPHILS NFR BLD AUTO: 0.2 % (ref 0–1.5)
BILIRUB SERPL-MCNC: 0.5 MG/DL (ref 0–1.2)
BUN SERPL-MCNC: 18 MG/DL (ref 8–23)
BUN/CREAT SERPL: 27.3 (ref 7–25)
CALCIUM SPEC-SCNC: 7.4 MG/DL (ref 8.6–10.5)
CHLORIDE SERPL-SCNC: 102 MMOL/L (ref 98–107)
CO2 SERPL-SCNC: 26.5 MMOL/L (ref 22–29)
CREAT SERPL-MCNC: 0.66 MG/DL (ref 0.76–1.27)
DEPRECATED RDW RBC AUTO: 44.8 FL (ref 37–54)
EOSINOPHIL # BLD AUTO: 0 10*3/MM3 (ref 0–0.4)
EOSINOPHIL NFR BLD AUTO: 0 % (ref 0.3–6.2)
ERYTHROCYTE [DISTWIDTH] IN BLOOD BY AUTOMATED COUNT: 12.7 % (ref 12.3–15.4)
GFR SERPL CREATININE-BSD FRML MDRD: 121 ML/MIN/1.73
GLOBULIN UR ELPH-MCNC: 3.1 GM/DL
GLUCOSE SERPL-MCNC: 120 MG/DL (ref 65–99)
HBA1C MFR BLD: 4.8 % (ref 4.8–5.6)
HCT VFR BLD AUTO: 40.4 % (ref 37.5–51)
HGB BLD-MCNC: 13.4 G/DL (ref 13–17.7)
IMM GRANULOCYTES # BLD AUTO: 0.05 10*3/MM3 (ref 0–0.05)
IMM GRANULOCYTES NFR BLD AUTO: 0.6 % (ref 0–0.5)
LYMPHOCYTES # BLD AUTO: 0.58 10*3/MM3 (ref 0.7–3.1)
LYMPHOCYTES NFR BLD AUTO: 6.6 % (ref 19.6–45.3)
MAGNESIUM SERPL-MCNC: 2.7 MG/DL (ref 1.6–2.4)
MCH RBC QN AUTO: 31.6 PG (ref 26.6–33)
MCHC RBC AUTO-ENTMCNC: 33.2 G/DL (ref 31.5–35.7)
MCV RBC AUTO: 95.3 FL (ref 79–97)
MONOCYTES # BLD AUTO: 0.83 10*3/MM3 (ref 0.1–0.9)
MONOCYTES NFR BLD AUTO: 9.5 % (ref 5–12)
NEUTROPHILS NFR BLD AUTO: 7.29 10*3/MM3 (ref 1.7–7)
NEUTROPHILS NFR BLD AUTO: 83.1 % (ref 42.7–76)
NRBC BLD AUTO-RTO: 0 /100 WBC (ref 0–0.2)
PHOSPHATE SERPL-MCNC: 1.5 MG/DL (ref 2.5–4.5)
PLATELET # BLD AUTO: 176 10*3/MM3 (ref 140–450)
PMV BLD AUTO: 10.9 FL (ref 6–12)
POTASSIUM SERPL-SCNC: 3.8 MMOL/L (ref 3.5–5.2)
PROT SERPL-MCNC: 6.1 G/DL (ref 6–8.5)
RBC # BLD AUTO: 4.24 10*6/MM3 (ref 4.14–5.8)
SODIUM SERPL-SCNC: 139 MMOL/L (ref 136–145)
WBC NRBC COR # BLD: 8.77 10*3/MM3 (ref 3.4–10.8)

## 2022-02-06 PROCEDURE — 85025 COMPLETE CBC W/AUTO DIFF WBC: CPT | Performed by: STUDENT IN AN ORGANIZED HEALTH CARE EDUCATION/TRAINING PROGRAM

## 2022-02-06 PROCEDURE — 99232 SBSQ HOSP IP/OBS MODERATE 35: CPT | Performed by: NURSE PRACTITIONER

## 2022-02-06 PROCEDURE — 80053 COMPREHEN METABOLIC PANEL: CPT | Performed by: STUDENT IN AN ORGANIZED HEALTH CARE EDUCATION/TRAINING PROGRAM

## 2022-02-06 PROCEDURE — 94799 UNLISTED PULMONARY SVC/PX: CPT

## 2022-02-06 PROCEDURE — 84100 ASSAY OF PHOSPHORUS: CPT | Performed by: STUDENT IN AN ORGANIZED HEALTH CARE EDUCATION/TRAINING PROGRAM

## 2022-02-06 PROCEDURE — 94761 N-INVAS EAR/PLS OXIMETRY MLT: CPT

## 2022-02-06 PROCEDURE — 83036 HEMOGLOBIN GLYCOSYLATED A1C: CPT | Performed by: STUDENT IN AN ORGANIZED HEALTH CARE EDUCATION/TRAINING PROGRAM

## 2022-02-06 PROCEDURE — 83735 ASSAY OF MAGNESIUM: CPT | Performed by: STUDENT IN AN ORGANIZED HEALTH CARE EDUCATION/TRAINING PROGRAM

## 2022-02-06 PROCEDURE — 25010000002 HYDROMORPHONE 1 MG/ML SOLUTION: Performed by: STUDENT IN AN ORGANIZED HEALTH CARE EDUCATION/TRAINING PROGRAM

## 2022-02-06 PROCEDURE — 94664 DEMO&/EVAL PT USE INHALER: CPT

## 2022-02-06 PROCEDURE — 25010000002 ENOXAPARIN PER 10 MG: Performed by: STUDENT IN AN ORGANIZED HEALTH CARE EDUCATION/TRAINING PROGRAM

## 2022-02-06 PROCEDURE — 71045 X-RAY EXAM CHEST 1 VIEW: CPT

## 2022-02-06 PROCEDURE — 94640 AIRWAY INHALATION TREATMENT: CPT

## 2022-02-06 PROCEDURE — 25010000002 ONDANSETRON PER 1 MG: Performed by: STUDENT IN AN ORGANIZED HEALTH CARE EDUCATION/TRAINING PROGRAM

## 2022-02-06 RX ORDER — IPRATROPIUM BROMIDE AND ALBUTEROL SULFATE 2.5; .5 MG/3ML; MG/3ML
3 SOLUTION RESPIRATORY (INHALATION) EVERY 6 HOURS
Status: DISCONTINUED | OUTPATIENT
Start: 2022-02-06 | End: 2022-02-07

## 2022-02-06 RX ORDER — DILTIAZEM HYDROCHLORIDE 180 MG/1
180 CAPSULE, COATED, EXTENDED RELEASE ORAL
Status: DISCONTINUED | OUTPATIENT
Start: 2022-02-06 | End: 2022-02-07

## 2022-02-06 RX ORDER — HYDROCODONE BITARTRATE AND ACETAMINOPHEN 5; 325 MG/1; MG/1
1 TABLET ORAL EVERY 6 HOURS PRN
Status: DISPENSED | OUTPATIENT
Start: 2022-02-06 | End: 2022-02-13

## 2022-02-06 RX ORDER — IPRATROPIUM BROMIDE AND ALBUTEROL SULFATE 2.5; .5 MG/3ML; MG/3ML
3 SOLUTION RESPIRATORY (INHALATION) 2 TIMES DAILY
Status: DISCONTINUED | OUTPATIENT
Start: 2022-02-06 | End: 2022-02-06

## 2022-02-06 RX ADMIN — SODIUM PHOSPHATE, MONOBASIC, MONOHYDRATE 30 MMOL: 276; 142 INJECTION, SOLUTION INTRAVENOUS at 11:49

## 2022-02-06 RX ADMIN — SODIUM CHLORIDE, PRESERVATIVE FREE 10 ML: 5 INJECTION INTRAVENOUS at 19:47

## 2022-02-06 RX ADMIN — HYDROCODONE BITARTRATE AND ACETAMINOPHEN 1 TABLET: 5; 325 TABLET ORAL at 23:27

## 2022-02-06 RX ADMIN — IPRATROPIUM BROMIDE AND ALBUTEROL SULFATE 3 ML: 2.5; .5 SOLUTION RESPIRATORY (INHALATION) at 07:56

## 2022-02-06 RX ADMIN — IPRATROPIUM BROMIDE AND ALBUTEROL SULFATE 3 ML: 2.5; .5 SOLUTION RESPIRATORY (INHALATION) at 15:33

## 2022-02-06 RX ADMIN — DILTIAZEM HYDROCHLORIDE 180 MG: 180 CAPSULE, COATED, EXTENDED RELEASE ORAL at 15:32

## 2022-02-06 RX ADMIN — METRONIDAZOLE 500 MG: 500 INJECTION, SOLUTION INTRAVENOUS at 07:02

## 2022-02-06 RX ADMIN — METRONIDAZOLE 500 MG: 500 INJECTION, SOLUTION INTRAVENOUS at 15:32

## 2022-02-06 RX ADMIN — HYDROMORPHONE HYDROCHLORIDE 0.5 MG: 1 INJECTION, SOLUTION INTRAMUSCULAR; INTRAVENOUS; SUBCUTANEOUS at 15:32

## 2022-02-06 RX ADMIN — DIGOXIN 250 MCG: 250 TABLET ORAL at 11:50

## 2022-02-06 RX ADMIN — Medication 3 MG: at 22:17

## 2022-02-06 RX ADMIN — ONDANSETRON 4 MG: 2 INJECTION INTRAMUSCULAR; INTRAVENOUS at 19:46

## 2022-02-06 RX ADMIN — HYDROMORPHONE HYDROCHLORIDE 0.5 MG: 1 INJECTION, SOLUTION INTRAMUSCULAR; INTRAVENOUS; SUBCUTANEOUS at 19:46

## 2022-02-06 RX ADMIN — HYDROMORPHONE HYDROCHLORIDE 0.5 MG: 1 INJECTION, SOLUTION INTRAMUSCULAR; INTRAVENOUS; SUBCUTANEOUS at 22:17

## 2022-02-06 RX ADMIN — IPRATROPIUM BROMIDE AND ALBUTEROL SULFATE 3 ML: 2.5; .5 SOLUTION RESPIRATORY (INHALATION) at 20:03

## 2022-02-06 RX ADMIN — HYDROCODONE BITARTRATE AND ACETAMINOPHEN 1 TABLET: 5; 325 TABLET ORAL at 17:33

## 2022-02-06 RX ADMIN — HYDROMORPHONE HYDROCHLORIDE 0.5 MG: 1 INJECTION, SOLUTION INTRAMUSCULAR; INTRAVENOUS; SUBCUTANEOUS at 09:27

## 2022-02-06 RX ADMIN — HYDROMORPHONE HYDROCHLORIDE 0.5 MG: 1 INJECTION, SOLUTION INTRAMUSCULAR; INTRAVENOUS; SUBCUTANEOUS at 02:10

## 2022-02-06 RX ADMIN — SODIUM PHOSPHATE, MONOBASIC, MONOHYDRATE 10 MMOL: 276; 142 INJECTION, SOLUTION INTRAVENOUS at 23:27

## 2022-02-06 RX ADMIN — ACETAMINOPHEN 650 MG: 325 TABLET, FILM COATED ORAL at 09:23

## 2022-02-06 RX ADMIN — ASPIRIN 81 MG: 81 TABLET, COATED ORAL at 09:23

## 2022-02-06 RX ADMIN — HYDROMORPHONE HYDROCHLORIDE 0.5 MG: 1 INJECTION, SOLUTION INTRAMUSCULAR; INTRAVENOUS; SUBCUTANEOUS at 04:41

## 2022-02-06 RX ADMIN — ENOXAPARIN SODIUM 40 MG: 100 INJECTION SUBCUTANEOUS at 19:45

## 2022-02-06 RX ADMIN — RISPERIDONE 5 MG: 2 TABLET, FILM COATED ORAL at 19:46

## 2022-02-06 RX ADMIN — HYDROMORPHONE HYDROCHLORIDE 0.5 MG: 1 INJECTION, SOLUTION INTRAMUSCULAR; INTRAVENOUS; SUBCUTANEOUS at 11:50

## 2022-02-06 NOTE — PLAN OF CARE
Goal Outcome Evaluation:         Pt medicated per orders. Pt IV fluids infusing per orders. Cardizem infusing per orders. Pt had complaints of pain, pt calling out within an hour after pain medication administration stating he is still in pain at a score of 10 on a 1 out of 10 pain scale. SCDs in place. PT on 3L nasal cannula throughout shift. Pt up to bedside commode throughout shift. Pt had good urine output. Pt states he is passing gas. RN went to measure and chart output but aid had already dumped and charted an occurrence. HR elevated. All other vital signs within normal limits at this time.

## 2022-02-06 NOTE — NURSING NOTE
Pt removed NG tube. RN placed a call out to general surgery and received a call back from Jonathan Wells. Jonathan Wells asked who performed surgery and how much output NG tube was having. RN stated very little output that is green in color now. Jonathan Wells stated to leave NG tube out for now.

## 2022-02-06 NOTE — PROGRESS NOTES
Name: Carlos Kraus ADMIT: 2/3/2022   : 1955  PCP: Provider, No Known    MRN: [id number] LOS: 3 days   AGE/SEX: 67 y.o. male  ROOM: E662/1     Subjective   Subjective   Patient seen this morning.  No acute events overnight per nurse.  Discussed with nurse, patient did pull out his NG tube yesterday.  Per nurse he had  little output from NG tube which was green color.  He  slept better overnight compared to the other night.  This morning laying in bed.  Not in distress.  Passing gases but no bowel movement.   Review of Systems   As above  Objective   Objective   Vital Signs  Temp:  [98.4 °F (36.9 °C)-99 °F (37.2 °C)] 99 °F (37.2 °C)  Heart Rate:  [] 113  Resp:  [18] 18  BP: (135-141)/(93-96) 141/96  SpO2:  [90 %-93 %] 90 %  on  Flow (L/min):  [3] 3;   Device (Oxygen Therapy): nasal cannula  Body mass index is 29.81 kg/m².  Physical Exam    General: Alert , not in distress, ill-appearing  HEENT: Normocephalic, atraumatic  CV: Tachycardic, irregular rhythm. no rubs or gallops.  Lungs: Bilateral expiratory wheezing on exam.  Abdomen: Distended, dressing from surgical and incision yesterday clean and intact.  Tenderness to palpation.  Bowel sounds present.  Extremities: No significant peripheral edema , no cyanosis     Results Review     I reviewed the patient's new clinical results.  Results from last 7 days   Lab Units 22  0807 22  0500 22  1145   WBC 10*3/mm3 10.05 19.14* 24.96*   HEMOGLOBIN g/dL 14.9 16.1 17.0   PLATELETS 10*3/mm3 173 181 225     Results from last 7 days   Lab Units 22  0807 22  0500 22  1145   SODIUM mmol/L 134* 129* 131*   POTASSIUM mmol/L 4.2 4.4 3.6   CHLORIDE mmol/L 100 97* 95*   CO2 mmol/L 24.0 19.1* 14.7*   BUN mg/dL 23 21 14   CREATININE mg/dL 0.81 0.77 1.21   GLUCOSE mg/dL 141* 120* 185*   Estimated Creatinine Clearance: 117.4 mL/min (by C-G formula based on SCr of 0.81 mg/dL).  Results from last 7 days   Lab Units 22  0840  02/03/22  1145   ALBUMIN g/dL 3.10* 4.70   BILIRUBIN mg/dL 0.9 2.2*   ALK PHOS U/L 48 58   AST (SGOT) U/L 52* 164*   ALT (SGPT) U/L 99* 324*     Results from last 7 days   Lab Units 02/05/22  0807 02/04/22  0500 02/03/22  1145   CALCIUM mg/dL 7.2* 7.9* 9.1   ALBUMIN g/dL 3.10*  --  4.70   MAGNESIUM mg/dL 2.2 1.5*  --    PHOSPHORUS mg/dL 1.1* 1.1*  --        COVID19   Date Value Ref Range Status   02/03/2022 Not Detected Not Detected - Ref. Range Final     No results found for: HGBA1C, POCGLU    XR Chest 1 View  PORTABLE CHEST 02/04/2022 AT 3:36 PM     CLINICAL HISTORY: Preop chest x-ray     Compared to the previous chest dated 02/03/2022.     The lungs are somewhat poorly inflated. There is mild atelectasis at the  right lung base. No definite acute focal infiltrates are identified.  There are no pleural effusions. The heart is moderately enlarged and  unchanged. The pulmonary vasculature is limited.     IMPRESSIONS: Poor lung expansion. No evidence of acute disease within  the chest.     This report was finalized on 2/4/2022 3:52 PM by Dr. Babatunde Self M.D.     Arterial Line  Karissa Simental MD     2/4/2022  3:18 PM  Arterial Line    Patient reassessed immediately prior to procedure    Patient location during procedure: pre-op  Start time: 2/4/2022 3:02 PM  Stop Time:2/4/2022 3:05 PM       Line placed for respiratory failure, hemodynamic monitoring, MD/Surgeon   request and ABGs/Labs/ISTAT.  Performed By   Anesthesiologist: Karissa Simental MD  Preanesthetic Checklist  Completed: patient identified, IV checked, site marked, risks and benefits   discussed, surgical consent, monitors and equipment checked, pre-op   evaluation and timeout performed  Arterial Line Prep   Sterile Tech: cap, gloves, sterile barriers and mask  Prep: ChloraPrep  Patient monitoring: EKG, continuous pulse oximetry and blood pressure   monitoring  Arterial Line Procedure   Laterality:left  Location:  radial artery  Catheter size:  20 G   Guidance: ultrasound guided  PROCEDURE NOTE/ULTRASOUND INTERPRETATION.  Using ultrasound guidance the   potential vascular sites for insertion of the catheter were visualized to   determine the patency of the vessel to be used for vascular access.  After   selecting the appropriate site for insertion, the needle was visualized   under ultrasound being inserted into the radial artery, followed by   ultrasound confirmation of wire and catheter placement. There were no   abnormalities seen on ultrasound; an image was taken; and the patient   tolerated the procedure with no complications.   Number of attempts: 1  Successful placement: yes  Post Assessment   Dressing Type: wrist guard applied, secured with tape and occlusive   dressing applied.   Complications no  Circ/Move/Sens Assessment: normal and unchanged.   Patient Tolerance: patient tolerated the procedure well with no apparent   complications  Additional Notes  Using ultrasound guidance the potential vascular sites for insertion of   the catheter were visualized to determine the patency of the vessel to be   used for vascular access.  After selecting the appropriate site for   insertion, the needle was visualized under ultrasound being inserted into   the artery, followed by ultrasound confirmation of wire and catheter   placement.  There were no abnormalities seen on ultrasound; an image was   taken/ and the patient tolerated the procedure with no complications.     XR Abdomen KUB  Narrative: KUB     HISTORY: Abdominal distention follow-up. Small bowel obstruction.     TECHNIQUE: Two x-rays of the abdomen and pelvis are provided. These are  correlated with other recent abdominal imaging.     FINDINGS: There is a nasogastric tube with its proximal port near the  level of the gastroesophageal junction. Mild atelectasis is observed in  the lung bases.     Gaseous dilatation of small bowel loops to almost 6 cm is observed. No  intraperitoneal free air is  identified.     Impression: Persistent small bowel obstruction.     This report was finalized on 2/4/2022 10:20 AM by Dr. Christopher Muñoz M.D.     CT Abdomen Pelvis With Contrast  Narrative: CT ABDOMEN AND PELVIS WITH CONTRAST     HISTORY: Abdominal pain with elevated lipase.     TECHNIQUE: Axial CT images of the abdomen and pelvis were obtained  following administration of intravenous contrast. The patient was not  given oral contrast Coronal and sagittal reformats were obtained.     COMPARISON: None.     FINDINGS: There is marked distention of the stomach with an air-fluid  level. There is distention of small bowel loop air-fluid levels  measuring up to 5.5 cm. These demonstrate a somewhat abrupt transition  within the distal ileal loops just proximal to the terminal ileum within  the right lower quadrant. This is best demonstrated on image 131/132  axial. The terminal ileum is completely collapsed. The colon is  completely collapsed. The dilated duodenum demonstrates peripheral foci  of air favored to represent trapped air and pseudopneumatosis.     The liver demonstrates normal attenuation. There is mild intrahepatic  and extrahepatic biliary dilatation status post cholecystectomy. Diffuse  peripancreatic stranding and fluid are present and most suggestive of  pancreatitis. The splenoportal confluence is patent. The spleen is  normal. Bilateral adrenal glands and kidneys are normal. Thickening of  Gerota's fascia and fluid are seen within bilateral paracolic gutters.  The prostate gland is mildly enlarged. Bilateral adrenal glands and  kidneys are normal. No pathological retroperitoneal lymphadenopathy.  Moderate calcified atherosclerotic plaque is seen within the abdominal  aorta and its branches.     There are bilateral small layering pleural effusions with subsegmental  lower lobe atelectasis. Small pericardial effusion is present     Impression: 1. Complete distal small bowel obstruction. This is favored to  be  secondary to adhesive disease. There is massive distention of the  stomach and duodenum and insertion of NG tube is recommended.  2. CT findings suggesting pancreatitis.     These findings were discussed with Dr. Marr by telephone at the time  of dictation.     Radiation dose reduction techniques were utilized, including automated  exposure control and exposure modulation based on body size.     This report was finalized on 2/4/2022 9:52 AM by Dr. Mica Ratliff M.D.     CT Soft Tissue Neck With Contrast  Narrative: CT NECK WITH CONTRAST     HISTORY: Stridor, status post nasogastric tube insertion attempt.     COMPARISON: None.     TECHNIQUE: A CT examination of the neck was performed after the  intravenous administration of contrast. The study was hampered  significantly by patient motion. The patient was scanned twice and there  was patient motion on both scans.     FINDINGS:  There is narrowing of the distal trachea on both scans. This  is likely related to breathing. Clinical correlation is recommended.  When able, further evaluation could be performed with a dedicated CT  examination of the chest.     The parotid, submandibular and thyroid glands appear unremarkable. There  is no evidence of pneumomediastinum or pathologic adenopathy.     Impression: 1.  This study is degraded by patient motion. There is no evidence of  pneumomediastinum or of obvious esophageal injury. A mucosal injury or  esophageal injury cannot be excluded.  2.  Narrowing of the distal trachea is appreciated which likely is  related to respiration. Clinical correlation is recommended. When able,  further evaluation could be performed with a dedicated CT examination of  the chest with a full breath-hold as indicated.     The above information was called to and discussed with Dr. Marr.      Radiation dose reduction techniques were utilized, including automated  exposure control and exposure modulation based on body size.     This  report was finalized on 2/4/2022 8:21 AM by Dr. Clemente Mccann M.D.       Scheduled Medications  aspirin, 81 mg, Oral, Daily  digoxin, 250 mcg, Oral, Daily  enoxaparin, 40 mg, Subcutaneous, Nightly  metroNIDAZOLE, 500 mg, Intravenous, Q8H  risperiDONE, 5 mg, Oral, Nightly    Infusions  dilTIAZem, 5-15 mg/hr, Last Rate: 5 mg/hr (02/04/22 3296)  lactated ringers, 9 mL/hr, Last Rate: 9 mL/hr (02/04/22 1840)  sodium chloride 0.9 % with KCl 20 mEq, 100 mL/hr, Last Rate: 100 mL/hr (02/05/22 2335)    Diet  NPO Diet       Assessment/Plan     Active Hospital Problems    Diagnosis  POA   • **SBO (small bowel obstruction) (HCC) [K56.609]  Unknown   • GERD (gastroesophageal reflux disease) [K21.9]  Unknown   • Hypothyroidism [E03.9]  Unknown   • Hyperlipidemia [E78.5]  Unknown   • Hypophosphatemia [E83.39]  Unknown   • Generalized abdominal pain [R10.84]  Yes   • Hypertension [I10]  Yes   • Atrial fibrillation (HCC) [I48.91]  Yes   • Schizophrenia (HCC) [F20.9]  Yes      Resolved Hospital Problems   No resolved problems to display.     67-year-old male with a history of atrial fibrillation, schizophrenia, hypertension, hyperlipidemia, hypothyroidism, presented to the ED with abdominal pain, nausea, and vomiting.  Was found to have small bowel obstruction.      Small bowel obstruction  -NG tube was placed, having significant output.  -General surgery consulted  -Persistent small bowel obstruction on x-ray  -Supportive care, IV fluids, antiemetics.  -Started on metronidazole 500 mg every 8 hours 05/04  -status post exploratory laparotomy and lysis of adhesions for high-grade small bowel obstruction 02/04  -Continue supportive care, IV fluids.    Postop hypoxemia.  On 3 L postop, saturation low 90s.  Initial x-ray showed poor lung expansion. No evidence of acute disease within the chest.  Mild diffuse expiratory wheezing on exam .  Incentive spirometry.  Repeat chest x-ray.  DuoNeb's.    Atrial fibrillation with RVR: Cardiology  consulted, on Cardizem drip, heart rate still elevated but improved.  On digoxin 250 mcg daily.  Schizophrenia.  Continue home risperidone 5 mg nightly.   Hypertension: BP acutely acceptable: Continue to monitor.  Hypothyroidism?  Per chart review patient has history of hypothyroidism but not on replacement.  TSH normal.   Hypophosphatemia: Replete as needed  Elevated transaminases: AST and ALTelevated at 324 and 164 respectively.  Likely secondary to severe obstruction.  Hepatitis panel negative.  Daily CMP.  Which is improving.  g after surgery will order further work-up.  Hyponatremia: Likely secondary to nausea vomiting and low p.o. fluid intake.  Continue IV normal saline.  Improving.  Hyperglycemia: Ordered hemoglobin of A1c    DVT prophylaxis: Lovenox  CODE STATUS: Full code    Dana Zurita MD  [facility]  02/06/22  07:06 EST

## 2022-02-06 NOTE — PROGRESS NOTES
"General Surgery Progress Note    Summary: Mr. Carlos Kraus is a 67 y.o. year old gentleman postoperative day 2 for exploratory laparotomy and lysis of adhesions for high-grade small bowel obstruction. Overall doing okay.  Removed his NG tube overnight, passing flatus.  Start clear liquid diet, will add p.o. pain medicine.  Ok to dc antibiotics from gen surg standpoint. On Lovenox for DVT prophylaxis.    Interval Events: No acute events overnight.  He removed his NG tube but has had no further nausea or vomiting.  He is passing gas but no bowel movement.  Worked with PT yesterday.  Vitals: /74 (BP Location: Right arm, Patient Position: Lying)   Pulse 106   Temp 99 °F (37.2 °C) (Oral)   Resp 18   Ht 188 cm (74\")   Wt 105 kg (232 lb 3.2 oz)   SpO2 94%   BMI 29.81 kg/m²     GENERAL: alert, well appearing, and in no distress  HEENT: normocephalic, atraumatic, moist mucous membranes, clear sclerae  CHEST: no increased work of breathing, symmetric air entry  CARDIAC: regular rate and rhythm    ABDOMEN: Soft, mildly distended, appropriately tender to palpation, incision clean and dry with staples intact  EXTREMITIES: no cyanosis, clubbing, or edema   SKIN: Warm and moist, no rashes    Labs:  Results from last 7 days   Lab Units 02/06/22  0709 02/05/22  0807 02/04/22  0500   WBC 10*3/mm3 8.77 10.05 19.14*   HEMOGLOBIN g/dL 13.4 14.9 16.1   HEMATOCRIT % 40.4 43.8 47.1   PLATELETS 10*3/mm3 176 173 181     Results from last 7 days   Lab Units 02/06/22  0709 02/05/22  0807 02/04/22  0500 02/03/22  1145 02/03/22  1145   SODIUM mmol/L 139 134* 129*   < > 131*   POTASSIUM mmol/L 3.8 4.2 4.4   < > 3.6   CHLORIDE mmol/L 102 100 97*   < > 95*   CO2 mmol/L 26.5 24.0 19.1*   < > 14.7*   BUN mg/dL 18 23 21   < > 14   CREATININE mg/dL 0.66* 0.81 0.77   < > 1.21   CALCIUM mg/dL 7.4* 7.2* 7.9*   < > 9.1   BILIRUBIN mg/dL 0.5 0.9  --   --  2.2*   ALK PHOS U/L 47 48  --   --  58   ALT (SGPT) U/L 73* 99*  --   --  324*   AST " (SGOT) U/L 43* 52*  --   --  164*   GLUCOSE mg/dL 120* 141* 120*   < > 185*    < > = values in this interval not displayed.           PERLA HERNDON MD  General and Endoscopic Surgery  [facility]    [address], [address]  [place], [place], [address]  P: [phone]  F: [phone]

## 2022-02-06 NOTE — PLAN OF CARE
Goal Outcome Evaluation:  Plan of Care Reviewed With: patient           Outcome Summary: Pt advanced to clears by SX.  SX verbalized in notes that PO pain pill will be given but will follow-up.  Dilaudid given throughout shift. Cardio is starting Cardizem PO.  Pt urinating spontaneously. No vomiting today.  VSS.  Will cont to monitor.

## 2022-02-06 NOTE — PROGRESS NOTES
HOSPITAL FOLLOW UP NOTE    Patient Name: Carlos Kraus  Patient : 1955        Date of Service:22  Provider of Service: CARIDAD Omalley  Place of Service: [facility]  Referral Provider: Yesenia Bauer, *          Follow Up: Atrial fibrillation with RVR    Interval Hx: No chest pain, pressure or dizziness.  NG tube removed this morning.  Patient able to take p.o. medications.  Rate 130s to 140s while sitting on side of bed using urinal.    OBJECTIVE  Temp:  [98.4 °F (36.9 °C)-99 °F (37.2 °C)] 99 °F (37.2 °C)  Heart Rate:  [] 106  Resp:  [18] 18  BP: (119-141)/(74-96) 119/74     Intake/Output Summary (Last 24 hours) at 2022 0859  Last data filed at 2022 1800  Gross per 24 hour   Intake 0 ml   Output 300 ml   Net -300 ml     Body mass index is 29.81 kg/m².      22  1643 22  0408   Weight: 111 kg (245 lb) 105 kg (232 lb 3.2 oz)         Physical Exam:     General Appearance:    Alert, cooperative, in no acute distress, sitting on side of the bed using urinal           Neck:   no thyromegaly, no   carotid bruit, no JVD   Lungs:     Clear to auscultation,respirations regular, even and unlabored    Heart:   Irregular rhythm/rate, normal S1 and S2, no murmur, no gallop, no rub, no click       Abdomen:     Normal bowel sounds, soft, nontender, nondistended, no guarding, no rebound  Tenderness   Extremities:   Moves all extremities well, no edema, no cyanosis, no redness   Pulses:   Pulses palpable and equal bilaterally.          CURRENT MEDS    Scheduled Meds:aspirin, 81 mg, Oral, Daily  digoxin, 250 mcg, Oral, Daily  enoxaparin, 40 mg, Subcutaneous, Nightly  ipratropium-albuterol, 3 mL, Nebulization, Q6H  metroNIDAZOLE, 500 mg, Intravenous, Q8H  risperiDONE, 5 mg, Oral, Nightly  sodium phosphate IVPB, 30 mmol, Intravenous, Once      Continuous Infusions:dilTIAZem, 5-15 mg/hr, Last Rate: 5 mg/hr (22 5086)  lactated ringers, 9 mL/hr, Last Rate: 9  mL/hr (02/04/22 1840)  sodium chloride 0.9 % with KCl 20 mEq, 100 mL/hr, Last Rate: 100 mL/hr (02/05/22 2335)          Lab Review:   Results from last 7 days   Lab Units 02/06/22  0709 02/05/22  0807   SODIUM mmol/L 139 134*   POTASSIUM mmol/L 3.8 4.2   CHLORIDE mmol/L 102 100   CO2 mmol/L 26.5 24.0   BUN mg/dL 18 23   CREATININE mg/dL 0.66* 0.81   GLUCOSE mg/dL 120* 141*   CALCIUM mg/dL 7.4* 7.2*   AST (SGOT) U/L 43* 52*   ALT (SGPT) U/L 73* 99*         Results from last 7 days   Lab Units 02/06/22  0709 02/05/22  0807   WBC 10*3/mm3 8.77 10.05   HEMOGLOBIN g/dL 13.4 14.9   HEMATOCRIT % 40.4 43.8   PLATELETS 10*3/mm3 176 173         Results from last 7 days   Lab Units 02/06/22  0709 02/05/22  0807   MAGNESIUM mg/dL 2.7* 2.2         Results from last 7 days   Lab Units 02/03/22  1145   PROBNP pg/mL 785.0     Results from last 7 days   Lab Units 02/04/22  0500   TSH uIU/mL 1.360           ASSESSMENT & PLAN    SBO (small bowel obstruction) (HCC)    Schizophrenia (HCC)    Atrial fibrillation (HCC)    Hypertension    Generalized abdominal pain    GERD (gastroesophageal reflux disease)    Hypothyroidism    Hyperlipidemia    Hypophosphatemia    1.  High-grade small bowel obstruction status post exploratory lap with lysis of adhesions this morning  2.  Chronic atrial fibrillation: A fib RVR on admission, controlled after given Cardizem bolus and IV digoxin.. Now controlled on Cardizem drip at 5mg/hr, however increases to 130-140 with activity.  NGT removed this am and patient able to take po.  Will change to p.o diltiazem.   It appears he is on digoxin 250 mcg tablets daily at home which has been continued. Dig level subtherapeutic on admission.  Dig level in am. CHADSVasc score 2.  Appears patient not on anticoagulant at home.   3.  Hypertension:  Controlled.  As above, will add BB when taking po. Prn iv hydralazine order placed for systolic >160.  Appears patient is getting IV Dilaudid every 2-4 hours over the last 24  hours due to reported pain.  This could have factor on hypertension as well.  4.  Hypothyroidism: Normal TSH  5.  Schizophrenia    Judi Benito, APRN  02/06/22

## 2022-02-07 ENCOUNTER — APPOINTMENT (OUTPATIENT)
Dept: GENERAL RADIOLOGY | Facility: HOSPITAL | Age: 67
End: 2022-02-07

## 2022-02-07 LAB
ALBUMIN SERPL-MCNC: 3 G/DL (ref 3.5–5.2)
ALBUMIN/GLOB SERPL: 1.2 G/DL
ALP SERPL-CCNC: 48 U/L (ref 39–117)
ALT SERPL W P-5'-P-CCNC: 64 U/L (ref 1–41)
ANION GAP SERPL CALCULATED.3IONS-SCNC: 10.9 MMOL/L (ref 5–15)
AST SERPL-CCNC: 48 U/L (ref 1–40)
BASOPHILS # BLD AUTO: 0.01 10*3/MM3 (ref 0–0.2)
BASOPHILS NFR BLD AUTO: 0.1 % (ref 0–1.5)
BILIRUB SERPL-MCNC: 0.5 MG/DL (ref 0–1.2)
BUN SERPL-MCNC: 15 MG/DL (ref 8–23)
BUN/CREAT SERPL: 25.9 (ref 7–25)
CALCIUM SPEC-SCNC: 7.4 MG/DL (ref 8.6–10.5)
CHLORIDE SERPL-SCNC: 100 MMOL/L (ref 98–107)
CO2 SERPL-SCNC: 28.1 MMOL/L (ref 22–29)
CREAT SERPL-MCNC: 0.58 MG/DL (ref 0.76–1.27)
DEPRECATED RDW RBC AUTO: 40.4 FL (ref 37–54)
DIGOXIN SERPL-MCNC: <0.3 NG/ML (ref 0.6–1.2)
EOSINOPHIL # BLD AUTO: 0 10*3/MM3 (ref 0–0.4)
EOSINOPHIL NFR BLD AUTO: 0 % (ref 0.3–6.2)
ERYTHROCYTE [DISTWIDTH] IN BLOOD BY AUTOMATED COUNT: 12.1 % (ref 12.3–15.4)
GFR SERPL CREATININE-BSD FRML MDRD: 141 ML/MIN/1.73
GLOBULIN UR ELPH-MCNC: 2.6 GM/DL
GLUCOSE SERPL-MCNC: 112 MG/DL (ref 65–99)
HCT VFR BLD AUTO: 36.6 % (ref 37.5–51)
HGB BLD-MCNC: 12.7 G/DL (ref 13–17.7)
IMM GRANULOCYTES # BLD AUTO: 0.11 10*3/MM3 (ref 0–0.05)
IMM GRANULOCYTES NFR BLD AUTO: 1.2 % (ref 0–0.5)
LYMPHOCYTES # BLD AUTO: 0.63 10*3/MM3 (ref 0.7–3.1)
LYMPHOCYTES NFR BLD AUTO: 7 % (ref 19.6–45.3)
MAGNESIUM SERPL-MCNC: 2.7 MG/DL (ref 1.6–2.4)
MCH RBC QN AUTO: 31.7 PG (ref 26.6–33)
MCHC RBC AUTO-ENTMCNC: 34.7 G/DL (ref 31.5–35.7)
MCV RBC AUTO: 91.3 FL (ref 79–97)
MONOCYTES # BLD AUTO: 0.83 10*3/MM3 (ref 0.1–0.9)
MONOCYTES NFR BLD AUTO: 9.2 % (ref 5–12)
NEUTROPHILS NFR BLD AUTO: 7.42 10*3/MM3 (ref 1.7–7)
NEUTROPHILS NFR BLD AUTO: 82.5 % (ref 42.7–76)
NRBC BLD AUTO-RTO: 0.1 /100 WBC (ref 0–0.2)
PHOSPHATE SERPL-MCNC: 1.8 MG/DL (ref 2.5–4.5)
PHOSPHATE SERPL-MCNC: 2 MG/DL (ref 2.5–4.5)
PLATELET # BLD AUTO: 161 10*3/MM3 (ref 140–450)
PMV BLD AUTO: 10.5 FL (ref 6–12)
POTASSIUM SERPL-SCNC: 3.1 MMOL/L (ref 3.5–5.2)
POTASSIUM SERPL-SCNC: 3.5 MMOL/L (ref 3.5–5.2)
PROT SERPL-MCNC: 5.6 G/DL (ref 6–8.5)
RBC # BLD AUTO: 4.01 10*6/MM3 (ref 4.14–5.8)
SODIUM SERPL-SCNC: 139 MMOL/L (ref 136–145)
WBC NRBC COR # BLD: 9 10*3/MM3 (ref 3.4–10.8)

## 2022-02-07 PROCEDURE — 84132 ASSAY OF SERUM POTASSIUM: CPT | Performed by: HOSPITALIST

## 2022-02-07 PROCEDURE — 94761 N-INVAS EAR/PLS OXIMETRY MLT: CPT

## 2022-02-07 PROCEDURE — 94799 UNLISTED PULMONARY SVC/PX: CPT

## 2022-02-07 PROCEDURE — 25010000002 ONDANSETRON PER 1 MG: Performed by: STUDENT IN AN ORGANIZED HEALTH CARE EDUCATION/TRAINING PROGRAM

## 2022-02-07 PROCEDURE — 83735 ASSAY OF MAGNESIUM: CPT | Performed by: STUDENT IN AN ORGANIZED HEALTH CARE EDUCATION/TRAINING PROGRAM

## 2022-02-07 PROCEDURE — 25010000002 ENOXAPARIN PER 10 MG: Performed by: STUDENT IN AN ORGANIZED HEALTH CARE EDUCATION/TRAINING PROGRAM

## 2022-02-07 PROCEDURE — 25010000002 SODIUM CHLORIDE 0.9 % WITH KCL 20 MEQ 20-0.9 MEQ/L-% SOLUTION: Performed by: STUDENT IN AN ORGANIZED HEALTH CARE EDUCATION/TRAINING PROGRAM

## 2022-02-07 PROCEDURE — 99232 SBSQ HOSP IP/OBS MODERATE 35: CPT | Performed by: INTERNAL MEDICINE

## 2022-02-07 PROCEDURE — 84100 ASSAY OF PHOSPHORUS: CPT | Performed by: HOSPITALIST

## 2022-02-07 PROCEDURE — 74018 RADEX ABDOMEN 1 VIEW: CPT

## 2022-02-07 PROCEDURE — 85025 COMPLETE CBC W/AUTO DIFF WBC: CPT | Performed by: STUDENT IN AN ORGANIZED HEALTH CARE EDUCATION/TRAINING PROGRAM

## 2022-02-07 PROCEDURE — 80053 COMPREHEN METABOLIC PANEL: CPT | Performed by: STUDENT IN AN ORGANIZED HEALTH CARE EDUCATION/TRAINING PROGRAM

## 2022-02-07 PROCEDURE — 25010000002 HYDROMORPHONE 1 MG/ML SOLUTION: Performed by: STUDENT IN AN ORGANIZED HEALTH CARE EDUCATION/TRAINING PROGRAM

## 2022-02-07 PROCEDURE — 84100 ASSAY OF PHOSPHORUS: CPT | Performed by: STUDENT IN AN ORGANIZED HEALTH CARE EDUCATION/TRAINING PROGRAM

## 2022-02-07 PROCEDURE — 80162 ASSAY OF DIGOXIN TOTAL: CPT | Performed by: NURSE PRACTITIONER

## 2022-02-07 PROCEDURE — 97110 THERAPEUTIC EXERCISES: CPT

## 2022-02-07 RX ORDER — IPRATROPIUM BROMIDE AND ALBUTEROL SULFATE 2.5; .5 MG/3ML; MG/3ML
3 SOLUTION RESPIRATORY (INHALATION) EVERY 6 HOURS
Status: DISCONTINUED | OUTPATIENT
Start: 2022-02-07 | End: 2022-02-19 | Stop reason: HOSPADM

## 2022-02-07 RX ORDER — POTASSIUM CHLORIDE 1.5 G/1.77G
40 POWDER, FOR SOLUTION ORAL AS NEEDED
Status: DISCONTINUED | OUTPATIENT
Start: 2022-02-07 | End: 2022-02-19 | Stop reason: HOSPADM

## 2022-02-07 RX ORDER — POTASSIUM CHLORIDE 750 MG/1
40 TABLET, FILM COATED, EXTENDED RELEASE ORAL AS NEEDED
Status: DISCONTINUED | OUTPATIENT
Start: 2022-02-07 | End: 2022-02-19 | Stop reason: HOSPADM

## 2022-02-07 RX ORDER — POLYETHYLENE GLYCOL 3350 17 G/17G
17 POWDER, FOR SOLUTION ORAL DAILY
Status: DISCONTINUED | OUTPATIENT
Start: 2022-02-07 | End: 2022-02-19 | Stop reason: HOSPADM

## 2022-02-07 RX ORDER — POTASSIUM CHLORIDE 7.45 MG/ML
10 INJECTION INTRAVENOUS
Status: DISCONTINUED | OUTPATIENT
Start: 2022-02-07 | End: 2022-02-19 | Stop reason: HOSPADM

## 2022-02-07 RX ORDER — DILTIAZEM HYDROCHLORIDE 180 MG/1
360 CAPSULE, COATED, EXTENDED RELEASE ORAL
Status: DISCONTINUED | OUTPATIENT
Start: 2022-02-07 | End: 2022-02-19 | Stop reason: HOSPADM

## 2022-02-07 RX ADMIN — IPRATROPIUM BROMIDE AND ALBUTEROL SULFATE 3 ML: 2.5; .5 SOLUTION RESPIRATORY (INHALATION) at 07:42

## 2022-02-07 RX ADMIN — HYDROMORPHONE HYDROCHLORIDE 0.5 MG: 1 INJECTION, SOLUTION INTRAMUSCULAR; INTRAVENOUS; SUBCUTANEOUS at 03:09

## 2022-02-07 RX ADMIN — RISPERIDONE 5 MG: 2 TABLET, FILM COATED ORAL at 21:46

## 2022-02-07 RX ADMIN — POTASSIUM PHOSPHATE, MONOBASIC AND POTASSIUM PHOSPHATE, DIBASIC 30 MMOL: 224; 236 INJECTION, SOLUTION, CONCENTRATE INTRAVENOUS at 10:14

## 2022-02-07 RX ADMIN — HYDROCODONE BITARTRATE AND ACETAMINOPHEN 1 TABLET: 5; 325 TABLET ORAL at 05:21

## 2022-02-07 RX ADMIN — ENOXAPARIN SODIUM 40 MG: 100 INJECTION SUBCUTANEOUS at 21:46

## 2022-02-07 RX ADMIN — HYDROMORPHONE HYDROCHLORIDE 0.5 MG: 1 INJECTION, SOLUTION INTRAMUSCULAR; INTRAVENOUS; SUBCUTANEOUS at 12:30

## 2022-02-07 RX ADMIN — DIGOXIN 250 MCG: 250 TABLET ORAL at 12:29

## 2022-02-07 RX ADMIN — HYDROMORPHONE HYDROCHLORIDE 0.5 MG: 1 INJECTION, SOLUTION INTRAMUSCULAR; INTRAVENOUS; SUBCUTANEOUS at 07:36

## 2022-02-07 RX ADMIN — POTASSIUM CHLORIDE AND SODIUM CHLORIDE 50 ML/HR: 900; 150 INJECTION, SOLUTION INTRAVENOUS at 08:52

## 2022-02-07 RX ADMIN — SODIUM CHLORIDE, PRESERVATIVE FREE 10 ML: 5 INJECTION INTRAVENOUS at 21:46

## 2022-02-07 RX ADMIN — DILTIAZEM HYDROCHLORIDE 360 MG: 180 CAPSULE, COATED, EXTENDED RELEASE ORAL at 10:14

## 2022-02-07 RX ADMIN — POLYETHYLENE GLYCOL 3350 17 G: 17 POWDER, FOR SOLUTION ORAL at 12:34

## 2022-02-07 RX ADMIN — HYDROMORPHONE HYDROCHLORIDE 0.5 MG: 1 INJECTION, SOLUTION INTRAMUSCULAR; INTRAVENOUS; SUBCUTANEOUS at 01:09

## 2022-02-07 RX ADMIN — HYDROCODONE BITARTRATE AND ACETAMINOPHEN 1 TABLET: 5; 325 TABLET ORAL at 21:46

## 2022-02-07 RX ADMIN — HYDROMORPHONE HYDROCHLORIDE 0.5 MG: 1 INJECTION, SOLUTION INTRAMUSCULAR; INTRAVENOUS; SUBCUTANEOUS at 14:54

## 2022-02-07 RX ADMIN — ONDANSETRON 4 MG: 2 INJECTION INTRAMUSCULAR; INTRAVENOUS at 08:52

## 2022-02-07 RX ADMIN — ASPIRIN 81 MG: 81 TABLET, COATED ORAL at 08:52

## 2022-02-07 RX ADMIN — IPRATROPIUM BROMIDE AND ALBUTEROL SULFATE 3 ML: 2.5; .5 SOLUTION RESPIRATORY (INHALATION) at 19:37

## 2022-02-07 NOTE — NURSING NOTE
"Pt agreeable to NG insertion \"after I get my pain medicine\".  PRN Dilaudid given. Gathered supplies for NG insertion and pt stated \"I'll pull it right out\".  Surgery and LHA aware.  "

## 2022-02-07 NOTE — PROGRESS NOTES
Dedicated to Hospital Care    [phone]   LOS: 4 days     Name: Carlos Kraus  Age/Sex: 67 y.o. male  :  1955        PCP: Provider, No Known  Chief Complaint   Patient presents with   • Abdominal Pain      Subjective   Increasingly distended overnight.  Has become increasingly uncomfortable overnight as well.  This morning he tried to eat his clear liquid diet for breakfast but became increasingly nauseous and could not eat anymore.  He is somewhat uncomfortable at the bedside but very resistant to the potential need for replacement of the NG tube.  General: No Fever or Chills, Cardiac: No Chest Pain or Palpitations, Resp: No Cough or SOA and Other: No bleeding    aspirin, 81 mg, Oral, Daily  digoxin, 250 mcg, Oral, Daily  dilTIAZem CD, 360 mg, Oral, Q24H  enoxaparin, 40 mg, Subcutaneous, Nightly  ipratropium-albuterol, 3 mL, Nebulization, Q6H  potassium phosphate, 30 mmol, Intravenous, Once  risperiDONE, 5 mg, Oral, Nightly      sodium chloride 0.9 % with KCl 20 mEq, 50 mL/hr, Last Rate: 50 mL/hr (22 0852)        Objective   Vital Signs  Temp:  [98 °F (36.7 °C)-99.1 °F (37.3 °C)] 98.4 °F (36.9 °C)  Heart Rate:  [] 117  Resp:  [16-18] 18  BP: (136-154)/() 154/97  Body mass index is 30.36 kg/m².    Intake/Output Summary (Last 24 hours) at 2022 1008  Last data filed at 2022 0736  Gross per 24 hour   Intake 1080 ml   Output 1350 ml   Net -270 ml       Physical Exam  Vitals and nursing note reviewed.   Constitutional:       Appearance: Normal appearance.   HENT:      Head: Normocephalic and atraumatic.   Cardiovascular:      Rate and Rhythm: Normal rate and regular rhythm.   Pulmonary:      Effort: Pulmonary effort is normal. No respiratory distress.      Breath sounds: Normal breath sounds.   Abdominal:      General: There is distension.      Comments: His abdomen is quite distended and tight on exam.  Unable to really appreciate much bowel sounds   Neurological:      General: No  focal deficit present.      Mental Status: He is alert and oriented to person, place, and time.           Results Review:       I reviewed the patient's new clinical results.  Results from last 7 days   Lab Units 02/07/22  0531 02/06/22  0709 02/05/22  0807 02/04/22  0500 02/03/22  1145   WBC 10*3/mm3 9.00 8.77 10.05 19.14* 24.96*   HEMOGLOBIN g/dL 12.7* 13.4 14.9 16.1 17.0   PLATELETS 10*3/mm3 161 176 173 181 225     Results from last 7 days   Lab Units 02/07/22  0531 02/06/22  0709 02/05/22  0807 02/04/22  0500 02/03/22  1145   SODIUM mmol/L 139 139 134* 129* 131*   POTASSIUM mmol/L 3.1* 3.8 4.2 4.4 3.6   CHLORIDE mmol/L 100 102 100 97* 95*   CO2 mmol/L 28.1 26.5 24.0 19.1* 14.7*   BUN mg/dL 15 18 23 21 14   CREATININE mg/dL 0.58* 0.66* 0.81 0.77 1.21   CALCIUM mg/dL 7.4* 7.4* 7.2* 7.9* 9.1   MAGNESIUM mg/dL 2.7* 2.7* 2.2 1.5*  --    PHOSPHORUS mg/dL 1.8* 1.5* 1.1* 1.1*  --    Estimated Creatinine Clearance: 119.9 mL/min (A) (by C-G formula based on SCr of 0.58 mg/dL (L)).      Assessment/Plan   Active Hospital Problems    Diagnosis  POA   • **SBO (small bowel obstruction) ([facility]) [K56.609]  Unknown   • GERD (gastroesophageal reflux disease) [K21.9]  Unknown   • Hypothyroidism [E03.9]  Unknown   • Hyperlipidemia [E78.5]  Unknown   • Hypophosphatemia [E83.39]  Unknown   • Generalized abdominal pain [R10.84]  Yes   • Hypertension [I10]  Yes   • Atrial fibrillation (HCC) [I48.91]  Yes   • Schizophrenia (HCC) [F20.9]  Yes      Resolved Hospital Problems   No resolved problems to display.       PLAN  This is a 67-year-old gentleman with history of A. fib schizophrenia hypertension hyperlipidemia hypothyroidism presented to the hospital with abdominal pain nausea and vomiting was found to have a small bowel obstruction that required exploratory laparotomy and lysis of adhesions.  Postoperatively we are dealing with a postoperative ileus  -I did order stat KUB this was independently reviewed and does show significant  gaseous distention of the stomach as well as the small and large bowel.  Discussed the findings with general surgery he will be by to evaluate the patient later today  -Diet downgraded n.p.o. continue IV fluids  -I recommended to the patient to replace the NG tube given the degree of distention seen on his x-ray and how uncomfortable he looks on exam.  He declined  -Heart rate is better cardiology is following remains on digoxin.  If he goes back n.p.o. may need to consider changing to IV methods of medication.  -Potassium and phosphorus replacement ordered for today  -Full code  -Lovenox for DVT prophylaxis    Disposition  To be determined      Clemente Byrd MD  [facility]  02/07/22  10:08 EST

## 2022-02-07 NOTE — PLAN OF CARE
Goal Outcome Evaluation:  Plan of Care Reviewed With: patient        Progress: improving  Outcome Summary: med x 1 for nausea and multiple times for pain, freq ask for pain med, score always 10, no grimacing noted, tolerating liquids, VSS, continues in afib with controlled rate, dozing only short intervals, little rest noted, will continue to monitor

## 2022-02-07 NOTE — PROGRESS NOTES
"General Surgery Progress Note    Summary: Mr. Carlos Kraus is a 67 y.o. year old gentleman postoperative day 3 for exploratory laparotomy and lysis of adhesions for high-grade small bowel obstruction. Overall doing okay.  KUB ordered due to nausea; has bowel and stomach dilatation consistent with an ileus. Recommended NG tube replacement, added bowel regimen. He is agreeable. Continue supportive care.     Interval Events: No acute events overnight. He complained of some nausea and abdominal discomfort. Passing flatus but no BM.    Vitals: /97 (BP Location: Left arm, Patient Position: Lying)   Pulse (!) 138   Temp 98.4 °F (36.9 °C) (Oral)   Resp 18   Ht 188 cm (74\")   Wt 107 kg (236 lb 8 oz)   SpO2 96%   BMI 30.36 kg/m²     GENERAL: alert, well appearing, and in no distress  HEENT: normocephalic, atraumatic, moist mucous membranes, clear sclerae  CHEST: no increased work of breathing, symmetric air entry  CARDIAC: regular rate and rhythm    ABDOMEN: Soft, distended, appropriately tender to palpation, incision clean and dry with staples intact  EXTREMITIES: no cyanosis, clubbing, or edema   SKIN: Warm and moist, no rashes    Labs:  Results from last 7 days   Lab Units 02/07/22  0531 02/06/22  0709 02/05/22  0807   WBC 10*3/mm3 9.00 8.77 10.05   HEMOGLOBIN g/dL 12.7* 13.4 14.9   HEMATOCRIT % 36.6* 40.4 43.8   PLATELETS 10*3/mm3 161 176 173     Results from last 7 days   Lab Units 02/07/22  0531 02/06/22  0709 02/05/22  0807   SODIUM mmol/L 139 139 134*   POTASSIUM mmol/L 3.1* 3.8 4.2   CHLORIDE mmol/L 100 102 100   CO2 mmol/L 28.1 26.5 24.0   BUN mg/dL 15 18 23   CREATININE mg/dL 0.58* 0.66* 0.81   CALCIUM mg/dL 7.4* 7.4* 7.2*   BILIRUBIN mg/dL 0.5 0.5 0.9   ALK PHOS U/L 48 47 48   ALT (SGPT) U/L 64* 73* 99*   AST (SGOT) U/L 48* 43* 52*   GLUCOSE mg/dL 112* 120* 141*           PERLA HERNDON MD  General and Endoscopic Surgery  [facility]    [address], [address]  [place], [place], " [address]  P: [phone]  F: [phone]

## 2022-02-07 NOTE — PLAN OF CARE
Goal Outcome Evaluation:  Plan of Care Reviewed With: patient           Outcome Summary: Pt restin gin bed, friend present and helpful, IV noted to be unattached, NG tube pulled out be pt over weekend (per nurse report). Today, pt req CGA to sit EOB, pt req min a for sit/stand from EOB. Pt amb 140' with r wx and min A of 1 (+1 for equip support), slow pace, fair balance, fair endurance. verbal and manual cues to maneuver r wx when turning. Pt req min A to return to L sidelying in bed. Good progress with mobility and decreased assistance required today. NG tube to be replaced per nsg. Cont PT to address functional deficits and progress as tolerated.  Patient was intermittently wearing a face mask during this therapy encounter. Therapist used appropriate personal protective equipment including eye protection, mask, and gloves.  Mask used was standard procedure mask. Appropriate PPE was worn during the entire therapy session. Hand hygiene was completed before and after therapy session. Patient is not in enhanced droplet precautions. PT Amber Garcia

## 2022-02-07 NOTE — PROGRESS NOTES
"    Patient Name: Carlos Kraus  :1955  67 y.o.      Patient Care Team:  Provider, No Known as PCP - General    Chief Complaint: AF    Interval History:    resting in bed, no complaints, comfortable appearing    Objective   Vital Signs  Temp:  [98 °F (36.7 °C)-99.1 °F (37.3 °C)] 98.4 °F (36.9 °C)  Heart Rate:  [] 117  Resp:  [16-18] 18  BP: (136-154)/() 154/97    Intake/Output Summary (Last 24 hours) at 2022 1047  Last data filed at 2022 1010  Gross per 24 hour   Intake 1080 ml   Output 1550 ml   Net -470 ml     Flowsheet Rows      First Filed Value   Admission Height 188 cm (74\") Documented at 2022 1643   Admission Weight 111 kg (245 lb) Documented at 2022 1643          Physical Exam:   General Appearance:    Alert, cooperative, in no acute distress   Lungs:     Clear to auscultation.  Normal respiratory effort and rate.      Heart:    IrRegular rhythm and normal rate, normal S1 and S2, no murmurs, gallops or rubs.     Chest Wall:    No abnormalities observed   Abdomen:     Soft, nontender, positive bowel sounds.     Extremities:   no cyanosis, clubbing or edema.  No marked joint deformities.  Adequate musculoskeletal strength.       Results Review:    Results from last 7 days   Lab Units 22  0531   SODIUM mmol/L 139   POTASSIUM mmol/L 3.1*   CHLORIDE mmol/L 100   CO2 mmol/L 28.1   BUN mg/dL 15   CREATININE mg/dL 0.58*   GLUCOSE mg/dL 112*   CALCIUM mg/dL 7.4*     Results from last 7 days   Lab Units 22  1145   TROPONIN T ng/mL <0.010     Results from last 7 days   Lab Units 22  0531   WBC 10*3/mm3 9.00   HEMOGLOBIN g/dL 12.7*   HEMATOCRIT % 36.6*   PLATELETS 10*3/mm3 161         Results from last 7 days   Lab Units 22  0531   MAGNESIUM mg/dL 2.7*                   Medication Review:   aspirin, 81 mg, Oral, Daily  digoxin, 250 mcg, Oral, Daily  dilTIAZem CD, 360 mg, Oral, Q24H  enoxaparin, 40 mg, Subcutaneous, Nightly  ipratropium-albuterol, 3 mL, " Nebulization, Q6H  risperiDONE, 5 mg, Oral, Nightly         sodium chloride 0.9 % with KCl 20 mEq, 50 mL/hr, Last Rate: 50 mL/hr (02/07/22 0852)        Assessment/Plan   1. AF: chronic. Rates relatively well controlled at rest though higher with exertion. Increase Dilt to 360 from 180. Continue home digoxin 250mcg. Was not previously on an anticoagulant. CHADSVASC is 2. I cannot get a sense of what his level of activity is, or his fall risk. PT note describes generalized weakness and decreased activity tolerance. Based on his recovery can consider AC.  2. SBO  3. Schizophrenia    Sue Winters MD  [facility]  02/07/22  10:47 EST

## 2022-02-07 NOTE — THERAPY TREATMENT NOTE
Patient Name: Carlos Kraus  : 1955    MRN: [id number]                              Today's Date: 2022       Admit Date: 2/3/2022    Visit Dx:     ICD-10-CM ICD-9-CM   1. Generalized abdominal pain  R10.84 789.07   2. Acute pancreatitis, unspecified complication status, unspecified pancreatitis type  K85.90 577.0   3. Small bowel obstruction (HCC)  K56.609 560.9   4. Atrial fibrillation with RVR (HCC)  I48.91 427.31     Patient Active Problem List   Diagnosis   • Syncope and collapse   • Schizophrenia (HCC)   • Atrial fibrillation (HCC)   • Hypertension   • Orthostatic hypotension   • Hypokalemia   • Generalized abdominal pain   • GERD (gastroesophageal reflux disease)   • Hypothyroidism   • Hyperlipidemia   • Hypophosphatemia   • SBO (small bowel obstruction) (HCC)     Past Medical History:   Diagnosis Date   • A-fib (HCC)    • Atrial fibrillation (HCC) 2019   • [place] esophagus    • GERD (gastroesophageal reflux disease)    • Hx of schizophrenia    • Hyperlipidemia    • Hypertension    • Hypokalemia 2019   • Hypophosphatemia    • Hypothyroidism    • Schizophrenia (HCC) 2019   • Syncope and collapse 2019   • Umbilical hernia      Past Surgical History:   Procedure Laterality Date   • EXPLORATORY LAPAROTOMY N/A 2022    Procedure: LAPAROTOMY EXPLORATORY LYSIS OF ADHESIONS;  Surgeon: Edyta Agosto MD;  Location: [facility];  Service: General;  Laterality: N/A;      General Information     Row Name 22 1534          Physical Therapy Time and Intention    Document Type therapy note (daily note)  -DJ     Mode of Treatment individual therapy; physical therapy  -DJ     Row Name 22 1534          General Information    Patient Profile Reviewed yes  -DJ     Existing Precautions/Restrictions fall  -DJ     Row Name 22 1534          Cognition    Orientation Status (Cognition) unable/difficult to assess  flat affect, slow to respond  -DJ     Row Name 22 1539           Safety Issues, Functional Mobility    Comment, Safety Issues/Impairments (Mobility) gt belt, nonskid socks  -DJ           User Key  (r) = Recorded By, (t) = Taken By, (c) = Cosigned By    Initials Name Provider Type    Pamela Turpin, PT Physical Therapist               Mobility     Row Name 02/07/22 1535          Bed Mobility    Bed Mobility supine-sit; sit-supine  -DJ     Supine-Sit [place] (Bed Mobility) contact guard; verbal cues  -DJ     Sit-Supine [place] (Bed Mobility) contact guard; minimum assist (75% patient effort); verbal cues  -DJ     Assistive Device (Bed Mobility) bed rails; head of bed elevated; draw sheet  -DJ     Comment (Bed Mobility) able to roll in bed and reposition self with vc  -DJ     Row Name 02/07/22 1535          Transfers    Comment (Transfers) sit/stand from EOB  -DJ     Row Name 02/07/22 1535          Bed-Chair Transfer    Bed-Chair [place] (Transfers) not tested  -DJ     Row Name 02/07/22 1535          Sit-Stand Transfer    Sit-Stand [place] (Transfers) minimum assist (75% patient effort); 1 person assist; 2 person assist; verbal cues; 1 person to manage equipment  -DJ     Assistive Device (Sit-Stand Transfers) walker, front-wheeled  -DJ     Row Name 02/07/22 1535          Gait/Stairs (Locomotion)    [place] Level (Gait) minimum assist (75% patient effort); 1 person assist; 1 person to manage equipment; verbal cues  -DJ     Assistive Device (Gait) walker, front-wheeled  -DJ     Distance in Feet (Gait) 140'  -DJ     Deviations/Abnormal Patterns (Gait) festinating/shuffling; stride length decreased; hortensia decreased  -DJ     Bilateral Gait Deviations forward flexed posture  -DJ     [place] Level (Stairs) not tested  -DJ     Comment (Gait/Stairs) Pt amb 140' with r wx and min A of 1 (+1 for equip support), slow pace, fair balance, fair endurance. verbal and manula cues to maneuver r wx when turning  -DJ           User Key  (r) = Recorded By, (t) = Taken  By, (c) = Cosigned By    Initials Name Provider Type    Pamela Turpin, SAMIR Physical Therapist               Obj/Interventions     Row Name 02/07/22 1538          Balance    Balance Assessment sitting static balance; standing static balance; standing dynamic balance  -DJ     Static Sitting Balance WFL; unsupported; sitting, edge of bed  -MARYCRUZ     Static Standing Balance WFL; supported; standing  -DJ     Dynamic Standing Balance mild impairment; supported; standing  -DJ     Balance Interventions sitting; standing; supported; sit to stand  -DJ           User Key  (r) = Recorded By, (t) = Taken By, (c) = Cosigned By    Initials Name Provider Type    Pamela Turpin, PT Physical Therapist               Goals/Plan    No documentation.                Clinical Impression     Row Name 02/07/22 1539          Pain    Additional Documentation Pain Scale: Numbers Pre/Post-Treatment (Group)  -     Row Name 02/07/22 1539          Pain Scale: Numbers Pre/Post-Treatment    Pretreatment Pain Rating 0/10 - no pain  -DJ     Pre/Posttreatment Pain Comment generally without c/os, denies pulling NG tube or IV out  -     Row Name 02/07/22 1539          Plan of Care Review    Plan of Care Reviewed With patient  -DJ     Outcome Summary Pt restin gin bed, friend present and helpful, IV noted to be unattached, NG tube pulled out be pt over weekend (per nurse report). Today, pt req CGA to sit EOB, pt req min a for sit/stand from EOB. Pt amb 140' with r wx and min A of 1 (+1 for equip support), slow pace, fair balance, fair endurance. verbal and manual cues to maneuver r wx when turning. Pt req min A to return to L sidelying in bed. Good progress with mobility and decreased assistance required today. NG tube to be replaced per nsg. Cont PT to address functional deficits and progress as tolerated.  -     Row Name 02/07/22 1530          Therapy Assessment/Plan (PT)    Criteria for Skilled Interventions Met (PT) skilled treatment is necessary   -DJ     Row Name 02/07/22 1539          Vital Signs    O2 Delivery Pre Treatment supplemental O2  under chin  -DJ     O2 Delivery Intra Treatment room air  -DJ     Pre Patient Position Side Lying  -DJ     Intra Patient Position Standing  -DJ     Post Patient Position Side Lying  -DJ     Row Name 02/07/22 1539          Positioning and Restraints    Pre-Treatment Position in bed  -DJ     Post Treatment Position bed  -DJ     In Bed notified nsg; side lying left; call light within reach; encouraged to call for assist; exit alarm on; with family/caregiver  -DJ           User Key  (r) = Recorded By, (t) = Taken By, (c) = Cosigned By    Initials Name Provider Type    Pamela Turpin, PT Physical Therapist               Outcome Measures     Row Name 02/07/22 1545          How much help from another person do you currently need...    Turning from your back to your side while in flat bed without using bedrails? 4  -DJ     Moving from lying on back to sitting on the side of a flat bed without bedrails? 4  -DJ     Moving to and from a bed to a chair (including a wheelchair)? 3  -DJ     Standing up from a chair using your arms (e.g., wheelchair, bedside chair)? 3  -DJ     Climbing 3-5 steps with a railing? 2  -DJ     To walk in hospital room? 3  -DJ     AM-PAC 6 Clicks Score (PT) 19  -DJ     Row Name 02/07/22 1545          Functional Assessment    Outcome Measure Options AM-PAC 6 Clicks Basic Mobility (PT)  -DJ           User Key  (r) = Recorded By, (t) = Taken By, (c) = Cosigned By    Initials Name Provider Type    Pamela Turpin, PT Physical Therapist                             Physical Therapy Education                 Title: PT OT SLP Therapies (Done)     Topic: Physical Therapy (Done)     Point: Mobility training (Done)     Learning Progress Summary           Patient Acceptance, E, VU,NR by MARYCRUZ at 2/7/2022 1545    Acceptance, E,TB, VU by SHILPA at 2/6/2022 1440    Acceptance, E,TB, VU,NR by DHEERAJ at 2/6/2022 0835    Acceptance,  E, NL by  at 2/5/2022 1216   Other Acceptance, E, VU,NR by  at 2/7/2022 1545                   Point: Home exercise program (Done)     Learning Progress Summary           Patient Acceptance, E,TB, VU by  at 2/6/2022 1440    Acceptance, E,TB, VU,NR by  at 2/6/2022 0835                   Point: Body mechanics (Done)     Learning Progress Summary           Patient Acceptance, E, VU,NR by  at 2/7/2022 1545    Acceptance, E,TB, VU by  at 2/6/2022 1440    Acceptance, E,TB, VU,NR by  at 2/6/2022 0835    Acceptance, E, NL by  at 2/5/2022 1216   Other Acceptance, E, VU,NR by  at 2/7/2022 1545                   Point: Precautions (Done)     Learning Progress Summary           Patient Acceptance, E, VU,NR by  at 2/7/2022 1545    Acceptance, E,TB, VU by  at 2/6/2022 1440    Acceptance, E,TB, VU,NR by  at 2/6/2022 0835    Acceptance, E, NL by  at 2/5/2022 1216   Other Acceptance, E, VU,NR by  at 2/7/2022 1545                               User Key     Initials Effective Dates Name Provider Type Discipline     06/16/21 -  Dionicio Jennings, RN Registered Nurse Nurse     10/25/19 -  Pamela Otero, PT Physical Therapist PT     03/10/21 -  Debora Herman, RN Registered Nurse Nurse              PT Recommendation and Plan  Planned Therapy Interventions (PT): balance training, bed mobility training, gait training, home exercise program, strengthening, postural re-education, patient/family education, transfer training  Plan of Care Reviewed With: patient  Outcome Summary: Pt restin gin bed, friend present and helpful, IV noted to be unattached, NG tube pulled out be pt over weekend (per nurse report). Today, pt req CGA to sit EOB, pt req min a for sit/stand from EOB. Pt amb 140' with r wx and min A of 1 (+1 for equip support), slow pace, fair balance, fair endurance. verbal and manual cues to maneuver r wx when turning. Pt req min A to return to L sidelying in bed. Good progress with mobility and decreased  assistance required today. NG tube to be replaced per nsg. Cont PT to address functional deficits and progress as tolerated.     Time Calculation:    PT Charges     Row Name 02/07/22 1546             Time Calculation    Start Time 1442  -DJ      Stop Time 1505  -DJ      Time Calculation (min) 23 min  -DJ      PT Non-Billable Time (min) 10 min  -DJ      PT Received On 02/07/22  -DJ      PT - Next Appointment 02/08/22  -DJ            User Key  (r) = Recorded By, (t) = Taken By, (c) = Cosigned By    Initials Name Provider Type    Pamela Turpin, SAMIR Physical Therapist              Therapy Charges for Today     Code Description Service Date Service Provider Modifiers Qty    91109908641 HC PT THER PROC EA 15 MIN 2/7/2022 Pamela Otero, PT GP 2    53879191221 HC PT THER SUPP EA 15 MIN 2/7/2022 Pamela Otero PT GP 2          PT G-Codes  Outcome Measure Options: AM-PAC 6 Clicks Basic Mobility (PT)  AM-PAC 6 Clicks Score (PT): 19  AM-PAC 6 Clicks Score (OT): 24    Pamela Otero PT  2/7/2022

## 2022-02-08 LAB
ALBUMIN SERPL-MCNC: 2.7 G/DL (ref 3.5–5.2)
ALBUMIN/GLOB SERPL: 1 G/DL
ALP SERPL-CCNC: 45 U/L (ref 39–117)
ALT SERPL W P-5'-P-CCNC: 48 U/L (ref 1–41)
ANION GAP SERPL CALCULATED.3IONS-SCNC: 11.4 MMOL/L (ref 5–15)
AST SERPL-CCNC: 40 U/L (ref 1–40)
BASOPHILS # BLD AUTO: 0.04 10*3/MM3 (ref 0–0.2)
BASOPHILS NFR BLD AUTO: 0.4 % (ref 0–1.5)
BILIRUB SERPL-MCNC: 0.5 MG/DL (ref 0–1.2)
BUN SERPL-MCNC: 21 MG/DL (ref 8–23)
BUN/CREAT SERPL: 36.2 (ref 7–25)
CALCIUM SPEC-SCNC: 7.7 MG/DL (ref 8.6–10.5)
CHLORIDE SERPL-SCNC: 98 MMOL/L (ref 98–107)
CO2 SERPL-SCNC: 29.6 MMOL/L (ref 22–29)
CREAT SERPL-MCNC: 0.58 MG/DL (ref 0.76–1.27)
DEPRECATED RDW RBC AUTO: 43.9 FL (ref 37–54)
EOSINOPHIL # BLD AUTO: 0.01 10*3/MM3 (ref 0–0.4)
EOSINOPHIL NFR BLD AUTO: 0.1 % (ref 0.3–6.2)
ERYTHROCYTE [DISTWIDTH] IN BLOOD BY AUTOMATED COUNT: 12.5 % (ref 12.3–15.4)
GFR SERPL CREATININE-BSD FRML MDRD: 141 ML/MIN/1.73
GLOBULIN UR ELPH-MCNC: 2.8 GM/DL
GLUCOSE SERPL-MCNC: 115 MG/DL (ref 65–99)
HCT VFR BLD AUTO: 38.8 % (ref 37.5–51)
HGB BLD-MCNC: 12.9 G/DL (ref 13–17.7)
IMM GRANULOCYTES # BLD AUTO: 0.14 10*3/MM3 (ref 0–0.05)
IMM GRANULOCYTES NFR BLD AUTO: 1.5 % (ref 0–0.5)
LYMPHOCYTES # BLD AUTO: 0.9 10*3/MM3 (ref 0.7–3.1)
LYMPHOCYTES NFR BLD AUTO: 9.6 % (ref 19.6–45.3)
MAGNESIUM SERPL-MCNC: 2.8 MG/DL (ref 1.6–2.4)
MCH RBC QN AUTO: 31.5 PG (ref 26.6–33)
MCHC RBC AUTO-ENTMCNC: 33.2 G/DL (ref 31.5–35.7)
MCV RBC AUTO: 94.6 FL (ref 79–97)
MONOCYTES # BLD AUTO: 1.29 10*3/MM3 (ref 0.1–0.9)
MONOCYTES NFR BLD AUTO: 13.8 % (ref 5–12)
NEUTROPHILS NFR BLD AUTO: 6.97 10*3/MM3 (ref 1.7–7)
NEUTROPHILS NFR BLD AUTO: 74.6 % (ref 42.7–76)
NRBC BLD AUTO-RTO: 0.1 /100 WBC (ref 0–0.2)
PHOSPHATE SERPL-MCNC: 1.5 MG/DL (ref 2.5–4.5)
PLATELET # BLD AUTO: 180 10*3/MM3 (ref 140–450)
PMV BLD AUTO: 10.9 FL (ref 6–12)
POTASSIUM SERPL-SCNC: 3.4 MMOL/L (ref 3.5–5.2)
PROT SERPL-MCNC: 5.5 G/DL (ref 6–8.5)
RBC # BLD AUTO: 4.1 10*6/MM3 (ref 4.14–5.8)
SODIUM SERPL-SCNC: 139 MMOL/L (ref 136–145)
WBC NRBC COR # BLD: 9.35 10*3/MM3 (ref 3.4–10.8)

## 2022-02-08 PROCEDURE — 84100 ASSAY OF PHOSPHORUS: CPT | Performed by: HOSPITALIST

## 2022-02-08 PROCEDURE — 80053 COMPREHEN METABOLIC PANEL: CPT | Performed by: STUDENT IN AN ORGANIZED HEALTH CARE EDUCATION/TRAINING PROGRAM

## 2022-02-08 PROCEDURE — 94799 UNLISTED PULMONARY SVC/PX: CPT

## 2022-02-08 PROCEDURE — 83735 ASSAY OF MAGNESIUM: CPT | Performed by: STUDENT IN AN ORGANIZED HEALTH CARE EDUCATION/TRAINING PROGRAM

## 2022-02-08 PROCEDURE — 85025 COMPLETE CBC W/AUTO DIFF WBC: CPT | Performed by: STUDENT IN AN ORGANIZED HEALTH CARE EDUCATION/TRAINING PROGRAM

## 2022-02-08 PROCEDURE — 97110 THERAPEUTIC EXERCISES: CPT

## 2022-02-08 PROCEDURE — 25010000002 ENOXAPARIN PER 10 MG: Performed by: STUDENT IN AN ORGANIZED HEALTH CARE EDUCATION/TRAINING PROGRAM

## 2022-02-08 PROCEDURE — 25010000002 HYDROMORPHONE 1 MG/ML SOLUTION: Performed by: STUDENT IN AN ORGANIZED HEALTH CARE EDUCATION/TRAINING PROGRAM

## 2022-02-08 PROCEDURE — 94761 N-INVAS EAR/PLS OXIMETRY MLT: CPT

## 2022-02-08 PROCEDURE — 94760 N-INVAS EAR/PLS OXIMETRY 1: CPT

## 2022-02-08 RX ADMIN — HYDROMORPHONE HYDROCHLORIDE 0.5 MG: 1 INJECTION, SOLUTION INTRAMUSCULAR; INTRAVENOUS; SUBCUTANEOUS at 20:14

## 2022-02-08 RX ADMIN — RISPERIDONE 5 MG: 2 TABLET, FILM COATED ORAL at 20:15

## 2022-02-08 RX ADMIN — POLYETHYLENE GLYCOL 3350 17 G: 17 POWDER, FOR SOLUTION ORAL at 08:52

## 2022-02-08 RX ADMIN — ASPIRIN 81 MG: 81 TABLET, COATED ORAL at 08:52

## 2022-02-08 RX ADMIN — DILTIAZEM HYDROCHLORIDE 360 MG: 180 CAPSULE, COATED, EXTENDED RELEASE ORAL at 08:52

## 2022-02-08 RX ADMIN — Medication 3 MG: at 20:15

## 2022-02-08 RX ADMIN — IPRATROPIUM BROMIDE AND ALBUTEROL SULFATE 3 ML: 2.5; .5 SOLUTION RESPIRATORY (INHALATION) at 20:57

## 2022-02-08 RX ADMIN — DIGOXIN 250 MCG: 250 TABLET ORAL at 15:03

## 2022-02-08 RX ADMIN — IPRATROPIUM BROMIDE AND ALBUTEROL SULFATE 3 ML: 2.5; .5 SOLUTION RESPIRATORY (INHALATION) at 00:00

## 2022-02-08 RX ADMIN — ENOXAPARIN SODIUM 40 MG: 100 INJECTION SUBCUTANEOUS at 20:15

## 2022-02-08 NOTE — PLAN OF CARE
Goal Outcome Evaluation:  Plan of Care Reviewed With: patient        Progress: declining  Outcome Summary: Pt initially declined PT session, but then encouraged at least to sit EOB; pt agreed but req assist of 2 for safety; initial , which RN was aware of him being tachy today, but then w/sitting EOB and taking 2 side-steps, pt incr HR to 159-RN alerted ; pt returned to supine ; will attempt amb next session if appropriate; plans DC back to facility where he resides    José Lua, PT Tech present

## 2022-02-08 NOTE — PROGRESS NOTES
"General Surgery Progress Note    Summary: Mr. Carlos Kraus is a 67 y.o. year old gentleman POD4 s/p exploratory laparotomy and lysis of adhesions for high-grade small bowel obstruction, now with an ileus. NPO, IVF, NG to LWS with high output, continue to monitor. PRN pain medication as needed. OOB, ambulate as tolerated. On lovenox for DVT ppx.    Interval Events: No acute events overnight. He did allow them to replace the NG tube and had a large amount of bilious output. Passing gas but no bowel movement.    Vitals: /55 (BP Location: Right arm, Patient Position: Lying)   Pulse 83   Temp 98.7 °F (37.1 °C) (Oral)   Resp 22   Ht 188 cm (74\")   Wt 107 kg (236 lb 8 oz)   SpO2 98%   BMI 30.36 kg/m²     GENERAL: Sleeping comfortably, well appearing, and in no distress  HEENT: normocephalic, atraumatic, moist mucous membranes, clear sclerae  CHEST: no increased work of breathing, symmetric air entry  CARDIAC: regular rate and rhythm    ABDOMEN: Soft, distended, appropriately tender to palpation, incision clean and dry with staples intact  EXTREMITIES: no cyanosis, clubbing, or edema   SKIN: Warm and moist, no rashes    Labs:  Results from last 7 days   Lab Units 02/08/22  0541 02/07/22  0531 02/06/22  0709   WBC 10*3/mm3 9.35 9.00 8.77   HEMOGLOBIN g/dL 12.9* 12.7* 13.4   HEMATOCRIT % 38.8 36.6* 40.4   PLATELETS 10*3/mm3 180 161 176     Results from last 7 days   Lab Units 02/08/22  0541 02/07/22  1830 02/07/22  0531 02/06/22  0709 02/06/22  0709   SODIUM mmol/L 139  --  139  --  139   POTASSIUM mmol/L 3.4* 3.5 3.1*   < > 3.8   CHLORIDE mmol/L 98  --  100  --  102   CO2 mmol/L 29.6*  --  28.1  --  26.5   BUN mg/dL 21  --  15  --  18   CREATININE mg/dL 0.58*  --  0.58*  --  0.66*   CALCIUM mg/dL 7.7*  --  7.4*  --  7.4*   BILIRUBIN mg/dL 0.5  --  0.5  --  0.5   ALK PHOS U/L 45  --  48  --  47   ALT (SGPT) U/L 48*  --  64*  --  73*   AST (SGOT) U/L 40  --  48*  --  43*   GLUCOSE mg/dL 115*  --  112*  --  120*    " < > = values in this interval not displayed.           PERLA HERNDON MD  General and Endoscopic Surgery  [facility]    [address], [address]  [place], [place], [address]  P: [phone]  F: [phone]

## 2022-02-08 NOTE — CASE MANAGEMENT/SOCIAL WORK
Continued Stay Note  [facility]     Patient Name: Carlos Kraus  MRN: [id number]  Today's Date: 2/8/2022    Admit Date: 2/3/2022     Discharge Plan     Row Name 02/08/22 1134       Plan    Plan Plan return to [facility].   DENISE Robins RN    Patient/Family in Agreement with Plan yes    Plan Comments Pt was taken to surgery on 2/4 for Exploratory laparotomy, lysis of adhesions .  Pt continues with NG tube.   [facility]               Discharge Codes    No documentation.               Expected Discharge Date and Time     Expected Discharge Date Expected Discharge Time    Feb 10, 2022             Yoanna Robins, RN

## 2022-02-08 NOTE — PROGRESS NOTES
Dedicated to Hospital Care    [phone]   LOS: 5 days     Name: Carlos Kraus  Age/Sex: 67 y.o. male  :  1955        PCP: Provider, No Known  Chief Complaint   Patient presents with   • Abdominal Pain      Subjective   NG replaced overniight with copious return of fluid  General: No Fever or Chills, Cardiac: No Chest Pain or Palpitations, Resp: No Cough or SOA and Other: No bleeding    aspirin, 81 mg, Oral, Daily  digoxin, 250 mcg, Oral, Daily  dilTIAZem CD, 360 mg, Oral, Q24H  enoxaparin, 40 mg, Subcutaneous, Nightly  ipratropium-albuterol, 3 mL, Nebulization, Q6H  polyethylene glycol, 17 g, Oral, Daily  risperiDONE, 5 mg, Oral, Nightly      sodium chloride 0.9 % with KCl 20 mEq, 50 mL/hr, Last Rate: 50 mL/hr (226)        Objective   Vital Signs  Temp:  [98 °F (36.7 °C)-99 °F (37.2 °C)] 99 °F (37.2 °C)  Heart Rate:  [101-138] 120  Resp:  [18-22] 22  BP: (134-154)/() 134/84  Body mass index is 30.36 kg/m².    Intake/Output Summary (Last 24 hours) at 2022 0559  Last data filed at 2022 0406  Gross per 24 hour   Intake --   Output 2575 ml   Net -2575 ml       Physical Exam  Vitals and nursing note reviewed.   Constitutional:       Appearance: Normal appearance.   HENT:      Head: Normocephalic and atraumatic.   Cardiovascular:      Rate and Rhythm: Normal rate and regular rhythm.   Pulmonary:      Effort: Pulmonary effort is normal. No respiratory distress.      Breath sounds: Normal breath sounds.   Abdominal:      General: There is distension.      Comments: His abdomen is quite distended and tight on exam.  Unable to really appreciate much bowel sounds   Neurological:      General: No focal deficit present.      Mental Status: He is alert and oriented to person, place, and time.           Results Review:       I reviewed the patient's new clinical results.  Results from last 7 days   Lab Units 22  0531 22  0709 22  0807 22  0500 22  1145   WBC  10*3/mm3 9.00 8.77 10.05 19.14* 24.96*   HEMOGLOBIN g/dL 12.7* 13.4 14.9 16.1 17.0   PLATELETS 10*3/mm3 161 176 173 181 225     Results from last 7 days   Lab Units 02/08/22  0541 02/07/22  2046 02/07/22  1830 02/07/22  0531 02/06/22  0709 02/05/22  0807 02/04/22  0500 02/03/22  1145   SODIUM mmol/L 139  --   --  139 139 134* 129* 131*   POTASSIUM mmol/L 3.4*  --  3.5 3.1* 3.8 4.2 4.4 3.6   CHLORIDE mmol/L 98  --   --  100 102 100 97* 95*   CO2 mmol/L 29.6*  --   --  28.1 26.5 24.0 19.1* 14.7*   BUN mg/dL 21  --   --  15 18 23 21 14   CREATININE mg/dL 0.58*  --   --  0.58* 0.66* 0.81 0.77 1.21   CALCIUM mg/dL 7.7*  --   --  7.4* 7.4* 7.2* 7.9* 9.1   MAGNESIUM mg/dL 2.8*  --   --  2.7* 2.7* 2.2 1.5*  --    PHOSPHORUS mg/dL 1.5* 2.0*  --  1.8* 1.5* 1.1* 1.1*  --    Estimated Creatinine Clearance: 119.9 mL/min (A) (by C-G formula based on SCr of 0.58 mg/dL (L)).      Assessment/Plan   Active Hospital Problems    Diagnosis  POA   • **SBO (small bowel obstruction) (HCC) [K56.609]  Unknown   • GERD (gastroesophageal reflux disease) [K21.9]  Unknown   • Hypothyroidism [E03.9]  Unknown   • Hyperlipidemia [E78.5]  Unknown   • Hypophosphatemia [E83.39]  Unknown   • Generalized abdominal pain [R10.84]  Yes   • Hypertension [I10]  Yes   • Atrial fibrillation (HCC) [I48.91]  Yes   • Schizophrenia (HCC) [F20.9]  Yes      Resolved Hospital Problems   No resolved problems to display.       PLAN  This is a 67-year-old gentleman with history of A. fib schizophrenia hypertension hyperlipidemia hypothyroidism presented to the hospital with abdominal pain nausea and vomiting was found to have a small bowel obstruction that required exploratory laparotomy and lysis of adhesions.  Postoperatively we are dealing with a postoperative ileus  -repeat KUB in AM  -continue NG to LWS, increase IVF to replace losses; noted contraction alkalosis today  -replace K and Phos per protocol  -Diet downgraded n.p.o. continue IV fluids, if remains NPO  tomorrow consider initiation of TPN  -Heart rate is stable for now but likely will require IV meds for further coverage given NPo and poor absorption  -Full code  -Lovenox for DVT prophylaxis    Disposition  To be determined      Clemente Byrd MD  [facility]  02/08/22  05:59 EST

## 2022-02-08 NOTE — THERAPY TREATMENT NOTE
Patient Name: Carlos Kraus  : 1955    MRN: [id number]                              Today's Date: 2022       Admit Date: 2/3/2022    Visit Dx:     ICD-10-CM ICD-9-CM   1. Generalized abdominal pain  R10.84 789.07   2. Acute pancreatitis, unspecified complication status, unspecified pancreatitis type  K85.90 577.0   3. Small bowel obstruction (HCC)  K56.609 560.9   4. Atrial fibrillation with RVR (HCC)  I48.91 427.31     Patient Active Problem List   Diagnosis   • Syncope and collapse   • Schizophrenia (HCC)   • Atrial fibrillation (HCC)   • Hypertension   • Orthostatic hypotension   • Hypokalemia   • Generalized abdominal pain   • GERD (gastroesophageal reflux disease)   • Hypothyroidism   • Hyperlipidemia   • Hypophosphatemia   • SBO (small bowel obstruction) (HCC)     Past Medical History:   Diagnosis Date   • A-fib (HCC)    • Atrial fibrillation (HCC) 2019   • [place] esophagus    • GERD (gastroesophageal reflux disease)    • Hx of schizophrenia    • Hyperlipidemia    • Hypertension    • Hypokalemia 2019   • Hypophosphatemia    • Hypothyroidism    • Schizophrenia (HCC) 2019   • Syncope and collapse 2019   • Umbilical hernia      Past Surgical History:   Procedure Laterality Date   • EXPLORATORY LAPAROTOMY N/A 2022    Procedure: LAPAROTOMY EXPLORATORY LYSIS OF ADHESIONS;  Surgeon: Edyta Agosto MD;  Location: [facility];  Service: General;  Laterality: N/A;      General Information     Row Name 22 173          Physical Therapy Time and Intention    Document Type therapy note (daily note)  -HILLARY     Mode of Treatment individual therapy; physical therapy  -     Row Name 22 173          General Information    Patient Profile Reviewed yes  -HILLARY     Existing Precautions/Restrictions fall  -     Row Name 22 173          Living Environment    Lives With facility resident  -     Row Name 22 173          Cognition    Orientation Status (Cognition)  unable/difficult to assess  not much conversation today  -     Row Name 02/08/22 1731          Safety Issues, Functional Mobility    Safety Issues Affecting Function (Mobility) awareness of need for assistance; insight into deficits/self-awareness; judgment; problem-solving  -     Impairments Affecting Function (Mobility) endurance/activity tolerance; strength  -           User Key  (r) = Recorded By, (t) = Taken By, (c) = Cosigned By    Initials Name Provider Type    Lety Burks PTA Physical Therapy Assistant               Mobility     Row Name 02/08/22 1743          Bed Mobility    Supine-Sit Chapman (Bed Mobility) 2 person assist; minimum assist (75% patient effort); verbal cues; nonverbal cues (demo/gesture)  -     Sit-Supine Chapman (Bed Mobility) 2 person assist; minimum assist (75% patient effort); verbal cues; nonverbal cues (demo/gesture)  -     Assistive Device (Bed Mobility) bed rails; draw sheet; head of bed elevated  -     Comment (Bed Mobility) pt not feeling well today, initially declined PT session; req more assist today  -     Row Name 02/08/22 1743          Bed-Chair Transfer    Bed-Chair Chapman (Transfers) not tested  -     Row Name 02/08/22 1743          Sit-Stand Transfer    Sit-Stand Chapman (Transfers) 2 person assist; minimum assist (75% patient effort); verbal cues; nonverbal cues (demo/gesture)  -     Assistive Device (Sit-Stand Transfers) --  HHA-2  -     Row Name 02/08/22 1743          Gait/Stairs (Locomotion)    Chapman Level (Gait) 2 person assist; minimum assist (75% patient effort)  -     Distance in Feet (Gait) 2 side steps toward HOB, incr HR to 159, RN alerted ; no further amb at this time  -     Deviations/Abnormal Patterns (Gait) festinating/shuffling  -     Bilateral Gait Deviations forward flexed posture  -           User Key  (r) = Recorded By, (t) = Taken By, (c) = Cosigned By    Initials Name Provider Type    HILLARY  Lety Villa PTA Physical Therapy Assistant               Obj/Interventions     Row Name 02/08/22 1747          Motor Skills    Therapeutic Exercise --  worked on posture w/sitting EOB, but pt declined LE exer  -           User Key  (r) = Recorded By, (t) = Taken By, (c) = Cosigned By    Initials Name Provider Type    Lety Burks PTA Physical Therapy Assistant               Goals/Plan    No documentation.                Clinical Impression     Row Name 02/08/22 1748          Pain Scale: Numbers Pre/Post-Treatment    Pretreatment Pain Rating 0/10 - no pain  -     Posttreatment Pain Rating 0/10 - no pain  -     Row Name 02/08/22 1748          Plan of Care Review    Plan of Care Reviewed With patient  -     Progress declining  -     Outcome Summary Pt initially declined PT session, but then encouraged at least to sit EOB; pt agreed but req assist of 2 for safety; initial , which RN was aware of him being tachy today, but then w/sitting EOB and taking 2 side-steps, pt incr HR to 159-RN alerted ; pt returned to supine ; will attempt amb next session if appropriate; plans DC back to facility where he resides  -     Row Name 02/08/22 1748          Therapy Assessment/Plan (PT)    Rehab Potential (PT) fair, will monitor progress closely  -     Criteria for Skilled Interventions Met (PT) yes  -[facility] Name 02/08/22 1748          Positioning and Restraints    Pre-Treatment Position in bed  -     Post Treatment Position bed  -     In Bed fowlers; call light within reach; encouraged to call for assist; exit alarm on; side rails up x3; notified ns  -           User Key  (r) = Recorded By, (t) = Taken By, (c) = Cosigned By    Initials Name Provider Type    Lety Burks PTA Physical Therapy Assistant               Outcome Measures     Row Name 02/08/22 3153          How much help from another person do you currently need...    Turning from your back to your side while in flat bed  without using bedrails? 3  -JM     Moving from lying on back to sitting on the side of a flat bed without bedrails? 3  -JM     Moving to and from a bed to a chair (including a wheelchair)? 1  -JM     Standing up from a chair using your arms (e.g., wheelchair, bedside chair)? 2  -JM     Climbing 3-5 steps with a railing? 1  -JM     To walk in hospital room? 1  -HILLARY     AM-PAC 6 Clicks Score (PT) 11  -HILLARY           User Key  (r) = Recorded By, (t) = Taken By, (c) = Cosigned By    Initials Name Provider Type    Lety Burks PTA Physical Therapy Assistant                             Physical Therapy Education                 Title: PT OT SLP Therapies (In Progress)     Topic: Physical Therapy (In Progress)     Point: Mobility training (In Progress)     Learning Progress Summary           Patient Acceptance, E,D, NR by HILLARY at 2/8/2022 1757    Acceptance, E, VU,NR by MARYCRUZ at 2/7/2022 1545    Acceptance, E,TB, VU by  at 2/6/2022 1440    Acceptance, E,TB, VU,NR by  at 2/6/2022 0835    Acceptance, E, NL by MARYCRUZ at 2/5/2022 1216   Other Acceptance, E, VU,NR by MARYCRUZ at 2/7/2022 1545                   Point: Home exercise program (In Progress)     Learning Progress Summary           Patient Acceptance, E,D, NR by HILLARY at 2/8/2022 1757    Acceptance, E,TB, VU by SHILPA at 2/6/2022 1440    Acceptance, E,TB, VU,NR by  at 2/6/2022 0835                   Point: Body mechanics (In Progress)     Learning Progress Summary           Patient Acceptance, E,D, NR by HILLARY at 2/8/2022 1757    Acceptance, E, VU,NR by MARYCRUZ at 2/7/2022 1545    Acceptance, E,TB, VU by SHILPA at 2/6/2022 1440    Acceptance, E,TB, VU,NR by  at 2/6/2022 0835    Acceptance, E, NL by  at 2/5/2022 1216   Other Acceptance, E, VU,NR by  at 2/7/2022 1545                   Point: Precautions (In Progress)     Learning Progress Summary           Patient Acceptance, E,D, NR by HILLARY at 2/8/2022 1757    Acceptance, E, VU,NR by MARYCRUZ at 2/7/2022 1545    Acceptance, E,TB, VU by RW at  2/6/2022 1440    Acceptance, E,TB, VU,NR by  at 2/6/2022 0835    Acceptance, E, NL by  at 2/5/2022 1216   Other Acceptance, E, VU,NR by  at 2/7/2022 1545                               User Key     Initials Effective Dates Name Provider Type Discipline     03/07/18 -  Lety Villa PTA Physical Therapy Assistant PT     06/16/21 -  Dionicio Jennings, RN Registered Nurse Nurse     10/25/19 -  Pamela Otero, PT Physical Therapist PT     03/10/21 -  Debora Herman, RN Registered Nurse Nurse              PT Recommendation and Plan     Plan of Care Reviewed With: patient  Progress: declining  Outcome Summary: Pt initially declined PT session, but then encouraged at least to sit EOB; pt agreed but req assist of 2 for safety; initial , which RN was aware of him being tachy today, but then w/sitting EOB and taking 2 side-steps, pt incr HR to 159-RN alerted ; pt returned to supine ; will attempt amb next session if appropriate; plans DC back to facility where he resides     Time Calculation:    PT Charges     Row Name 02/08/22 1728             Time Calculation    Start Time 1447  -      Stop Time 1500  -      Time Calculation (min) 13 min  -      PT Received On 02/08/22  -      PT - Next Appointment 02/09/22  -            User Key  (r) = Recorded By, (t) = Taken By, (c) = Cosigned By    Initials Name Provider Type     Lety Villa PTA Physical Therapy Assistant              Therapy Charges for Today     Code Description Service Date Service Provider Modifiers Qty    79311386527 HC PT THER PROC EA 15 MIN 2/8/2022 Lety Villa PTA GP 1    98299166026 HC PT THER SUPP EA 15 MIN 2/8/2022 Lety Villa PTA GP 1          PT G-Codes  Outcome Measure Options: AM-PAC 6 Clicks Basic Mobility (PT)  AM-PAC 6 Clicks Score (PT): 11  AM-PAC 6 Clicks Score (OT): 24    Lety Villa PTA  2/8/2022

## 2022-02-09 ENCOUNTER — APPOINTMENT (OUTPATIENT)
Dept: GENERAL RADIOLOGY | Facility: HOSPITAL | Age: 67
End: 2022-02-09

## 2022-02-09 LAB
ALBUMIN SERPL-MCNC: 2.4 G/DL (ref 3.5–5.2)
ALBUMIN/GLOB SERPL: 0.8 G/DL
ALP SERPL-CCNC: 45 U/L (ref 39–117)
ALT SERPL W P-5'-P-CCNC: 41 U/L (ref 1–41)
ANION GAP SERPL CALCULATED.3IONS-SCNC: 10.2 MMOL/L (ref 5–15)
AST SERPL-CCNC: 35 U/L (ref 1–40)
BASOPHILS # BLD AUTO: 0.03 10*3/MM3 (ref 0–0.2)
BASOPHILS NFR BLD AUTO: 0.3 % (ref 0–1.5)
BILIRUB SERPL-MCNC: 0.3 MG/DL (ref 0–1.2)
BUN SERPL-MCNC: 18 MG/DL (ref 8–23)
BUN/CREAT SERPL: 32.7 (ref 7–25)
CALCIUM SPEC-SCNC: 7.7 MG/DL (ref 8.6–10.5)
CHLORIDE SERPL-SCNC: 100 MMOL/L (ref 98–107)
CO2 SERPL-SCNC: 32.8 MMOL/L (ref 22–29)
CREAT SERPL-MCNC: 0.55 MG/DL (ref 0.76–1.27)
DEPRECATED RDW RBC AUTO: 43.6 FL (ref 37–54)
EOSINOPHIL # BLD AUTO: 0.06 10*3/MM3 (ref 0–0.4)
EOSINOPHIL NFR BLD AUTO: 0.6 % (ref 0.3–6.2)
ERYTHROCYTE [DISTWIDTH] IN BLOOD BY AUTOMATED COUNT: 12.5 % (ref 12.3–15.4)
GFR SERPL CREATININE-BSD FRML MDRD: 149 ML/MIN/1.73
GLOBULIN UR ELPH-MCNC: 2.9 GM/DL
GLUCOSE BLDC GLUCOMTR-MCNC: 110 MG/DL (ref 70–130)
GLUCOSE BLDC GLUCOMTR-MCNC: 130 MG/DL (ref 70–130)
GLUCOSE SERPL-MCNC: 110 MG/DL (ref 65–99)
HCT VFR BLD AUTO: 36.7 % (ref 37.5–51)
HGB BLD-MCNC: 12.4 G/DL (ref 13–17.7)
IMM GRANULOCYTES # BLD AUTO: 0.2 10*3/MM3 (ref 0–0.05)
IMM GRANULOCYTES NFR BLD AUTO: 2 % (ref 0–0.5)
LYMPHOCYTES # BLD AUTO: 0.87 10*3/MM3 (ref 0.7–3.1)
LYMPHOCYTES NFR BLD AUTO: 8.8 % (ref 19.6–45.3)
MAGNESIUM SERPL-MCNC: 2.8 MG/DL (ref 1.6–2.4)
MCH RBC QN AUTO: 31.6 PG (ref 26.6–33)
MCHC RBC AUTO-ENTMCNC: 33.8 G/DL (ref 31.5–35.7)
MCV RBC AUTO: 93.6 FL (ref 79–97)
MONOCYTES # BLD AUTO: 1.05 10*3/MM3 (ref 0.1–0.9)
MONOCYTES NFR BLD AUTO: 10.6 % (ref 5–12)
NEUTROPHILS NFR BLD AUTO: 7.66 10*3/MM3 (ref 1.7–7)
NEUTROPHILS NFR BLD AUTO: 77.7 % (ref 42.7–76)
NRBC BLD AUTO-RTO: 0.1 /100 WBC (ref 0–0.2)
PHOSPHATE SERPL-MCNC: 1.8 MG/DL (ref 2.5–4.5)
PLATELET # BLD AUTO: 174 10*3/MM3 (ref 140–450)
PMV BLD AUTO: 10.8 FL (ref 6–12)
POTASSIUM SERPL-SCNC: 3.4 MMOL/L (ref 3.5–5.2)
PROT SERPL-MCNC: 5.3 G/DL (ref 6–8.5)
RBC # BLD AUTO: 3.92 10*6/MM3 (ref 4.14–5.8)
SODIUM SERPL-SCNC: 143 MMOL/L (ref 136–145)
TRIGL SERPL-MCNC: 186 MG/DL (ref 0–150)
WBC NRBC COR # BLD: 9.87 10*3/MM3 (ref 3.4–10.8)

## 2022-02-09 PROCEDURE — 85025 COMPLETE CBC W/AUTO DIFF WBC: CPT | Performed by: STUDENT IN AN ORGANIZED HEALTH CARE EDUCATION/TRAINING PROGRAM

## 2022-02-09 PROCEDURE — 94761 N-INVAS EAR/PLS OXIMETRY MLT: CPT

## 2022-02-09 PROCEDURE — 94799 UNLISTED PULMONARY SVC/PX: CPT

## 2022-02-09 PROCEDURE — 83735 ASSAY OF MAGNESIUM: CPT | Performed by: STUDENT IN AN ORGANIZED HEALTH CARE EDUCATION/TRAINING PROGRAM

## 2022-02-09 PROCEDURE — 80053 COMPREHEN METABOLIC PANEL: CPT | Performed by: STUDENT IN AN ORGANIZED HEALTH CARE EDUCATION/TRAINING PROGRAM

## 2022-02-09 PROCEDURE — 99232 SBSQ HOSP IP/OBS MODERATE 35: CPT | Performed by: NURSE PRACTITIONER

## 2022-02-09 PROCEDURE — 25010000002 MAGNESIUM SULFATE PER 500 MG OF MAGNESIUM: Performed by: HOSPITALIST

## 2022-02-09 PROCEDURE — 82962 GLUCOSE BLOOD TEST: CPT

## 2022-02-09 PROCEDURE — 25010000002 CALCIUM GLUCONATE PER 10 ML: Performed by: HOSPITALIST

## 2022-02-09 PROCEDURE — 74018 RADEX ABDOMEN 1 VIEW: CPT

## 2022-02-09 PROCEDURE — 84100 ASSAY OF PHOSPHORUS: CPT | Performed by: STUDENT IN AN ORGANIZED HEALTH CARE EDUCATION/TRAINING PROGRAM

## 2022-02-09 PROCEDURE — C1751 CATH, INF, PER/CENT/MIDLINE: HCPCS

## 2022-02-09 PROCEDURE — 25010000002 ENOXAPARIN PER 10 MG: Performed by: STUDENT IN AN ORGANIZED HEALTH CARE EDUCATION/TRAINING PROGRAM

## 2022-02-09 PROCEDURE — 25010000002 SODIUM CHLORIDE 0.9 % WITH KCL 20 MEQ 20-0.9 MEQ/L-% SOLUTION: Performed by: HOSPITALIST

## 2022-02-09 PROCEDURE — 84478 ASSAY OF TRIGLYCERIDES: CPT | Performed by: HOSPITALIST

## 2022-02-09 PROCEDURE — 02HV33Z INSERTION OF INFUSION DEVICE INTO SUPERIOR VENA CAVA, PERCUTANEOUS APPROACH: ICD-10-PCS | Performed by: STUDENT IN AN ORGANIZED HEALTH CARE EDUCATION/TRAINING PROGRAM

## 2022-02-09 PROCEDURE — 25010000002 HYDROMORPHONE 1 MG/ML SOLUTION: Performed by: STUDENT IN AN ORGANIZED HEALTH CARE EDUCATION/TRAINING PROGRAM

## 2022-02-09 RX ORDER — FAMOTIDINE 10 MG/ML
20 INJECTION, SOLUTION INTRAVENOUS EVERY 12 HOURS SCHEDULED
Status: DISCONTINUED | OUTPATIENT
Start: 2022-02-09 | End: 2022-02-18

## 2022-02-09 RX ORDER — DEXTROSE MONOHYDRATE 25 G/50ML
25 INJECTION, SOLUTION INTRAVENOUS
Status: DISCONTINUED | OUTPATIENT
Start: 2022-02-09 | End: 2022-02-19 | Stop reason: HOSPADM

## 2022-02-09 RX ORDER — SODIUM CHLORIDE 0.9 % (FLUSH) 0.9 %
20 SYRINGE (ML) INJECTION AS NEEDED
Status: DISCONTINUED | OUTPATIENT
Start: 2022-02-09 | End: 2022-02-19 | Stop reason: HOSPADM

## 2022-02-09 RX ORDER — SODIUM CHLORIDE 0.9 % (FLUSH) 0.9 %
10 SYRINGE (ML) INJECTION EVERY 12 HOURS SCHEDULED
Status: DISCONTINUED | OUTPATIENT
Start: 2022-02-09 | End: 2022-02-19 | Stop reason: HOSPADM

## 2022-02-09 RX ORDER — SODIUM CHLORIDE 0.9 % (FLUSH) 0.9 %
10 SYRINGE (ML) INJECTION AS NEEDED
Status: DISCONTINUED | OUTPATIENT
Start: 2022-02-09 | End: 2022-02-19 | Stop reason: HOSPADM

## 2022-02-09 RX ORDER — SODIUM CHLORIDE 9 MG/ML
50 INJECTION, SOLUTION INTRAVENOUS CONTINUOUS
Status: DISCONTINUED | OUTPATIENT
Start: 2022-02-09 | End: 2022-02-17

## 2022-02-09 RX ORDER — NICOTINE POLACRILEX 4 MG
15 LOZENGE BUCCAL
Status: DISCONTINUED | OUTPATIENT
Start: 2022-02-09 | End: 2022-02-19 | Stop reason: HOSPADM

## 2022-02-09 RX ADMIN — POTASSIUM CHLORIDE AND SODIUM CHLORIDE 125 ML/HR: 900; 150 INJECTION, SOLUTION INTRAVENOUS at 06:56

## 2022-02-09 RX ADMIN — HYDROMORPHONE HYDROCHLORIDE 0.5 MG: 1 INJECTION, SOLUTION INTRAMUSCULAR; INTRAVENOUS; SUBCUTANEOUS at 14:33

## 2022-02-09 RX ADMIN — FAMOTIDINE 20 MG: 10 INJECTION INTRAVENOUS at 20:31

## 2022-02-09 RX ADMIN — POTASSIUM CHLORIDE 40 MEQ: 750 TABLET, EXTENDED RELEASE ORAL at 14:12

## 2022-02-09 RX ADMIN — HYDROMORPHONE HYDROCHLORIDE 0.5 MG: 1 INJECTION, SOLUTION INTRAMUSCULAR; INTRAVENOUS; SUBCUTANEOUS at 23:18

## 2022-02-09 RX ADMIN — IPRATROPIUM BROMIDE AND ALBUTEROL SULFATE 3 ML: 2.5; .5 SOLUTION RESPIRATORY (INHALATION) at 11:11

## 2022-02-09 RX ADMIN — POTASSIUM PHOSPHATE, MONOBASIC AND POTASSIUM PHOSPHATE, DIBASIC 30 MMOL: 224; 236 INJECTION, SOLUTION, CONCENTRATE INTRAVENOUS at 09:58

## 2022-02-09 RX ADMIN — SODIUM CHLORIDE, PRESERVATIVE FREE 10 ML: 5 INJECTION INTRAVENOUS at 20:25

## 2022-02-09 RX ADMIN — SODIUM CHLORIDE 50 ML/HR: 9 INJECTION, SOLUTION INTRAVENOUS at 17:31

## 2022-02-09 RX ADMIN — HYDROCODONE BITARTRATE AND ACETAMINOPHEN 1 TABLET: 5; 325 TABLET ORAL at 20:24

## 2022-02-09 RX ADMIN — IPRATROPIUM BROMIDE AND ALBUTEROL SULFATE 3 ML: 2.5; .5 SOLUTION RESPIRATORY (INHALATION) at 01:00

## 2022-02-09 RX ADMIN — IPRATROPIUM BROMIDE AND ALBUTEROL SULFATE 3 ML: 2.5; .5 SOLUTION RESPIRATORY (INHALATION) at 07:44

## 2022-02-09 RX ADMIN — RISPERIDONE 5 MG: 2 TABLET, FILM COATED ORAL at 20:24

## 2022-02-09 RX ADMIN — DILTIAZEM HYDROCHLORIDE 360 MG: 180 CAPSULE, COATED, EXTENDED RELEASE ORAL at 08:33

## 2022-02-09 RX ADMIN — HYDROCODONE BITARTRATE AND ACETAMINOPHEN 1 TABLET: 5; 325 TABLET ORAL at 08:33

## 2022-02-09 RX ADMIN — ASPIRIN 81 MG: 81 TABLET, COATED ORAL at 08:33

## 2022-02-09 RX ADMIN — DIGOXIN 250 MCG: 250 TABLET ORAL at 12:33

## 2022-02-09 RX ADMIN — POLYETHYLENE GLYCOL 3350 17 G: 17 POWDER, FOR SOLUTION ORAL at 08:33

## 2022-02-09 RX ADMIN — METOPROLOL TARTRATE 25 MG: 25 TABLET, FILM COATED ORAL at 20:24

## 2022-02-09 RX ADMIN — METOPROLOL TARTRATE 25 MG: 25 TABLET, FILM COATED ORAL at 12:33

## 2022-02-09 RX ADMIN — ENOXAPARIN SODIUM 40 MG: 100 INJECTION SUBCUTANEOUS at 20:24

## 2022-02-09 RX ADMIN — IPRATROPIUM BROMIDE AND ALBUTEROL SULFATE 3 ML: 2.5; .5 SOLUTION RESPIRATORY (INHALATION) at 20:57

## 2022-02-09 RX ADMIN — POTASSIUM CHLORIDE 40 MEQ: 750 TABLET, EXTENDED RELEASE ORAL at 08:33

## 2022-02-09 RX ADMIN — CALCIUM GLUCONATE: 98 INJECTION, SOLUTION INTRAVENOUS at 17:31

## 2022-02-09 RX ADMIN — SODIUM CHLORIDE, PRESERVATIVE FREE 10 ML: 5 INJECTION INTRAVENOUS at 20:24

## 2022-02-09 NOTE — PROGRESS NOTES
"Pharmacy to dose TPN - Daily Progress Note  Patient: Calros Kraus  MRN#: [id number]  Attending: No name on file.  Admission Date: 020322    Carlos Kraus is a 67 y.o. male receiving TPN for prolonged paralytic ileus.   Per Dr Byrd fluid goal is 125ml/hr, ok for pharmacy to order SSI while on TPN(note added to pharmacy consult)    TPN # 1 per Dr. Byrd     Subjective/Objective  188 cm (74.02\")  102 kg (224 lb 1.6 oz)  Body mass index is 28.76 kg/m².     Results from last 7 days   Lab Units 02/09/22  0600   SODIUM mmol/L 143   POTASSIUM mmol/L 3.4*   CHLORIDE mmol/L 100   CO2 mmol/L 32.8*   BUN mg/dL 18   CREATININE mg/dL 0.55*   CALCIUM mg/dL 7.7*   ALBUMIN g/dL 2.40*   BILIRUBIN mg/dL 0.3   ALK PHOS U/L 45   ALT (SGPT) U/L 41   AST (SGOT) U/L 35   GLUCOSE mg/dL 110*   MAGNESIUM mg/dL 2.8*   PHOSPHORUS mg/dL 1.8*       Diet Orders (active) (From admission, onward)     Start     Ordered    02/09/22 1115  NPO Diet  Diet Effective Now         02/09/22 1115              Additional insulin administration while previous TPN infusing: __ units  Additional electrolyte administration while previous TPN infusing: has electrolyte replacement protocols for phos and potassium ordered       -- 2/9 K 40 meq po, kphos 30 mmol        Acid suppression: famotidine 20 mg IV BID   Stool in last 24 hours: 0.      2/9 has NG to LWS w/ high output     RD Recommendations  Dextrose (Kcal): 1900  Amino Acid (gm): 100  Lipid Concentration (%): 20%  Lipid Volume (mL): 100 mL  Lipid Frequency: Daily  Kcal/Day: 2500 Kcal/day      Daily Assessment/Plan:  I'm inclined to start the dextrose at lower than usual amount (50% goal) since pt already has had low K/Phos for last several days. Refeeding is likely to exacerbate these electrolyte disturbances.     1. Macros Dextrose at ~30% goal 600 kcal, AA 50g.   2. Will start w/ standard TPN electrolytes except d/t shortage of concentrated KCl used in TPN  we will be using Potassium " Acetate   3. MVI MWF d/t national shortage   4. Fluid goal is 125 mL/hr. At 1800 TPN to run @ 75ml/hr. NS at 50ml/hr.   5. Sliding Scale - regular Insulin, low dose Q6H   6. Labs -- daily CMP, mag, phos     TPN Medication Recent History (Show up to 4 orders; newest on the left.)     Start date and time   02/09/2022 1800      [facility]ult [facility] 2-in-1 TPN [[id number]]    Order Status  Active       Macro Ingredients    amino acids 15 %  50 g    dextrose  750 kcal       Electrolytes    sodium chloride  70 mEq    potassium acetate  50 mEq    potassium phosphate  22 mEq    magnesium sulfate  10 mEq    calcium gluconate  9 mEq       Additives    multiple vitamin  10 mL    Trace Minerals Cu-Mn-Se-Zn  1 mL       QS Base    sterile water  1,071.52 mL       Energy Contribution    Proteins  199.8 kcal    Dextrose  750.01 kcal    Lipids  --    Total  949.81 kcal       Electrolyte Ion Calculated Amount    Sodium  70 mEq    Potassium  72 mEq    Calcium  9 mEq    Magnesium  10.15 mEq    Aluminum  --    Phosphate  15 mmol    Chloride  70 mEq    Acetate  92.29 mEq       Other    Total Amino Acid  49.95 g    Total Amino Acid/kg  0.49 g/kg    Glucose Infusion Rate  1.5 mg/kg/min    Osmolarity (Estimated)  1,069.31    Volume  1,800 mL    Rate  75 mL/hr    Dosing Weight  102 kg    Infusion Site  [facility]            Jessie Thomas, PharmD, BCPS  2/9/2022 13:05 EST

## 2022-02-09 NOTE — PROGRESS NOTES
Dedicated to Hospital Care    [phone]   LOS: 6 days     Name: Carlos Kraus  Age/Sex: 67 y.o. male  :  1955        PCP: Provider, No Known  Chief Complaint   Patient presents with   • Abdominal Pain      Subjective   Still with a lot of fluid out from his NG tube.  Patient is somewhat lethargic this morning he did not rest well overnight.  General: No Fever or Chills, Cardiac: No Chest Pain or Palpitations, Resp: No Cough or SOA and Other: No bleeding    aspirin, 81 mg, Oral, Daily  digoxin, 250 mcg, Oral, Daily  dilTIAZem CD, 360 mg, Oral, Q24H  enoxaparin, 40 mg, Subcutaneous, Nightly  ipratropium-albuterol, 3 mL, Nebulization, Q6H  polyethylene glycol, 17 g, Oral, Daily  risperiDONE, 5 mg, Oral, Nightly      sodium chloride 0.9 % with KCl 20 mEq, 125 mL/hr, Last Rate: 125 mL/hr (22 1336)        Objective   Vital Signs  Temp:  [97.1 °F (36.2 °C)-98.7 °F (37.1 °C)] 97.1 °F (36.2 °C)  Heart Rate:  [] 118  Resp:  [18-22] 20  BP: (107-129)/(55-91) 129/91  Body mass index is 28.77 kg/m².    Intake/Output Summary (Last 24 hours) at 2022 0552  Last data filed at 2022 0420  Gross per 24 hour   Intake --   Output 3600 ml   Net -3600 ml       Physical Exam  Vitals and nursing note reviewed.   Constitutional:       Appearance: Normal appearance.   HENT:      Head: Normocephalic and atraumatic.   Cardiovascular:      Rate and Rhythm: Normal rate and regular rhythm.   Pulmonary:      Effort: Pulmonary effort is normal. No respiratory distress.      Breath sounds: Normal breath sounds.   Abdominal:      General: There is distension.      Comments: His abdomen is quite distended and tight on exam.  Unable to really appreciate much bowel sounds   Neurological:      General: No focal deficit present.      Mental Status: He is alert and oriented to person, place, and time.           Results Review:       I reviewed the patient's new clinical results.  Results from last 7 days   Lab Units  02/08/22  0541 02/07/22  0531 02/06/22  0709 02/05/22  0807 02/04/22  0500 02/03/22  1145   WBC 10*3/mm3 9.35 9.00 8.77 10.05 19.14* 24.96*   HEMOGLOBIN g/dL 12.9* 12.7* 13.4 14.9 16.1 17.0   PLATELETS 10*3/mm3 180 161 176 173 181 225     Results from last 7 days   Lab Units 02/08/22  0541 02/07/22  2046 02/07/22  1830 02/07/22  0531 02/06/22  0709 02/05/22  0807 02/04/22  0500 02/03/22  1145   SODIUM mmol/L 139  --   --  139 139 134* 129* 131*   POTASSIUM mmol/L 3.4*  --  3.5 3.1* 3.8 4.2 4.4 3.6   CHLORIDE mmol/L 98  --   --  100 102 100 97* 95*   CO2 mmol/L 29.6*  --   --  28.1 26.5 24.0 19.1* 14.7*   BUN mg/dL 21  --   --  15 18 23 21 14   CREATININE mg/dL 0.58*  --   --  0.58* 0.66* 0.81 0.77 1.21   CALCIUM mg/dL 7.7*  --   --  7.4* 7.4* 7.2* 7.9* 9.1   MAGNESIUM mg/dL 2.8*  --   --  2.7* 2.7* 2.2 1.5*  --    PHOSPHORUS mg/dL 1.5* 2.0*  --  1.8* 1.5* 1.1* 1.1*  --    Estimated Creatinine Clearance: 117.3 mL/min (A) (by C-G formula based on SCr of 0.58 mg/dL (L)).      Assessment/Plan   Active Hospital Problems    Diagnosis  POA   • **SBO (small bowel obstruction) (HCC) [K56.609]  Unknown   • GERD (gastroesophageal reflux disease) [K21.9]  Unknown   • Hypothyroidism [E03.9]  Unknown   • Hyperlipidemia [E78.5]  Unknown   • Hypophosphatemia [E83.39]  Unknown   • Generalized abdominal pain [R10.84]  Yes   • Hypertension [I10]  Yes   • Atrial fibrillation (HCC) [I48.91]  Yes   • Schizophrenia (HCC) [F20.9]  Yes      Resolved Hospital Problems   No resolved problems to display.       PLAN  This is a 67-year-old gentleman with history of A. fib schizophrenia hypertension hyperlipidemia hypothyroidism presented to the hospital with abdominal pain nausea and vomiting was found to have a small bowel obstruction that required exploratory laparotomy and lysis of adhesions.  Postoperatively we are dealing with a postoperative ileus  -KUB independently reviewed from this morning shows persistent ileus  -continue NG to LWS,  increase IVF to replace losses; noted contraction alkalosis today  -replace K and Phos per protocol  -Diet downgraded n.p.o. continue IV fluids, initiate TPN today discussed with pharmacy  -Heart rate is stable for now but likely will require IV meds for further coverage given NPo and poor absorption  -Full code  -Lovenox for DVT prophylaxis    Disposition  To be determined      Clemente Byrd MD  [facility]  02/09/22  05:52 EST

## 2022-02-09 NOTE — CONSULTS
"Adult Nutrition  Assessment/PES    Patient Name:  Carlos Kraus  YOB: 1955  MRN: [id number]  Admit Date:  2/3/2022    Assessment Date:  2/9/2022    Comments:  Nutrition consult for TPN assessment.  Patient with SBO, NGT to LWS.  S/p ex lap with lysis of adhesions 2/4.  NPO/Clear Liquid Diet x 6 days.    Recommend TPN with goal base of 100 grams amino acids, 200 kcal lipids, 1900 kcal dextrose.  This will meet 100% of patient's estimated kcal needs and 98% of patient's estimated protein needs.    RD to continue to follow closely.     Reason for Assessment     Row Name 02/09/22 1143          Reason for Assessment    Reason For Assessment physician consult; TF/PN     Diagnosis gastrointestinal disease; psychosocial; cardiac disease; endocrine conditions  SBO, schizophrenia, Afib, HTN, GERD, hypothyroid, HLD, Kirkland's esophagus, hernia; adm with SBO                Nutrition/Diet History     Row Name 02/09/22 1144          Nutrition/Diet History    Typical Food/Fluid Intake NPO/CLD x 6 days, TPN to start today                Anthropometrics     Row Name 02/09/22 1144 02/09/22 0420       Anthropometrics    Height 188 cm (74.02\") --    Weight -- 102 kg (224 lb 1.6 oz)       Admit Weight    Admit Weight --  224# 2/9 --       Ideal Body Weight (IBW)    Ideal Body Weight (IBW) (kg) 87.7 --       Body Mass Index (BMI)    BMI Assessment BMI 25-29.9: overweight  28.81 --               Labs/Tests/Procedures/Meds     Row Name 02/09/22 1145          Labs/Procedures/Meds    Lab Results Reviewed reviewed, pertinent     Lab Results Comments Gluc, K, Creat, Ca, Alb, Phos, Mg, Hgb, Hct            Diagnostic Tests/Procedures    Diagnostic Test/Procedure Reviewed reviewed, pertinent     Diagnostic Test/Procedures Comments s/p ex lap with lysis of adhesions 2/4            Medications    Pertinent Medications Reviewed reviewed, pertinent     Pertinent Medications Comments lovenox, miralax, IVFs                Physical " "Findings     Row Name 02/09/22 1146          Physical Findings    Overall Physical Appearance overweight; on oxygen therapy     Tubes nasogastric tube  NGT to LWS     Skin surgical incision  B=20, abd inc                Estimated/Assessed Needs     Row Name 02/09/22 1144 02/09/22 1142       Calculation Measurements    Weight Used For Calculations -- 102 kg (224 lb 13.9 oz)    Height 188 cm (74.02\") --       Estimated/Assessed Needs       KCAL/KG    KCAL/KG -- 20 Kcal/Kg (kcal); 25 Kcal/Kg (kcal)    20 Kcal/Kg (kcal) -- 2040    25 Kcal/Kg (kcal) -- 2550       Protein Requirements    Weight Used For Protein Calculations -- 102 kg (224 lb 13.9 oz)    Est Protein Requirement Amount (gms/kg) -- 1.0 gm protein    Estimated Protein Requirements (gms/day) -- 102       Fluid Requirements    Fluid Requirements (mL/day) -- 2000               Nutrition Prescription Ordered     Row Name 02/09/22 1143          Nutrition Prescription PO    Current PO Diet NPO               Problem/Interventions:   Problem 1     Row Name 02/09/22 1146          Nutrition Diagnoses Problem 1    Problem 1 Needs Alternate Route     Etiology (related to) Medical Diagnosis     Gastrointestinal SBO     Signs/Symptoms (evidenced by) NPO                Intervention Goal     Row Name 02/09/22 1146          Intervention Goal    General Maintain nutrition; Disease management/therapy; Nutrition support treatment; Meet nutritional needs for age/condition     PO Initiate feeding     TF/PN Inititiate TF/PN; Deliver (%) goal     Deliver % of Goal 100 %     Transition PN to PO     Weight No significant weight loss                Nutrition Intervention     Row Name 02/09/22 1147          Nutrition Intervention    RD/Tech Action Follow Tx progress; Care plan reviewd; Recommend/ordered     Recommended/Ordered PN                Nutrition Prescription     Row Name 02/09/22 1147          Nutrition Prescription PN    Parenteral Prescription PN begin/change; Parenteral to " supply     Dextrose (Kcal) 1900     Amino Acid (gm) 100     Lipid Concentration (%) 20%     Lipid Volume (mL) 100 mL     Lipid Frequency Daily     New PN Prescription Ordered? No, recommended            PN to Supply    Kcal/Day 2500 Kcal/day     Protein (gm/day) 100 gm/day     Meet Estimated Kcal Need (%) 100 %     Meet Estimated Protein Need (%) 98 %                Education/Evaluation     Row Name 02/09/22 4076          Education    Education Will Instruct as appropriate            Monitor/Evaluation    Monitor Per protocol; I&O; Pertinent labs; PN delivery/tolerance; Weight; Skin status; Symptoms                 Electronically signed by:  Sarah Medina RD  02/09/22 11:54 EST

## 2022-02-09 NOTE — PROGRESS NOTES
"General Surgery Progress Note    Summary: Mr. Carlos Kraus is a 67 y.o. year old gentleman POD5 s/p exploratory laparotomy and lysis of adhesions for high-grade small bowel obstruction, now with an ileus. NPO, IVF, NG to LWS with high output, continue to monitor. Reasonable to start TPN due to ileus. PRN pain medication as needed. OOB, ambulate as tolerated. On lovenox for DVT ppx.    Interval Events: No acute events overnight. NG with high bilious output. Passing flatus, no BM. States his abdomen doesn't hurt.    Vitals: /87 (BP Location: Left arm, Patient Position: Lying)   Pulse 112   Temp 98.6 °F (37 °C) (Oral)   Resp 18   Ht 188 cm (74\")   Wt 102 kg (224 lb 1.6 oz)   SpO2 99%   BMI 28.77 kg/m²     GENERAL: Alert, well appearing, and in no distress  HEENT: normocephalic, atraumatic, moist mucous membranes, clear sclerae  CHEST: no increased work of breathing, symmetric air entry  CARDIAC: regular rate and rhythm    ABDOMEN: Soft, distended, appropriately tender to palpation, incision clean and dry with staples intact  EXTREMITIES: no cyanosis, clubbing, or edema   SKIN: Warm and moist, no rashes    Labs:  Results from last 7 days   Lab Units 02/09/22  0600 02/08/22  0541 02/07/22  0531   WBC 10*3/mm3 9.87 9.35 9.00   HEMOGLOBIN g/dL 12.4* 12.9* 12.7*   HEMATOCRIT % 36.7* 38.8 36.6*   PLATELETS 10*3/mm3 174 180 161     Results from last 7 days   Lab Units 02/09/22  0600 02/08/22  0541 02/07/22  1830 02/07/22  0531 02/07/22  0531   SODIUM mmol/L 143 139  --   --  139   POTASSIUM mmol/L 3.4* 3.4* 3.5   < > 3.1*   CHLORIDE mmol/L 100 98  --   --  100   CO2 mmol/L 32.8* 29.6*  --   --  28.1   BUN mg/dL 18 21  --   --  15   CREATININE mg/dL 0.55* 0.58*  --   --  0.58*   CALCIUM mg/dL 7.7* 7.7*  --   --  7.4*   BILIRUBIN mg/dL 0.3 0.5  --   --  0.5   ALK PHOS U/L 45 45  --   --  48   ALT (SGPT) U/L 41 48*  --   --  64*   AST (SGOT) U/L 35 40  --   --  48*   GLUCOSE mg/dL 110* 115*  --   --  112*    < > = " values in this interval not displayed.           PERLA HERNDON MD  General and Endoscopic Surgery  [facility]    [address], [address]  [place], [place], [address]  P: [phone]  F: [phone]

## 2022-02-09 NOTE — PROGRESS NOTES
Hospital Follow Up    LOS:  LOS: 6 days   Patient Name: Carlos Kraus  Age/Sex: 67 y.o. male  : 1955  MRN: [id number]    Day of Service: 22   Length of Stay: 6  Encounter Provider: CARIDAD Kong  Place of Service: [facility] CARDIOLOG[facility]  Patient Care Team:  Provider, No Known as PCP - General    Subjective:     Chief Complaint: afib/SBO    Interval History: Resting comfortably. Had pain pill this morning.    Objective:     Objective:  Temp:  [97.1 °F (36.2 °C)-98.6 °F (37 °C)] 98.6 °F (37 °C)  Heart Rate:  [] 114  Resp:  [18-22] 18  BP: (128-136)/(87-91) 136/87     Intake/Output Summary (Last 24 hours) at 2022 1038  Last data filed at 2022 0842  Gross per 24 hour   Intake --   Output 3900 ml   Net -3900 ml     Body mass index is 28.77 kg/m².      22  0408 22  0547 22  0420   Weight: 105 kg (232 lb 3.2 oz) 107 kg (236 lb 8 oz) 102 kg (224 lb 1.6 oz)     Weight change:     Physical Exam:   General Appearance:    Awake alert and oriented in no acute distress.   Color:  Skin:  Neuro:  HEENT:    Lungs:     Pink  Warm and dry  No focal, motor or sensory deficits  Neck supple, pupils equal, round and reactive. No JVD, No Bruit  Clear to auscultation,respirations regular, even and                  unlabored    Heart:    irreg/irreg rate and rhythm, S1 and S2, no murmur, no gallop, no rub. No edema, DP/PT pulses are 2+   Chest Wall:    No abnormalities observed   Abdomen:     Normal bowel sounds, no masses, no organomegaly, soft        non-tender, non-distended, no guarding, no ascites noted   Extremities:   Moves all extremities well, no edema, no cyanosis, no redness       Lab Review:   Results from last 7 days   Lab Units 22  0600 22  0541   SODIUM mmol/L 143 139   POTASSIUM mmol/L 3.4* 3.4*   CHLORIDE mmol/L 100 98   CO2 mmol/L 32.8* 29.6*   BUN mg/dL 18 21   CREATININE mg/dL 0.55* 0.58*   GLUCOSE mg/dL 110* 115*   CALCIUM  mg/dL 7.7* 7.7*   AST (SGOT) U/L 35 40   ALT (SGPT) U/L 41 48*     Results from last 7 days   Lab Units 02/03/22  1145   TROPONIN T ng/mL <0.010     Results from last 7 days   Lab Units 02/09/22  0600 02/08/22  0541   WBC 10*3/mm3 9.87 9.35   HEMOGLOBIN g/dL 12.4* 12.9*   HEMATOCRIT % 36.7* 38.8   PLATELETS 10*3/mm3 174 180         Results from last 7 days   Lab Units 02/09/22  0600 02/08/22  0541   MAGNESIUM mg/dL 2.8* 2.8*           Invalid input(s): LDLCALC  Results from last 7 days   Lab Units 02/03/22  1145   PROBNP pg/mL 785.0     Results from last 7 days   Lab Units 02/04/22  0500   TSH uIU/mL 1.360     I reviewed the patient's new clinical results.  I personally viewed and interpreted the patient's EKG  Current Medications:   Scheduled Meds:aspirin, 81 mg, Oral, Daily  digoxin, 250 mcg, Oral, Daily  dilTIAZem CD, 360 mg, Oral, Q24H  enoxaparin, 40 mg, Subcutaneous, Nightly  ipratropium-albuterol, 3 mL, Nebulization, Q6H  metoprolol tartrate, 25 mg, Oral, Q12H  polyethylene glycol, 17 g, Oral, Daily  risperiDONE, 5 mg, Oral, Nightly      Continuous Infusions:sodium chloride 0.9 % with KCl 20 mEq, 125 mL/hr, Last Rate: 125 mL/hr (02/09/22 0656)        Allergies:  Allergies   Allergen Reactions   • Penicillins Anaphylaxis   • Latex Hives       Assessment:       SBO (small bowel obstruction) (HCC)    Schizophrenia (HCC)    Atrial fibrillation (HCC)    Hypertension    Generalized abdominal pain    GERD (gastroesophageal reflux disease)    Hypothyroidism    Hyperlipidemia    Hypophosphatemia      1. Permanent afib  2. SBO  3. Schizophrenia    Plan:         Was started back on home rate meds and diltizem actually increased. Still getting tachy. HR into the 130's with ambulation. Not sure why he is not on betablocker as BP has been stable, was also not on at home. Will start lopressor 25mg BID>  Paula Payton, CARIDAD  02/09/22  10:38 EST  Electronically signed by Paula Payton, CARIDAD, 02/09/22, 10:38 AM EST.

## 2022-02-09 NOTE — PLAN OF CARE
Goal Outcome Evaluation:      Patient is resting well this shift and has been complaining of abdominal achiness. Pain medication given as ordered and patient was able to sleep several hours peacefully. He continues to have dark, green/brown bile contents from his NG tube. Abdomen is large and round, but soft and non-tender to touch per patient. Midline staples clean, dry and intact without any notice of drainage. Patient received a full bed bath this am. Because his NG tubing came apart in the bed... he is refusing to wear his SCDs but did tolerate his Lovenox injection without adverse effects.

## 2022-02-09 NOTE — SIGNIFICANT NOTE
02/09/22 0929   OTHER   Discipline physical therapist   Rehab Time/Intention   Session Not Performed patient/family declined treatment  (pt refused PT despite encouragement, RN notified. will re-attempt as time allows.)   Recommendation   PT - Next Appointment 02/10/22

## 2022-02-09 NOTE — PLAN OF CARE
Goal Outcome Evaluation:  Plan of Care Reviewed With: patient           Outcome Summary: NG tubein place, very little output during shift.  Made Suregery aware that there was an issue when trying to push med thru earlier.  PICC placed for TPN.  IVF will change when TPN is placed at 1800.  BG q 6 hours.  Caps on ends of PICC lines.  VSS.  Will cont to monitor.

## 2022-02-09 NOTE — SIGNIFICANT NOTE
02/09/22 1411   PICC Triple Lumen 02/09/22 Right Brachial   Placement Date/Time: 02/09/22 1408   Hand Hygiene Completed: Yes  Size (Fr): 5  Description (optional): power picc, exp. date 2022-10-31, lot no. [id number]  Length (cm): 40 cm  Orientation: Right  Location: Brachial  Site Prep: Chlorhexidine isopropyl ...   Site Assessment Clean; Dry; Intact   #1 Lumen Status Blood return noted; Capped; Flushed   #2 Lumen Status Blood return noted; Capped; Flushed   #3 Lumen Status Blood return noted; Capped; Flushed   Dressing Type Border Dressing; Transparent; Securing device; Antimicrobial dressing/disc   Liquid Adhesive Applied   Dressing Change Due 02/16/22   Indication/Daily Review of Necessity total parenteral nutrition

## 2022-02-10 ENCOUNTER — APPOINTMENT (OUTPATIENT)
Dept: GENERAL RADIOLOGY | Facility: HOSPITAL | Age: 67
End: 2022-02-10

## 2022-02-10 LAB
ALBUMIN SERPL-MCNC: 2.8 G/DL (ref 3.5–5.2)
ALBUMIN/GLOB SERPL: 1.1 G/DL
ALP SERPL-CCNC: 58 U/L (ref 39–117)
ALT SERPL W P-5'-P-CCNC: 54 U/L (ref 1–41)
ANION GAP SERPL CALCULATED.3IONS-SCNC: 10.2 MMOL/L (ref 5–15)
AST SERPL-CCNC: 48 U/L (ref 1–40)
BASOPHILS # BLD AUTO: 0.05 10*3/MM3 (ref 0–0.2)
BASOPHILS NFR BLD AUTO: 0.4 % (ref 0–1.5)
BILIRUB SERPL-MCNC: 0.4 MG/DL (ref 0–1.2)
BUN SERPL-MCNC: 13 MG/DL (ref 8–23)
BUN/CREAT SERPL: 24.1 (ref 7–25)
CALCIUM SPEC-SCNC: 8 MG/DL (ref 8.6–10.5)
CHLORIDE SERPL-SCNC: 102 MMOL/L (ref 98–107)
CO2 SERPL-SCNC: 30.8 MMOL/L (ref 22–29)
CREAT SERPL-MCNC: 0.54 MG/DL (ref 0.76–1.27)
DEPRECATED RDW RBC AUTO: 43.9 FL (ref 37–54)
EOSINOPHIL # BLD AUTO: 0.11 10*3/MM3 (ref 0–0.4)
EOSINOPHIL NFR BLD AUTO: 0.8 % (ref 0.3–6.2)
ERYTHROCYTE [DISTWIDTH] IN BLOOD BY AUTOMATED COUNT: 12.7 % (ref 12.3–15.4)
GFR SERPL CREATININE-BSD FRML MDRD: >150 ML/MIN/1.73
GLOBULIN UR ELPH-MCNC: 2.6 GM/DL
GLUCOSE BLDC GLUCOMTR-MCNC: 109 MG/DL (ref 70–130)
GLUCOSE BLDC GLUCOMTR-MCNC: 110 MG/DL (ref 70–130)
GLUCOSE BLDC GLUCOMTR-MCNC: 126 MG/DL (ref 70–130)
GLUCOSE BLDC GLUCOMTR-MCNC: 128 MG/DL (ref 70–130)
GLUCOSE BLDC GLUCOMTR-MCNC: 141 MG/DL (ref 70–130)
GLUCOSE SERPL-MCNC: 147 MG/DL (ref 65–99)
HCT VFR BLD AUTO: 37.4 % (ref 37.5–51)
HGB BLD-MCNC: 12.5 G/DL (ref 13–17.7)
LYMPHOCYTES # BLD AUTO: 0.94 10*3/MM3 (ref 0.7–3.1)
LYMPHOCYTES NFR BLD AUTO: 6.9 % (ref 19.6–45.3)
MAGNESIUM SERPL-MCNC: 2.4 MG/DL (ref 1.6–2.4)
MCH RBC QN AUTO: 31.4 PG (ref 26.6–33)
MCHC RBC AUTO-ENTMCNC: 33.4 G/DL (ref 31.5–35.7)
MCV RBC AUTO: 94 FL (ref 79–97)
MONOCYTES # BLD AUTO: 0.79 10*3/MM3 (ref 0.1–0.9)
MONOCYTES NFR BLD AUTO: 5.8 % (ref 5–12)
NEUTROPHILS NFR BLD AUTO: 11.44 10*3/MM3 (ref 1.7–7)
NEUTROPHILS NFR BLD AUTO: 84.3 % (ref 42.7–76)
PHOSPHATE SERPL-MCNC: 2.2 MG/DL (ref 2.5–4.5)
PLATELET # BLD AUTO: 164 10*3/MM3 (ref 140–450)
PMV BLD AUTO: 11.1 FL (ref 6–12)
POTASSIUM SERPL-SCNC: 3.6 MMOL/L (ref 3.5–5.2)
PROT SERPL-MCNC: 5.4 G/DL (ref 6–8.5)
RBC # BLD AUTO: 3.98 10*6/MM3 (ref 4.14–5.8)
SODIUM SERPL-SCNC: 143 MMOL/L (ref 136–145)
WBC NRBC COR # BLD: 13.57 10*3/MM3 (ref 3.4–10.8)

## 2022-02-10 PROCEDURE — 84100 ASSAY OF PHOSPHORUS: CPT | Performed by: STUDENT IN AN ORGANIZED HEALTH CARE EDUCATION/TRAINING PROGRAM

## 2022-02-10 PROCEDURE — 94799 UNLISTED PULMONARY SVC/PX: CPT

## 2022-02-10 PROCEDURE — 25010000002 MAGNESIUM SULFATE PER 500 MG OF MAGNESIUM: Performed by: HOSPITALIST

## 2022-02-10 PROCEDURE — 99232 SBSQ HOSP IP/OBS MODERATE 35: CPT | Performed by: INTERNAL MEDICINE

## 2022-02-10 PROCEDURE — 85025 COMPLETE CBC W/AUTO DIFF WBC: CPT | Performed by: STUDENT IN AN ORGANIZED HEALTH CARE EDUCATION/TRAINING PROGRAM

## 2022-02-10 PROCEDURE — 97110 THERAPEUTIC EXERCISES: CPT

## 2022-02-10 PROCEDURE — 83735 ASSAY OF MAGNESIUM: CPT | Performed by: STUDENT IN AN ORGANIZED HEALTH CARE EDUCATION/TRAINING PROGRAM

## 2022-02-10 PROCEDURE — 94761 N-INVAS EAR/PLS OXIMETRY MLT: CPT

## 2022-02-10 PROCEDURE — 74018 RADEX ABDOMEN 1 VIEW: CPT

## 2022-02-10 PROCEDURE — 25010000002 HYDROMORPHONE 1 MG/ML SOLUTION: Performed by: STUDENT IN AN ORGANIZED HEALTH CARE EDUCATION/TRAINING PROGRAM

## 2022-02-10 PROCEDURE — 80053 COMPREHEN METABOLIC PANEL: CPT | Performed by: STUDENT IN AN ORGANIZED HEALTH CARE EDUCATION/TRAINING PROGRAM

## 2022-02-10 PROCEDURE — 25010000002 ENOXAPARIN PER 10 MG: Performed by: STUDENT IN AN ORGANIZED HEALTH CARE EDUCATION/TRAINING PROGRAM

## 2022-02-10 PROCEDURE — 25010000002 CALCIUM GLUCONATE PER 10 ML: Performed by: HOSPITALIST

## 2022-02-10 PROCEDURE — 82962 GLUCOSE BLOOD TEST: CPT

## 2022-02-10 RX ADMIN — NYSTATIN 500000 UNITS: 100000 SUSPENSION ORAL at 20:36

## 2022-02-10 RX ADMIN — SODIUM CHLORIDE, PRESERVATIVE FREE 10 ML: 5 INJECTION INTRAVENOUS at 08:14

## 2022-02-10 RX ADMIN — METOPROLOL TARTRATE 25 MG: 25 TABLET, FILM COATED ORAL at 20:35

## 2022-02-10 RX ADMIN — SODIUM CHLORIDE, PRESERVATIVE FREE 10 ML: 5 INJECTION INTRAVENOUS at 20:36

## 2022-02-10 RX ADMIN — ASPIRIN 81 MG: 81 TABLET, COATED ORAL at 08:13

## 2022-02-10 RX ADMIN — DILTIAZEM HYDROCHLORIDE 360 MG: 180 CAPSULE, COATED, EXTENDED RELEASE ORAL at 08:13

## 2022-02-10 RX ADMIN — NYSTATIN 500000 UNITS: 100000 SUSPENSION ORAL at 18:32

## 2022-02-10 RX ADMIN — POTASSIUM PHOSPHATE, MONOBASIC AND POTASSIUM PHOSPHATE, DIBASIC 15 MMOL: 224; 236 INJECTION, SOLUTION, CONCENTRATE INTRAVENOUS at 08:26

## 2022-02-10 RX ADMIN — RISPERIDONE 5 MG: 2 TABLET, FILM COATED ORAL at 20:35

## 2022-02-10 RX ADMIN — IPRATROPIUM BROMIDE AND ALBUTEROL SULFATE 3 ML: 2.5; .5 SOLUTION RESPIRATORY (INHALATION) at 08:05

## 2022-02-10 RX ADMIN — HYDROMORPHONE HYDROCHLORIDE 0.5 MG: 1 INJECTION, SOLUTION INTRAMUSCULAR; INTRAVENOUS; SUBCUTANEOUS at 09:40

## 2022-02-10 RX ADMIN — IPRATROPIUM BROMIDE AND ALBUTEROL SULFATE 3 ML: 2.5; .5 SOLUTION RESPIRATORY (INHALATION) at 11:39

## 2022-02-10 RX ADMIN — POLYETHYLENE GLYCOL 3350 17 G: 17 POWDER, FOR SOLUTION ORAL at 08:13

## 2022-02-10 RX ADMIN — FAMOTIDINE 20 MG: 10 INJECTION INTRAVENOUS at 08:14

## 2022-02-10 RX ADMIN — HYDROCODONE BITARTRATE AND ACETAMINOPHEN 1 TABLET: 5; 325 TABLET ORAL at 05:01

## 2022-02-10 RX ADMIN — DIGOXIN 250 MCG: 250 TABLET ORAL at 12:35

## 2022-02-10 RX ADMIN — HYDROMORPHONE HYDROCHLORIDE 0.5 MG: 1 INJECTION, SOLUTION INTRAMUSCULAR; INTRAVENOUS; SUBCUTANEOUS at 16:41

## 2022-02-10 RX ADMIN — IPRATROPIUM BROMIDE AND ALBUTEROL SULFATE 3 ML: 2.5; .5 SOLUTION RESPIRATORY (INHALATION) at 20:19

## 2022-02-10 RX ADMIN — CALCIUM GLUCONATE: 98 INJECTION, SOLUTION INTRAVENOUS at 18:32

## 2022-02-10 RX ADMIN — IPRATROPIUM BROMIDE AND ALBUTEROL SULFATE 3 ML: 2.5; .5 SOLUTION RESPIRATORY (INHALATION) at 01:08

## 2022-02-10 RX ADMIN — METOPROLOL TARTRATE 25 MG: 25 TABLET, FILM COATED ORAL at 08:13

## 2022-02-10 RX ADMIN — HYDROCODONE BITARTRATE AND ACETAMINOPHEN 1 TABLET: 5; 325 TABLET ORAL at 18:32

## 2022-02-10 RX ADMIN — POTASSIUM CHLORIDE 40 MEQ: 750 TABLET, EXTENDED RELEASE ORAL at 08:27

## 2022-02-10 RX ADMIN — FAMOTIDINE 20 MG: 10 INJECTION INTRAVENOUS at 20:35

## 2022-02-10 RX ADMIN — ENOXAPARIN SODIUM 40 MG: 100 INJECTION SUBCUTANEOUS at 20:34

## 2022-02-10 RX ADMIN — HYDROCODONE BITARTRATE AND ACETAMINOPHEN 1 TABLET: 5; 325 TABLET ORAL at 12:35

## 2022-02-10 NOTE — PLAN OF CARE
Goal Outcome Evaluation:  Plan of Care Reviewed With: patient        Progress: no change  Outcome Summary: med for pain multiple times this shift, with some relief stated, VSS, continues in afib with controlled rate, NG draining out of blue part of tube, brown drainage noted, oral meds given with water, NG clamped x 1 hour the returned to suctioning, no c/o nausea, tolerating, up to bathroom, sat on toilet for a while, no bm, no flatus, will continue to monitor

## 2022-02-10 NOTE — PROGRESS NOTES
Dedicated to Hospital Care    [phone]   LOS: 7 days     Name: Carlos Kraus  Age/Sex: 67 y.o. male  :  1955        PCP: Provider, No Known  Chief Complaint   Patient presents with   • Abdominal Pain      Subjective   Still with a lot of fluid out from his NG tube.  Patient is somewhat lethargic this morning he did not rest well overnight.    General: No Fever or Chills, Cardiac: No Chest Pain or Palpitations, Resp: No Cough or SOA and Other: No bleeding    aspirin, 81 mg, Oral, Daily  digoxin, 250 mcg, Oral, Daily  dilTIAZem CD, 360 mg, Oral, Q24H  enoxaparin, 40 mg, Subcutaneous, Nightly  famotidine, 20 mg, Intravenous, Q12H  insulin regular, 0-7 Units, Subcutaneous, Q6H  ipratropium-albuterol, 3 mL, Nebulization, Q6H  metoprolol tartrate, 25 mg, Oral, Q12H  polyethylene glycol, 17 g, Oral, Daily  risperiDONE, 5 mg, Oral, Nightly  sodium chloride, 10 mL, Intravenous, Q12H  sodium chloride, 10 mL, Intravenous, Q12H  sodium chloride, 10 mL, Intravenous, Q12H      Adult Central 2-in-1 TPN, , Last Rate: 75 mL/hr at 22  Pharmacy to Dose TPN,   sodium chloride, 50 mL/hr, Last Rate: 50 mL/hr (22)        Objective   Vital Signs  Temp:  [98.1 °F (36.7 °C)-98.9 °F (37.2 °C)] 98.9 °F (37.2 °C)  Heart Rate:  [] 100  Resp:  [16-20] 20  BP: (122-138)/(83-94) 122/83  Body mass index is 28.48 kg/m².    Intake/Output Summary (Last 24 hours) at 2/10/2022 0628  Last data filed at 2/10/2022 0550  Gross per 24 hour   Intake 240 ml   Output 3475 ml   Net -3235 ml       Physical Exam  Vitals and nursing note reviewed.   Constitutional:       Appearance: Normal appearance.   HENT:      Head: Normocephalic and atraumatic.   Cardiovascular:      Rate and Rhythm: Normal rate and regular rhythm.   Pulmonary:      Effort: Pulmonary effort is normal. No respiratory distress.      Breath sounds: Normal breath sounds.   Abdominal:      General: There is distension.      Comments: His abdomen is quite  distended and tight on exam.  Unable to really appreciate much bowel sounds   Neurological:      General: No focal deficit present.      Mental Status: He is alert and oriented to person, place, and time.           Results Review:       I reviewed the patient's new clinical results.  Results from last 7 days   Lab Units 02/09/22  0600 02/08/22  0541 02/07/22  0531 02/06/22  0709 02/05/22  0807 02/04/22  0500 02/03/22  1145   WBC 10*3/mm3 9.87 9.35 9.00 8.77 10.05 19.14* 24.96*   HEMOGLOBIN g/dL 12.4* 12.9* 12.7* 13.4 14.9 16.1 17.0   PLATELETS 10*3/mm3 174 180 161 176 173 181 225     Results from last 7 days   Lab Units 02/09/22  0600 02/08/22  0541 02/07/22  2046 02/07/22  1830 02/07/22  0531 02/06/22  0709 02/05/22  0807 02/04/22  0500 02/03/22  1145 02/03/22  1145   SODIUM mmol/L 143 139  --   --  139 139 134* 129*  --  131*   POTASSIUM mmol/L 3.4* 3.4*  --  3.5 3.1* 3.8 4.2 4.4   < > 3.6   CHLORIDE mmol/L 100 98  --   --  100 102 100 97*  --  95*   CO2 mmol/L 32.8* 29.6*  --   --  28.1 26.5 24.0 19.1*  --  14.7*   BUN mg/dL 18 21  --   --  15 18 23 21  --  14   CREATININE mg/dL 0.55* 0.58*  --   --  0.58* 0.66* 0.81 0.77  --  1.21   CALCIUM mg/dL 7.7* 7.7*  --   --  7.4* 7.4* 7.2* 7.9*  --  9.1   MAGNESIUM mg/dL 2.8* 2.8*  --   --  2.7* 2.7* 2.2 1.5*  --   --    PHOSPHORUS mg/dL 1.8* 1.5* 2.0*  --  1.8* 1.5* 1.1* 1.1*  --   --     < > = values in this interval not displayed.   Estimated Creatinine Clearance: 116.8 mL/min (A) (by C-G formula based on SCr of 0.55 mg/dL (L)).      Assessment/Plan   Active Hospital Problems    Diagnosis  POA   • **SBO (small bowel obstruction) (HCC) [K56.609]  Unknown   • GERD (gastroesophageal reflux disease) [K21.9]  Unknown   • Hypothyroidism [E03.9]  Unknown   • Hyperlipidemia [E78.5]  Unknown   • Hypophosphatemia [E83.39]  Unknown   • Generalized abdominal pain [R10.84]  Yes   • Hypertension [I10]  Yes   • Atrial fibrillation (HCC) [I48.91]  Yes   • Schizophrenia (HCC) [F20.9]   Yes      Resolved Hospital Problems   No resolved problems to display.       PLAN  This is a 67-year-old gentleman with history of A. fib schizophrenia hypertension hyperlipidemia hypothyroidism presented to the hospital with abdominal pain nausea and vomiting was found to have a small bowel obstruction that required exploratory laparotomy and lysis of adhesions.  Postoperatively we are dealing with a postoperative ileus  -KUB independently reviewed from this morning shows persistent ileus  -continue NG to LWS, increase IVF to replace losses; noted contraction alkalosis today  -replace K and Phos per protocol  -Diet upgraded to ice chips and sips of water continue IV fluids, continue TPN   -Heart rate is stable for now but likely will require IV meds for further coverage given NPo and poor absorption  -Full code  -Lovenox for DVT prophylaxis    Disposition  To be determined      Clemente Byrd MD  [facility]  02/10/22  06:28 EST

## 2022-02-10 NOTE — PROGRESS NOTES
"Pharmacy to dose TPN - Daily Progress Note  Patient: Carlos Kraus  MRN#: [id number]  Attending: No name on file.  Admission Date: 203    Carlos Kraus is a 67 y.o. male receiving TPN for prolonged paralytic ileus.   Per Dr Byrd fluid goal is 125ml/hr, ok for pharmacy to order SSI while on TPN(note added to pharmacy consult)    TPN # 2 per Dr. Byrd     Subjective/Objective  188 cm (74.02\")  101 kg (221 lb 14.4 oz)  Body mass index is 28.48 kg/m².     Results from last 7 days   Lab Units 02/10/22  0628 22  0600 22  0600   SODIUM mmol/L 143   < > 143   POTASSIUM mmol/L 3.6   < > 3.4*   CHLORIDE mmol/L 102   < > 100   CO2 mmol/L 30.8*   < > 32.8*   BUN mg/dL 13   < > 18   CREATININE mg/dL 0.54*   < > 0.55*   CALCIUM mg/dL 8.0*   < > 7.7*   ALBUMIN g/dL 2.80*   < > 2.40*   BILIRUBIN mg/dL 0.4   < > 0.3   ALK PHOS U/L 58   < > 45   ALT (SGPT) U/L 54*   < > 41   AST (SGOT) U/L 48*   < > 35   GLUCOSE mg/dL 147*   < > 110*   MAGNESIUM mg/dL 2.4   < > 2.8*   PHOSPHORUS mg/dL 2.2*   < > 1.8*   TRIGLYCERIDES mg/dL  --   --  186*    < > = values in this interval not displayed.       Diet Orders (active) (From admission, onward)     Start     Ordered    22 1115  NPO Diet  Diet Effective Now         22 1115              Additional insulin administration while previous TPN infusin units  Additional electrolyte administration while previous TPN infusing: has electrolyte replacement protocols for phos and potassium ordered       -- /9 K 40 meq po, kphos 30 mmol      -- 2/10 K 80 meq po, kphos 15 mmol       Acid suppression: famotidine 20 mg IV BID   Stool in last 24 hours: 0.      2/9 has NG to LWS w/ high output     RD Recommendations  Dextrose (Kcal): 1900  Amino Acid (gm): 100  Lipid Concentration (%): 20%  Lipid Volume (mL): 100 mL  Lipid Frequency: Daily  Kcal/Day: 2500 Kcal/day      Daily Assessment/Plan:   started the dextrose at lower than usual amount (50% goal) since pt already has " had low K/Phos for last several days. Refeeding is likely to exacerbate these electrolyte disturbances.   2/10 k/phos better w/ replacement getting another 15mmol kphos and 80 meq kcl today. Will advance macros     1. Macros Dextrose 900 kcal, AA 75g.   2. Continue with standard TPN electrolytes except d/t shortage of concentrated KCl used in TPN  we will be using Potassium Acetate   3. MVI MWF d/t national shortage   4. Fluid goal is 125 mL/hr. TPN to run @ 75ml/hr. NS at 50ml/hr.   5. Sliding Scale - regular Insulin, low dose Q6H   6. Labs -- daily CMP, mag, phos     TPN Medication Recent History (Show up to 4 orders; newest on the left. Changes between the two most recent orders are indicated.)     Start date and time   02/10/2022 1800 02/09/2022 1800      Adult Central 2-in-1 TPN [[id number]] Adult Central 2-in-1 TPN [[id number]]    Order Status  Active Active    Last Admin   New Bag at 02/09/2022 1731 by Dionicio Jennings, RN       Macro Ingredients    amino acids 15 %  75 g 50 g    dextrose  900 kcal 750 kcal       Electrolytes    sodium chloride  70 mEq 70 mEq    potassium acetate  50 mEq 50 mEq    potassium phosphate  22 mEq 22 mEq    magnesium sulfate  10 mEq 10 mEq    calcium gluconate  9 mEq 9 mEq       Additives    multiple vitamin  -- 10 mL    Trace Minerals Cu-Mn-Se-Zn  1 mL 1 mL       QS Base    sterile water  851.5 mL 1,071.52 mL       Energy Contribution    Proteins  300 kcal 199.8 kcal    Dextrose  900 kcal 750.01 kcal    Lipids  -- --    Total  1,200 kcal 949.81 kcal       Electrolyte Ion Calculated Amount    Sodium  70 mEq 70 mEq    Potassium  72 mEq 72 mEq    Calcium  9 mEq 9 mEq    Magnesium  10.15 mEq 10.15 mEq    Aluminum  -- --    Phosphate  15 mmol 15 mmol    Chloride  70 mEq 70 mEq    Acetate  113.5 mEq 92.29 mEq       Other    Total Amino Acid  75 g 49.95 g    Total Amino Acid/kg  0.74 g/kg 0.49 g/kg    Glucose Infusion Rate  1.82 mg/kg/min 1.5 mg/kg/min    Osmolarity (Estimated)   1,331.01 1,069.31    Volume  1,800 mL 1,800 mL    Rate  75 mL/hr 75 mL/hr    Dosing Weight  101 kg 102 kg    Infusion Site  Central Central              Jessie Thomas PharmD, BCPS  2/10/2022 11:36 EST

## 2022-02-10 NOTE — PROGRESS NOTES
"General Surgery Progress Note    Summary: Mr. Carlos Kraus is a 67 y.o. year old gentleman POD6 s/p exploratory laparotomy and lysis of adhesions for high-grade small bowel obstruction, now with an ileus. NPO, TPN, NG to LWS with less output.  KUB improved.  On MiraLAX.  Continue supportive care.  May need CT scan in the next few days if no improvement.    Interval Events: No acute events overnight.  Pain controlled.  No flatus or bowel movement.  Tolerating his NG tube.  Asking for ice.  KUB report reviewed and improved.    Vitals: /85 (BP Location: Right arm, Patient Position: Lying)   Pulse 100   Temp 99.7 °F (37.6 °C) (Oral)   Resp 18   Ht 188 cm (74.02\")   Wt 101 kg (221 lb 14.4 oz)   SpO2 97%   BMI 28.48 kg/m²     GENERAL: Alert, well appearing, and in no distress  HEENT: normocephalic, atraumatic, moist mucous membranes, clear sclerae  CHEST: no increased work of breathing, symmetric air entry  CARDIAC: regular rate and rhythm    ABDOMEN: Soft, distended, appropriately tender to palpation, incision clean and dry with staples intact  EXTREMITIES: no cyanosis, clubbing, or edema   SKIN: Warm and moist, no rashes    Labs:  Results from last 7 days   Lab Units 02/10/22  0628 02/09/22  0600 02/08/22  0541   WBC 10*3/mm3 13.57* 9.87 9.35   HEMOGLOBIN g/dL 12.5* 12.4* 12.9*   HEMATOCRIT % 37.4* 36.7* 38.8   PLATELETS 10*3/mm3 164 174 180     Results from last 7 days   Lab Units 02/10/22  0628 02/09/22  0600 02/08/22  0541   SODIUM mmol/L 143 143 139   POTASSIUM mmol/L 3.6 3.4* 3.4*   CHLORIDE mmol/L 102 100 98   CO2 mmol/L 30.8* 32.8* 29.6*   BUN mg/dL 13 18 21   CREATININE mg/dL 0.54* 0.55* 0.58*   CALCIUM mg/dL 8.0* 7.7* 7.7*   BILIRUBIN mg/dL 0.4 0.3 0.5   ALK PHOS U/L 58 45 45   ALT (SGPT) U/L 54* 41 48*   AST (SGOT) U/L 48* 35 40   GLUCOSE mg/dL 147* 110* 115*           PERLA HERNDON MD  General and Endoscopic Surgery  [facility]    [address], [address]  [place], [place], " [address]  P: [phone]  F: [phone]

## 2022-02-10 NOTE — CASE MANAGEMENT/SOCIAL WORK
Continued Stay Note  [facility]     Patient Name: Carlos Kraus  MRN: [id number]  Today's Date: 2/10/2022    Admit Date: 2/3/2022     Discharge Plan     Row Name 02/10/22 1303       Plan    Plan Plan return to [facility]- personal care .     DENISE Robins RN    Patient/Family in Agreement with Plan yes    Plan Comments Pt continues with NG tube.  Pt resides at [facility] in [place], [place].   Called and spoke with the  (Stephanie  [phone]) of [facility] and updated her on pt's condition.   Plan return to [facility]- personal care.   DENISE Robins RN               Discharge Codes    No documentation.               Expected Discharge Date and Time     Expected Discharge Date Expected Discharge Time    Feb 14, 2022             Yoanna Robins, RN

## 2022-02-10 NOTE — PROGRESS NOTES
HOSPITAL FOLLOW UP NOTE    Patient Name: Carlos Kraus  Patient : 1955        Date of Service:02/10/22  Provider of Service: Miguelangel Benson MD  Place of Service: [facility]  Referral Provider: Yesenia Bauer, *          Follow Up: Atrial fibrillation    Interval Hx: Patient's heart rate still intermittently elevated. Currently on diltiazem, metoprolol as well as digoxin.        OBJECTIVE  Temp:  [98.1 °F (36.7 °C)-99.7 °F (37.6 °C)] 99.7 °F (37.6 °C)  Heart Rate:  [] 100  Resp:  [16-20] 18  BP: (122-138)/(83-94) 122/85     Intake/Output Summary (Last 24 hours) at 2/10/2022 0835  Last data filed at 2/10/2022 0550  Gross per 24 hour   Intake 240 ml   Output 2575 ml   Net -2335 ml     Body mass index is 28.48 kg/m².      22  0547 22  0420 02/10/22  0520   Weight: 107 kg (236 lb 8 oz) 102 kg (224 lb 1.6 oz) 101 kg (221 lb 14.4 oz)         Physical Exam:   Vitals reviewed.   Constitutional:       Appearance: Well-developed.   Eyes:      Pupils: Pupils are equal, round, and reactive to light.   HENT:      Head: Normocephalic.   Neck:      Thyroid: No thyromegaly.      Vascular: No carotid bruit or JVD.   Pulmonary:      Effort: Pulmonary effort is normal.      Breath sounds: Normal breath sounds.   Cardiovascular:      Normal rate. Irregularly irregular rhythm. Normal S1. Normal S2.      Murmurs: There is no murmur.      No gallop. No click. No rub.   Pulses:     Intact distal pulses.   Edema:     Peripheral edema absent.   Abdominal:      General: Bowel sounds are normal.      Palpations: Abdomen is soft.   Musculoskeletal:      Cervical back: Normal range of motion. Skin:     General: Skin is warm and dry.      Findings: No erythema.   Neurological:      Mental Status: Alert and oriented to person, place, and time.           CURRENT MEDS    Scheduled Meds:aspirin, 81 mg, Oral, Daily  digoxin, 250 mcg, Oral, Daily  dilTIAZem CD, 360 mg, Oral, Q24H  enoxaparin, 40  mg, Subcutaneous, Nightly  famotidine, 20 mg, Intravenous, Q12H  insulin regular, 0-7 Units, Subcutaneous, Q6H  ipratropium-albuterol, 3 mL, Nebulization, Q6H  metoprolol tartrate, 25 mg, Oral, Q12H  polyethylene glycol, 17 g, Oral, Daily  risperiDONE, 5 mg, Oral, Nightly  sodium chloride, 10 mL, Intravenous, Q12H  sodium chloride, 10 mL, Intravenous, Q12H  sodium chloride, 10 mL, Intravenous, Q12H      Continuous Infusions:Adult Central 2-in-1 TPN, , Last Rate: 75 mL/hr at 02/09/22 1731  Pharmacy to Dose TPN,   sodium chloride, 50 mL/hr, Last Rate: 50 mL/hr (02/09/22 1731)          Lab Review:   Results from last 7 days   Lab Units 02/10/22  0628 02/09/22  0600   SODIUM mmol/L 143 143   POTASSIUM mmol/L 3.6 3.4*   CHLORIDE mmol/L 102 100   CO2 mmol/L 30.8* 32.8*   BUN mg/dL 13 18   CREATININE mg/dL 0.54* 0.55*   GLUCOSE mg/dL 147* 110*   CALCIUM mg/dL 8.0* 7.7*   AST (SGOT) U/L 48* 35   ALT (SGPT) U/L 54* 41         Results from last 7 days   Lab Units 02/10/22  0628 02/09/22  0600   WBC 10*3/mm3 13.57* 9.87   HEMOGLOBIN g/dL 12.5* 12.4*   HEMATOCRIT % 37.4* 36.7*   PLATELETS 10*3/mm3 164 174         Results from last 7 days   Lab Units 02/10/22  0628 02/09/22  0600   MAGNESIUM mg/dL 2.4 2.8*     Results from last 7 days   Lab Units 02/09/22  0600   TRIGLYCERIDES mg/dL 186*     Results from last 7 days   Lab Units 02/03/22  1145   PROBNP pg/mL 785.0     Results from last 7 days   Lab Units 02/04/22  0500   TSH uIU/mL 1.360       I personally reviewed the patient's ECG and telemetry data    ASSESSMENT & PLAN    SBO (small bowel obstruction) (HCC)    Schizophrenia (HCC)    Atrial fibrillation (HCC)    Hypertension    Generalized abdominal pain    GERD (gastroesophageal reflux disease)    Hypothyroidism    Hyperlipidemia    Hypophosphatemia      1. Small bowel obstruction: Status post exploratory lap. Per surgery  2. Atrial fibrillation: Permanent. Intermittently heart rates are elevated. Just started on beta-blocker  yesterday. Will not make further adjustments today and see how he tolerates it the beta-blocker as well as diltiazem and digoxin. Concerned about anticoagulation long-term. This patient has not had any follow-up since 2019. We will need to address prior to his discharge. We will need to investigate his living situation and whether or not anticoagulation can be safely administered. His [id number]-[id number] score is 2  Miguelangel Benson MD  02/10/22

## 2022-02-10 NOTE — THERAPY TREATMENT NOTE
Patient Name: Carlos Kraus  : 1955    MRN: [id number]                              Today's Date: 2/10/2022       Admit Date: 2/3/2022    Visit Dx:     ICD-10-CM ICD-9-CM   1. Generalized abdominal pain  R10.84 789.07   2. Acute pancreatitis, unspecified complication status, unspecified pancreatitis type  K85.90 577.0   3. Small bowel obstruction (HCC)  K56.609 560.9   4. Atrial fibrillation with RVR (HCC)  I48.91 427.31     Patient Active Problem List   Diagnosis   • Syncope and collapse   • Schizophrenia (HCC)   • Atrial fibrillation (HCC)   • Hypertension   • Orthostatic hypotension   • Hypokalemia   • Generalized abdominal pain   • GERD (gastroesophageal reflux disease)   • Hypothyroidism   • Hyperlipidemia   • Hypophosphatemia   • SBO (small bowel obstruction) (HCC)     Past Medical History:   Diagnosis Date   • A-fib (HCC)    • Atrial fibrillation (HCC) 2019   • [place] esophagus    • GERD (gastroesophageal reflux disease)    • Hx of schizophrenia    • Hyperlipidemia    • Hypertension    • Hypokalemia 2019   • Hypophosphatemia    • Hypothyroidism    • Schizophrenia (HCC) 2019   • Syncope and collapse 2019   • Umbilical hernia      Past Surgical History:   Procedure Laterality Date   • EXPLORATORY LAPAROTOMY N/A 2022    Procedure: LAPAROTOMY EXPLORATORY LYSIS OF ADHESIONS;  Surgeon: Edyta Agosto MD;  Location: [facility];  Service: General;  Laterality: N/A;      General Information     Row Name 02/10/22 1310          Physical Therapy Time and Intention    Document Type therapy note (daily note)  -     Mode of Treatment individual therapy; physical therapy  -     Row Name 02/10/22 1310          General Information    Patient Profile Reviewed yes  -     Existing Precautions/Restrictions fall  -     Row Name 02/10/22 131          Cognition    Orientation Status (Cognition) unable/difficult to assess  not much conversation today  -     Row Name 02/10/22 7410           Safety Issues, Functional Mobility    Impairments Affecting Function (Mobility) endurance/activity tolerance; strength  -           User Key  (r) = Recorded By, (t) = Taken By, (c) = Cosigned By    Initials Name Provider Type    Lety Burks PTA Physical Therapy Assistant               Mobility     Row Name 02/10/22 1310          Bed Mobility    Supine-Sit [place] (Bed Mobility) 2 person assist; minimum assist (75% patient effort); verbal cues; nonverbal cues (demo/gesture)  -     Sit-Supine [place] (Bed Mobility) 2 person assist; minimum assist (75% patient effort); verbal cues; nonverbal cues (demo/gesture)  -     Assistive Device (Bed Mobility) bed rails; draw sheet; head of bed elevated  -     Row Name 02/10/22 1310          Bed-Chair Transfer    Bed-Chair [place] (Transfers) not tested  -     Row Name 02/10/22 1310          Sit-Stand Transfer    Sit-Stand [place] (Transfers) verbal cues; nonverbal cues (demo/gesture); contact guard; 2 person assist  -     Assistive Device (Sit-Stand Transfers) --  HHA-2  -     Row Name 02/10/22 1310          Gait/Stairs (Locomotion)    [place] Level (Gait) 2 person assist; minimum assist (75% patient effort)  -     Assistive Device (Gait) walker, front-wheeled  -     Distance in Feet (Gait) 15ft x2, HR 89 today  -     Deviations/Abnormal Patterns (Gait) festinating/shuffling  -     Bilateral Gait Deviations forward flexed posture  -           User Key  (r) = Recorded By, (t) = Taken By, (c) = Cosigned By    Initials Name Provider Type    Lety Burks PTA Physical Therapy Assistant               Obj/Interventions    No documentation.                Goals/Plan    No documentation.                Clinical Impression    No documentation.                Outcome Measures     Row Name 02/10/22 0831          How much help from another person do you currently need...    Turning from your back to your side while in flat  bed without using bedrails? 3  -RW     Moving from lying on back to sitting on the side of a flat bed without bedrails? 3  -RW     Moving to and from a bed to a chair (including a wheelchair)? 3  -RW     Standing up from a chair using your arms (e.g., wheelchair, bedside chair)? 2  -RW     Climbing 3-5 steps with a railing? 2  -RW     To walk in hospital room? 2  -RW     AM-PAC 6 Clicks Score (PT) 15  -RW           User Key  (r) = Recorded By, (t) = Taken By, (c) = Cosigned By    Initials Name Provider Type    Dionicio Florence, RN Registered Nurse                             Physical Therapy Education                 Title: PT OT SLP Therapies (In Progress)     Topic: Physical Therapy (In Progress)     Point: Mobility training (In Progress)     Learning Progress Summary           Patient Acceptance, E,D, NR by HILLARY at 2/8/2022 1757    Acceptance, E, VU,NR by  at 2/7/2022 1545    Acceptance, E,TB, VU by SHILPA at 2/6/2022 1440    Acceptance, E,TB, VU,NR by  at 2/6/2022 0835    Acceptance, E, NL by MARYCRUZ at 2/5/2022 1216   Other Acceptance, E, VU,NR by  at 2/7/2022 1545                   Point: Home exercise program (In Progress)     Learning Progress Summary           Patient Acceptance, E,D, NR by HILLARY at 2/8/2022 1757    Acceptance, E,TB, VU by SHILPA at 2/6/2022 1440    Acceptance, E,TB, VU,NR by  at 2/6/2022 0835                   Point: Body mechanics (In Progress)     Learning Progress Summary           Patient Acceptance, E,D, NR by HILLARY at 2/8/2022 1757    Acceptance, E, VU,NR by MARYCRUZ at 2/7/2022 1545    Acceptance, E,TB, VU by SHILPA at 2/6/2022 1440    Acceptance, E,TB, VU,NR by  at 2/6/2022 0835    Acceptance, E, NL by  at 2/5/2022 1216   Other Acceptance, E, VU,NR by  at 2/7/2022 1545                   Point: Precautions (Done)     Learning Progress Summary           Patient Acceptance, E, VU,NR by MS at 2/9/2022 0423    Acceptance, E,D, NR by HILLARY at 2/8/2022 1757    Acceptance, E, VU,NR by MARYCRUZ at 2/7/2022 2555     Acceptance, E,TB, VU by  at 2/6/2022 1440    Acceptance, E,TB, VU,NR by  at 2/6/2022 0835    Acceptance, E, NL by  at 2/5/2022 1216   Other Acceptance, E, VU,NR by  at 2/7/2022 1545                               User Key     Initials Effective Dates Name Provider Type Discipline     03/07/18 -  Lety Villa PTA Physical Therapy Assistant PT     06/16/21 -  Dionicio Jennings, RN Registered Nurse Nurse    MS 06/16/21 -  Mendy Odonnell, RN Registered Nurse Nurse     10/25/19 -  Pamela Otero, PT Physical Therapist PT     03/10/21 -  Debora Herman, RN Registered Nurse Nurse              PT Recommendation and Plan     Plan of Care Reviewed With: patient  Progress: declining  Outcome Summary: Pt initially declined PT session, but then encouraged at least to sit EOB; pt agreed but req assist of 2 for safety; initial , which RN was aware of him being tachy today, but then w/sitting EOB and taking 2 side-steps, pt incr HR to 159-RN alerted ; pt returned to supine ; will attempt amb next session if appropriate; plans DC back to facility where he resides     Time Calculation:    PT Charges     Row Name 02/10/22 1306             Time Calculation    Start Time 1115  -      Stop Time 1133  -      Time Calculation (min) 18 min  -      PT Received On 02/10/22  -      PT - Next Appointment 02/11/22  -            User Key  (r) = Recorded By, (t) = Taken By, (c) = Cosigned By    Initials Name Provider Type     Lety Villa PTA Physical Therapy Assistant              Therapy Charges for Today     Code Description Service Date Service Provider Modifiers Qty    24474157997 HC PT THER PROC EA 15 MIN 2/10/2022 Lety Villa PTA GP 1    87162141113 HC PT THER SUPP EA 15 MIN 2/10/2022 Lety Villa PTA GP 1          PT G-Codes  Outcome Measure Options: AM-PAC 6 Clicks Basic Mobility (PT)  AM-PAC 6 Clicks Score (PT): 15  AM-PAC 6 Clicks Score (OT): 24    Lety Villa PTA  2/10/2022

## 2022-02-11 ENCOUNTER — APPOINTMENT (OUTPATIENT)
Dept: GENERAL RADIOLOGY | Facility: HOSPITAL | Age: 67
End: 2022-02-11

## 2022-02-11 LAB
ALBUMIN SERPL-MCNC: 2.7 G/DL (ref 3.5–5.2)
ALBUMIN/GLOB SERPL: 1 G/DL
ALP SERPL-CCNC: 68 U/L (ref 39–117)
ALT SERPL W P-5'-P-CCNC: 82 U/L (ref 1–41)
ANION GAP SERPL CALCULATED.3IONS-SCNC: 6.3 MMOL/L (ref 5–15)
AST SERPL-CCNC: 68 U/L (ref 1–40)
BASOPHILS # BLD AUTO: 0.02 10*3/MM3 (ref 0–0.2)
BASOPHILS NFR BLD AUTO: 0.1 % (ref 0–1.5)
BILIRUB SERPL-MCNC: 0.7 MG/DL (ref 0–1.2)
BUN SERPL-MCNC: 12 MG/DL (ref 8–23)
BUN/CREAT SERPL: 22.2 (ref 7–25)
CALCIUM SPEC-SCNC: 8.2 MG/DL (ref 8.6–10.5)
CHLORIDE SERPL-SCNC: 96 MMOL/L (ref 98–107)
CO2 SERPL-SCNC: 31.7 MMOL/L (ref 22–29)
CREAT SERPL-MCNC: 0.54 MG/DL (ref 0.76–1.27)
DEPRECATED RDW RBC AUTO: 39.4 FL (ref 37–54)
EOSINOPHIL # BLD AUTO: 0.11 10*3/MM3 (ref 0–0.4)
EOSINOPHIL NFR BLD AUTO: 0.8 % (ref 0.3–6.2)
ERYTHROCYTE [DISTWIDTH] IN BLOOD BY AUTOMATED COUNT: 12.1 % (ref 12.3–15.4)
GFR SERPL CREATININE-BSD FRML MDRD: >150 ML/MIN/1.73
GLOBULIN UR ELPH-MCNC: 2.8 GM/DL
GLUCOSE BLDC GLUCOMTR-MCNC: 110 MG/DL (ref 70–130)
GLUCOSE BLDC GLUCOMTR-MCNC: 113 MG/DL (ref 70–130)
GLUCOSE BLDC GLUCOMTR-MCNC: 117 MG/DL (ref 70–130)
GLUCOSE BLDC GLUCOMTR-MCNC: 121 MG/DL (ref 70–130)
GLUCOSE BLDC GLUCOMTR-MCNC: 146 MG/DL (ref 70–130)
GLUCOSE SERPL-MCNC: 109 MG/DL (ref 65–99)
HCT VFR BLD AUTO: 34 % (ref 37.5–51)
HGB BLD-MCNC: 11.7 G/DL (ref 13–17.7)
IMM GRANULOCYTES # BLD AUTO: 0.27 10*3/MM3 (ref 0–0.05)
IMM GRANULOCYTES NFR BLD AUTO: 1.8 % (ref 0–0.5)
LYMPHOCYTES # BLD AUTO: 1.14 10*3/MM3 (ref 0.7–3.1)
LYMPHOCYTES NFR BLD AUTO: 7.8 % (ref 19.6–45.3)
MAGNESIUM SERPL-MCNC: 2.2 MG/DL (ref 1.6–2.4)
MCH RBC QN AUTO: 31.3 PG (ref 26.6–33)
MCHC RBC AUTO-ENTMCNC: 34.4 G/DL (ref 31.5–35.7)
MCV RBC AUTO: 90.9 FL (ref 79–97)
MONOCYTES # BLD AUTO: 0.78 10*3/MM3 (ref 0.1–0.9)
MONOCYTES NFR BLD AUTO: 5.3 % (ref 5–12)
NEUTROPHILS NFR BLD AUTO: 12.29 10*3/MM3 (ref 1.7–7)
NEUTROPHILS NFR BLD AUTO: 84.2 % (ref 42.7–76)
NRBC BLD AUTO-RTO: 0 /100 WBC (ref 0–0.2)
PHOSPHATE SERPL-MCNC: 2.8 MG/DL (ref 2.5–4.5)
PLATELET # BLD AUTO: 200 10*3/MM3 (ref 140–450)
PMV BLD AUTO: 10.3 FL (ref 6–12)
POTASSIUM SERPL-SCNC: 3.2 MMOL/L (ref 3.5–5.2)
PROT SERPL-MCNC: 5.5 G/DL (ref 6–8.5)
RBC # BLD AUTO: 3.74 10*6/MM3 (ref 4.14–5.8)
SODIUM SERPL-SCNC: 134 MMOL/L (ref 136–145)
WBC NRBC COR # BLD: 14.61 10*3/MM3 (ref 3.4–10.8)

## 2022-02-11 PROCEDURE — 85025 COMPLETE CBC W/AUTO DIFF WBC: CPT | Performed by: STUDENT IN AN ORGANIZED HEALTH CARE EDUCATION/TRAINING PROGRAM

## 2022-02-11 PROCEDURE — 94799 UNLISTED PULMONARY SVC/PX: CPT

## 2022-02-11 PROCEDURE — 25010000002 ENOXAPARIN PER 10 MG: Performed by: STUDENT IN AN ORGANIZED HEALTH CARE EDUCATION/TRAINING PROGRAM

## 2022-02-11 PROCEDURE — 84100 ASSAY OF PHOSPHORUS: CPT | Performed by: STUDENT IN AN ORGANIZED HEALTH CARE EDUCATION/TRAINING PROGRAM

## 2022-02-11 PROCEDURE — 25010000002 CALCIUM GLUCONATE PER 10 ML: Performed by: HOSPITALIST

## 2022-02-11 PROCEDURE — 74018 RADEX ABDOMEN 1 VIEW: CPT

## 2022-02-11 PROCEDURE — 25010000002 MAGNESIUM SULFATE PER 500 MG OF MAGNESIUM: Performed by: HOSPITALIST

## 2022-02-11 PROCEDURE — 83735 ASSAY OF MAGNESIUM: CPT | Performed by: STUDENT IN AN ORGANIZED HEALTH CARE EDUCATION/TRAINING PROGRAM

## 2022-02-11 PROCEDURE — 94761 N-INVAS EAR/PLS OXIMETRY MLT: CPT

## 2022-02-11 PROCEDURE — 82962 GLUCOSE BLOOD TEST: CPT

## 2022-02-11 PROCEDURE — 99232 SBSQ HOSP IP/OBS MODERATE 35: CPT | Performed by: INTERNAL MEDICINE

## 2022-02-11 PROCEDURE — 80053 COMPREHEN METABOLIC PANEL: CPT | Performed by: STUDENT IN AN ORGANIZED HEALTH CARE EDUCATION/TRAINING PROGRAM

## 2022-02-11 RX ADMIN — CALCIUM GLUCONATE: 98 INJECTION, SOLUTION INTRAVENOUS at 18:14

## 2022-02-11 RX ADMIN — IPRATROPIUM BROMIDE AND ALBUTEROL SULFATE 3 ML: 2.5; .5 SOLUTION RESPIRATORY (INHALATION) at 19:28

## 2022-02-11 RX ADMIN — NYSTATIN 500000 UNITS: 100000 SUSPENSION ORAL at 08:22

## 2022-02-11 RX ADMIN — IPRATROPIUM BROMIDE AND ALBUTEROL SULFATE 3 ML: 2.5; .5 SOLUTION RESPIRATORY (INHALATION) at 07:45

## 2022-02-11 RX ADMIN — SODIUM CHLORIDE, PRESERVATIVE FREE 10 ML: 5 INJECTION INTRAVENOUS at 20:18

## 2022-02-11 RX ADMIN — DILTIAZEM HYDROCHLORIDE 360 MG: 180 CAPSULE, COATED, EXTENDED RELEASE ORAL at 08:22

## 2022-02-11 RX ADMIN — NYSTATIN 500000 UNITS: 100000 SUSPENSION ORAL at 20:16

## 2022-02-11 RX ADMIN — POTASSIUM CHLORIDE 40 MEQ: 750 TABLET, EXTENDED RELEASE ORAL at 13:05

## 2022-02-11 RX ADMIN — METOPROLOL TARTRATE 25 MG: 25 TABLET, FILM COATED ORAL at 08:22

## 2022-02-11 RX ADMIN — SODIUM CHLORIDE, PRESERVATIVE FREE 10 ML: 5 INJECTION INTRAVENOUS at 08:23

## 2022-02-11 RX ADMIN — RISPERIDONE 5 MG: 2 TABLET, FILM COATED ORAL at 20:17

## 2022-02-11 RX ADMIN — SODIUM CHLORIDE, PRESERVATIVE FREE 10 ML: 5 INJECTION INTRAVENOUS at 08:22

## 2022-02-11 RX ADMIN — POLYETHYLENE GLYCOL 3350 17 G: 17 POWDER, FOR SOLUTION ORAL at 08:22

## 2022-02-11 RX ADMIN — METOPROLOL TARTRATE 37.5 MG: 25 TABLET, FILM COATED ORAL at 20:17

## 2022-02-11 RX ADMIN — DIGOXIN 250 MCG: 250 TABLET ORAL at 12:58

## 2022-02-11 RX ADMIN — ENOXAPARIN SODIUM 40 MG: 100 INJECTION SUBCUTANEOUS at 20:17

## 2022-02-11 RX ADMIN — ASPIRIN 81 MG: 81 TABLET, COATED ORAL at 08:22

## 2022-02-11 RX ADMIN — Medication 3 MG: at 20:17

## 2022-02-11 RX ADMIN — IPRATROPIUM BROMIDE AND ALBUTEROL SULFATE 3 ML: 2.5; .5 SOLUTION RESPIRATORY (INHALATION) at 11:43

## 2022-02-11 RX ADMIN — POTASSIUM CHLORIDE 40 MEQ: 750 TABLET, EXTENDED RELEASE ORAL at 08:22

## 2022-02-11 RX ADMIN — HYDROCODONE BITARTRATE AND ACETAMINOPHEN 1 TABLET: 5; 325 TABLET ORAL at 12:59

## 2022-02-11 RX ADMIN — HYDROCODONE BITARTRATE AND ACETAMINOPHEN 1 TABLET: 5; 325 TABLET ORAL at 00:20

## 2022-02-11 RX ADMIN — FAMOTIDINE 20 MG: 10 INJECTION INTRAVENOUS at 08:23

## 2022-02-11 RX ADMIN — FAMOTIDINE 20 MG: 10 INJECTION INTRAVENOUS at 20:30

## 2022-02-11 NOTE — PLAN OF CARE
Goal Outcome Evaluation:  Plan of Care Reviewed With: patient           Outcome Summary: KUB this AM seems better.  NG still suctioning to LWS continuous.  Pt tolerating well.  Pt not asking for pain meds as much.  Green liquid from NG tube.  RA.  TPN and IVF cont.  Urinating well, only gas from rectum.  VSS.  Will cont to monitor.

## 2022-02-11 NOTE — PROGRESS NOTES
HOSPITAL FOLLOW UP NOTE    Patient Name: Carlos Kraus  Patient : 1955        Date of Service:22  Provider of Service: Miguelangel Benson MD  Place of Service: [facility]  Referral Provider: Yesenia Bauer, *          Follow Up: Atrial fibrillation  Interval Hx: Patient is average heart rate still slightly elevated.  Blood pressure appears satisfactory enough to support increase beta-blocker.        OBJECTIVE  Temp:  [98.8 °F (37.1 °C)-98.9 °F (37.2 °C)] 98.9 °F (37.2 °C)  Heart Rate:  [] 109  Resp:  [18] 18  BP: (129-141)/(77-89) 141/77     Intake/Output Summary (Last 24 hours) at 2022 0816  Last data filed at 2022 0450  Gross per 24 hour   Intake --   Output 2850 ml   Net -2850 ml     Body mass index is 27.5 kg/m².      22  0420 02/10/22  0520 22  0512   Weight: 102 kg (224 lb 1.6 oz) 101 kg (221 lb 14.4 oz) 97.2 kg (214 lb 4.8 oz)         Physical Exam:   Vitals reviewed.   Constitutional:       Appearance: Healthy appearance. Well-developed.   Eyes:      Pupils: Pupils are equal, round, and reactive to light.   HENT:      Head: Normocephalic.   Neck:      Thyroid: No thyromegaly.      Vascular: No carotid bruit or JVD.   Pulmonary:      Effort: Pulmonary effort is normal.      Breath sounds: Normal breath sounds.   Cardiovascular:      Normal rate. Irregularly irregular rhythm. Normal S1. Normal S2.      Murmurs: There is no murmur.      No gallop.   Pulses:     Intact distal pulses.   Edema:     Peripheral edema absent.   Skin:     General: Skin is warm and dry.      Findings: No erythema.   Neurological:      Mental Status: Alert and oriented to person, place, and time.           CURRENT MEDS    Scheduled Meds:aspirin, 81 mg, Oral, Daily  digoxin, 250 mcg, Oral, Daily  dilTIAZem CD, 360 mg, Oral, Q24H  enoxaparin, 40 mg, Subcutaneous, Nightly  famotidine, 20 mg, Intravenous, Q12H  insulin regular, 0-7 Units, Subcutaneous,  Q6H  ipratropium-albuterol, 3 mL, Nebulization, Q6H  metoprolol tartrate, 25 mg, Oral, Q12H  nystatin, 5 mL, Swish & Spit, 4x Daily  polyethylene glycol, 17 g, Oral, Daily  risperiDONE, 5 mg, Oral, Nightly  sodium chloride, 10 mL, Intravenous, Q12H  sodium chloride, 10 mL, Intravenous, Q12H  sodium chloride, 10 mL, Intravenous, Q12H      Continuous Infusions:Adult Central 2-in-1 TPN, , Last Rate: 75 mL/hr at 02/10/22 1832  Pharmacy to Dose TPN,   sodium chloride, 50 mL/hr, Last Rate: 50 mL/hr (02/09/22 1731)          Lab Review:   Results from last 7 days   Lab Units 02/11/22  0631 02/10/22  0628   SODIUM mmol/L 134* 143   POTASSIUM mmol/L 3.2* 3.6   CHLORIDE mmol/L 96* 102   CO2 mmol/L 31.7* 30.8*   BUN mg/dL 12 13   CREATININE mg/dL 0.54* 0.54*   GLUCOSE mg/dL 109* 147*   CALCIUM mg/dL 8.2* 8.0*   AST (SGOT) U/L 68* 48*   ALT (SGPT) U/L 82* 54*         Results from last 7 days   Lab Units 02/11/22  0631 02/10/22  0628   WBC 10*3/mm3 14.61* 13.57*   HEMOGLOBIN g/dL 11.7* 12.5*   HEMATOCRIT % 34.0* 37.4*   PLATELETS 10*3/mm3 200 164         Results from last 7 days   Lab Units 02/10/22  0628 02/09/22  0600   MAGNESIUM mg/dL 2.4 2.8*     Results from last 7 days   Lab Units 02/09/22  0600   TRIGLYCERIDES mg/dL 186*               I personally reviewed the patient's ECG and telemetry data    ASSESSMENT & PLAN    SBO (small bowel obstruction) (HCC)    Schizophrenia (HCC)    Atrial fibrillation (HCC)    Hypertension    Generalized abdominal pain    GERD (gastroesophageal reflux disease)    Hypothyroidism    Hyperlipidemia    Hypophosphatemia    1.  Small bowel obstruction: Status post exploratory lap.  Management per surgery  2.  Atrial fibrillation: Permanent heart rate still mildly elevated.  We will plan on increasing his beta-blocker.  Presently he is on Lovenox.  He has not been on long-term anticoagulation for his atrial fibrillation.  There has been no cardiology follow-up since 2019.  I checked with the discharge  coordinator who states that the patient is at an oversight facility but there are no RNs at the facility.  The patient is administered his medications with basically he lives independently.  Not certain he would be able to tolerate long-term anticoagulation unless he comes for regular checks.  Ideally he would be on Eliquis so that he would not be burdened with blood testing if you are on warfarin.  We will need to vet this out carefully prior to his discharge.  His [id number] score remains 2.    Miguelangel Benson MD  02/11/22

## 2022-02-11 NOTE — PROGRESS NOTES
Dedicated to Hospital Care    [phone]   LOS: 8 days     Name: Carlos Kraus  Age/Sex: 67 y.o. male  :  1955        PCP: Provider, No Known  Chief Complaint   Patient presents with   • Abdominal Pain      Subjective   Still with a lot of fluid out from his NG tube.  Patient is somewhat lethargic this morning he did not rest well overnight.    General: No Fever or Chills, Cardiac: No Chest Pain or Palpitations, Resp: No Cough or SOA and Other: No bleeding    aspirin, 81 mg, Oral, Daily  digoxin, 250 mcg, Oral, Daily  dilTIAZem CD, 360 mg, Oral, Q24H  enoxaparin, 40 mg, Subcutaneous, Nightly  famotidine, 20 mg, Intravenous, Q12H  insulin regular, 0-7 Units, Subcutaneous, Q6H  ipratropium-albuterol, 3 mL, Nebulization, Q6H  metoprolol tartrate, 37.5 mg, Oral, Q12H  nystatin, 5 mL, Swish & Spit, 4x Daily  polyethylene glycol, 17 g, Oral, Daily  risperiDONE, 5 mg, Oral, Nightly  sodium chloride, 10 mL, Intravenous, Q12H  sodium chloride, 10 mL, Intravenous, Q12H  sodium chloride, 10 mL, Intravenous, Q12H      Adult Central 2-in-1 TPN, , Last Rate: 75 mL/hr at 02/10/22 1832  Adult Central 2-in-1 TPN,   Pharmacy to Dose TPN,   sodium chloride, 50 mL/hr, Last Rate: 50 mL/hr (22 1731)        Objective   Vital Signs  Temp:  [98.9 °F (37.2 °C)] 98.9 °F (37.2 °C)  Heart Rate:  [102-112] 106  Resp:  [18] 18  BP: (131-141)/(77-89) 141/77  Body mass index is 27.5 kg/m².    Intake/Output Summary (Last 24 hours) at 2022 1353  Last data filed at 2022 1200  Gross per 24 hour   Intake --   Output 3250 ml   Net -3250 ml       Physical Exam  Vitals and nursing note reviewed.   Constitutional:       Appearance: Normal appearance.   HENT:      Head: Normocephalic and atraumatic.   Cardiovascular:      Rate and Rhythm: Normal rate and regular rhythm.   Pulmonary:      Effort: Pulmonary effort is normal. No respiratory distress.      Breath sounds: Normal breath sounds.   Abdominal:      General: There is  distension.      Comments: His abdomen is little less distended today it is soft.  He is got tympanic bowel sounds   Neurological:      General: No focal deficit present.      Mental Status: He is alert and oriented to person, place, and time.           Results Review:       I reviewed the patient's new clinical results.  Results from last 7 days   Lab Units 02/11/22  0631 02/10/22  0628 02/09/22  0600 02/08/22  0541 02/07/22  0531 02/06/22  0709 02/05/22  0807   WBC 10*3/mm3 14.61* 13.57* 9.87 9.35 9.00 8.77 10.05   HEMOGLOBIN g/dL 11.7* 12.5* 12.4* 12.9* 12.7* 13.4 14.9   PLATELETS 10*3/mm3 200 164 174 180 161 176 173     Results from last 7 days   Lab Units 02/11/22  0631 02/10/22  0628 02/09/22  0600 02/08/22  0541 02/07/22  2046 02/07/22  1830 02/07/22  0531 02/06/22  0709 02/05/22  0807 02/05/22  0807   SODIUM mmol/L 134* 143 143 139  --   --  139 139  --  134*   POTASSIUM mmol/L 3.2* 3.6 3.4* 3.4*  --  3.5 3.1* 3.8   < > 4.2   CHLORIDE mmol/L 96* 102 100 98  --   --  100 102  --  100   CO2 mmol/L 31.7* 30.8* 32.8* 29.6*  --   --  28.1 26.5  --  24.0   BUN mg/dL 12 13 18 21  --   --  15 18  --  23   CREATININE mg/dL 0.54* 0.54* 0.55* 0.58*  --   --  0.58* 0.66*  --  0.81   CALCIUM mg/dL 8.2* 8.0* 7.7* 7.7*  --   --  7.4* 7.4*  --  7.2*   MAGNESIUM mg/dL 2.2 2.4 2.8* 2.8*  --   --  2.7* 2.7*  --  2.2   PHOSPHORUS mg/dL 2.8 2.2* 1.8* 1.5* 2.0*  --  1.8* 1.5*   < > 1.1*    < > = values in this interval not displayed.   Estimated Creatinine Clearance: 126.6 mL/min (A) (by C-G formula based on SCr of 0.54 mg/dL (L)).      Assessment/Plan   Active Hospital Problems    Diagnosis  POA   • **SBO (small bowel obstruction) ([facility]) [K56.609]  Unknown   • GERD (gastroesophageal reflux disease) [K21.9]  Unknown   • Hypothyroidism [E03.9]  Unknown   • Hyperlipidemia [E78.5]  Unknown   • Hypophosphatemia [E83.39]  Unknown   • Generalized abdominal pain [R10.84]  Yes   • Hypertension [I10]  Yes   • Atrial fibrillation (HCC)  [I48.91]  Yes   • Schizophrenia (HCC) [F20.9]  Yes      Resolved Hospital Problems   No resolved problems to display.       PLAN  This is a 67-year-old gentleman with history of A. fib schizophrenia hypertension hyperlipidemia hypothyroidism presented to the hospital with abdominal pain nausea and vomiting was found to have a small bowel obstruction that required exploratory laparotomy and lysis of adhesions.  Postoperatively we are dealing with a postoperative ileus  -KUB independently reviewed from this morning shows persistent ileus  -continue NG to LWS, increase IVF to replace losses; noted contraction alkalosis today  -replace K and Phos per protocol  -Diet upgraded to ice chips and sips of water continue IV fluids, continue TPN   -Heart rate is stable for now but likely will require IV meds for further coverage given NPo and poor absorption  -Full code  -Lovenox for DVT prophylaxis    Disposition  To be determined      Clemente Byrd MD  [facility]  02/11/22  13:53 EST

## 2022-02-11 NOTE — PROGRESS NOTES
"Adult Nutrition  Assessment/PES    Patient Name:  Carlos Kraus  YOB: 1955  MRN: [id number]  Admit Date:  2/3/2022    Assessment Date:  2/11/2022    Comments:  Nutrition follow up.  NGT to LWS with less output.  KUB slowly improving.  TPN continues - 90 grams amino acids, 1300 kcal dextrose, no lipids as of yet.  + flatus.    RD to continue to follow.     Reason for Assessment     Row Name 02/11/22 1358          Reason for Assessment    Reason For Assessment TF/PN; follow-up protocol                Nutrition/Diet History     Row Name 02/11/22 1359          Nutrition/Diet History    Typical Food/Fluid Intake remains NPO, TPN continues                Anthropometrics     Row Name 02/11/22 1359          Anthropometrics    Height 188 cm (74.02\")            Admit Weight    Admit Weight --  214# 2/11            Ideal Body Weight (IBW)    Ideal Body Weight (IBW) (kg) 87.7            Body Mass Index (BMI)    BMI Assessment BMI 25-29.9: overweight  27.45                Labs/Tests/Procedures/Meds     Row Name 02/11/22 1400          Labs/Procedures/Meds    Lab Results Reviewed reviewed, pertinent     Lab Results Comments Na, K, Creat, Ca, AST, ALT, Alb, WBC, Hgb, Hct            Diagnostic Tests/Procedures    Diagnostic Test/Procedure Reviewed reviewed, pertinent            Medications    Pertinent Medications Reviewed reviewed, pertinent     Pertinent Medications Comments lovenox, pepcid, humulin, miralax, TPN at 75 ml/hr                Physical Findings     Row Name 02/11/22 1401          Physical Findings    Overall Physical Appearance overweight     Tubes nasogastric tube  NGT to LWS     Skin surgical incision  B=20, abd inc                Estimated/Assessed Needs     Row Name 02/11/22 1359          Calculation Measurements    Height 188 cm (74.02\")                Nutrition Prescription Ordered     Row Name 02/11/22 1409          Nutrition Prescription PO    Current PO Diet NPO; Sips/ice chips            " Nutrition Prescription PN    PN Route PICC     PN Current Rate (mL/hr) 75 mL/hr     Dextrose (Kcal) 1300     Amino Acid (gm) 90                Evaluation of Received Nutrient/Fluid Intake     Row Name 02/11/22 1410          Nutrient/Fluid Evaluation    Additional Documentation Calories Evaluation (Group); Protein Evaluation (Group)            Calories Evaluation    Parenteral Calories (kcal) 1660     % of Kcal Needs 81            Protein Evaluation    Parenteral Protein (gm) 90     % of Protein Needs 88                     Problem/Interventions:           Intervention Goal     Row Name 02/11/22 1411          Intervention Goal    General Maintain nutrition; Nutrition support treatment; Meet nutritional needs for age/condition; Disease management/therapy     PO Initiate feeding     TF/PN Adjust TF/PN; Deliver (%) goal     Deliver % of Goal 100 %     Transition PN to PO     Weight No significant weight loss                Nutrition Intervention     Row Name 02/11/22 1413          Nutrition Intervention    RD/Tech Action Follow Tx progress; Care plan reviewd     Recommended/Ordered PN                Nutrition Prescription     Row Name 02/11/22 1413          Nutrition Prescription PN    Parenteral Prescription Continue same protocal                Education/Evaluation     Row Name 02/11/22 1413          Education    Education Will Instruct as appropriate            Monitor/Evaluation    Monitor Per protocol; I&O; Pertinent labs; PN delivery/tolerance; Weight; Skin status; Symptoms                 Electronically signed by:  Sarah Medina RD  02/11/22 14:14 EST

## 2022-02-11 NOTE — PLAN OF CARE
Goal Outcome Evaluation:  Plan of Care Reviewed With: patient        Progress: improving  Outcome Summary: med for pain x 1 with some relief stated, no c/o nausea, ng clamped periodically for oral meds, tolerating, ng draining brown drainage, passed flatus x 1 according to a friend that was at bedside, VSS, continues in afib with controlled rate, eyes closed at long i nterval, resting quietly in room, will continue to monitor

## 2022-02-11 NOTE — PROGRESS NOTES
[facility] Clinical Pharmacy Services: TPN Daily Progress Note    TPN Day # 3   Indication: prolonged paralytic ileus  Route: central  Type: standard    Subjective/Objective  Results from last 7 days   Lab Units 22  0631 02/10/22  0628 22  0600   SODIUM mmol/L 134*   < > 143   POTASSIUM mmol/L 3.2*   < > 3.4*   CHLORIDE mmol/L 96*   < > 100   CO2 mmol/L 31.7*   < > 32.8*   BUN mg/dL 12   < > 18   CREATININE mg/dL 0.54*   < > 0.55*   CALCIUM mg/dL 8.2*   < > 7.7*   ALBUMIN g/dL 2.70*   < > 2.40*   BILIRUBIN mg/dL 0.7   < > 0.3   ALK PHOS U/L 68   < > 45   ALT (SGPT) U/L 82*   < > 41   AST (SGOT) U/L 68*   < > 35   GLUCOSE mg/dL 109*   < > 110*   MAGNESIUM mg/dL 2.2   < > 2.8*   PHOSPHORUS mg/dL 2.8   < > 1.8*   TRIGLYCERIDES mg/dL  --   --  186*    < > = values in this interval not displayed.        Diet Orders (active) (From admission, onward)       Start     Ordered    02/10/22 1013  NPO Diet NPO Except: Ice Chips, Sips With Meds  Diet Effective Now         02/10/22 1012                  Additional insulin administration while previous TPN infusin units  Additional electrolyte administration while previous TPN infusin mEq KCl PO  Acid suppression: Famotidine 20 mg Q12    Goal TPN Formula Recommendations: 100/1900/100 mL 20% AA/Dex/Lipids; 2500 kcal/day  Current TPN Formula: 75g/900kcal/0 mL  AA/Dex/Lipids    Assessment/Plan    Patient with slight decrease in KCl, although Phos improved so less likely to be a result of refeeding syndrome. AST/ALT are increasing - will need to monitor; pt with hx of pancreatitis so not yet on lipids - may need to consider using SMOFlipid once lipids start. Will increase macros again today; add increased K as Kphos given elevation in CO2. All other electrolytes to remain unchanged.     Macronutrients: Continue to increase - will increase to 90 g AA and dextrose to 1300 kcal. Continue to hold lipids. Triglycerides checked  at 186.   Electrolytes/Additives:    Sodium Chloride = 70 mEq  Potassium Acetate = 50 mEq  Potassium Phosphate = 22 mEq - increase to 30 mEq  Magnesium Chloride = 10 mEq  Calcium Gluconate = 9 mEq  Labs: CMP, Magnesium, Phos with AM labs    Aliya Morris Pharm.D., [facility]  Oncology Pharmacy Specialist  [phone]    Clinical Pharmacist

## 2022-02-11 NOTE — PROGRESS NOTES
"General Surgery Progress Note    Summary: Mr. Carlos Kraus is a 67 y.o. year old gentleman POD7 s/p exploratory laparotomy and lysis of adhesions for high-grade small bowel obstruction, now with an ileus. NPO, TPN, NG to LWS with less output.  KUB slowly improving.  On MiraLAX.  Continue supportive care.  May need CT scan in the next few days if no improvement.    Interval Events: No acute events overnight. No flatus or BM. Pain controlled. Got OOB yesterday.    Vitals: /77 (BP Location: Left arm, Patient Position: Lying)   Pulse 109   Temp 98.9 °F (37.2 °C) (Oral)   Resp 18   Ht 188 cm (74.02\")   Wt 97.2 kg (214 lb 4.8 oz)   SpO2 95%   BMI 27.50 kg/m²     GENERAL: Alert, well appearing, laying comfortably in bed, and in no distress  HEENT: normocephalic, atraumatic, moist mucous membranes, clear sclerae  CHEST: no increased work of breathing, symmetric air entry  CARDIAC: regular rate and rhythm    ABDOMEN: Soft, distended, appropriately tender to palpation, incision clean and dry with staples intact  EXTREMITIES: no cyanosis, clubbing, or edema   SKIN: Warm and moist, no rashes    Labs:  Results from last 7 days   Lab Units 02/11/22  0631 02/10/22  0628 02/09/22  0600   WBC 10*3/mm3 14.61* 13.57* 9.87   HEMOGLOBIN g/dL 11.7* 12.5* 12.4*   HEMATOCRIT % 34.0* 37.4* 36.7*   PLATELETS 10*3/mm3 200 164 174     Results from last 7 days   Lab Units 02/11/22  0631 02/10/22  0628 02/09/22  0600   SODIUM mmol/L 134* 143 143   POTASSIUM mmol/L 3.2* 3.6 3.4*   CHLORIDE mmol/L 96* 102 100   CO2 mmol/L 31.7* 30.8* 32.8*   BUN mg/dL 12 13 18   CREATININE mg/dL 0.54* 0.54* 0.55*   CALCIUM mg/dL 8.2* 8.0* 7.7*   BILIRUBIN mg/dL 0.7 0.4 0.3   ALK PHOS U/L 68 58 45   ALT (SGPT) U/L 82* 54* 41   AST (SGOT) U/L 68* 48* 35   GLUCOSE mg/dL 109* 147* 110*           PERLA HERNDON MD  General and Endoscopic Surgery  [facility]    [address], [address]  [place], [place], [address]  P: [phone]  F: " [phone]

## 2022-02-12 ENCOUNTER — APPOINTMENT (OUTPATIENT)
Dept: CT IMAGING | Facility: HOSPITAL | Age: 67
End: 2022-02-12

## 2022-02-12 ENCOUNTER — APPOINTMENT (OUTPATIENT)
Dept: GENERAL RADIOLOGY | Facility: HOSPITAL | Age: 67
End: 2022-02-12

## 2022-02-12 LAB
ALBUMIN SERPL-MCNC: 2.6 G/DL (ref 3.5–5.2)
ALBUMIN/GLOB SERPL: 0.8 G/DL
ALP SERPL-CCNC: 78 U/L (ref 39–117)
ALT SERPL W P-5'-P-CCNC: 83 U/L (ref 1–41)
ANION GAP SERPL CALCULATED.3IONS-SCNC: 6.1 MMOL/L (ref 5–15)
ARTERIAL PATENCY WRIST A: POSITIVE
AST SERPL-CCNC: 62 U/L (ref 1–40)
ATMOSPHERIC PRESS: 759.1 MMHG
BASE EXCESS BLDA CALC-SCNC: 6.2 MMOL/L (ref 0–2)
BASOPHILS # BLD AUTO: 0.05 10*3/MM3 (ref 0–0.2)
BASOPHILS NFR BLD AUTO: 0.4 % (ref 0–1.5)
BDY SITE: ABNORMAL
BILIRUB SERPL-MCNC: 0.6 MG/DL (ref 0–1.2)
BUN SERPL-MCNC: 12 MG/DL (ref 8–23)
BUN/CREAT SERPL: 16 (ref 7–25)
CALCIUM SPEC-SCNC: 8.5 MG/DL (ref 8.6–10.5)
CHLORIDE SERPL-SCNC: 96 MMOL/L (ref 98–107)
CO2 SERPL-SCNC: 33.9 MMOL/L (ref 22–29)
CREAT SERPL-MCNC: 0.75 MG/DL (ref 0.76–1.27)
DEPRECATED RDW RBC AUTO: 40.8 FL (ref 37–54)
EOSINOPHIL # BLD AUTO: 0.14 10*3/MM3 (ref 0–0.4)
EOSINOPHIL NFR BLD AUTO: 1 % (ref 0.3–6.2)
ERYTHROCYTE [DISTWIDTH] IN BLOOD BY AUTOMATED COUNT: 12.3 % (ref 12.3–15.4)
GFR SERPL CREATININE-BSD FRML MDRD: 105 ML/MIN/1.73
GLOBULIN UR ELPH-MCNC: 3.3 GM/DL
GLUCOSE BLDC GLUCOMTR-MCNC: 105 MG/DL (ref 70–130)
GLUCOSE BLDC GLUCOMTR-MCNC: 112 MG/DL (ref 70–130)
GLUCOSE BLDC GLUCOMTR-MCNC: 130 MG/DL (ref 70–130)
GLUCOSE BLDC GLUCOMTR-MCNC: 132 MG/DL (ref 70–130)
GLUCOSE SERPL-MCNC: 112 MG/DL (ref 65–99)
HCO3 BLDA-SCNC: 29.5 MMOL/L (ref 22–28)
HCT VFR BLD AUTO: 34.2 % (ref 37.5–51)
HGB BLD-MCNC: 11.7 G/DL (ref 13–17.7)
IMM GRANULOCYTES # BLD AUTO: 0.32 10*3/MM3 (ref 0–0.05)
IMM GRANULOCYTES NFR BLD AUTO: 2.3 % (ref 0–0.5)
LYMPHOCYTES # BLD AUTO: 1.19 10*3/MM3 (ref 0.7–3.1)
LYMPHOCYTES NFR BLD AUTO: 8.6 % (ref 19.6–45.3)
MAGNESIUM SERPL-MCNC: 2.3 MG/DL (ref 1.6–2.4)
MCH RBC QN AUTO: 31.5 PG (ref 26.6–33)
MCHC RBC AUTO-ENTMCNC: 34.2 G/DL (ref 31.5–35.7)
MCV RBC AUTO: 92.2 FL (ref 79–97)
MODALITY: ABNORMAL
MONOCYTES # BLD AUTO: 0.77 10*3/MM3 (ref 0.1–0.9)
MONOCYTES NFR BLD AUTO: 5.6 % (ref 5–12)
NEUTROPHILS NFR BLD AUTO: 11.35 10*3/MM3 (ref 1.7–7)
NEUTROPHILS NFR BLD AUTO: 82.1 % (ref 42.7–76)
NRBC BLD AUTO-RTO: 0 /100 WBC (ref 0–0.2)
PCO2 BLDA: 36.6 MM HG (ref 35–45)
PH BLDA: 7.51 PH UNITS (ref 7.35–7.45)
PHOSPHATE SERPL-MCNC: 3.4 MG/DL (ref 2.5–4.5)
PLATELET # BLD AUTO: 220 10*3/MM3 (ref 140–450)
PMV BLD AUTO: 10.7 FL (ref 6–12)
PO2 BLDA: 63 MM HG (ref 80–100)
POTASSIUM SERPL-SCNC: 3.7 MMOL/L (ref 3.5–5.2)
PROT SERPL-MCNC: 5.9 G/DL (ref 6–8.5)
RBC # BLD AUTO: 3.71 10*6/MM3 (ref 4.14–5.8)
SAO2 % BLDCOA: 93.9 % (ref 92–99)
SET MECH RESP RATE: 20
SODIUM SERPL-SCNC: 136 MMOL/L (ref 136–145)
WBC NRBC COR # BLD: 13.82 10*3/MM3 (ref 3.4–10.8)

## 2022-02-12 PROCEDURE — 0 DIATRIZOATE MEGLUMINE & SODIUM PER 1 ML: Performed by: STUDENT IN AN ORGANIZED HEALTH CARE EDUCATION/TRAINING PROGRAM

## 2022-02-12 PROCEDURE — 82803 BLOOD GASES ANY COMBINATION: CPT

## 2022-02-12 PROCEDURE — 25010000002 ENOXAPARIN PER 10 MG: Performed by: STUDENT IN AN ORGANIZED HEALTH CARE EDUCATION/TRAINING PROGRAM

## 2022-02-12 PROCEDURE — 94761 N-INVAS EAR/PLS OXIMETRY MLT: CPT

## 2022-02-12 PROCEDURE — 25010000002 MAGNESIUM SULFATE PER 500 MG OF MAGNESIUM: Performed by: STUDENT IN AN ORGANIZED HEALTH CARE EDUCATION/TRAINING PROGRAM

## 2022-02-12 PROCEDURE — 74177 CT ABD & PELVIS W/CONTRAST: CPT

## 2022-02-12 PROCEDURE — 99232 SBSQ HOSP IP/OBS MODERATE 35: CPT | Performed by: NURSE PRACTITIONER

## 2022-02-12 PROCEDURE — 25010000002 CALCIUM GLUCONATE PER 10 ML: Performed by: STUDENT IN AN ORGANIZED HEALTH CARE EDUCATION/TRAINING PROGRAM

## 2022-02-12 PROCEDURE — 36600 WITHDRAWAL OF ARTERIAL BLOOD: CPT

## 2022-02-12 PROCEDURE — 84100 ASSAY OF PHOSPHORUS: CPT | Performed by: STUDENT IN AN ORGANIZED HEALTH CARE EDUCATION/TRAINING PROGRAM

## 2022-02-12 PROCEDURE — 85025 COMPLETE CBC W/AUTO DIFF WBC: CPT | Performed by: STUDENT IN AN ORGANIZED HEALTH CARE EDUCATION/TRAINING PROGRAM

## 2022-02-12 PROCEDURE — 83735 ASSAY OF MAGNESIUM: CPT | Performed by: STUDENT IN AN ORGANIZED HEALTH CARE EDUCATION/TRAINING PROGRAM

## 2022-02-12 PROCEDURE — 94799 UNLISTED PULMONARY SVC/PX: CPT

## 2022-02-12 PROCEDURE — 82962 GLUCOSE BLOOD TEST: CPT

## 2022-02-12 PROCEDURE — 80053 COMPREHEN METABOLIC PANEL: CPT | Performed by: STUDENT IN AN ORGANIZED HEALTH CARE EDUCATION/TRAINING PROGRAM

## 2022-02-12 PROCEDURE — 25010000002 ALTEPLASE 2 MG RECONSTITUTED SOLUTION: Performed by: HOSPITALIST

## 2022-02-12 PROCEDURE — 25010000002 IOPAMIDOL 61 % SOLUTION: Performed by: HOSPITALIST

## 2022-02-12 RX ORDER — METOPROLOL TARTRATE 50 MG/1
50 TABLET, FILM COATED ORAL EVERY 12 HOURS SCHEDULED
Status: DISCONTINUED | OUTPATIENT
Start: 2022-02-12 | End: 2022-02-19 | Stop reason: HOSPADM

## 2022-02-12 RX ADMIN — NYSTATIN 500000 UNITS: 100000 SUSPENSION ORAL at 21:55

## 2022-02-12 RX ADMIN — DIGOXIN 250 MCG: 250 TABLET ORAL at 11:48

## 2022-02-12 RX ADMIN — SODIUM CHLORIDE, PRESERVATIVE FREE 10 ML: 5 INJECTION INTRAVENOUS at 21:56

## 2022-02-12 RX ADMIN — NYSTATIN 500000 UNITS: 100000 SUSPENSION ORAL at 11:51

## 2022-02-12 RX ADMIN — DILTIAZEM HYDROCHLORIDE 360 MG: 180 CAPSULE, COATED, EXTENDED RELEASE ORAL at 09:03

## 2022-02-12 RX ADMIN — METOPROLOL TARTRATE 37.5 MG: 25 TABLET, FILM COATED ORAL at 09:04

## 2022-02-12 RX ADMIN — SODIUM CHLORIDE, PRESERVATIVE FREE 10 ML: 5 INJECTION INTRAVENOUS at 09:07

## 2022-02-12 RX ADMIN — SODIUM CHLORIDE, PRESERVATIVE FREE 10 ML: 5 INJECTION INTRAVENOUS at 09:08

## 2022-02-12 RX ADMIN — METOPROLOL TARTRATE 50 MG: 50 TABLET, FILM COATED ORAL at 21:54

## 2022-02-12 RX ADMIN — NYSTATIN 500000 UNITS: 100000 SUSPENSION ORAL at 09:06

## 2022-02-12 RX ADMIN — SODIUM CHLORIDE 50 ML/HR: 9 INJECTION, SOLUTION INTRAVENOUS at 16:22

## 2022-02-12 RX ADMIN — FAMOTIDINE 20 MG: 10 INJECTION INTRAVENOUS at 09:04

## 2022-02-12 RX ADMIN — ALTEPLASE: 2.2 INJECTION, POWDER, LYOPHILIZED, FOR SOLUTION INTRAVENOUS at 22:54

## 2022-02-12 RX ADMIN — METOPROLOL TARTRATE 12.5 MG: 25 TABLET, FILM COATED ORAL at 11:47

## 2022-02-12 RX ADMIN — IPRATROPIUM BROMIDE AND ALBUTEROL SULFATE 3 ML: 2.5; .5 SOLUTION RESPIRATORY (INHALATION) at 08:52

## 2022-02-12 RX ADMIN — ASPIRIN 81 MG: 81 TABLET, COATED ORAL at 09:03

## 2022-02-12 RX ADMIN — HYDROCODONE BITARTRATE AND ACETAMINOPHEN 1 TABLET: 5; 325 TABLET ORAL at 21:54

## 2022-02-12 RX ADMIN — RISPERIDONE 5 MG: 2 TABLET, FILM COATED ORAL at 21:55

## 2022-02-12 RX ADMIN — DIATRIZOATE MEGLUMINE AND DIATRIZOATE SODIUM 30 ML: 600; 100 SOLUTION ORAL; RECTAL at 10:29

## 2022-02-12 RX ADMIN — POLYETHYLENE GLYCOL 3350 17 G: 17 POWDER, FOR SOLUTION ORAL at 09:06

## 2022-02-12 RX ADMIN — IOPAMIDOL 85 ML: 612 INJECTION, SOLUTION INTRAVENOUS at 14:12

## 2022-02-12 RX ADMIN — ENOXAPARIN SODIUM 40 MG: 100 INJECTION SUBCUTANEOUS at 21:55

## 2022-02-12 RX ADMIN — Medication 3 MG: at 21:54

## 2022-02-12 RX ADMIN — CALCIUM GLUCONATE: 98 INJECTION, SOLUTION INTRAVENOUS at 18:00

## 2022-02-12 RX ADMIN — HYDROCODONE BITARTRATE AND ACETAMINOPHEN 1 TABLET: 5; 325 TABLET ORAL at 16:16

## 2022-02-12 RX ADMIN — IPRATROPIUM BROMIDE AND ALBUTEROL SULFATE 3 ML: 2.5; .5 SOLUTION RESPIRATORY (INHALATION) at 01:42

## 2022-02-12 RX ADMIN — FAMOTIDINE 20 MG: 10 INJECTION INTRAVENOUS at 21:56

## 2022-02-12 RX ADMIN — IPRATROPIUM BROMIDE AND ALBUTEROL SULFATE 3 ML: 2.5; .5 SOLUTION RESPIRATORY (INHALATION) at 12:00

## 2022-02-12 RX ADMIN — NYSTATIN 500000 UNITS: 100000 SUSPENSION ORAL at 17:05

## 2022-02-12 RX ADMIN — IPRATROPIUM BROMIDE AND ALBUTEROL SULFATE 3 ML: 2.5; .5 SOLUTION RESPIRATORY (INHALATION) at 19:22

## 2022-02-12 RX ADMIN — HYDROCODONE BITARTRATE AND ACETAMINOPHEN 1 TABLET: 5; 325 TABLET ORAL at 06:28

## 2022-02-12 NOTE — PROGRESS NOTES
"General Surgery Progress Note    Summary: Mr. Carlos Kraus is a 67 y.o. year old gentleman POD8 s/p exploratory laparotomy and lysis of adhesions for high-grade small bowel obstruction, now with an ileus. NPO, TPN, NG to LWS with less output.  On MiraLAX.  We will check a CT scan today to evaluate for ileus versus obstruction.  Continue supportive care.    Interval Events: No acute events overnight.  No bowel movement.  States he passed some gas.  States he thinks he got out of bed yesterday.  No complaints.    Vitals: /85 (BP Location: Left arm, Patient Position: Lying)   Pulse 96   Temp 98 °F (36.7 °C) (Oral)   Resp 20   Ht 188 cm (74.02\")   Wt 101 kg (222 lb 12.8 oz)   SpO2 95%   BMI 28.59 kg/m²     GENERAL: Alert, well appearing, laying comfortably in bed, and in no distress  HEENT: normocephalic, atraumatic, moist mucous membranes, clear sclerae, NG in place with bilious fluid  CHEST: no increased work of breathing, symmetric air entry  CARDIAC: regular rate and rhythm    ABDOMEN: Soft, distended, appropriately tender to palpation, incision clean and dry with staples intact  EXTREMITIES: no cyanosis, clubbing, or edema   SKIN: Warm and moist, no rashes    Labs:  Results from last 7 days   Lab Units 02/12/22  0546 02/11/22  0631 02/10/22  0628   WBC 10*3/mm3 13.82* 14.61* 13.57*   HEMOGLOBIN g/dL 11.7* 11.7* 12.5*   HEMATOCRIT % 34.2* 34.0* 37.4*   PLATELETS 10*3/mm3 220 200 164     Results from last 7 days   Lab Units 02/12/22  0546 02/11/22  0631 02/10/22  0628   SODIUM mmol/L 136 134* 143   POTASSIUM mmol/L 3.7 3.2* 3.6   CHLORIDE mmol/L 96* 96* 102   CO2 mmol/L 33.9* 31.7* 30.8*   BUN mg/dL 12 12 13   CREATININE mg/dL 0.75* 0.54* 0.54*   CALCIUM mg/dL 8.5* 8.2* 8.0*   BILIRUBIN mg/dL 0.6 0.7 0.4   ALK PHOS U/L 78 68 58   ALT (SGPT) U/L 83* 82* 54*   AST (SGOT) U/L 62* 68* 48*   GLUCOSE mg/dL 112* 109* 147*           PERLA HERNDON MD  General and Endoscopic Surgery  [facility]    [address], [address]  [place], [place], [address]  P: [phone]  F: [phone]

## 2022-02-12 NOTE — PROGRESS NOTES
Dedicated to Hospital Care    [phone]   LOS: 9 days     Name: Carlos Kraus  Age/Sex: 67 y.o. male  :  1955        PCP: Provider, No Known  Chief Complaint   Patient presents with   • Abdominal Pain      Subjective   Still without a bowel movement occasional flatus.  The nurses have had issues with his NG tube flushing correctly.  I had been using the blue port and had been working okay but now another port is flushing well.  He otherwise denies new issues or complaints other than just feeling bad with the tube in his nose.    General: No Fever or Chills, Cardiac: No Chest Pain or Palpitations, Resp: No Cough or SOA and Other: No bleeding    aspirin, 81 mg, Oral, Daily  digoxin, 250 mcg, Oral, Daily  dilTIAZem CD, 360 mg, Oral, Q24H  enoxaparin, 40 mg, Subcutaneous, Nightly  famotidine, 20 mg, Intravenous, Q12H  insulin regular, 0-7 Units, Subcutaneous, Q6H  ipratropium-albuterol, 3 mL, Nebulization, Q6H  metoprolol tartrate, 50 mg, Oral, Q12H  nystatin, 5 mL, Swish & Spit, 4x Daily  polyethylene glycol, 17 g, Oral, Daily  risperiDONE, 5 mg, Oral, Nightly  sodium chloride, 10 mL, Intravenous, Q12H  sodium chloride, 10 mL, Intravenous, Q12H  sodium chloride, 10 mL, Intravenous, Q12H      Adult Central 2-in-1 TPN, , Last Rate: 75 mL/hr at 22  Adult Central 2-in-1 TPN,   Pharmacy to Dose TPN,   sodium chloride, 50 mL/hr, Last Rate: 50 mL/hr (22)        Objective   Vital Signs  Temp:  [97 °F (36.1 °C)-99.5 °F (37.5 °C)] 97 °F (36.1 °C)  Heart Rate:  [] 85  Resp:  [16-20] 16  BP: (103-135)/(71-99) 119/88  Body mass index is 28.59 kg/m².    Intake/Output Summary (Last 24 hours) at 2022 1331  Last data filed at 2022 1307  Gross per 24 hour   Intake 100 ml   Output 2725 ml   Net -2625 ml       Physical Exam  Vitals and nursing note reviewed.   Constitutional:       Appearance: Normal appearance.   HENT:      Head: Normocephalic and atraumatic.   Cardiovascular:      Rate  and Rhythm: Normal rate and regular rhythm.   Pulmonary:      Effort: Pulmonary effort is normal. No respiratory distress.      Breath sounds: Normal breath sounds.   Abdominal:      General: There is distension.      Comments: His abdomen is little less distended today it is soft.  He is got tympanic bowel sounds   Neurological:      General: No focal deficit present.      Mental Status: He is alert and oriented to person, place, and time.           Results Review:       I reviewed the patient's new clinical results.  Results from last 7 days   Lab Units 02/12/22  0546 02/11/22  0631 02/10/22  0628 02/09/22  0600 02/08/22  0541 02/07/22  0531 02/06/22  0709   WBC 10*3/mm3 13.82* 14.61* 13.57* 9.87 9.35 9.00 8.77   HEMOGLOBIN g/dL 11.7* 11.7* 12.5* 12.4* 12.9* 12.7* 13.4   PLATELETS 10*3/mm3 220 200 164 174 180 161 176     Results from last 7 days   Lab Units 02/12/22  0546 02/11/22  0631 02/10/22  0628 02/09/22  0600 02/08/22  0541 02/07/22  2046 02/07/22  1830 02/07/22  0531 02/06/22  0709 02/06/22  0709   SODIUM mmol/L 136 134* 143 143 139  --   --  139  --  139   POTASSIUM mmol/L 3.7 3.2* 3.6 3.4* 3.4*  --  3.5 3.1*   < > 3.8   CHLORIDE mmol/L 96* 96* 102 100 98  --   --  100  --  102   CO2 mmol/L 33.9* 31.7* 30.8* 32.8* 29.6*  --   --  28.1  --  26.5   BUN mg/dL 12 12 13 18 21  --   --  15  --  18   CREATININE mg/dL 0.75* 0.54* 0.54* 0.55* 0.58*  --   --  0.58*  --  0.66*   CALCIUM mg/dL 8.5* 8.2* 8.0* 7.7* 7.7*  --   --  7.4*  --  7.4*   MAGNESIUM mg/dL 2.3 2.2 2.4 2.8* 2.8*  --   --  2.7*  --  2.7*   PHOSPHORUS mg/dL 3.4 2.8 2.2* 1.8* 1.5* 2.0*  --  1.8*   < > 1.5*    < > = values in this interval not displayed.   Estimated Creatinine Clearance: 116.8 mL/min (A) (by C-G formula based on SCr of 0.75 mg/dL (L)).      Assessment/Plan   Active Hospital Problems    Diagnosis  POA   • **SBO (small bowel obstruction) ([facility]) [K56.609]  Unknown   • GERD (gastroesophageal reflux disease) [K21.9]  Unknown   •  Hypothyroidism [E03.9]  Unknown   • Hyperlipidemia [E78.5]  Unknown   • Hypophosphatemia [E83.39]  Unknown   • Generalized abdominal pain [R10.84]  Yes   • Hypertension [I10]  Yes   • Atrial fibrillation (HCC) [I48.91]  Yes   • Schizophrenia (HCC) [F20.9]  Yes      Resolved Hospital Problems   No resolved problems to display.       PLAN  This is a 67-year-old gentleman with history of A. fib schizophrenia hypertension hyperlipidemia hypothyroidism presented to the hospital with abdominal pain nausea and vomiting was found to have a small bowel obstruction that required exploratory laparotomy and lysis of adhesions.  Postoperatively we are dealing with a postoperative ileus  -Given persistent ileus without improvement we will plan for CT of the abdomen and pelvis today.  -May need to consider replacing the NG tube we have been hesitant to do this given the patient's reluctance to have it replaced in the first place.  -continue NG to LWS, continue TPN  -Continued alkalosis seen on his labs.  I do not think this is related to contraction given how well we replaced his fluids with the TPN.  We will check an ABG today to get a better idea of his acid-base status with this elevated bicarb.  I do not think this represents CO2 retention but could certainly be an issue.  -Diet upgraded to ice chips and sips of water continue IV fluids, continue TPN   -Heart rate is stable for now but likely will require IV meds for further coverage given NPo and poor absorption  -Full code  -Lovenox for DVT prophylaxis    Disposition  To be determined      Clemente Byrd MD  [facility]  02/12/22  13:31 EST

## 2022-02-12 NOTE — PLAN OF CARE
Goal Outcome Evaluation:   Patient is resting well at this time and denies any pain or discomfort. His abdomen is soft with surgical incision clean, well approximated, and intact with signs of healing. He continues with moderate amount of green NG tube drainage and nursing is promoting oral care often. He is alert when awake and able to make his needs known. Patient is continent of urine and is voiding clear, yellow urine via urinal without difficulty.

## 2022-02-12 NOTE — SIGNIFICANT NOTE
02/12/22 1541   OTHER   Discipline physical therapist   Rehab Time/Intention   Session Not Performed patient/family declined treatment  (Pt refused therapy 2x stating he did not feel like walking today or doing exercises.)   Recommendation   PT - Next Appointment 02/13/22

## 2022-02-12 NOTE — PROGRESS NOTES
[facility] Clinical Pharmacy Services: TPN Daily Progress Note    TPN Day #4    Indication: prolonged paralytic ileus  Route: central  Type: standard    Subjective/Objective  Results from last 7 days   Lab Units 22  0546 02/10/22  0628 22  0600   SODIUM mmol/L 136   < > 143   POTASSIUM mmol/L 3.7   < > 3.4*   CHLORIDE mmol/L 96*   < > 100   CO2 mmol/L 33.9*   < > 32.8*   BUN mg/dL 12   < > 18   CREATININE mg/dL 0.75*   < > 0.55*   CALCIUM mg/dL 8.5*   < > 7.7*   ALBUMIN g/dL 2.60*   < > 2.40*   BILIRUBIN mg/dL 0.6   < > 0.3   ALK PHOS U/L 78   < > 45   ALT (SGPT) U/L 83*   < > 41   AST (SGOT) U/L 62*   < > 35   GLUCOSE mg/dL 112*   < > 110*   MAGNESIUM mg/dL 2.3   < > 2.8*   PHOSPHORUS mg/dL 3.4   < > 1.8*   TRIGLYCERIDES mg/dL  --   --  186*    < > = values in this interval not displayed.        Diet Orders (active) (From admission, onward)       Start     Ordered    02/10/22 1013  NPO Diet NPO Except: Ice Chips, Sips With Meds  Diet Effective Now         02/10/22 1012                  Additional insulin administration while previous TPN infusin units  Additional electrolyte administration while previous TPN infusing: None  Acid suppression: Famotidine 20 mg IV Q12    RD TPN Formula Recommendations  Dextrose (Kcal): 1900  Amino Acid (gm): 100  Lipid Concentration (%): 20%  Lipid Volume (mL): 100 mL  Lipid Frequency: Daily  Kcal/Day: 2500 Kcal/day    Current TPN Formula: 90g/1300kcal/0 mL AA/Dex/Lipids    Assessment/Plan    Electrolytes stable; AST/ALT are elevated, but stable - will need to monitor; pt with hx of pancreatitis so not yet on lipids - may need to consider using SMOFlipid once lipids start. Will increase macros again today and remove MVI - only on MWF d/t shortage. All other electrolytes to remain unchanged.     1. Macronutrients: Continue to increase - will increase to 100 g AA and dextrose to 1600 kcal. Continue to hold lipids. Triglycerides checked  at 186.    2. Electrolytes/Additives:   Sodium Chloride = 70 mEq  Potassium Acetate = 50 mEq  Potassium Phosphate = 30 mEq  Magnesium Chloride = 10 mEq  Calcium Gluconate = 9 mEq  3. Labs: CMP, Magnesium, Phos with AM labs    Thank you for allowing me to participate in the care of your patient,    Dionicio Devlin, PharmD  2/12/2022  13:01 EST

## 2022-02-12 NOTE — PROGRESS NOTES
LOS: 9 days   Patient Care Team:  Provider, No Known as PCP - General      Chief Complaint: Follow-up atrial fibrillation       Interval History: Denies shortness of breath but does report chest discomfort.        Objective   Vital Signs  Temp:  [98 °F (36.7 °C)-99.5 °F (37.5 °C)] 98 °F (36.7 °C)  Heart Rate:  [] 101  Resp:  [16-20] 20  BP: (103-133)/(71-96) 116/85    Intake/Output Summary (Last 24 hours) at 2/12/2022 1102  Last data filed at 2/12/2022 0935  Gross per 24 hour   Intake 100 ml   Output 3150 ml   Net -3050 ml         Physical Exam:  Constitutional: Well appearing, well developed, no acute distress   HENT: Oropharynx clear and membrane moist  Eyes: Normal conjunctiva, no sclera icterus.  Neck: Supple, no carotid bruit bilaterally.  Cardiovascular: Regular and Irregularly irregular rate and rhythm, No Murmur, No bilateral lower extremity edema.  Pulmonary: Normal respiratory effort, normal lung sounds, no wheezing.  Abdominal: Soft, nontender, no hepatosplenomegaly, liver is non-pulsatile.  Neurological: Alert and orient x 3.   Skin: Warm, dry, no ecchymosis, no rash.  Psych: Appropriate mood and affect. Normal judgment and insight.      Results Review:      Results from last 7 days   Lab Units 02/12/22  0546 02/11/22  0631 02/11/22  0631 02/10/22  0628 02/10/22  0628   SODIUM mmol/L 136  --  134*  --  143   POTASSIUM mmol/L 3.7  --  3.2*  --  3.6   CHLORIDE mmol/L 96*  --  96*  --  102   CO2 mmol/L 33.9*  --  31.7*  --  30.8*   BUN mg/dL 12  --  12  --  13   CREATININE mg/dL 0.75*  --  0.54*  --  0.54*   GLUCOSE mg/dL 112*   < > 109*   < > 147*   CALCIUM mg/dL 8.5*  --  8.2*  --  8.0*    < > = values in this interval not displayed.         Results from last 7 days   Lab Units 02/12/22  0546 02/11/22  0631 02/10/22  0628   WBC 10*3/mm3 13.82* 14.61* 13.57*   HEMOGLOBIN g/dL 11.7* 11.7* 12.5*   HEMATOCRIT % 34.2* 34.0* 37.4*   PLATELETS 10*3/mm3 220 200 164             Results from last 7 days    Lab Units 02/12/22  0546   MAGNESIUM mg/dL 2.3     Results from last 7 days   Lab Units 02/09/22  0600   TRIGLYCERIDES mg/dL 186*       I reviewed the patient's new clinical results.  I personally viewed and interpreted the patient's EKG/Telemetry data        Medication Review:   aspirin, 81 mg, Oral, Daily  digoxin, 250 mcg, Oral, Daily  dilTIAZem CD, 360 mg, Oral, Q24H  enoxaparin, 40 mg, Subcutaneous, Nightly  famotidine, 20 mg, Intravenous, Q12H  insulin regular, 0-7 Units, Subcutaneous, Q6H  ipratropium-albuterol, 3 mL, Nebulization, Q6H  metoprolol tartrate, 12.5 mg, Oral, Once  metoprolol tartrate, 50 mg, Oral, Q12H  nystatin, 5 mL, Swish & Spit, 4x Daily  polyethylene glycol, 17 g, Oral, Daily  risperiDONE, 5 mg, Oral, Nightly  sodium chloride, 10 mL, Intravenous, Q12H  sodium chloride, 10 mL, Intravenous, Q12H  sodium chloride, 10 mL, Intravenous, Q12H        Adult Central 2-in-1 TPN, , Last Rate: 75 mL/hr at 02/12/22 0902  Pharmacy to Dose TPN,   sodium chloride, 50 mL/hr, Last Rate: 50 mL/hr (02/12/22 0902)        Assessment/Plan       SBO (small bowel obstruction) (HCC)    Schizophrenia (HCC)    Atrial fibrillation (HCC)    Hypertension    Generalized abdominal pain    GERD (gastroesophageal reflux disease)    Hypothyroidism    Hyperlipidemia    Hypophosphatemia      1.  Small bowel obstruction.  Status post exploratory lap.  2.  Chronic atrial fibrillation.  Remains on metoprolol, diltiazem, and digoxin.  Rates remain elevated.  Will increase metoprolol.  CHADS2-VASc is 2.  However, he was not on anticoagulation prior to admission.  He is presently on Lovenox.  Patient not entirely aware of the situation.  Concern for long-term anticoagulation.  We will need to address prior to discharge.  3.  Hypertension.  Stable.  4.  Acute hypoxic respiratory failure.  6.  Hyperlipidemia.  Previously on simvastatin.  7.  Schizophrenia.  8.  Possible ileus.  CT scan today.    Senia Freeman, CARIDAD  02/12/22  11:02  EST

## 2022-02-13 ENCOUNTER — APPOINTMENT (OUTPATIENT)
Dept: GENERAL RADIOLOGY | Facility: HOSPITAL | Age: 67
End: 2022-02-13

## 2022-02-13 PROBLEM — K85.90 ACUTE PANCREATITIS: Status: ACTIVE | Noted: 2022-02-13

## 2022-02-13 LAB
ALBUMIN SERPL-MCNC: 2.7 G/DL (ref 3.5–5.2)
ALBUMIN/GLOB SERPL: 1 G/DL
ALP SERPL-CCNC: 74 U/L (ref 39–117)
ALT SERPL W P-5'-P-CCNC: 87 U/L (ref 1–41)
ANION GAP SERPL CALCULATED.3IONS-SCNC: 8 MMOL/L (ref 5–15)
AST SERPL-CCNC: 62 U/L (ref 1–40)
BASOPHILS # BLD AUTO: 0.03 10*3/MM3 (ref 0–0.2)
BASOPHILS NFR BLD AUTO: 0.3 % (ref 0–1.5)
BILIRUB SERPL-MCNC: 0.6 MG/DL (ref 0–1.2)
BUN SERPL-MCNC: 16 MG/DL (ref 8–23)
BUN/CREAT SERPL: 22.5 (ref 7–25)
CALCIUM SPEC-SCNC: 7.9 MG/DL (ref 8.6–10.5)
CHLORIDE SERPL-SCNC: 97 MMOL/L (ref 98–107)
CO2 SERPL-SCNC: 28 MMOL/L (ref 22–29)
CREAT SERPL-MCNC: 0.71 MG/DL (ref 0.76–1.27)
DEPRECATED RDW RBC AUTO: 39.4 FL (ref 37–54)
EOSINOPHIL # BLD AUTO: 0.2 10*3/MM3 (ref 0–0.4)
EOSINOPHIL NFR BLD AUTO: 1.8 % (ref 0.3–6.2)
ERYTHROCYTE [DISTWIDTH] IN BLOOD BY AUTOMATED COUNT: 12.2 % (ref 12.3–15.4)
GFR SERPL CREATININE-BSD FRML MDRD: 111 ML/MIN/1.73
GLOBULIN UR ELPH-MCNC: 2.7 GM/DL
GLUCOSE BLDC GLUCOMTR-MCNC: 128 MG/DL (ref 70–130)
GLUCOSE BLDC GLUCOMTR-MCNC: 134 MG/DL (ref 70–130)
GLUCOSE BLDC GLUCOMTR-MCNC: 138 MG/DL (ref 70–130)
GLUCOSE BLDC GLUCOMTR-MCNC: 92 MG/DL (ref 70–130)
GLUCOSE BLDC GLUCOMTR-MCNC: 95 MG/DL (ref 70–130)
GLUCOSE SERPL-MCNC: 117 MG/DL (ref 65–99)
HCT VFR BLD AUTO: 31.6 % (ref 37.5–51)
HGB BLD-MCNC: 10.9 G/DL (ref 13–17.7)
IMM GRANULOCYTES # BLD AUTO: 0.27 10*3/MM3 (ref 0–0.05)
IMM GRANULOCYTES NFR BLD AUTO: 2.5 % (ref 0–0.5)
LYMPHOCYTES # BLD AUTO: 1.05 10*3/MM3 (ref 0.7–3.1)
LYMPHOCYTES NFR BLD AUTO: 9.6 % (ref 19.6–45.3)
MAGNESIUM SERPL-MCNC: 2 MG/DL (ref 1.6–2.4)
MCH RBC QN AUTO: 30.7 PG (ref 26.6–33)
MCHC RBC AUTO-ENTMCNC: 34.5 G/DL (ref 31.5–35.7)
MCV RBC AUTO: 89 FL (ref 79–97)
MONOCYTES # BLD AUTO: 0.79 10*3/MM3 (ref 0.1–0.9)
MONOCYTES NFR BLD AUTO: 7.3 % (ref 5–12)
NEUTROPHILS NFR BLD AUTO: 78.5 % (ref 42.7–76)
NEUTROPHILS NFR BLD AUTO: 8.55 10*3/MM3 (ref 1.7–7)
NRBC BLD AUTO-RTO: 0 /100 WBC (ref 0–0.2)
PHOSPHATE SERPL-MCNC: 2.7 MG/DL (ref 2.5–4.5)
PLATELET # BLD AUTO: 206 10*3/MM3 (ref 140–450)
PMV BLD AUTO: 10.6 FL (ref 6–12)
POTASSIUM SERPL-SCNC: 3.8 MMOL/L (ref 3.5–5.2)
PROT SERPL-MCNC: 5.4 G/DL (ref 6–8.5)
RBC # BLD AUTO: 3.55 10*6/MM3 (ref 4.14–5.8)
SODIUM SERPL-SCNC: 133 MMOL/L (ref 136–145)
WBC NRBC COR # BLD: 10.89 10*3/MM3 (ref 3.4–10.8)

## 2022-02-13 PROCEDURE — 82962 GLUCOSE BLOOD TEST: CPT

## 2022-02-13 PROCEDURE — 94799 UNLISTED PULMONARY SVC/PX: CPT

## 2022-02-13 PROCEDURE — 74018 RADEX ABDOMEN 1 VIEW: CPT

## 2022-02-13 PROCEDURE — 25010000002 ENOXAPARIN PER 10 MG: Performed by: STUDENT IN AN ORGANIZED HEALTH CARE EDUCATION/TRAINING PROGRAM

## 2022-02-13 PROCEDURE — 85025 COMPLETE CBC W/AUTO DIFF WBC: CPT | Performed by: STUDENT IN AN ORGANIZED HEALTH CARE EDUCATION/TRAINING PROGRAM

## 2022-02-13 PROCEDURE — 25010000002 MAGNESIUM SULFATE PER 500 MG OF MAGNESIUM: Performed by: STUDENT IN AN ORGANIZED HEALTH CARE EDUCATION/TRAINING PROGRAM

## 2022-02-13 PROCEDURE — 84100 ASSAY OF PHOSPHORUS: CPT | Performed by: STUDENT IN AN ORGANIZED HEALTH CARE EDUCATION/TRAINING PROGRAM

## 2022-02-13 PROCEDURE — 83735 ASSAY OF MAGNESIUM: CPT | Performed by: STUDENT IN AN ORGANIZED HEALTH CARE EDUCATION/TRAINING PROGRAM

## 2022-02-13 PROCEDURE — 99232 SBSQ HOSP IP/OBS MODERATE 35: CPT | Performed by: NURSE PRACTITIONER

## 2022-02-13 PROCEDURE — 25010000002 CALCIUM GLUCONATE PER 10 ML: Performed by: STUDENT IN AN ORGANIZED HEALTH CARE EDUCATION/TRAINING PROGRAM

## 2022-02-13 PROCEDURE — 94761 N-INVAS EAR/PLS OXIMETRY MLT: CPT

## 2022-02-13 PROCEDURE — 80053 COMPREHEN METABOLIC PANEL: CPT | Performed by: STUDENT IN AN ORGANIZED HEALTH CARE EDUCATION/TRAINING PROGRAM

## 2022-02-13 RX ADMIN — NYSTATIN 500000 UNITS: 100000 SUSPENSION ORAL at 18:01

## 2022-02-13 RX ADMIN — HYDROCODONE BITARTRATE AND ACETAMINOPHEN 1 TABLET: 5; 325 TABLET ORAL at 16:23

## 2022-02-13 RX ADMIN — IPRATROPIUM BROMIDE AND ALBUTEROL SULFATE 3 ML: 2.5; .5 SOLUTION RESPIRATORY (INHALATION) at 21:14

## 2022-02-13 RX ADMIN — NYSTATIN 500000 UNITS: 100000 SUSPENSION ORAL at 21:03

## 2022-02-13 RX ADMIN — DIGOXIN 250 MCG: 250 TABLET ORAL at 13:02

## 2022-02-13 RX ADMIN — SODIUM CHLORIDE, PRESERVATIVE FREE 10 ML: 5 INJECTION INTRAVENOUS at 21:02

## 2022-02-13 RX ADMIN — FAMOTIDINE 20 MG: 10 INJECTION INTRAVENOUS at 21:01

## 2022-02-13 RX ADMIN — SODIUM CHLORIDE, PRESERVATIVE FREE 10 ML: 5 INJECTION INTRAVENOUS at 08:49

## 2022-02-13 RX ADMIN — CALCIUM GLUCONATE: 98 INJECTION, SOLUTION INTRAVENOUS at 18:01

## 2022-02-13 RX ADMIN — POLYETHYLENE GLYCOL 3350 17 G: 17 POWDER, FOR SOLUTION ORAL at 08:46

## 2022-02-13 RX ADMIN — FAMOTIDINE 20 MG: 10 INJECTION INTRAVENOUS at 08:46

## 2022-02-13 RX ADMIN — ACETAMINOPHEN 650 MG: 325 TABLET, FILM COATED ORAL at 21:03

## 2022-02-13 RX ADMIN — IPRATROPIUM BROMIDE AND ALBUTEROL SULFATE 3 ML: 2.5; .5 SOLUTION RESPIRATORY (INHALATION) at 07:24

## 2022-02-13 RX ADMIN — NYSTATIN 500000 UNITS: 100000 SUSPENSION ORAL at 08:46

## 2022-02-13 RX ADMIN — ENOXAPARIN SODIUM 40 MG: 100 INJECTION SUBCUTANEOUS at 21:01

## 2022-02-13 RX ADMIN — DILTIAZEM HYDROCHLORIDE 360 MG: 180 CAPSULE, COATED, EXTENDED RELEASE ORAL at 08:46

## 2022-02-13 RX ADMIN — SODIUM CHLORIDE, PRESERVATIVE FREE 10 ML: 5 INJECTION INTRAVENOUS at 21:03

## 2022-02-13 RX ADMIN — SODIUM CHLORIDE 50 ML/HR: 9 INJECTION, SOLUTION INTRAVENOUS at 18:02

## 2022-02-13 RX ADMIN — METOPROLOL TARTRATE 50 MG: 50 TABLET, FILM COATED ORAL at 08:46

## 2022-02-13 RX ADMIN — IPRATROPIUM BROMIDE AND ALBUTEROL SULFATE 3 ML: 2.5; .5 SOLUTION RESPIRATORY (INHALATION) at 12:08

## 2022-02-13 RX ADMIN — ASPIRIN 81 MG: 81 TABLET, COATED ORAL at 08:46

## 2022-02-13 RX ADMIN — RISPERIDONE 5 MG: 2 TABLET, FILM COATED ORAL at 21:02

## 2022-02-13 RX ADMIN — METOPROLOL TARTRATE 50 MG: 50 TABLET, FILM COATED ORAL at 21:02

## 2022-02-13 RX ADMIN — NYSTATIN 500000 UNITS: 100000 SUSPENSION ORAL at 13:03

## 2022-02-13 RX ADMIN — IPRATROPIUM BROMIDE AND ALBUTEROL SULFATE 3 ML: 2.5; .5 SOLUTION RESPIRATORY (INHALATION) at 02:23

## 2022-02-13 NOTE — PROGRESS NOTES
LOS: 10 days   Patient Care Team:  Provider, No Known as PCP - General      Chief Complaint: Follow-up atrial fibrillation       Interval History: No complaints today.        Objective   Vital Signs  Temp:  [97 °F (36.1 °C)-98.4 °F (36.9 °C)] 98.4 °F (36.9 °C)  Heart Rate:  [] 99  Resp:  [16] 16  BP: (105-135)/(65-99) 118/65    Intake/Output Summary (Last 24 hours) at 2/13/2022 1131  Last data filed at 2/13/2022 1100  Gross per 24 hour   Intake 1425 ml   Output 1375 ml   Net 50 ml         Physical Exam:  Constitutional: Well appearing, well developed, no acute distress   HENT: Oropharynx clear and membrane moist  Eyes: Normal conjunctiva, no sclera icterus.  Neck: Supple, no carotid bruit bilaterally.  Cardiovascular: Irregularly irregular\ rate and rhythm, No Murmur, No bilateral lower extremity edema.  Pulmonary: Normal respiratory effort, normal lung sounds, no wheezing.  Abdominal: Soft, nontender, no hepatosplenomegaly, liver is non-pulsatile.  Neurological: Alert and orient x 3.   Skin: Warm, dry, no ecchymosis, no rash.  Psych: Appropriate mood and affect. Normal judgment and insight.      Results Review:      Results from last 7 days   Lab Units 02/13/22 0548 02/12/22 0546 02/12/22 0546 02/11/22  0631 02/11/22  0631   SODIUM mmol/L 133*  --  136  --  134*   POTASSIUM mmol/L 3.8  --  3.7  --  3.2*   CHLORIDE mmol/L 97*  --  96*  --  96*   CO2 mmol/L 28.0  --  33.9*  --  31.7*   BUN mg/dL 16  --  12  --  12   CREATININE mg/dL 0.71*  --  0.75*  --  0.54*   GLUCOSE mg/dL 117*   < > 112*   < > 109*   CALCIUM mg/dL 7.9*  --  8.5*  --  8.2*    < > = values in this interval not displayed.         Results from last 7 days   Lab Units 02/13/22 0548 02/12/22  0546 02/11/22  0631   WBC 10*3/mm3 10.89* 13.82* 14.61*   HEMOGLOBIN g/dL 10.9* 11.7* 11.7*   HEMATOCRIT % 31.6* 34.2* 34.0*   PLATELETS 10*3/mm3 206 220 200             Results from last 7 days   Lab Units 02/13/22  0548   MAGNESIUM mg/dL 2.0      Results from last 7 days   Lab Units 02/09/22  0600   TRIGLYCERIDES mg/dL 186*       I reviewed the patient's new clinical results.  I personally viewed and interpreted the patient's EKG/Telemetry data        Medication Review:   aspirin, 81 mg, Oral, Daily  digoxin, 250 mcg, Oral, Daily  dilTIAZem CD, 360 mg, Oral, Q24H  enoxaparin, 40 mg, Subcutaneous, Nightly  famotidine, 20 mg, Intravenous, Q12H  insulin regular, 0-7 Units, Subcutaneous, Q6H  ipratropium-albuterol, 3 mL, Nebulization, Q6H  metoprolol tartrate, 50 mg, Oral, Q12H  nystatin, 5 mL, Swish & Spit, 4x Daily  polyethylene glycol, 17 g, Oral, Daily  risperiDONE, 5 mg, Oral, Nightly  sodium chloride, 10 mL, Intravenous, Q12H  sodium chloride, 10 mL, Intravenous, Q12H  sodium chloride, 10 mL, Intravenous, Q12H        Adult Central 2-in-1 TPN, , Last Rate: 75 mL/hr at 02/12/22 1800  Adult Central 2-in-1 TPN,   Pharmacy to Dose TPN,   sodium chloride, 50 mL/hr, Last Rate: 50 mL/hr (02/12/22 1622)        Assessment/Plan       SBO (small bowel obstruction) (HCC)    Schizophrenia (HCC)    Atrial fibrillation (HCC)    Hypertension    Generalized abdominal pain    GERD (gastroesophageal reflux disease)    Hypothyroidism    Hyperlipidemia    Hypophosphatemia      1.  Small bowel obstruction.  Status post exploratory lap.  2.  Chronic atrial fibrillation.  Remains on metoprolol, diltiazem, and digoxin.  Rates improved with increased dose of metoprolol.  CHADS2-VASc is 2.  However, he was not on anticoagulation prior to admission.  He is presently on Lovenox.  Patient not entirely aware of the situation.  Concern for long-term anticoagulation.  We will need to address prior to discharge.   3.  Hypertension.  Stable.  4.  Acute hypoxic respiratory failure.  Improved.   6.  Hyperlipidemia.  Previously on simvastatin.  7.  Schizophrenia.  8.  Postop ileus.        Senia Freeman, APRN  02/13/22  11:31 EST

## 2022-02-13 NOTE — PLAN OF CARE
Goal Outcome Evaluation:     Patient alert and oriented with periods of forgetfulness/confusion. Had large sized liquid bowel movement today. Started on clear liquid diet, tolerating well. Treated for pain x1 during shift. TPN infusing. VSS. No acute distress noted.

## 2022-02-13 NOTE — PROGRESS NOTES
[facility]    [phone]   LOS: 10 days     Name: Carlos Kraus  Age/Sex: 67 y.o. male  :  1955        PCP: Provider, No Known  Chief Complaint   Patient presents with   • Abdominal Pain      Subjective   Still without a bowel movement occasional flatus.  The patient reports he feels okay, his NG tube has come out he reports he feels better.  He is taking clear liquids well.    General: No Fever or Chills, Cardiac: No Chest Pain or Palpitations, Resp: No Cough or SOA and Other: No bleeding    aspirin, 81 mg, Oral, Daily  digoxin, 250 mcg, Oral, Daily  dilTIAZem CD, 360 mg, Oral, Q24H  enoxaparin, 40 mg, Subcutaneous, Nightly  famotidine, 20 mg, Intravenous, Q12H  insulin regular, 0-7 Units, Subcutaneous, Q6H  ipratropium-albuterol, 3 mL, Nebulization, Q6H  metoprolol tartrate, 50 mg, Oral, Q12H  nystatin, 5 mL, Swish & Spit, 4x Daily  polyethylene glycol, 17 g, Oral, Daily  risperiDONE, 5 mg, Oral, Nightly  sodium chloride, 10 mL, Intravenous, Q12H  sodium chloride, 10 mL, Intravenous, Q12H  sodium chloride, 10 mL, Intravenous, Q12H      Adult Central 2-in-1 TPN, , Last Rate: 75 mL/hr at 22  Pharmacy to Dose TPN,   sodium chloride, 50 mL/hr, Last Rate: 50 mL/hr (22)        Objective   Vital Signs  Temp:  [97.6 °F (36.4 °C)-98.4 °F (36.9 °C)] 98.3 °F (36.8 °C)  Heart Rate:  [] 90  Resp:  [16] 16  BP: (105-118)/(65-85) 108/76  Body mass index is 28.21 kg/m².    Intake/Output Summary (Last 24 hours) at 2022 1840  Last data filed at 2022 1712  Gross per 24 hour   Intake --   Output 1825 ml   Net -1825 ml       Physical Exam  Vitals and nursing note reviewed.   Constitutional:       Appearance: Normal appearance.   HENT:      Head: Normocephalic and atraumatic.      Mouth/Throat:      Mouth: Mucous membranes are moist.      Pharynx: Oropharynx is clear.   Eyes:      Extraocular Movements: Extraocular movements intact.      Conjunctiva/sclera: Conjunctivae  normal.      Pupils: Pupils are equal, round, and reactive to light.   Cardiovascular:      Rate and Rhythm: Normal rate and regular rhythm.   Pulmonary:      Effort: Pulmonary effort is normal. No respiratory distress.      Breath sounds: Normal breath sounds.   Abdominal:      Palpations: Abdomen is soft.      Tenderness: There is abdominal tenderness.      Comments: Soft, some tenderness   Skin:     General: Skin is warm and dry.   Neurological:      General: No focal deficit present.      Mental Status: He is alert and oriented to person, place, and time.         Results Review:       I reviewed the patient's new clinical results.  Results from last 7 days   Lab Units 02/13/22  0548 02/12/22  0546 02/11/22  0631 02/10/22  0628 02/09/22  0600 02/08/22  0541 02/07/22  0531   WBC 10*3/mm3 10.89* 13.82* 14.61* 13.57* 9.87 9.35 9.00   HEMOGLOBIN g/dL 10.9* 11.7* 11.7* 12.5* 12.4* 12.9* 12.7*   PLATELETS 10*3/mm3 206 220 200 164 174 180 161     Results from last 7 days   Lab Units 02/13/22  0548 02/12/22  0546 02/11/22  0631 02/10/22  0628 02/09/22  0600 02/08/22  0541 02/07/22  2046 02/07/22  1830 02/07/22  0531 02/07/22  0531   SODIUM mmol/L 133* 136 134* 143 143 139  --   --   --  139   POTASSIUM mmol/L 3.8 3.7 3.2* 3.6 3.4* 3.4*  --  3.5   < > 3.1*   CHLORIDE mmol/L 97* 96* 96* 102 100 98  --   --   --  100   CO2 mmol/L 28.0 33.9* 31.7* 30.8* 32.8* 29.6*  --   --   --  28.1   BUN mg/dL 16 12 12 13 18 21  --   --   --  15   CREATININE mg/dL 0.71* 0.75* 0.54* 0.54* 0.55* 0.58*  --   --   --  0.58*   CALCIUM mg/dL 7.9* 8.5* 8.2* 8.0* 7.7* 7.7*  --   --   --  7.4*   MAGNESIUM mg/dL 2.0 2.3 2.2 2.4 2.8* 2.8*  --   --   --  2.7*   PHOSPHORUS mg/dL 2.7 3.4 2.8 2.2* 1.8* 1.5* 2.0*  --    < > 1.8*    < > = values in this interval not displayed.   Estimated Creatinine Clearance: 116.1 mL/min (A) (by C-G formula based on SCr of 0.71 mg/dL (L)).      Assessment/Plan   Active Hospital Problems    Diagnosis  POA   • **SBO  (small bowel obstruction) (HCC) [K56.609]  Unknown   • Acute pancreatitis [K85.90]  Unknown   • GERD (gastroesophageal reflux disease) [K21.9]  Unknown   • Hypothyroidism [E03.9]  Unknown   • Hyperlipidemia [E78.5]  Unknown   • Hypophosphatemia [E83.39]  Unknown   • Generalized abdominal pain [R10.84]  Yes   • Hypertension [I10]  Yes   • Atrial fibrillation (HCC) [I48.91]  Yes   • Schizophrenia (HCC) [F20.9]  Yes      Resolved Hospital Problems   No resolved problems to display.       PLAN  This is a 67-year-old gentleman with history of A. fib schizophrenia hypertension hyperlipidemia hypothyroidism presented to the hospital with abdominal pain nausea and vomiting was found to have a small bowel obstruction that required exploratory laparotomy and lysis of adhesions.  Postoperatively we are dealing with a postoperative ileus and now pancreatitis    -Given persistent ileus he had repeat CT abdomen/pelvis yesterday which revealed the development of pancreatitis  -Continue CLD and TPN  -Atrial fibrillationwith cardiology following, on metop, dilt, and digoxin; metoprolol dose was increased with improvement in rates.  -Heart rate is stable for now but likely will require IV meds for further coverage given NPO and poor absorption  -Full code  -Lovenox for DVT prophylaxis    Disposition  To be determined- patient frequently declines participation with PT    Roz Chacko MD  [facility]  02/13/22  18:40 EST

## 2022-02-13 NOTE — PROGRESS NOTES
"General Surgery Progress Note    Summary: Mr. Carlos Kraus is a 67 y.o. year old gentleman POD9 s/p exploratory laparotomy and lysis of adhesions for high-grade small bowel obstruction, now with an ileus.  CT scan from yesterday reviewed; no signs of bowel obstruction but does have evolution of his pancreatitis.  Continue TPN, will start clears today.  Continue bowel regimen.  Out of bed and ambulate as tolerated.  On DVT prophylaxis.    Interval Events: No acute events overnight.  Passing gas, no bowel movement.  Some abdominal pain.    Vitals: /65 (BP Location: Left arm, Patient Position: Lying)   Pulse 99   Temp 98.4 °F (36.9 °C) (Oral)   Resp 16   Ht 188 cm (74.02\")   Wt 99.7 kg (219 lb 12.8 oz)   SpO2 94%   BMI 28.21 kg/m²     GENERAL: Alert, well appearing, laying comfortably in bed, and in no distress  HEENT: normocephalic, atraumatic, moist mucous membranes, clear sclerae, NG in place with bilious fluid  CHEST: no increased work of breathing, on room air, symmetric air entry  CARDIAC: regular rate and rhythm    ABDOMEN: Soft, distended, appropriately tender to palpation, incision clean and dry with staples intact  EXTREMITIES: no cyanosis, clubbing, or edema   SKIN: Warm and moist, no rashes    Labs:  Results from last 7 days   Lab Units 02/13/22  0548 02/12/22  0546 02/11/22  0631   WBC 10*3/mm3 10.89* 13.82* 14.61*   HEMOGLOBIN g/dL 10.9* 11.7* 11.7*   HEMATOCRIT % 31.6* 34.2* 34.0*   PLATELETS 10*3/mm3 206 220 200     Results from last 7 days   Lab Units 02/13/22  0548 02/12/22  0546 02/11/22  0631   SODIUM mmol/L 133* 136 134*   POTASSIUM mmol/L 3.8 3.7 3.2*   CHLORIDE mmol/L 97* 96* 96*   CO2 mmol/L 28.0 33.9* 31.7*   BUN mg/dL 16 12 12   CREATININE mg/dL 0.71* 0.75* 0.54*   CALCIUM mg/dL 7.9* 8.5* 8.2*   BILIRUBIN mg/dL 0.6 0.6 0.7   ALK PHOS U/L 74 78 68   ALT (SGPT) U/L 87* 83* 82*   AST (SGOT) U/L 62* 62* 68*   GLUCOSE mg/dL 117* 112* 109*           PERLA HERNDON MD  General and " Endoscopic Surgery  [facility], [facility]    [address], [address]  [place], [place], [address]  P: [phone]  F: [phone]

## 2022-02-13 NOTE — PROGRESS NOTES
[facility] Clinical Pharmacy Services: TPN Daily Progress Note    TPN Day #5    Indication: prolonged paralytic ileus  Route: central  Type: standard    Subjective/Objective  Results from last 7 days   Lab Units 22  0548 02/10/22  0628 22  0600   SODIUM mmol/L 133*   < > 143   POTASSIUM mmol/L 3.8   < > 3.4*   CHLORIDE mmol/L 97*   < > 100   CO2 mmol/L 28.0   < > 32.8*   BUN mg/dL 16   < > 18   CREATININE mg/dL 0.71*   < > 0.55*   CALCIUM mg/dL 7.9*   < > 7.7*   ALBUMIN g/dL 2.70*   < > 2.40*   BILIRUBIN mg/dL 0.6   < > 0.3   ALK PHOS U/L 74   < > 45   ALT (SGPT) U/L 87*   < > 41   AST (SGOT) U/L 62*   < > 35   GLUCOSE mg/dL 117*   < > 110*   MAGNESIUM mg/dL 2.0   < > 2.8*   PHOSPHORUS mg/dL 2.7   < > 1.8*   TRIGLYCERIDES mg/dL  --   --  186*    < > = values in this interval not displayed.        Diet Orders (active) (From admission, onward)       Start     Ordered    02/10/22 1013  NPO Diet NPO Except: Ice Chips, Sips With Meds  Diet Effective Now         02/10/22 1012                  Additional insulin administration while previous TPN infusin units  Additional electrolyte administration while previous TPN infusing: None  Acid suppression: Famotidine 20 mg IV Q12    RD TPN Formula Recommendations  Dextrose (Kcal): 1900  Amino Acid (gm): 100  Lipid Concentration (%): 20%  Lipid Volume (mL): 100 mL  Lipid Frequency: Daily  Kcal/Day: 2500 Kcal/day    Current TPN Formula: 100g/1600kcal/0 mL AA/Dex/Lipids    Assessment/Plan    Patient with decrease in phos, although potassium remains stable. Do not believe a result of refeeding syndrome. Never the less will not increase dextrose to goal today and repeat same macros from yesterday's formula. Sodium with slight decrease, will add additional NaCl. AST/ALT are elevated, but stable - will need to monitor; pt with hx of pancreatitis so no lipids as of now - may need to consider using SMOFlipid once lipids start. All other electrolytes to remain  unchanged. [facility] MWF only.    1. Macronutrients: Repeat same - 100 g AA and dextrose to 1600 kcal. Continue to hold lipids. Triglycerides checked 2/9 at 186.   2. Electrolytes/Additives:   Sodium Chloride = 80 mEq (increased from 70 mEq)  Potassium Acetate = 50 mEq  Potassium Phosphate = 35 mEq (increased from 30 mEq)  Magnesium Chloride = 10 mEq  Calcium Gluconate = 9 mEq  3. Labs: CMP, Magnesium, Phos with AM labs    Thank you for allowing me to participate in the care of your patient,    Dionicio Devlin, PharmD  2/13/2022  11:17 EST

## 2022-02-13 NOTE — SIGNIFICANT NOTE
02/13/22 1502   OTHER   Discipline physical therapist   Rehab Time/Intention   Session Not Performed patient/family declined treatment  (Pt declined 2 x. Reports he does not want to get up. Pt always states he is waiting on nurse to come. psych history)   Recommendation   PT - Next Appointment 02/14/22

## 2022-02-14 LAB
ALBUMIN SERPL-MCNC: 2.5 G/DL (ref 3.5–5.2)
ALBUMIN/GLOB SERPL: 0.8 G/DL
ALP SERPL-CCNC: 72 U/L (ref 39–117)
ALT SERPL W P-5'-P-CCNC: 80 U/L (ref 1–41)
ANION GAP SERPL CALCULATED.3IONS-SCNC: 7 MMOL/L (ref 5–15)
AST SERPL-CCNC: 47 U/L (ref 1–40)
BASOPHILS # BLD AUTO: 0.05 10*3/MM3 (ref 0–0.2)
BASOPHILS NFR BLD AUTO: 0.4 % (ref 0–1.5)
BILIRUB SERPL-MCNC: 0.4 MG/DL (ref 0–1.2)
BUN SERPL-MCNC: 18 MG/DL (ref 8–23)
BUN/CREAT SERPL: 29.5 (ref 7–25)
CALCIUM SPEC-SCNC: 8.3 MG/DL (ref 8.6–10.5)
CHLORIDE SERPL-SCNC: 99 MMOL/L (ref 98–107)
CO2 SERPL-SCNC: 26 MMOL/L (ref 22–29)
CREAT SERPL-MCNC: 0.61 MG/DL (ref 0.76–1.27)
DEPRECATED RDW RBC AUTO: 45.6 FL (ref 37–54)
EOSINOPHIL # BLD AUTO: 0.15 10*3/MM3 (ref 0–0.4)
EOSINOPHIL NFR BLD AUTO: 1.3 % (ref 0.3–6.2)
ERYTHROCYTE [DISTWIDTH] IN BLOOD BY AUTOMATED COUNT: 12.6 % (ref 12.3–15.4)
GFR SERPL CREATININE-BSD FRML MDRD: 133 ML/MIN/1.73
GLOBULIN UR ELPH-MCNC: 3.2 GM/DL
GLUCOSE BLDC GLUCOMTR-MCNC: 109 MG/DL (ref 70–130)
GLUCOSE BLDC GLUCOMTR-MCNC: 146 MG/DL (ref 70–130)
GLUCOSE BLDC GLUCOMTR-MCNC: 95 MG/DL (ref 70–130)
GLUCOSE BLDC GLUCOMTR-MCNC: 97 MG/DL (ref 70–130)
GLUCOSE SERPL-MCNC: 102 MG/DL (ref 65–99)
HCT VFR BLD AUTO: 34.6 % (ref 37.5–51)
HGB BLD-MCNC: 11.3 G/DL (ref 13–17.7)
IMM GRANULOCYTES # BLD AUTO: 0.5 10*3/MM3 (ref 0–0.05)
IMM GRANULOCYTES NFR BLD AUTO: 4.2 % (ref 0–0.5)
LYMPHOCYTES # BLD AUTO: 1.02 10*3/MM3 (ref 0.7–3.1)
LYMPHOCYTES NFR BLD AUTO: 8.5 % (ref 19.6–45.3)
MAGNESIUM SERPL-MCNC: 2 MG/DL (ref 1.6–2.4)
MCH RBC QN AUTO: 31.4 PG (ref 26.6–33)
MCHC RBC AUTO-ENTMCNC: 32.7 G/DL (ref 31.5–35.7)
MCV RBC AUTO: 96.1 FL (ref 79–97)
MONOCYTES # BLD AUTO: 1.18 10*3/MM3 (ref 0.1–0.9)
MONOCYTES NFR BLD AUTO: 9.8 % (ref 5–12)
NEUTROPHILS NFR BLD AUTO: 75.8 % (ref 42.7–76)
NEUTROPHILS NFR BLD AUTO: 9.1 10*3/MM3 (ref 1.7–7)
NRBC BLD AUTO-RTO: 0 /100 WBC (ref 0–0.2)
PHOSPHATE SERPL-MCNC: 2.4 MG/DL (ref 2.5–4.5)
PLATELET # BLD AUTO: 279 10*3/MM3 (ref 140–450)
PMV BLD AUTO: 10.7 FL (ref 6–12)
POTASSIUM SERPL-SCNC: 3.9 MMOL/L (ref 3.5–5.2)
PROT SERPL-MCNC: 5.7 G/DL (ref 6–8.5)
RBC # BLD AUTO: 3.6 10*6/MM3 (ref 4.14–5.8)
SODIUM SERPL-SCNC: 132 MMOL/L (ref 136–145)
WBC NRBC COR # BLD: 12 10*3/MM3 (ref 3.4–10.8)

## 2022-02-14 PROCEDURE — 97110 THERAPEUTIC EXERCISES: CPT

## 2022-02-14 PROCEDURE — 25010000002 ENOXAPARIN PER 10 MG: Performed by: STUDENT IN AN ORGANIZED HEALTH CARE EDUCATION/TRAINING PROGRAM

## 2022-02-14 PROCEDURE — 25010000002 ALTEPLASE 2 MG RECONSTITUTED SOLUTION: Performed by: STUDENT IN AN ORGANIZED HEALTH CARE EDUCATION/TRAINING PROGRAM

## 2022-02-14 PROCEDURE — 94799 UNLISTED PULMONARY SVC/PX: CPT

## 2022-02-14 PROCEDURE — 82962 GLUCOSE BLOOD TEST: CPT

## 2022-02-14 PROCEDURE — 94761 N-INVAS EAR/PLS OXIMETRY MLT: CPT

## 2022-02-14 PROCEDURE — 85025 COMPLETE CBC W/AUTO DIFF WBC: CPT | Performed by: STUDENT IN AN ORGANIZED HEALTH CARE EDUCATION/TRAINING PROGRAM

## 2022-02-14 PROCEDURE — 80053 COMPREHEN METABOLIC PANEL: CPT | Performed by: STUDENT IN AN ORGANIZED HEALTH CARE EDUCATION/TRAINING PROGRAM

## 2022-02-14 PROCEDURE — 83735 ASSAY OF MAGNESIUM: CPT | Performed by: STUDENT IN AN ORGANIZED HEALTH CARE EDUCATION/TRAINING PROGRAM

## 2022-02-14 PROCEDURE — 84100 ASSAY OF PHOSPHORUS: CPT | Performed by: STUDENT IN AN ORGANIZED HEALTH CARE EDUCATION/TRAINING PROGRAM

## 2022-02-14 PROCEDURE — 25010000002 MAGNESIUM SULFATE PER 500 MG OF MAGNESIUM: Performed by: HOSPITALIST

## 2022-02-14 PROCEDURE — 25010000002 CALCIUM GLUCONATE PER 10 ML: Performed by: HOSPITALIST

## 2022-02-14 RX ORDER — HYDROCODONE BITARTRATE AND ACETAMINOPHEN 5; 325 MG/1; MG/1
1 TABLET ORAL EVERY 6 HOURS PRN
Status: DISCONTINUED | OUTPATIENT
Start: 2022-02-14 | End: 2022-02-19 | Stop reason: HOSPADM

## 2022-02-14 RX ADMIN — NYSTATIN 500000 UNITS: 100000 SUSPENSION ORAL at 08:05

## 2022-02-14 RX ADMIN — NYSTATIN 500000 UNITS: 100000 SUSPENSION ORAL at 17:13

## 2022-02-14 RX ADMIN — NYSTATIN 500000 UNITS: 100000 SUSPENSION ORAL at 12:46

## 2022-02-14 RX ADMIN — FAMOTIDINE 20 MG: 10 INJECTION INTRAVENOUS at 08:05

## 2022-02-14 RX ADMIN — ASPIRIN 81 MG: 81 TABLET, COATED ORAL at 08:05

## 2022-02-14 RX ADMIN — SODIUM CHLORIDE, PRESERVATIVE FREE 10 ML: 5 INJECTION INTRAVENOUS at 08:06

## 2022-02-14 RX ADMIN — DIGOXIN 250 MCG: 250 TABLET ORAL at 12:46

## 2022-02-14 RX ADMIN — IPRATROPIUM BROMIDE AND ALBUTEROL SULFATE 3 ML: 2.5; .5 SOLUTION RESPIRATORY (INHALATION) at 12:10

## 2022-02-14 RX ADMIN — SODIUM CHLORIDE, PRESERVATIVE FREE 10 ML: 5 INJECTION INTRAVENOUS at 21:08

## 2022-02-14 RX ADMIN — CALCIUM GLUCONATE: 98 INJECTION, SOLUTION INTRAVENOUS at 17:13

## 2022-02-14 RX ADMIN — ALTEPLASE: 2.2 INJECTION, POWDER, LYOPHILIZED, FOR SOLUTION INTRAVENOUS at 01:34

## 2022-02-14 RX ADMIN — HYDROCODONE BITARTRATE AND ACETAMINOPHEN 1 TABLET: 5; 325 TABLET ORAL at 21:06

## 2022-02-14 RX ADMIN — DILTIAZEM HYDROCHLORIDE 360 MG: 180 CAPSULE, COATED, EXTENDED RELEASE ORAL at 08:05

## 2022-02-14 RX ADMIN — SODIUM CHLORIDE, PRESERVATIVE FREE 10 ML: 5 INJECTION INTRAVENOUS at 21:09

## 2022-02-14 RX ADMIN — SODIUM CHLORIDE 50 ML/HR: 9 INJECTION, SOLUTION INTRAVENOUS at 23:30

## 2022-02-14 RX ADMIN — METOPROLOL TARTRATE 50 MG: 50 TABLET, FILM COATED ORAL at 21:07

## 2022-02-14 RX ADMIN — HYDROCODONE BITARTRATE AND ACETAMINOPHEN 1 TABLET: 5; 325 TABLET ORAL at 12:46

## 2022-02-14 RX ADMIN — IPRATROPIUM BROMIDE AND ALBUTEROL SULFATE 3 ML: 2.5; .5 SOLUTION RESPIRATORY (INHALATION) at 07:59

## 2022-02-14 RX ADMIN — ENOXAPARIN SODIUM 40 MG: 100 INJECTION SUBCUTANEOUS at 21:08

## 2022-02-14 RX ADMIN — SODIUM CHLORIDE 50 ML/HR: 9 INJECTION, SOLUTION INTRAVENOUS at 14:18

## 2022-02-14 RX ADMIN — POLYETHYLENE GLYCOL 3350 17 G: 17 POWDER, FOR SOLUTION ORAL at 08:09

## 2022-02-14 RX ADMIN — IPRATROPIUM BROMIDE AND ALBUTEROL SULFATE 3 ML: 2.5; .5 SOLUTION RESPIRATORY (INHALATION) at 21:13

## 2022-02-14 RX ADMIN — RISPERIDONE 5 MG: 2 TABLET, FILM COATED ORAL at 21:08

## 2022-02-14 RX ADMIN — METOPROLOL TARTRATE 50 MG: 50 TABLET, FILM COATED ORAL at 08:05

## 2022-02-14 RX ADMIN — FAMOTIDINE 20 MG: 10 INJECTION INTRAVENOUS at 21:08

## 2022-02-14 RX ADMIN — NYSTATIN 500000 UNITS: 100000 SUSPENSION ORAL at 21:07

## 2022-02-14 NOTE — CASE MANAGEMENT/SOCIAL WORK
Continued Stay Note  [facility]     Patient Name: Carlos Kraus  MRN: [id number]  Today's Date: 2/14/2022    Admit Date: 2/3/2022     Discharge Plan     Row Name 02/14/22 1254       Plan    Plan Plan return to [facility].   DENISE Robins RN    Patient/Family in Agreement with Plan yes    Plan Comments Pt is on TPN.  Pt is from [facility].   Plan return to [facility].   DENISE Robins RN               Discharge Codes    No documentation.               Expected Discharge Date and Time     Expected Discharge Date Expected Discharge Time    Feb 14, 2022             Yoanna Robins, RN

## 2022-02-14 NOTE — PROGRESS NOTES
Dedicated to Hospital Care    [phone]   LOS: 11 days     Name: Carlos Kraus  Age/Sex: 67 y.o. male  :  1955        PCP: Provider, No Known  Chief Complaint   Patient presents with   • Abdominal Pain      Subjective   The patient is sleeping when I enter the room but easily awakens to voice.  He denies any complaints including denying abdominal pain.  Per RN patient was complaining of terrible abdominal pain earlier but it resolved rapidly.  He still has some distention.  He was able to be up and walk with physical therapy in the hallway.    General: No Fever or Chills, Cardiac: No Chest Pain or Palpitations, Resp: No Cough or SOA and Other: No bleeding    aspirin, 81 mg, Oral, Daily  digoxin, 250 mcg, Oral, Daily  dilTIAZem CD, 360 mg, Oral, Q24H  enoxaparin, 40 mg, Subcutaneous, Nightly  famotidine, 20 mg, Intravenous, Q12H  insulin regular, 0-7 Units, Subcutaneous, Q6H  ipratropium-albuterol, 3 mL, Nebulization, Q6H  metoprolol tartrate, 50 mg, Oral, Q12H  nystatin, 5 mL, Swish & Spit, 4x Daily  polyethylene glycol, 17 g, Oral, Daily  risperiDONE, 5 mg, Oral, Nightly  sodium chloride, 10 mL, Intravenous, Q12H  sodium chloride, 10 mL, Intravenous, Q12H  sodium chloride, 10 mL, Intravenous, Q12H      Adult Central 2-in-1 TPN, , Last Rate: 75 mL/hr at 22  Adult Central 2-in-1 TPN,   Pharmacy to Dose TPN,   sodium chloride, 50 mL/hr, Last Rate: 50 mL/hr (22)        Objective   Vital Signs  Temp:  [97.8 °F (36.6 °C)-99.7 °F (37.6 °C)] 97.8 °F (36.6 °C)  Heart Rate:  [74-96] 78  Resp:  [16-18] 18  BP: (108-120)/(71-92) 120/87  Body mass index is 28.3 kg/m².    Intake/Output Summary (Last 24 hours) at 2022 1255  Last data filed at 2022 0759  Gross per 24 hour   Intake --   Output 1175 ml   Net -1175 ml       Physical Exam  Vitals and nursing note reviewed.   Constitutional:       Appearance: Normal appearance.   HENT:      Head: Normocephalic and atraumatic.       Mouth/Throat:      Mouth: Mucous membranes are moist.      Pharynx: Oropharynx is clear.   Eyes:      Extraocular Movements: Extraocular movements intact.      Conjunctiva/sclera: Conjunctivae normal.      Pupils: Pupils are equal, round, and reactive to light.   Cardiovascular:      Rate and Rhythm: Normal rate and regular rhythm.   Pulmonary:      Effort: Pulmonary effort is normal. No respiratory distress.      Breath sounds: Normal breath sounds.   Abdominal:      General: There is distension.      Palpations: Abdomen is soft.      Tenderness: There is abdominal tenderness.      Comments: Soft, some tenderness   Skin:     General: Skin is warm and dry.   Neurological:      General: No focal deficit present.      Mental Status: He is alert and oriented to person, place, and time.         Results Review:       I reviewed the patient's new clinical results.  Results from last 7 days   Lab Units 02/14/22  0646 02/13/22  0548 02/12/22  0546 02/11/22  0631 02/10/22  0628 02/09/22  0600 02/08/22  0541   WBC 10*3/mm3 12.00* 10.89* 13.82* 14.61* 13.57* 9.87 9.35   HEMOGLOBIN g/dL 11.3* 10.9* 11.7* 11.7* 12.5* 12.4* 12.9*   PLATELETS 10*3/mm3 279 206 220 200 164 174 180     Results from last 7 days   Lab Units 02/14/22  0646 02/13/22  0548 02/12/22  0546 02/11/22  0631 02/10/22  0628 02/09/22  0600 02/08/22  0541   SODIUM mmol/L 132* 133* 136 134* 143 143 139   POTASSIUM mmol/L 3.9 3.8 3.7 3.2* 3.6 3.4* 3.4*   CHLORIDE mmol/L 99 97* 96* 96* 102 100 98   CO2 mmol/L 26.0 28.0 33.9* 31.7* 30.8* 32.8* 29.6*   BUN mg/dL 18 16 12 12 13 18 21   CREATININE mg/dL 0.61* 0.71* 0.75* 0.54* 0.54* 0.55* 0.58*   CALCIUM mg/dL 8.3* 7.9* 8.5* 8.2* 8.0* 7.7* 7.7*   MAGNESIUM mg/dL 2.0 2.0 2.3 2.2 2.4 2.8* 2.8*   PHOSPHORUS mg/dL 2.4* 2.7 3.4 2.8 2.2* 1.8* 1.5*   Estimated Creatinine Clearance: 116.3 mL/min (A) (by C-G formula based on SCr of 0.61 mg/dL (L)).      Assessment/Plan   Active Hospital Problems    Diagnosis  POA   • **SBO (small  bowel obstruction) (HCC) [K56.609]  Unknown   • Acute pancreatitis [K85.90]  Unknown   • GERD (gastroesophageal reflux disease) [K21.9]  Unknown   • Hypothyroidism [E03.9]  Unknown   • Hyperlipidemia [E78.5]  Unknown   • Hypophosphatemia [E83.39]  Unknown   • Generalized abdominal pain [R10.84]  Yes   • Hypertension [I10]  Yes   • Atrial fibrillation (HCC) [I48.91]  Yes   • Schizophrenia (HCC) [F20.9]  Yes      Resolved Hospital Problems   No resolved problems to display.       PLAN  This is a 67-year-old gentleman with history of A. fib schizophrenia hypertension hyperlipidemia hypothyroidism presented to the hospital with abdominal pain nausea and vomiting was found to have a small bowel obstruction that required exploratory laparotomy and lysis of adhesions.  Postoperatively we are dealing with a postoperative ileus and now pancreatitis    -Given persistent ileus he had repeat CT abdomen/pelvis which revealed the development of pancreatitis  -Continue CLD and TPN and pain medication (renewed today)  -Atrial fibrillationwith cardiology following, on metop, dilt, and digoxin; metoprolol dose was increased with improvement in rates; cardiology to determine if AC is appropriate  -Heart rate is stable for now but may require IV meds for further coverage given NPO and poor absorption  -Full code  -Lovenox for DVT prophylaxis    Disposition  To be determined- patient up in the halls walking with PT today    Roz Chacko MD  [facility]  02/14/22  12:55 EST

## 2022-02-14 NOTE — THERAPY TREATMENT NOTE
Patient Name: Carlos Kraus  : 1955    MRN: [id number]                              Today's Date: 2022       Admit Date: 2/3/2022    Visit Dx:     ICD-10-CM ICD-9-CM   1. Generalized abdominal pain  R10.84 789.07   2. Acute pancreatitis, unspecified complication status, unspecified pancreatitis type  K85.90 577.0   3. Small bowel obstruction (HCC)  K56.609 560.9   4. Atrial fibrillation with RVR (HCC)  I48.91 427.31     Patient Active Problem List   Diagnosis   • Syncope and collapse   • Schizophrenia (HCC)   • Atrial fibrillation (HCC)   • Hypertension   • Orthostatic hypotension   • Hypokalemia   • Generalized abdominal pain   • GERD (gastroesophageal reflux disease)   • Hypothyroidism   • Hyperlipidemia   • Hypophosphatemia   • SBO (small bowel obstruction) (HCC)   • Acute pancreatitis     Past Medical History:   Diagnosis Date   • A-fib (HCC)    • Atrial fibrillation (HCC) 2019   • [place] esophagus    • GERD (gastroesophageal reflux disease)    • Hx of schizophrenia    • Hyperlipidemia    • Hypertension    • Hypokalemia 2019   • Hypophosphatemia    • Hypothyroidism    • Schizophrenia (HCC) 2019   • Syncope and collapse 2019   • Umbilical hernia      Past Surgical History:   Procedure Laterality Date   • EXPLORATORY LAPAROTOMY N/A 2022    Procedure: LAPAROTOMY EXPLORATORY LYSIS OF ADHESIONS;  Surgeon: Edyta Agosto MD;  Location: [facility]  Service: General;  Laterality: N/A;      General Information     Row Name 22 1433          Physical Therapy Time and Intention    Document Type therapy note (daily note)  -HILLARY     Mode of Treatment individual therapy; physical therapy  -HILLARY     Row Name 22 1433          General Information    Patient Profile Reviewed yes  -HILLARY     Existing Precautions/Restrictions fall  -     Row Name 22 1433          Living Environment    Lives With facility resident  -     Row Name 22 1433          Cognition     Orientation Status (Cognition) oriented to; person; place  -     Row Name 02/14/22 1433          Safety Issues, Functional Mobility    Safety Issues Affecting Function (Mobility) awareness of need for assistance; judgment; problem-solving; safety precaution awareness  -     Impairments Affecting Function (Mobility) balance; endurance/activity tolerance; strength  -           User Key  (r) = Recorded By, (t) = Taken By, (c) = Cosigned By    Initials Name Provider Type    Lety Burks PTA Physical Therapy Assistant               Mobility     Row Name 02/14/22 1439          Bed Mobility    Supine-Sit [place] (Bed Mobility) contact guard; verbal cues  -     Sit-Supine [place] (Bed Mobility) supervision; verbal cues  -     Assistive Device (Bed Mobility) bed rails; head of bed elevated  -     Row Name 02/14/22 1439          Sit-Stand Transfer    Sit-Stand [place] (Transfers) minimum assist (75% patient effort); contact guard; verbal cues  -     Assistive Device (Sit-Stand Transfers) walker, front-wheeled  -     Row Name 02/14/22 1439          Gait/Stairs (Locomotion)    [place] Level (Gait) 1 person to manage equipment; 1 person assist; contact guard  -     Assistive Device (Gait) walker, front-wheeled  -     Distance in Feet (Gait) 75ftx2, standing rest to center in rwx, cues for posture  -     Deviations/Abnormal Patterns (Gait) base of support, wide; festinating/shuffling; stride length decreased  -     Bilateral Gait Deviations forward flexed posture  -           User Key  (r) = Recorded By, (t) = Taken By, (c) = Cosigned By    Initials Name Provider Type    Lety Burks PTA Physical Therapy Assistant               Obj/Interventions     Row Name 02/14/22 1442          Motor Skills    Therapeutic Exercise --  LAQS x10  -           User Key  (r) = Recorded By, (t) = Taken By, (c) = Cosigned By    Initials Name Provider Type    Lety Burks PTA  Physical Therapy Assistant               Goals/Plan    No documentation.                Clinical Impression     Row Name 02/14/22 1442          Pain Scale: Numbers Pre/Post-Treatment    Pretreatment Pain Rating 0/10 - no pain  -     Posttreatment Pain Rating 5/10  -     Pain Location abdomen  -     Pain Intervention(s) Repositioned; Rest  -[facility] Name 02/14/22 1442          Plan of Care Review    Plan of Care Reviewed With patient  -     Progress improving  -     Outcome Summary required some encouragement but agreed to ambulate today, amb 75ft, then perf standing rest , and completed another 75ft; pt improved w/bed mobility and amb, assist of 2 only for equipment, plans SNU at [place], lives at facility  -[facility] Name 02/14/22 1442          Therapy Assessment/Plan (PT)    Rehab Potential (PT) good, to achieve stated therapy goals  -     Criteria for Skilled Interventions Met (PT) yes  -[facility] Name 02/14/22 1442          Vital Signs    Pretreatment Heart Rate (beats/min) 113  -     Intratreatment Heart Rate (beats/min) 166  RN alerted  -     Posttreatment Heart Rate (beats/min) 130  -     O2 Delivery Pre Treatment room air  -[facility] Name 02/14/22 1442          Positioning and Restraints    Pre-Treatment Position in bed  -     Post Treatment Position bed  -     In Bed supine; call light within reach; encouraged to call for assist; exit alarm on; notified nsg  -           User Key  (r) = Recorded By, (t) = Taken By, (c) = Cosigned By    Initials Name Provider Type    Lety Burks PTA Physical Therapy Assistant               Outcome Measures     Row Name 02/14/22 1452 02/14/22 0801       How much help from another person do you currently need...    Turning from your back to your side while in flat bed without using bedrails? 3  -JM 3  -LN    Moving from lying on back to sitting on the side of a flat bed without bedrails? 3  - 3  -LN    Moving to and from a bed to a chair  (including a wheelchair)? 3  -HILLARY 3  -LN    Standing up from a chair using your arms (e.g., wheelchair, bedside chair)? 3  -HILLARY 3  -LN    Climbing 3-5 steps with a railing? 3  -HILLARY 3  -LN    To walk in hospital room? 3  -HILLARY 3  -LN    AM-PAC 6 Clicks Score (PT) 18  -HILLARY 18  -LN          User Key  (r) = Recorded By, (t) = Taken By, (c) = Cosigned By    Initials Name Provider Type    Lety Burks PTA Physical Therapy Assistant    Birdie Verma, RN Registered Nurse                             Physical Therapy Education                 Title: PT OT SLP Therapies (Done)     Topic: Physical Therapy (Done)     Point: Mobility training (Done)     Learning Progress Summary           Patient Acceptance, E,D, VU,NR by HILLARY at 2/14/2022 1453    Acceptance, E,D, NR by HILLARY at 2/8/2022 1757    Acceptance, E, VU,NR by MARYCRUZ at 2/7/2022 1545    Acceptance, E,TB, VU by SHILPA at 2/6/2022 1440    Acceptance, E,TB, VU,NR by  at 2/6/2022 0835    Acceptance, E, NL by MARYCRUZ at 2/5/2022 1216   Other Acceptance, E, VU,NR by MARYCRUZ at 2/7/2022 1545                   Point: Home exercise program (Done)     Learning Progress Summary           Patient Acceptance, E,D, VU,NR by HILLARY at 2/14/2022 1453    Acceptance, E,D, NR by HILLARY at 2/8/2022 1757    Acceptance, E,TB, VU by SHILPA at 2/6/2022 1440    Acceptance, E,TB, VU,NR by  at 2/6/2022 0835                   Point: Body mechanics (Done)     Learning Progress Summary           Patient Acceptance, E,D, VU,NR by HILLARY at 2/14/2022 1453    Acceptance, E,D, NR by HILLARY at 2/8/2022 1757    Acceptance, E, VU,NR by MARYCRUZ at 2/7/2022 1545    Acceptance, E,TB, VU by SHILPA at 2/6/2022 1440    Acceptance, E,TB, VU,NR by  at 2/6/2022 0835    Acceptance, E, NL by MARYCRUZ at 2/5/2022 1216   Other Acceptance, E, VU,NR by MARYCRUZ at 2/7/2022 1545                   Point: Precautions (Done)     Learning Progress Summary           Patient Acceptance, E,D, VU,NR by HILLARY at 2/14/2022 1453    Acceptance, TB,E, VU by KYLE at 2/13/2022 6291     Acceptance, E, VU,NR by MS at 2/9/2022 0423    Acceptance, E,D, NR by  at 2/8/2022 1757    Acceptance, E, VU,NR by  at 2/7/2022 1545    Acceptance, E,TB, VU by  at 2/6/2022 1440    Acceptance, E,TB, VU,NR by  at 2/6/2022 0835    Acceptance, E, NL by  at 2/5/2022 1216   Other Acceptance, E, VU,NR by  at 2/7/2022 1545                               User Key     Initials Effective Dates Name Provider Type Discipline     03/07/18 -  Lety Villa PTA Physical Therapy Assistant PT    RW 06/16/21 -  Dionicio Jennings, RN Registered Nurse Nurse    MS 06/16/21 -  Mendy Odonnell, RN Registered Nurse Nurse     10/25/19 -  Pamela Otero, PT Physical Therapist PT     06/01/21 -  Birdie Benito, RN Registered Nurse Nurse     03/10/21 -  Debora Herman, RN Registered Nurse Nurse              PT Recommendation and Plan     Plan of Care Reviewed With: patient  Progress: improving  Outcome Summary: required some encouragement but agreed to ambulate today, amb 75ft, then perf standing rest , and completed another 75ft; pt improved w/bed mobility and amb, assist of 2 only for equipment, plans SNU at [place], lives at facility     Time Calculation:    PT Charges     Row Name 02/14/22 1453             Time Calculation    Start Time 1101  -      Stop Time 1117  -      Time Calculation (min) 16 min  -      PT Received On 02/14/22  -      PT - Next Appointment 02/15/22  -            User Key  (r) = Recorded By, (t) = Taken By, (c) = Cosigned By    Initials Name Provider Type     Lety Villa PTA Physical Therapy Assistant              Therapy Charges for Today     Code Description Service Date Service Provider Modifiers Qty    55854400305 HC PT THER PROC EA 15 MIN 2/14/2022 Lety Villa PTA GP 1    95582950875 HC PT THER SUPP EA 15 MIN 2/14/2022 Lety Villa PTA GP 1          PT G-Codes  Outcome Measure Options: AM-PAC 6 Clicks Basic Mobility (PT)  AM-PAC 6 Clicks Score (PT): 18  AM-PAC 6 Clicks Score  (OT): 24    Lety Villa, PTA  2/14/2022

## 2022-02-14 NOTE — PROGRESS NOTES
[facility]: TPN Daily Progress Note    TPN Day # 6  Indication: prolonged paralytic ileus  Route: central  Type: standard    Subjective/Objective  Results from last 7 days   Lab Units 22  0646 02/10/22  0628 22  0600   SODIUM mmol/L 132*   < > 143   POTASSIUM mmol/L 3.9   < > 3.4*   CHLORIDE mmol/L 99   < > 100   CO2 mmol/L 26.0   < > 32.8*   BUN mg/dL 18   < > 18   CREATININE mg/dL 0.61*   < > 0.55*   CALCIUM mg/dL 8.3*   < > 7.7*   ALBUMIN g/dL 2.50*   < > 2.40*   BILIRUBIN mg/dL 0.4   < > 0.3   ALK PHOS U/L 72   < > 45   ALT (SGPT) U/L 80*   < > 41   AST (SGOT) U/L 47*   < > 35   GLUCOSE mg/dL 102*   < > 110*   MAGNESIUM mg/dL 2.0   < > 2.8*   PHOSPHORUS mg/dL 2.4*   < > 1.8*   TRIGLYCERIDES mg/dL  --   --  186*    < > = values in this interval not displayed.        Diet Orders (active) (From admission, onward)       Start     Ordered    02/10/22 1013  NPO Diet NPO Except: Ice Chips, Sips With Meds  Diet Effective Now         02/10/22 1012                  Additional insulin administration while previous TPN infusin units  Additional electrolyte administration while previous TPN infusing: none  Acid suppression: Famotidine 20 mg Q12    RD TPN Formula Recommendations  Dextrose (Kcal): 1900  Amino Acid (gm): 100  Lipid Concentration (%): 20%  Lipid Volume (mL): 100 mL  Lipid Frequency: Daily  Kcal/Day: 2500 Kcal/day     Current TPN Formula: 100g/1600kcal/0 mL AA/Dex/Lipids     Assessment/Plan    Patient with decrease in phos, although potassium remains stable. Do not believe a result of refeeding syndrome. Never the less will not increase dextrose to goal today and repeat same macros from yesterday's formula. Sodium with slight decrease, will add additional NaPo4 10Meq . AST/ALT are elevated, but stable - will need to monitor; pt with hx of pancreatitis so no lipids as of now - may need to consider using SMOFlipid once lipids start. All other electrolytes to remain  unchanged. MVI  10ml MWF only.     Macronutrients: Repeat same - 100 g AA and dextrose to 1600 kcal. Continue to hold lipids. Triglycerides checked 2/9 at 186.  Will order Triglycerides in am  Electrolytes/Additives:   Sodium Chloride = 80 mEq   Sodium Phosphate= 10 mEq  Potassium Acetate = 50 mEq  Potassium Phosphate = 35 mEq   Magnesium Chloride = 10 mEq  Calcium Gluconate = 9 mEq  Labs: CMP, Magnesium, Phos , Triglycerides with AM labs    Marilu Ramos [facility]    Clinical Pharmacist

## 2022-02-14 NOTE — PLAN OF CARE
Goal Outcome Evaluation:     Patient alert with moments of confusion and forgetfulness. Patient had two liquid bowel movements today. No complaints of N/V. TPN in PICC. Medicated per orders. VSS. No acute distress noted.

## 2022-02-14 NOTE — PROGRESS NOTES
"General Surgery Progress Note    Summary: Mr. Carlos Kraus is a 67 y.o. year old gentleman POD10 s/p exploratory laparotomy and lysis of adhesions for high-grade small bowel obstruction,with a resolving ileus.  CT scan from yesterday reviewed; no signs of bowel obstruction but does have evolution of his pancreatitis.  Continue TPN, clears as tolerated.  Continue bowel regimen.  Out of bed and ambulate as tolerated.  On DVT prophylaxis.    Interval Events: No acute events overnight.  Had 2 bowel movements yesterday.  Had some nausea with clears but this is gone away.    Vitals: /73 (BP Location: Left arm, Patient Position: Lying)   Pulse 76   Temp 98.3 °F (36.8 °C) (Oral)   Resp 16   Ht 188 cm (74.02\")   Wt 100 kg (220 lb 8 oz)   SpO2 95%   BMI 28.30 kg/m²     GENERAL: Alert, well appearing, laying comfortably in bed, and in no distress  HEENT: normocephalic, atraumatic, moist mucous membranes, clear sclerae  CHEST: no increased work of breathing, on room air, symmetric air entry  CARDIAC: regular rate and rhythm    ABDOMEN: Soft, distended, appropriately tender to palpation, incision clean and dry with staples intact  EXTREMITIES: no cyanosis, clubbing, or edema   SKIN: Warm and moist, no rashes    Labs:  Results from last 7 days   Lab Units 02/14/22  0646 02/13/22  0548 02/12/22  0546   WBC 10*3/mm3 12.00* 10.89* 13.82*   HEMOGLOBIN g/dL 11.3* 10.9* 11.7*   HEMATOCRIT % 34.6* 31.6* 34.2*   PLATELETS 10*3/mm3 279 206 220     Results from last 7 days   Lab Units 02/14/22  0646 02/13/22  0548 02/12/22  0546   SODIUM mmol/L 132* 133* 136   POTASSIUM mmol/L 3.9 3.8 3.7   CHLORIDE mmol/L 99 97* 96*   CO2 mmol/L 26.0 28.0 33.9*   BUN mg/dL 18 16 12   CREATININE mg/dL 0.61* 0.71* 0.75*   CALCIUM mg/dL 8.3* 7.9* 8.5*   BILIRUBIN mg/dL 0.4 0.6 0.6   ALK PHOS U/L 72 74 78   ALT (SGPT) U/L 80* 87* 83*   AST (SGOT) U/L 47* 62* 62*   GLUCOSE mg/dL 102* 117* 112*           PERLA HERNDON MD  General and Endoscopic " Surgery  [facility]    [address], [address]  [place], [place], [address]  P: [phone]  F: [phone]

## 2022-02-14 NOTE — PROGRESS NOTES
LOS: 11 days   Patient Care Team:  Provider, No Known as PCP - General      Chief Complaint: Follow-up atrial fibrillation       Interval History: No complaints. Still with NG Tube      Objective   Vital Signs  Temp:  [97.8 °F (36.6 °C)-99.7 °F (37.6 °C)] 97.8 °F (36.6 °C)  Heart Rate:  [] 74  Resp:  [16-18] 18  BP: (108-120)/(71-92) 120/87    Intake/Output Summary (Last 24 hours) at 2/14/2022 1038  Last data filed at 2/14/2022 0759  Gross per 24 hour   Intake --   Output 1300 ml   Net -1300 ml         Physical Exam:  GEN: no distress, alert and oriented  Eyes: normal sclerae, normal lids and lashes  HENT: moist mucous membranes,   Lungs: CTAB, no rales or wheezes  Chest: no abnormalities  Neck: no JVD or carotid bruits  CV: RRR, no murmurs, +2 DP and 2+ carotid pulses b/l  Abdomen: soft, nontender, nondistended  Extremities: no edema  Skin: no rash, warm, dry  Heme/Lymph: no bruising  Psych: organized thought, normal behavior and affect      Results Review:      Results from last 7 days   Lab Units 02/14/22  0646 02/13/22 0548 02/13/22 0548 02/12/22  0546 02/12/22  0546   SODIUM mmol/L 132*  --  133*  --  136   POTASSIUM mmol/L 3.9  --  3.8  --  3.7   CHLORIDE mmol/L 99  --  97*  --  96*   CO2 mmol/L 26.0  --  28.0  --  33.9*   BUN mg/dL 18  --  16  --  12   CREATININE mg/dL 0.61*  --  0.71*  --  0.75*   GLUCOSE mg/dL 102*   < > 117*   < > 112*   CALCIUM mg/dL 8.3*  --  7.9*  --  8.5*    < > = values in this interval not displayed.         Results from last 7 days   Lab Units 02/14/22  0646 02/13/22  0548 02/12/22  0546   WBC 10*3/mm3 12.00* 10.89* 13.82*   HEMOGLOBIN g/dL 11.3* 10.9* 11.7*   HEMATOCRIT % 34.6* 31.6* 34.2*   PLATELETS 10*3/mm3 279 206 220             Results from last 7 days   Lab Units 02/14/22  0646   MAGNESIUM mg/dL 2.0     Results from last 7 days   Lab Units 02/09/22  0600   TRIGLYCERIDES mg/dL 186*       I reviewed the patient's new clinical results.  I personally viewed and  interpreted the patient's EKG/Telemetry data        Medication Review:   aspirin, 81 mg, Oral, Daily  digoxin, 250 mcg, Oral, Daily  dilTIAZem CD, 360 mg, Oral, Q24H  enoxaparin, 40 mg, Subcutaneous, Nightly  famotidine, 20 mg, Intravenous, Q12H  insulin regular, 0-7 Units, Subcutaneous, Q6H  ipratropium-albuterol, 3 mL, Nebulization, Q6H  metoprolol tartrate, 50 mg, Oral, Q12H  nystatin, 5 mL, Swish & Spit, 4x Daily  polyethylene glycol, 17 g, Oral, Daily  risperiDONE, 5 mg, Oral, Nightly  sodium chloride, 10 mL, Intravenous, Q12H  sodium chloride, 10 mL, Intravenous, Q12H  sodium chloride, 10 mL, Intravenous, Q12H        Adult Central 2-in-1 TPN, , Last Rate: 75 mL/hr at 02/13/22 1801  Adult Central 2-in-1 TPN,   Pharmacy to Dose TPN,   sodium chloride, 50 mL/hr, Last Rate: 50 mL/hr (02/13/22 1802)        Assessment/Plan       SBO (small bowel obstruction) (HCC)    Schizophrenia (HCC)    Atrial fibrillation (HCC)    Hypertension    Generalized abdominal pain    GERD (gastroesophageal reflux disease)    Hypothyroidism    Hyperlipidemia    Hypophosphatemia    Acute pancreatitis      1.  Small bowel obstruction.  Status post exploratory lap.  2.  Chronic atrial fibrillation.  Remains on metoprolol, diltiazem, and digoxin.  3.  Hypertension.  Stable.  4.  Acute hypoxic respiratory failure.  6.  Hyperlipidemia.  Previously on simvastatin.  7.  Schizophrenia.  8.  Possible ileus.     HR relatively well controlled considering no PO intake, discomfort, etc. Continue current dosages.  I have seen him multiple times and at this point I am not sure he has good insight in to his need for AC, which make me hesitant to rx it for him as  An outpatient. Would continue to assess as his clinical course improves.     Sue Winters MD  02/14/22  10:38 EST

## 2022-02-14 NOTE — PLAN OF CARE
Goal Outcome Evaluation:  Plan of Care Reviewed With: patient        Progress: improving  Outcome Summary: required some encouragement but agreed to ambulate today, amb 75ft, then perf standing rest , and completed another 75ft; pt improved w/bed mobility and amb, assist of 2 only for equipment, plans SNU at [place], lives at facility    [facility], PT Tech present    Patient was wearing a face mask during this therapy encounter. Therapist used appropriate personal protective equipment including eye protection, mask, and gloves.  Mask used was standard procedure mask. Appropriate PPE was worn during the entire therapy session. Hand hygiene was completed before and after therapy session. Patient is not in enhanced droplet precautions.

## 2022-02-15 LAB
ALBUMIN SERPL-MCNC: 2.7 G/DL (ref 3.5–5.2)
ALBUMIN/GLOB SERPL: 0.9 G/DL
ALP SERPL-CCNC: 64 U/L (ref 39–117)
ALT SERPL W P-5'-P-CCNC: 66 U/L (ref 1–41)
ANION GAP SERPL CALCULATED.3IONS-SCNC: 7 MMOL/L (ref 5–15)
AST SERPL-CCNC: 43 U/L (ref 1–40)
BASOPHILS # BLD AUTO: 0.03 10*3/MM3 (ref 0–0.2)
BASOPHILS NFR BLD AUTO: 0.3 % (ref 0–1.5)
BILIRUB SERPL-MCNC: 0.3 MG/DL (ref 0–1.2)
BUN SERPL-MCNC: 19 MG/DL (ref 8–23)
BUN/CREAT SERPL: 29.7 (ref 7–25)
CALCIUM SPEC-SCNC: 7.7 MG/DL (ref 8.6–10.5)
CHLORIDE SERPL-SCNC: 98 MMOL/L (ref 98–107)
CO2 SERPL-SCNC: 27 MMOL/L (ref 22–29)
CREAT SERPL-MCNC: 0.64 MG/DL (ref 0.76–1.27)
DEPRECATED RDW RBC AUTO: 44.7 FL (ref 37–54)
EOSINOPHIL # BLD AUTO: 0.16 10*3/MM3 (ref 0–0.4)
EOSINOPHIL NFR BLD AUTO: 1.5 % (ref 0.3–6.2)
ERYTHROCYTE [DISTWIDTH] IN BLOOD BY AUTOMATED COUNT: 12.9 % (ref 12.3–15.4)
GFR SERPL CREATININE-BSD FRML MDRD: 126 ML/MIN/1.73
GLOBULIN UR ELPH-MCNC: 2.9 GM/DL
GLUCOSE BLDC GLUCOMTR-MCNC: 114 MG/DL (ref 70–130)
GLUCOSE BLDC GLUCOMTR-MCNC: 131 MG/DL (ref 70–130)
GLUCOSE BLDC GLUCOMTR-MCNC: 139 MG/DL (ref 70–130)
GLUCOSE BLDC GLUCOMTR-MCNC: 98 MG/DL (ref 70–130)
GLUCOSE SERPL-MCNC: 129 MG/DL (ref 65–99)
HCT VFR BLD AUTO: 29.7 % (ref 37.5–51)
HGB BLD-MCNC: 9.7 G/DL (ref 13–17.7)
IMM GRANULOCYTES # BLD AUTO: 0.42 10*3/MM3 (ref 0–0.05)
IMM GRANULOCYTES NFR BLD AUTO: 4 % (ref 0–0.5)
LYMPHOCYTES # BLD AUTO: 1.13 10*3/MM3 (ref 0.7–3.1)
LYMPHOCYTES NFR BLD AUTO: 10.9 % (ref 19.6–45.3)
MAGNESIUM SERPL-MCNC: 2 MG/DL (ref 1.6–2.4)
MCH RBC QN AUTO: 31 PG (ref 26.6–33)
MCHC RBC AUTO-ENTMCNC: 32.7 G/DL (ref 31.5–35.7)
MCV RBC AUTO: 94.9 FL (ref 79–97)
MONOCYTES # BLD AUTO: 1.1 10*3/MM3 (ref 0.1–0.9)
MONOCYTES NFR BLD AUTO: 10.6 % (ref 5–12)
NEUTROPHILS NFR BLD AUTO: 7.57 10*3/MM3 (ref 1.7–7)
NEUTROPHILS NFR BLD AUTO: 72.7 % (ref 42.7–76)
NRBC BLD AUTO-RTO: 0 /100 WBC (ref 0–0.2)
PHOSPHATE SERPL-MCNC: 2.5 MG/DL (ref 2.5–4.5)
PLATELET # BLD AUTO: 248 10*3/MM3 (ref 140–450)
PMV BLD AUTO: 11.3 FL (ref 6–12)
POTASSIUM SERPL-SCNC: 3.6 MMOL/L (ref 3.5–5.2)
PROT SERPL-MCNC: 5.6 G/DL (ref 6–8.5)
RBC # BLD AUTO: 3.13 10*6/MM3 (ref 4.14–5.8)
SODIUM SERPL-SCNC: 132 MMOL/L (ref 136–145)
TRIGL SERPL-MCNC: 140 MG/DL (ref 0–150)
WBC NRBC COR # BLD: 10.41 10*3/MM3 (ref 3.4–10.8)

## 2022-02-15 PROCEDURE — 80053 COMPREHEN METABOLIC PANEL: CPT | Performed by: STUDENT IN AN ORGANIZED HEALTH CARE EDUCATION/TRAINING PROGRAM

## 2022-02-15 PROCEDURE — 25010000002 MAGNESIUM SULFATE PER 500 MG OF MAGNESIUM: Performed by: STUDENT IN AN ORGANIZED HEALTH CARE EDUCATION/TRAINING PROGRAM

## 2022-02-15 PROCEDURE — 25010000002 CALCIUM GLUCONATE PER 10 ML: Performed by: STUDENT IN AN ORGANIZED HEALTH CARE EDUCATION/TRAINING PROGRAM

## 2022-02-15 PROCEDURE — 97110 THERAPEUTIC EXERCISES: CPT

## 2022-02-15 PROCEDURE — 84100 ASSAY OF PHOSPHORUS: CPT | Performed by: STUDENT IN AN ORGANIZED HEALTH CARE EDUCATION/TRAINING PROGRAM

## 2022-02-15 PROCEDURE — 94799 UNLISTED PULMONARY SVC/PX: CPT

## 2022-02-15 PROCEDURE — 94761 N-INVAS EAR/PLS OXIMETRY MLT: CPT

## 2022-02-15 PROCEDURE — 99232 SBSQ HOSP IP/OBS MODERATE 35: CPT | Performed by: INTERNAL MEDICINE

## 2022-02-15 PROCEDURE — 25010000002 ENOXAPARIN PER 10 MG: Performed by: STUDENT IN AN ORGANIZED HEALTH CARE EDUCATION/TRAINING PROGRAM

## 2022-02-15 PROCEDURE — 85025 COMPLETE CBC W/AUTO DIFF WBC: CPT | Performed by: STUDENT IN AN ORGANIZED HEALTH CARE EDUCATION/TRAINING PROGRAM

## 2022-02-15 PROCEDURE — 83735 ASSAY OF MAGNESIUM: CPT | Performed by: STUDENT IN AN ORGANIZED HEALTH CARE EDUCATION/TRAINING PROGRAM

## 2022-02-15 PROCEDURE — 82962 GLUCOSE BLOOD TEST: CPT

## 2022-02-15 PROCEDURE — 84478 ASSAY OF TRIGLYCERIDES: CPT | Performed by: HOSPITALIST

## 2022-02-15 RX ADMIN — NYSTATIN 500000 UNITS: 100000 SUSPENSION ORAL at 12:06

## 2022-02-15 RX ADMIN — ENOXAPARIN SODIUM 40 MG: 100 INJECTION SUBCUTANEOUS at 21:14

## 2022-02-15 RX ADMIN — NYSTATIN 500000 UNITS: 100000 SUSPENSION ORAL at 21:14

## 2022-02-15 RX ADMIN — SMOFLIPID 100 ML: 6; 6; 5; 3 INJECTION, EMULSION INTRAVENOUS at 17:43

## 2022-02-15 RX ADMIN — NYSTATIN 500000 UNITS: 100000 SUSPENSION ORAL at 08:57

## 2022-02-15 RX ADMIN — SODIUM CHLORIDE, PRESERVATIVE FREE 10 ML: 5 INJECTION INTRAVENOUS at 09:00

## 2022-02-15 RX ADMIN — DIGOXIN 250 MCG: 250 TABLET ORAL at 12:06

## 2022-02-15 RX ADMIN — SODIUM CHLORIDE, PRESERVATIVE FREE 10 ML: 5 INJECTION INTRAVENOUS at 21:15

## 2022-02-15 RX ADMIN — METOPROLOL TARTRATE 50 MG: 50 TABLET, FILM COATED ORAL at 08:58

## 2022-02-15 RX ADMIN — HYDROCODONE BITARTRATE AND ACETAMINOPHEN 1 TABLET: 5; 325 TABLET ORAL at 03:37

## 2022-02-15 RX ADMIN — CALCIUM GLUCONATE: 98 INJECTION, SOLUTION INTRAVENOUS at 17:43

## 2022-02-15 RX ADMIN — HYDROCODONE BITARTRATE AND ACETAMINOPHEN 1 TABLET: 5; 325 TABLET ORAL at 09:11

## 2022-02-15 RX ADMIN — METOPROLOL TARTRATE 50 MG: 50 TABLET, FILM COATED ORAL at 21:14

## 2022-02-15 RX ADMIN — FAMOTIDINE 20 MG: 10 INJECTION INTRAVENOUS at 09:11

## 2022-02-15 RX ADMIN — NYSTATIN 500000 UNITS: 100000 SUSPENSION ORAL at 17:43

## 2022-02-15 RX ADMIN — HYDROCODONE BITARTRATE AND ACETAMINOPHEN 1 TABLET: 5; 325 TABLET ORAL at 18:11

## 2022-02-15 RX ADMIN — FAMOTIDINE 20 MG: 10 INJECTION INTRAVENOUS at 21:15

## 2022-02-15 RX ADMIN — RISPERIDONE 5 MG: 2 TABLET, FILM COATED ORAL at 21:14

## 2022-02-15 RX ADMIN — IPRATROPIUM BROMIDE AND ALBUTEROL SULFATE 3 ML: 2.5; .5 SOLUTION RESPIRATORY (INHALATION) at 07:40

## 2022-02-15 RX ADMIN — DILTIAZEM HYDROCHLORIDE 360 MG: 180 CAPSULE, COATED, EXTENDED RELEASE ORAL at 08:57

## 2022-02-15 RX ADMIN — POLYETHYLENE GLYCOL 3350 17 G: 17 POWDER, FOR SOLUTION ORAL at 08:58

## 2022-02-15 RX ADMIN — ASPIRIN 81 MG: 81 TABLET, COATED ORAL at 08:58

## 2022-02-15 RX ADMIN — HYDROCODONE BITARTRATE AND ACETAMINOPHEN 1 TABLET: 5; 325 TABLET ORAL at 23:44

## 2022-02-15 NOTE — THERAPY TREATMENT NOTE
Patient Name: Carlos Kraus  : 1955    MRN: [id number]                              Today's Date: 2/15/2022       Admit Date: 2/3/2022    Visit Dx:     ICD-10-CM ICD-9-CM   1. Generalized abdominal pain  R10.84 789.07   2. Acute pancreatitis, unspecified complication status, unspecified pancreatitis type  K85.90 577.0   3. Small bowel obstruction (HCC)  K56.609 560.9   4. Atrial fibrillation with RVR (HCC)  I48.91 427.31     Patient Active Problem List   Diagnosis   • Syncope and collapse   • Schizophrenia (HCC)   • Atrial fibrillation (HCC)   • Hypertension   • Orthostatic hypotension   • Hypokalemia   • Generalized abdominal pain   • GERD (gastroesophageal reflux disease)   • Hypothyroidism   • Hyperlipidemia   • Hypophosphatemia   • SBO (small bowel obstruction) (HCC)   • Acute pancreatitis     Past Medical History:   Diagnosis Date   • A-fib (HCC)    • Atrial fibrillation (HCC) 2019   • [place] esophagus    • GERD (gastroesophageal reflux disease)    • Hx of schizophrenia    • Hyperlipidemia    • Hypertension    • Hypokalemia 2019   • Hypophosphatemia    • Hypothyroidism    • Schizophrenia (HCC) 2019   • Syncope and collapse 2019   • Umbilical hernia      Past Surgical History:   Procedure Laterality Date   • EXPLORATORY LAPAROTOMY N/A 2022    Procedure: LAPAROTOMY EXPLORATORY LYSIS OF ADHESIONS;  Surgeon: Edyta Agosto MD;  Location: [facility];  Service: General;  Laterality: N/A;      General Information     Row Name 02/15/22 161          Physical Therapy Time and Intention    Document Type therapy note (daily note)  -     Mode of Treatment individual therapy; physical therapy  -     Row Name 02/15/22 161          General Information    Patient Profile Reviewed yes  -     Existing Precautions/Restrictions fall  -     Row Name 02/15/22 161          Cognition    Orientation Status (Cognition) oriented to; person; place  -     Row Name 02/15/22 161           Safety Issues, Functional Mobility    Impairments Affecting Function (Mobility) balance; endurance/activity tolerance; strength  -           User Key  (r) = Recorded By, (t) = Taken By, (c) = Cosigned By    Initials Name Provider Type    Lety Burks PTA Physical Therapy Assistant               Mobility     Row Name 02/15/22 1613          Bed Mobility    Supine-Sit Booker (Bed Mobility) contact guard; verbal cues  -     Sit-Supine Booker (Bed Mobility) supervision; verbal cues  -     Assistive Device (Bed Mobility) bed rails; head of bed elevated  -     Comment (Bed Mobility) needed encouragement , but when mentioned not being able to DC unless he is participating , he agreed  -     Row Name 02/15/22 1613          Sit-Stand Transfer    Sit-Stand Booker (Transfers) minimum assist (75% patient effort); contact guard; verbal cues  -     Assistive Device (Sit-Stand Transfers) walker, front-wheeled  -     Row Name 02/15/22 1613          Gait/Stairs (Locomotion)    Booker Level (Gait) 1 person to manage equipment; 1 person assist; contact guard  -     Assistive Device (Gait) walker, front-wheeled  -     Distance in Feet (Gait) 50ft, pt fatigued and complained of MOD pain in abdomen  -     Deviations/Abnormal Patterns (Gait) base of support, wide; festinating/shuffling  improved step length  -     Bilateral Gait Deviations forward flexed posture  -           User Key  (r) = Recorded By, (t) = Taken By, (c) = Cosigned By    Initials Name Provider Type    Lety Burks PTA Physical Therapy Assistant               Obj/Interventions    No documentation.                Goals/Plan    No documentation.                Clinical Impression     Row Name 02/15/22 161          Pain Scale: Numbers Pre/Post-Treatment    Pretreatment Pain Rating 0/10 - no pain  -     Posttreatment Pain Rating 6/10  -     Pain Location abdomen  -     Pain Intervention(s) Repositioned   -     Row Name 02/15/22 1615          Plan of Care Review    Outcome Summary Pt agreed to amb but needed encouragement, pt devin 50ft of amb today w/assist of 1, plus one to manage IV ; pt fatigued and having moderate c/o abd pain; plans back to facility [place]; pt aware that he may need rwx at present but unsure if facility furnishes  -     Row Name 02/15/22 1615          Therapy Assessment/Plan (PT)    Rehab Potential (PT) good, to achieve stated therapy goals  -     Criteria for Skilled Interventions Met (PT) yes  -     Row Name 02/15/22 1615          Positioning and Restraints    Pre-Treatment Position in bed  -     Post Treatment Position bed  -     In Bed supine; call light within reach; encouraged to call for assist; exit alarm on; side rails up x3  -           User Key  (r) = Recorded By, (t) = Taken By, (c) = Cosigned By    Initials Name Provider Type    Lety Burks PTA Physical Therapy Assistant               Outcome Measures     Row Name 02/15/22 1621 02/15/22 0814       How much help from another person do you currently need...    Turning from your back to your side while in flat bed without using bedrails? 3  - 3  -LN    Moving from lying on back to sitting on the side of a flat bed without bedrails? 3  - 3  -LN    Moving to and from a bed to a chair (including a wheelchair)? 3  - 3  -LN    Standing up from a chair using your arms (e.g., wheelchair, bedside chair)? 3  - 3  -LN    Climbing 3-5 steps with a railing? 2  - 3  -LN    To walk in hospital room? 3  - 3  -LN    AM-PAC 6 Clicks Score (PT) 17  -JM 18  -LN          User Key  (r) = Recorded By, (t) = Taken By, (c) = Cosigned By    Initials Name Provider Type    Lety Burks PTA Physical Therapy Assistant    Birdie Verma, RN Registered Nurse                             Physical Therapy Education                 Title: PT OT SLP Therapies (In Progress)     Topic: Physical Therapy (In Progress)     Point:  Mobility training (In Progress)     Learning Progress Summary           Patient Acceptance, E,D, NR by HILLARY at 2/15/2022 1623   Other Acceptance, E, VU,NR by MARYCRUZ at 2/7/2022 1545      Show all documentation for this point (7)                 Point: Home exercise program (In Progress)     Learning Progress Summary           Patient Acceptance, E,D, NR by HILLARY at 2/15/2022 1623      Show all documentation for this point (5)                 Point: Body mechanics (In Progress)     Learning Progress Summary           Patient Acceptance, E,D, NR by HILLARY at 2/15/2022 1623   Other Acceptance, E, VU,NR by MARYCRUZ at 2/7/2022 1545      Show all documentation for this point (7)                 Point: Precautions (In Progress)     Learning Progress Summary           Patient Acceptance, E,D, NR by HILLARY at 2/15/2022 1623   Other Acceptance, E, VU,NR by MARYCRUZ at 2/7/2022 1545      Show all documentation for this point (9)                             User Key     Initials Effective Dates Name Provider Type Replaced by [facility]    HILLARY 03/07/18 -  Lety Villa PTA Physical Therapy Assistant PT     10/25/19 -  Pamela Otero PT Physical Therapist PT              PT Recommendation and Plan     Plan of Care Reviewed With: patient  Progress: improving  Outcome Summary: Pt agreed to amb but needed encouragement, pt devin 50ft of amb today w/assist of 1, plus one to manage IV ; pt fatigued and having moderate c/o abd pain; plans back to facility at [place]; pt aware that he may need rwx at present but unsure if facility furnishes     Time Calculation:    PT Charges     Row Name 02/15/22 1628             Time Calculation    Start Time 1021  -      Stop Time 1030  -      Time Calculation (min) 9 min  -      PT Received On 02/15/22  -      PT - Next Appointment 02/16/22  -            User Key  (r) = Recorded By, (t) = Taken By, (c) = Cosigned By    Initials Name Provider Type    Lety Burks PTA Physical Therapy Assistant              Therapy Charges  for Today     Code Description Service Date Service Provider Modifiers Qty    76548296584 HC PT THER PROC EA 15 MIN 2/14/2022 Lety Villa, PTA GP 1    16697171668 HC PT THER SUPP EA 15 MIN 2/14/2022 Lety Villa, PTA GP 1    28526224078 HC PT THER PROC EA 15 MIN 2/15/2022 Lety Villa, UMBERTO GP 1    28051769459 HC PT THER SUPP EA 15 MIN 2/15/2022 Lety Villa, UMBERTO GP 1          PT G-Codes  Outcome Measure Options: AM-PAC 6 Clicks Basic Mobility (PT)  AM-PAC 6 Clicks Score (PT): 17  AM-PAC 6 Clicks Score (OT): 24    Lety Villa PTA  2/15/2022

## 2022-02-15 NOTE — PLAN OF CARE
Goal Outcome Evaluation:  Plan of Care Reviewed With: patient        Progress: improving  Outcome Summary: Pt agreed to amb but needed encouragement, pt devin 50ft of amb today w/assist of 1, plus one to manage IV ; pt fatigued and having moderate c/o abd pain; plans back to facility at [place]; pt aware that he may need rwx at present but unsure if facility furnishes    José Lua, PT Tech present    Patient was wearing a face mask during this therapy encounter. Therapist used appropriate personal protective equipment including eye protection, mask, and gloves.  Mask used was standard procedure mask. Appropriate PPE was worn during the entire therapy session. Hand hygiene was completed before and after therapy session. Patient is not in enhanced droplet precautions.

## 2022-02-15 NOTE — PLAN OF CARE
Goal Outcome Evaluation:     Patient had small liquid bowel movement today. No appetite, did not eat - though he was encouraged to. PICC dressing changed today, all lines flushed, had blood return, all lines changed - still becoming occluded with TPN. Had some complaints of nausea and stomach pains. VSS. No acute distress noted.

## 2022-02-15 NOTE — PROGRESS NOTES
"General Surgery Progress Note    Summary: Mr. Carlos Kraus is a 67 y.o. year old gentleman POD11 s/p exploratory laparotomy and lysis of adhesions for high-grade small bowel obstruction, with a resolving ileus and pancreatitis. Tolerating some clears, having bowel function. Advance to FLD. OOB and ambulate as tolerated.  On Lovenox for DVT prophylaxis.    Interval Events: No acute events overnight.  2 more bowel movements yesterday.  Tolerated clears.  Never complained of nausea or vomiting.    Vitals: /85 (BP Location: Left arm, Patient Position: Lying)   Pulse 86   Temp 98.1 °F (36.7 °C) (Oral)   Resp 18   Ht 188 cm (74.02\")   Wt 101 kg (223 lb)   SpO2 95%   BMI 28.62 kg/m²     GENERAL: Alert, well appearing, laying comfortably in bed, and in no distress  HEENT: normocephalic, atraumatic, moist mucous membranes, clear sclerae  CHEST: no increased work of breathing, on room air, symmetric air entry  CARDIAC: regular rate and rhythm    ABDOMEN: Soft, distended, appropriately tender to palpation, incision clean and dry with staples intact  EXTREMITIES: no cyanosis, clubbing, or edema   SKIN: Warm and moist, no rashes    Labs:  Results from last 7 days   Lab Units 02/15/22  0614 02/14/22  0646 02/13/22  0548   WBC 10*3/mm3 10.41 12.00* 10.89*   HEMOGLOBIN g/dL 9.7* 11.3* 10.9*   HEMATOCRIT % 29.7* 34.6* 31.6*   PLATELETS 10*3/mm3 248 279 206     Results from last 7 days   Lab Units 02/15/22  0613 02/14/22  0646 02/13/22  0548   SODIUM mmol/L 132* 132* 133*   POTASSIUM mmol/L 3.6 3.9 3.8   CHLORIDE mmol/L 98 99 97*   CO2 mmol/L 27.0 26.0 28.0   BUN mg/dL 19 18 16   CREATININE mg/dL 0.64* 0.61* 0.71*   CALCIUM mg/dL 7.7* 8.3* 7.9*   BILIRUBIN mg/dL 0.3 0.4 0.6   ALK PHOS U/L 64 72 74   ALT (SGPT) U/L 66* 80* 87*   AST (SGOT) U/L 43* 47* 62*   GLUCOSE mg/dL 129* 102* 117*           PERLA HERNDON MD  General and Endoscopic Surgery  [facility]    [address], [address]  [place], [place], " [address]  P: [phone]  F: [phone]

## 2022-02-15 NOTE — PLAN OF CARE
Goal Outcome Evaluation:  Plan of Care Reviewed With: patient        Progress: improving  Outcome Summary: PICC line very postional, frequently occluded, all ports flush with blood return, pt get tangled in tubing with repositioning, med for pain x 2 with some relief stated, abdomen distended with hypoactive bowel sounds, denies nausea. BM x 1 this shift, loose, VSS, continues in afib with controlled rate, eyes closed at long interval, resting quietly in room, will continue to monitor

## 2022-02-15 NOTE — PROGRESS NOTES
HOSPITAL FOLLOW UP NOTE    Patient Name: Carlos Kraus  Patient : 1955        Date of Service:02/15/22  Provider of Service: Miguelangel Benson MD  Place of Service: [facility]  Referral Provider: Yesenia Bauer, *          Follow Up: Atrial fibrillation    Interval Hx: No specific complaints. Remains under good rate control.      OBJECTIVE  Temp:  [97.6 °F (36.4 °C)-98.4 °F (36.9 °C)] 98.1 °F (36.7 °C)  Heart Rate:  [75-96] 86  Resp:  [16-18] 18  BP: (105-131)/(68-85) 127/85     Intake/Output Summary (Last 24 hours) at 2/15/2022 0907  Last data filed at 2/15/2022 0502  Gross per 24 hour   Intake 1200 ml   Output 350 ml   Net 850 ml     Body mass index is 28.62 kg/m².      22  0507 22  0520 02/15/22  0620   Weight: 99.7 kg (219 lb 12.8 oz) 100 kg (220 lb 8 oz) 101 kg (223 lb)         Physical Exam:   Vitals reviewed.   Constitutional:       General: Sleeping.      Appearance: Healthy appearance. Well-developed and not in distress.   HENT:      Head: Normocephalic.   Neck:      Thyroid: No thyromegaly.      Vascular: No carotid bruit or JVD.   Pulmonary:      Effort: Pulmonary effort is normal.      Breath sounds: Normal breath sounds.   Cardiovascular:      Normal rate. Irregularly irregular rhythm. Normal S1. Normal S2.      Murmurs: There is no murmur.      No gallop.   Pulses:     Intact distal pulses.   Edema:     Peripheral edema absent.   Skin:     Findings: No erythema.           CURRENT MEDS    Scheduled Meds:aspirin, 81 mg, Oral, Daily  digoxin, 250 mcg, Oral, Daily  dilTIAZem CD, 360 mg, Oral, Q24H  enoxaparin, 40 mg, Subcutaneous, Nightly  famotidine, 20 mg, Intravenous, Q12H  Fat Emul Fish Oil/Plant Based, 100 mL, Intravenous, Once  insulin regular, 0-7 Units, Subcutaneous, Q6H  ipratropium-albuterol, 3 mL, Nebulization, Q6H  metoprolol tartrate, 50 mg, Oral, Q12H  nystatin, 5 mL, Swish & Spit, 4x Daily  polyethylene glycol, 17 g, Oral, Daily  risperiDONE, 5  mg, Oral, Nightly  sodium chloride, 10 mL, Intravenous, Q12H  sodium chloride, 10 mL, Intravenous, Q12H  sodium chloride, 10 mL, Intravenous, Q12H      Continuous Infusions:Adult Central 2-in-1 TPN, , Last Rate: 75 mL/hr at 02/14/22 1713  Adult Central 2-in-1 TPN,   Pharmacy to Dose TPN,   sodium chloride, 50 mL/hr, Last Rate: 50 mL/hr (02/14/22 2330)          Lab Review:   Results from last 7 days   Lab Units 02/15/22  0613 02/14/22  0646   SODIUM mmol/L 132* 132*   POTASSIUM mmol/L 3.6 3.9   CHLORIDE mmol/L 98 99   CO2 mmol/L 27.0 26.0   BUN mg/dL 19 18   CREATININE mg/dL 0.64* 0.61*   GLUCOSE mg/dL 129* 102*   CALCIUM mg/dL 7.7* 8.3*   AST (SGOT) U/L 43* 47*   ALT (SGPT) U/L 66* 80*         Results from last 7 days   Lab Units 02/15/22  0614 02/14/22  0646   WBC 10*3/mm3 10.41 12.00*   HEMOGLOBIN g/dL 9.7* 11.3*   HEMATOCRIT % 29.7* 34.6*   PLATELETS 10*3/mm3 248 279         Results from last 7 days   Lab Units 02/15/22  0613 02/14/22  0646   MAGNESIUM mg/dL 2.0 2.0     Results from last 7 days   Lab Units 02/15/22  0613 02/09/22  0600   TRIGLYCERIDES mg/dL 140 186*               I personally reviewed the patient's ECG and telemetry data    ASSESSMENT & PLAN    SBO (small bowel obstruction) (HCC)    Schizophrenia (HCC)    Atrial fibrillation (HCC)    Hypertension    Generalized abdominal pain    GERD (gastroesophageal reflux disease)    Hypothyroidism    Hyperlipidemia    Hypophosphatemia    Acute pancreatitis      1. Small bowel obstruction: Status post exploratory lap  2. Chronic atrial fibrillation: Good rate control on medical therapy. Continue the same. Not considering long-term anticoagulation due to cognitive impairment  3. Hypertension: Stable    Cardiac condition continues to remain stable. He remains on Lovenox for anticoagulation at this time. Disposition with respect to long-term anticoagulation pending but not favorable.    Miguelangel Benson MD  02/15/22

## 2022-02-15 NOTE — PROGRESS NOTES
[facility]    [phone]   LOS: 12 days     Name: Carlos Kraus  Age/Sex: 67 y.o. male  :  1955        PCP: Provider, No Known  Chief Complaint   Patient presents with   • Abdominal Pain      Subjective   The patient is resting in bed.  Per RN no acute events overnight, continues to have bowel movements and is tolerating his clear diet.  No nausea or vomiting.    General: No Fever or Chills, Cardiac: No Chest Pain or Palpitations, Resp: No Cough or SOA and Other: No bleeding    aspirin, 81 mg, Oral, Daily  digoxin, 250 mcg, Oral, Daily  dilTIAZem CD, 360 mg, Oral, Q24H  enoxaparin, 40 mg, Subcutaneous, Nightly  famotidine, 20 mg, Intravenous, Q12H  Fat Emul Fish Oil/Plant Based, 100 mL, Intravenous, Once  insulin regular, 0-7 Units, Subcutaneous, Q6H  ipratropium-albuterol, 3 mL, Nebulization, Q6H  metoprolol tartrate, 50 mg, Oral, Q12H  nystatin, 5 mL, Swish & Spit, 4x Daily  polyethylene glycol, 17 g, Oral, Daily  risperiDONE, 5 mg, Oral, Nightly  sodium chloride, 10 mL, Intravenous, Q12H  sodium chloride, 10 mL, Intravenous, Q12H  sodium chloride, 10 mL, Intravenous, Q12H      Adult Central 2-in-1 TPN, , Last Rate: 75 mL/hr at 22 1713  Adult Central 2-in-1 TPN,   Pharmacy to Dose TPN,   sodium chloride, 50 mL/hr, Last Rate: 50 mL/hr (22 2330)        Objective   Vital Signs  Temp:  [97.6 °F (36.4 °C)-98.4 °F (36.9 °C)] 98.1 °F (36.7 °C)  Heart Rate:  [75-96] 86  Resp:  [16-18] 18  BP: (105-131)/(68-85) 127/85  Body mass index is 28.62 kg/m².    Intake/Output Summary (Last 24 hours) at 2/15/2022 1310  Last data filed at 2/15/2022 0900  Gross per 24 hour   Intake 1200 ml   Output 350 ml   Net 850 ml       Physical Exam  Vitals and nursing note reviewed.   Constitutional:       Appearance: Normal appearance.   HENT:      Head: Normocephalic and atraumatic.      Mouth/Throat:      Mouth: Mucous membranes are moist.      Pharynx: Oropharynx is clear.   Eyes:      Extraocular  Movements: Extraocular movements intact.      Conjunctiva/sclera: Conjunctivae normal.      Pupils: Pupils are equal, round, and reactive to light.   Cardiovascular:      Rate and Rhythm: Normal rate and regular rhythm.   Pulmonary:      Effort: Pulmonary effort is normal. No respiratory distress.      Breath sounds: Normal breath sounds.   Abdominal:      General: Abdomen is flat. There is no distension.      Palpations: Abdomen is soft.      Tenderness: There is no abdominal tenderness.   Musculoskeletal:      Right lower leg: No edema.      Left lower leg: No edema.   Skin:     General: Skin is warm and dry.   Neurological:      General: No focal deficit present.      Mental Status: He is alert and oriented to person, place, and time.         Results Review:       I reviewed the patient's new clinical results.  Results from last 7 days   Lab Units 02/15/22  0614 02/14/22  0646 02/13/22  0548 02/12/22  0546 02/11/22  0631 02/10/22  0628 02/09/22  0600   WBC 10*3/mm3 10.41 12.00* 10.89* 13.82* 14.61* 13.57* 9.87   HEMOGLOBIN g/dL 9.7* 11.3* 10.9* 11.7* 11.7* 12.5* 12.4*   PLATELETS 10*3/mm3 248 279 206 220 200 164 174     Results from last 7 days   Lab Units 02/15/22  0613 02/14/22  0646 02/13/22  0548 02/12/22  0546 02/11/22  0631 02/10/22  0628 02/09/22  0600   SODIUM mmol/L 132* 132* 133* 136 134* 143 143   POTASSIUM mmol/L 3.6 3.9 3.8 3.7 3.2* 3.6 3.4*   CHLORIDE mmol/L 98 99 97* 96* 96* 102 100   CO2 mmol/L 27.0 26.0 28.0 33.9* 31.7* 30.8* 32.8*   BUN mg/dL 19 18 16 12 12 13 18   CREATININE mg/dL 0.64* 0.61* 0.71* 0.75* 0.54* 0.54* 0.55*   CALCIUM mg/dL 7.7* 8.3* 7.9* 8.5* 8.2* 8.0* 7.7*   MAGNESIUM mg/dL 2.0 2.0 2.0 2.3 2.2 2.4 2.8*   PHOSPHORUS mg/dL 2.5 2.4* 2.7 3.4 2.8 2.2* 1.8*   Estimated Creatinine Clearance: 116.8 mL/min (A) (by C-G formula based on SCr of 0.64 mg/dL (L)).      Assessment/Plan   Active Hospital Problems    Diagnosis  POA   • **SBO (small bowel obstruction) ([facility]) [K56.609]  Unknown   •  Acute pancreatitis [K85.90]  Unknown   • GERD (gastroesophageal reflux disease) [K21.9]  Unknown   • Hypothyroidism [E03.9]  Unknown   • Hyperlipidemia [E78.5]  Unknown   • Hypophosphatemia [E83.39]  Unknown   • Generalized abdominal pain [R10.84]  Yes   • Hypertension [I10]  Yes   • Atrial fibrillation (HCC) [I48.91]  Yes   • Schizophrenia (HCC) [F20.9]  Yes      Resolved Hospital Problems   No resolved problems to display.       PLAN  This is a 67-year-old gentleman with history of A. fib schizophrenia hypertension hyperlipidemia hypothyroidism presented to the hospital with abdominal pain nausea and vomiting was found to have a small bowel obstruction that required exploratory laparotomy and lysis of adhesions.  Postoperatively we are dealing with a postoperative ileus and now pancreatitis    -Given persistent ileus he had repeat CT abdomen/pelvis which revealed the development of pancreatitis  -Diet advanced to full liquid per surgery, continue TPN and pain medication;  -Atrial fibrillationwith cardiology following, on metop, dilt, and digoxin; metoprolol dose was increased with improvement in rates; cardiology to determine if AC is appropriate  -Heart rate is stable for now but may require IV meds for further coverage given NPO and poor absorption  -Full code  -Lovenox for DVT prophylaxis; cardiology leaning towards no long-term anticoagulation given his comorbidities    Disposition  To be determined    Roz Chacko MD  [facility]  02/15/22  13:10 EST

## 2022-02-15 NOTE — PROGRESS NOTES
[facility] Clinical Pharmacy Services: TPN Daily Progress Note    TPN Day # 7  Indication: prolonged paralytic ileus  Route: central  Type: standard    Subjective/Objective  Results from last 7 days   Lab Units 02/15/22  0613   SODIUM mmol/L 132*   POTASSIUM mmol/L 3.6   CHLORIDE mmol/L 98   CO2 mmol/L 27.0   BUN mg/dL 19   CREATININE mg/dL 0.64*   CALCIUM mg/dL 7.7*   ALBUMIN g/dL 2.70*   BILIRUBIN mg/dL 0.3   ALK PHOS U/L 64   ALT (SGPT) U/L 66*   AST (SGOT) U/L 43*   GLUCOSE mg/dL 129*   MAGNESIUM mg/dL 2.0   PHOSPHORUS mg/dL 2.5   TRIGLYCERIDES mg/dL 140        Diet Orders (active) (From admission, onward)       Start     Ordered    02/10/22 1013  NPO Diet NPO Except: Ice Chips, Sips With Meds  Diet Effective Now         02/10/22 1012                  Additional insulin administration while previous TPN infusin units  Additional electrolyte administration while previous TPN infusing: none  Acid suppression: Famotidine 20 mg Q12    RD TPN Formula Recommendations  Dextrose (Kcal): 1900  Amino Acid (gm): 100  Lipid Concentration (%): 20%  Lipid Volume (mL): 100 mL  Lipid Frequency: Daily  Kcal/Day: 2500 Kcal/day     Current TPN Formula: 100g/1600kcal/0 mL AA/Dex/Lipids     Assessment/Plan    CT showed pancreatitis forming. Na stable but low at 132, phos slightly improve. Triglycerides 140(improved), LFTS down further. Will give SMOFlipids 100mL X 1 today, but need to monitor closely due to pancreatitis. But pt has not had any fats since starting TPN and days prior to TPN start     Macronutrients: 100 g AA and dextrose to 1600 kcal   Electrolytes/Additives:   Sodium Chloride = 90 mEq   Sodium Phosphate= 25 mEq  Potassium Acetate = 50 mEq  Potassium Phosphate = 35 mEq   Magnesium Chloride = 10 mEq  Calcium Gluconate = 9 mEq  Trace 1mL  MVI: qMWF    Labs: CMP, Magnesium, Phos in am labs      Agustin Ely, PharmD  2/15  Clinical Pharmacist

## 2022-02-15 NOTE — PROGRESS NOTES
"Adult Nutrition  Assessment/PES    Patient Name:  Carlos Kraus  YOB: 1955  MRN: [id number]  Admit Date:  2/3/2022    Assessment Date:  2/15/2022    Comments:  Nutrition follow up.  Advanced to full liquid diet today.  TPN continues - 1600 kcal dextrose, 100 grams protein, 100 mL 20% SMOF lipids today.  Resolving ileus and pancreatitis.  Having BMs.  No N/V.    RD to continue to follow.     Reason for Assessment     Row Name 02/15/22 1652          Reason for Assessment    Reason For Assessment TF/PN; follow-up protocol                Nutrition/Diet History     Row Name 02/15/22 1652          Nutrition/Diet History    Typical Food/Fluid Intake advanced to full liquids today, TPN continues, SMOF lipids being given today                Anthropometrics     Row Name 02/15/22 1653          Anthropometrics    Height 188 cm (74.02\")            Admit Weight    Admit Weight --  223# 2/15            Ideal Body Weight (IBW)    Ideal Body Weight (IBW) (kg) 87.7            Body Mass Index (BMI)    BMI Assessment BMI 25-29.9: overweight  28.53                Labs/Tests/Procedures/Meds     Row Name 02/15/22 1653          Labs/Procedures/Meds    Lab Results Reviewed reviewed, pertinent     Lab Results Comments Gluc, Hgb, Hct, ALT, AST, Creat, Na, Alb            Diagnostic Tests/Procedures    Diagnostic Test/Procedure Reviewed reviewed, pertinent            Medications    Pertinent Medications Reviewed reviewed, pertinent     Pertinent Medications Comments lovenox, pepcid, SMOF lipids, humulin, miralax, TPN at 75 ml/hr                Physical Findings     Row Name 02/15/22 1654          Physical Findings    Overall Physical Appearance overweight     Skin surgical incision  B=16, abd inc                Estimated/Assessed Needs     Row Name 02/15/22 1653          Calculation Measurements    Height 188 cm (74.02\")                Nutrition Prescription Ordered     Row Name 02/15/22 1655          Nutrition Prescription PO "    Current PO Diet Full Liquid            Nutrition Prescription PN    PN Route PICC     PN Current Rate (mL/hr) 75 mL/hr     Dextrose (Kcal) 1600     Amino Acid (gm) 100     Lipid Concentration (%) 20%     Lipid Volume (mL) 100 mL  SMOF lipids today                Evaluation of Received Nutrient/Fluid Intake     Row Name 02/15/22 1655          Nutrient/Fluid Evaluation    Additional Documentation Calories Evaluation (Group); Protein Evaluation (Group); Fluid Intake Evaluation (Group)            Calories Evaluation    Parenteral Calories (kcal) 2200     % of Kcal Needs 100            Protein Evaluation    Parenteral Protein (gm) 100     % of Protein Needs 98            Intake Assessment    Energy/Calorie Requirement Assessment meeting needs     Protein Requirement Assessment meeting needs            PO Evaluation    Number of Meals 1     % PO Intake 0 (CLD)                     Problem/Interventions:           Intervention Goal     Row Name 02/15/22 7640          Intervention Goal    General Maintain nutrition; Nutrition support treatment; Meet nutritional needs for age/condition; Disease management/therapy     PO Tolerate PO; Advance diet; Increase intake; PO intake (%)     PO Intake % 75 %     TF/PN Maintain TF/PN     Transition PN to PO     Weight No significant weight loss                Nutrition Intervention     Row Name 02/15/22 1657          Nutrition Intervention    RD/Tech Action Follow Tx progress; Care plan reviewd     Recommended/Ordered PN                Nutrition Prescription     Row Name 02/15/22 1657          Nutrition Prescription PN    Parenteral Prescription Continue same protocal                Education/Evaluation     Row Name 02/15/22 1657          Education    Education Will Instruct as appropriate            Monitor/Evaluation    Monitor I&O; Pertinent labs; Weight; PN delivery/tolerance; Skin status; Per protocol; Symptoms                 Electronically signed by:  Sarah Medina RD  02/15/22  16:57 EST

## 2022-02-16 LAB
ALBUMIN SERPL-MCNC: 2.6 G/DL (ref 3.5–5.2)
ALBUMIN/GLOB SERPL: 1 G/DL
ALP SERPL-CCNC: 58 U/L (ref 39–117)
ALT SERPL W P-5'-P-CCNC: 57 U/L (ref 1–41)
ANION GAP SERPL CALCULATED.3IONS-SCNC: 4.2 MMOL/L (ref 5–15)
AST SERPL-CCNC: 36 U/L (ref 1–40)
BASOPHILS # BLD AUTO: 0.03 10*3/MM3 (ref 0–0.2)
BASOPHILS NFR BLD AUTO: 0.3 % (ref 0–1.5)
BILIRUB SERPL-MCNC: 0.3 MG/DL (ref 0–1.2)
BUN SERPL-MCNC: 12 MG/DL (ref 8–23)
BUN/CREAT SERPL: 19.4 (ref 7–25)
CALCIUM SPEC-SCNC: 8 MG/DL (ref 8.6–10.5)
CHLORIDE SERPL-SCNC: 100 MMOL/L (ref 98–107)
CO2 SERPL-SCNC: 29.8 MMOL/L (ref 22–29)
CREAT SERPL-MCNC: 0.62 MG/DL (ref 0.76–1.27)
DEPRECATED RDW RBC AUTO: 43.2 FL (ref 37–54)
EOSINOPHIL # BLD AUTO: 0.18 10*3/MM3 (ref 0–0.4)
EOSINOPHIL NFR BLD AUTO: 1.8 % (ref 0.3–6.2)
ERYTHROCYTE [DISTWIDTH] IN BLOOD BY AUTOMATED COUNT: 12.5 % (ref 12.3–15.4)
GFR SERPL CREATININE-BSD FRML MDRD: 130 ML/MIN/1.73
GLOBULIN UR ELPH-MCNC: 2.7 GM/DL
GLUCOSE BLDC GLUCOMTR-MCNC: 120 MG/DL (ref 70–130)
GLUCOSE BLDC GLUCOMTR-MCNC: 122 MG/DL (ref 70–130)
GLUCOSE BLDC GLUCOMTR-MCNC: 127 MG/DL (ref 70–130)
GLUCOSE BLDC GLUCOMTR-MCNC: 99 MG/DL (ref 70–130)
GLUCOSE SERPL-MCNC: 123 MG/DL (ref 65–99)
HCT VFR BLD AUTO: 26 % (ref 37.5–51)
HGB BLD-MCNC: 8.7 G/DL (ref 13–17.7)
IMM GRANULOCYTES # BLD AUTO: 0.41 10*3/MM3 (ref 0–0.05)
IMM GRANULOCYTES NFR BLD AUTO: 4 % (ref 0–0.5)
LYMPHOCYTES # BLD AUTO: 1.12 10*3/MM3 (ref 0.7–3.1)
LYMPHOCYTES NFR BLD AUTO: 10.9 % (ref 19.6–45.3)
MAGNESIUM SERPL-MCNC: 1.9 MG/DL (ref 1.6–2.4)
MCH RBC QN AUTO: 31.4 PG (ref 26.6–33)
MCHC RBC AUTO-ENTMCNC: 33.5 G/DL (ref 31.5–35.7)
MCV RBC AUTO: 93.9 FL (ref 79–97)
MONOCYTES # BLD AUTO: 1.19 10*3/MM3 (ref 0.1–0.9)
MONOCYTES NFR BLD AUTO: 11.6 % (ref 5–12)
NEUTROPHILS NFR BLD AUTO: 7.3 10*3/MM3 (ref 1.7–7)
NEUTROPHILS NFR BLD AUTO: 71.4 % (ref 42.7–76)
NRBC BLD AUTO-RTO: 0.1 /100 WBC (ref 0–0.2)
PHOSPHATE SERPL-MCNC: 2.9 MG/DL (ref 2.5–4.5)
PLATELET # BLD AUTO: 261 10*3/MM3 (ref 140–450)
PMV BLD AUTO: 10.9 FL (ref 6–12)
POTASSIUM SERPL-SCNC: 3.5 MMOL/L (ref 3.5–5.2)
POTASSIUM SERPL-SCNC: 3.9 MMOL/L (ref 3.5–5.2)
PROT SERPL-MCNC: 5.3 G/DL (ref 6–8.5)
RBC # BLD AUTO: 2.77 10*6/MM3 (ref 4.14–5.8)
SODIUM SERPL-SCNC: 134 MMOL/L (ref 136–145)
WBC NRBC COR # BLD: 10.23 10*3/MM3 (ref 3.4–10.8)

## 2022-02-16 PROCEDURE — 94799 UNLISTED PULMONARY SVC/PX: CPT

## 2022-02-16 PROCEDURE — 80053 COMPREHEN METABOLIC PANEL: CPT | Performed by: STUDENT IN AN ORGANIZED HEALTH CARE EDUCATION/TRAINING PROGRAM

## 2022-02-16 PROCEDURE — 82962 GLUCOSE BLOOD TEST: CPT

## 2022-02-16 PROCEDURE — 83735 ASSAY OF MAGNESIUM: CPT | Performed by: STUDENT IN AN ORGANIZED HEALTH CARE EDUCATION/TRAINING PROGRAM

## 2022-02-16 PROCEDURE — 25010000002 ENOXAPARIN PER 10 MG: Performed by: STUDENT IN AN ORGANIZED HEALTH CARE EDUCATION/TRAINING PROGRAM

## 2022-02-16 PROCEDURE — 25010000002 CALCIUM GLUCONATE PER 10 ML: Performed by: STUDENT IN AN ORGANIZED HEALTH CARE EDUCATION/TRAINING PROGRAM

## 2022-02-16 PROCEDURE — 94761 N-INVAS EAR/PLS OXIMETRY MLT: CPT

## 2022-02-16 PROCEDURE — 99232 SBSQ HOSP IP/OBS MODERATE 35: CPT | Performed by: NURSE PRACTITIONER

## 2022-02-16 PROCEDURE — 85025 COMPLETE CBC W/AUTO DIFF WBC: CPT | Performed by: STUDENT IN AN ORGANIZED HEALTH CARE EDUCATION/TRAINING PROGRAM

## 2022-02-16 PROCEDURE — 84100 ASSAY OF PHOSPHORUS: CPT | Performed by: STUDENT IN AN ORGANIZED HEALTH CARE EDUCATION/TRAINING PROGRAM

## 2022-02-16 PROCEDURE — 25010000002 MAGNESIUM SULFATE PER 500 MG OF MAGNESIUM: Performed by: STUDENT IN AN ORGANIZED HEALTH CARE EDUCATION/TRAINING PROGRAM

## 2022-02-16 PROCEDURE — 84132 ASSAY OF SERUM POTASSIUM: CPT | Performed by: STUDENT IN AN ORGANIZED HEALTH CARE EDUCATION/TRAINING PROGRAM

## 2022-02-16 RX ADMIN — DIGOXIN 250 MCG: 250 TABLET ORAL at 12:05

## 2022-02-16 RX ADMIN — HYDROCODONE BITARTRATE AND ACETAMINOPHEN 1 TABLET: 5; 325 TABLET ORAL at 12:05

## 2022-02-16 RX ADMIN — NYSTATIN 500000 UNITS: 100000 SUSPENSION ORAL at 12:05

## 2022-02-16 RX ADMIN — IPRATROPIUM BROMIDE AND ALBUTEROL SULFATE 3 ML: 2.5; .5 SOLUTION RESPIRATORY (INHALATION) at 00:18

## 2022-02-16 RX ADMIN — METOPROLOL TARTRATE 50 MG: 50 TABLET, FILM COATED ORAL at 21:04

## 2022-02-16 RX ADMIN — NYSTATIN 500000 UNITS: 100000 SUSPENSION ORAL at 09:01

## 2022-02-16 RX ADMIN — SODIUM CHLORIDE, PRESERVATIVE FREE 10 ML: 5 INJECTION INTRAVENOUS at 09:01

## 2022-02-16 RX ADMIN — DILTIAZEM HYDROCHLORIDE 360 MG: 180 CAPSULE, COATED, EXTENDED RELEASE ORAL at 09:01

## 2022-02-16 RX ADMIN — METOPROLOL TARTRATE 50 MG: 50 TABLET, FILM COATED ORAL at 09:01

## 2022-02-16 RX ADMIN — RISPERIDONE 5 MG: 2 TABLET, FILM COATED ORAL at 21:04

## 2022-02-16 RX ADMIN — POTASSIUM CHLORIDE 40 MEQ: 750 TABLET, EXTENDED RELEASE ORAL at 16:34

## 2022-02-16 RX ADMIN — ENOXAPARIN SODIUM 40 MG: 100 INJECTION SUBCUTANEOUS at 21:04

## 2022-02-16 RX ADMIN — HYDROCODONE BITARTRATE AND ACETAMINOPHEN 1 TABLET: 5; 325 TABLET ORAL at 19:13

## 2022-02-16 RX ADMIN — SODIUM CHLORIDE, PRESERVATIVE FREE 10 ML: 5 INJECTION INTRAVENOUS at 21:05

## 2022-02-16 RX ADMIN — FAMOTIDINE 20 MG: 10 INJECTION INTRAVENOUS at 21:04

## 2022-02-16 RX ADMIN — POTASSIUM CHLORIDE 40 MEQ: 750 TABLET, EXTENDED RELEASE ORAL at 12:05

## 2022-02-16 RX ADMIN — ASPIRIN 81 MG: 81 TABLET, COATED ORAL at 09:01

## 2022-02-16 RX ADMIN — NYSTATIN 500000 UNITS: 100000 SUSPENSION ORAL at 16:36

## 2022-02-16 RX ADMIN — CALCIUM GLUCONATE: 98 INJECTION, SOLUTION INTRAVENOUS at 16:37

## 2022-02-16 RX ADMIN — HYDROCODONE BITARTRATE AND ACETAMINOPHEN 1 TABLET: 5; 325 TABLET ORAL at 06:13

## 2022-02-16 RX ADMIN — FAMOTIDINE 20 MG: 10 INJECTION INTRAVENOUS at 09:01

## 2022-02-16 RX ADMIN — SODIUM CHLORIDE, PRESERVATIVE FREE 10 ML: 5 INJECTION INTRAVENOUS at 21:04

## 2022-02-16 RX ADMIN — NYSTATIN 500000 UNITS: 100000 SUSPENSION ORAL at 21:04

## 2022-02-16 RX ADMIN — POLYETHYLENE GLYCOL 3350 17 G: 17 POWDER, FOR SOLUTION ORAL at 09:01

## 2022-02-16 NOTE — NURSING NOTE
Spoke with IV therapy and they said they have repositioned it, re-dressed, and used cathflow on it multiple times without much improvement. She tells me they are not able to do anything else to improve the patients PICC line.

## 2022-02-16 NOTE — CASE MANAGEMENT/SOCIAL WORK
Continued Stay Note  [facility]     Patient Name: Carlos Kraus  MRN: [id number]  Today's Date: 2/16/2022    Admit Date: 2/3/2022     Discharge Plan     Row Name 02/16/22 1050       Plan    Plan Plan return to [facility]- personal care.   DENISE Robins RN    Patient/Family in Agreement with Plan yes    Plan Comments Awaiting medical stability for pt to return to [facility].  CCP updating Stephanie [phone]) at [facility] on pt's condition.  Plan return to [facility]- personal care.   NADIYA Flores               Discharge Codes    No documentation.               Expected Discharge Date and Time     Expected Discharge Date Expected Discharge Time    Feb 17, 2022             Yoanna Robins, RN

## 2022-02-16 NOTE — PLAN OF CARE
Problem: Adult Inpatient Plan of Care  Goal: Plan of Care Review  Outcome: Ongoing, Progressing  Flowsheets  Taken 2/16/2022 1419 by Jamie Mcdonald RN  Progress: improving  Outcome Summary: Patient has been pleasant during shift. Patient refuses to be more aware of PICC line and tubing. Readjusted often but continues to occlude. Replacing potassium. TPN running. Patient has refused breakfast and lunch today. AOx4, assist x1, room air. Will continue to monitor and assist patient as needed.  Taken 2/16/2022 0506 by Janna Badillo RN  Plan of Care Reviewed With: patient     Problem: Skin Injury Risk Increased  Goal: Skin Health and Integrity  Outcome: Ongoing, Progressing  Intervention: Optimize Skin Protection  Recent Flowsheet Documentation  Taken 2/16/2022 1345 by Jamie Mcdonald RN  Pressure Reduction Techniques:   frequent weight shift encouraged   weight shift assistance provided  Head of Bed (HOB): HOB at 30 degrees  Pressure Reduction Devices: pressure-redistributing mattress utilized  Skin Protection:   tubing/devices free from skin contact   incontinence pads utilized  Taken 2/16/2022 1200 by Jamie Mcdonald RN  Head of Bed (HOB): HOB at 15 degrees  Taken 2/16/2022 0950 by Jamie Mcdonald RN  Head of Bed (HOB): HOB at 15 degrees  Taken 2/16/2022 0852 by Jamie Mcdonald RN  Pressure Reduction Techniques: frequent weight shift encouraged  Head of Bed (HOB): HOB at 20 degrees  Pressure Reduction Devices: pressure-redistributing mattress utilized  Skin Protection:   tubing/devices free from skin contact   incontinence pads utilized     Problem: Parenteral Nutrition  Goal: Effective Intravenous Nutrition Therapy Delivery  Outcome: Ongoing, Progressing   Goal Outcome Evaluation:           Progress: improving  Outcome Summary: Patient has been pleasant during shift. Patient refuses to be more aware of PICC line and tubing. Readjusted often but continues to occlude. Replacing potassium. TPN running.  Patient has refused breakfast and lunch today. AOx4, assist x1, room air. Will continue to monitor and assist patient as needed.

## 2022-02-16 NOTE — PROGRESS NOTES
Hospital Follow Up    LOS:  LOS: 13 days   Patient Name: Carlos Kraus  Age/Sex: 67 y.o. male  : 1955  MRN: [id number]    Day of Service: 22   Length of Stay: 13  Encounter Provider: CARIDAD Kong  Place of Service: [facility] CARDIOLOGY  Patient Care Team:  Provider, No Known as PCP - General    Subjective:     Chief Complaint: A. fib, small bowel obstruction    Interval History: No complaints today says he is feeling better    Objective:     Objective:  Temp:  [97.6 °F (36.4 °C)-98.8 °F (37.1 °C)] 98.8 °F (37.1 °C)  Heart Rate:  [70-97] 83  Resp:  [16-18] 18  BP: (100-111)/(66-86) 111/86     Intake/Output Summary (Last 24 hours) at 2022 1116  Last data filed at 2022 0900  Gross per 24 hour   Intake 2271.9 ml   Output 1250 ml   Net 1021.9 ml     Body mass index is 28.27 kg/m².      22  0520 02/15/22  0620 22  0500   Weight: 100 kg (220 lb 8 oz) 101 kg (223 lb) 99.9 kg (220 lb 4.8 oz)     Weight change: -1.225 kg (-2 lb 11.2 oz)    Physical Exam:   General Appearance:    Awake alert and oriented in no acute distress.   Color:  Skin:  Neuro:  HEENT:    Lungs:     Pink  Warm and dry  No focal, motor or sensory deficits  Neck supple, pupils equal, round and reactive. No JVD, No Bruit  Clear to auscultation,respirations regular, even and                  unlabored    Heart:   Irregularly irregular rate and rhythm, S1 and S2, no murmur, no gallop, no rub. No edema, DP/PT pulses are 2+   Chest Wall:    No abnormalities observed   Abdomen:     Normal bowel sounds, no masses, no organomegaly, soft        non-tender, non-distended, no guarding, no ascites noted   Extremities:   Moves all extremities well, no edema, no cyanosis, no redness       Lab Review:   Results from last 7 days   Lab Units 22  0615 02/15/22  0613   SODIUM mmol/L 134* 132*   POTASSIUM mmol/L 3.5 3.6   CHLORIDE mmol/L 100 98   CO2 mmol/L 29.8* 27.0   BUN mg/dL 12 19    CREATININE mg/dL 0.62* 0.64*   GLUCOSE mg/dL 123* 129*   CALCIUM mg/dL 8.0* 7.7*   AST (SGOT) U/L 36 43*   ALT (SGPT) U/L 57* 66*         Results from last 7 days   Lab Units 02/16/22  0615 02/15/22  0614   WBC 10*3/mm3 10.23 10.41   HEMOGLOBIN g/dL 8.7* 9.7*   HEMATOCRIT % 26.0* 29.7*   PLATELETS 10*3/mm3 261 248         Results from last 7 days   Lab Units 02/16/22  0615 02/15/22  0613   MAGNESIUM mg/dL 1.9 2.0     Results from last 7 days   Lab Units 02/15/22  0613   TRIGLYCERIDES mg/dL 140             I reviewed the patient's new clinical results.  I personally viewed and interpreted the patient's EKG  Current Medications:   Scheduled Meds:aspirin, 81 mg, Oral, Daily  digoxin, 250 mcg, Oral, Daily  dilTIAZem CD, 360 mg, Oral, Q24H  enoxaparin, 40 mg, Subcutaneous, Nightly  famotidine, 20 mg, Intravenous, Q12H  insulin regular, 0-7 Units, Subcutaneous, Q6H  ipratropium-albuterol, 3 mL, Nebulization, Q6H  metoprolol tartrate, 50 mg, Oral, Q12H  nystatin, 5 mL, Swish & Spit, 4x Daily  polyethylene glycol, 17 g, Oral, Daily  risperiDONE, 5 mg, Oral, Nightly  sodium chloride, 10 mL, Intravenous, Q12H  sodium chloride, 10 mL, Intravenous, Q12H  sodium chloride, 10 mL, Intravenous, Q12H      Continuous Infusions:Adult Central 2-in-1 TPN, , Last Rate: 75 mL/hr at 02/15/22 1743  Adult Central 2-in-1 TPN,   Pharmacy to Dose TPN,   sodium chloride, 50 mL/hr, Last Rate: 50 mL/hr (02/14/22 2330)        Allergies:  Allergies   Allergen Reactions   • Penicillins Anaphylaxis   • Latex Hives       Assessment:     1. Small bowel obstruction status post exploratory lap  2. Permanent A. fib with rate control not on anticoagulation outpatient for unknown reason  4. Essential hypertension  5. Hypoxic respiratory failure  6. Hyperlipidemia  7. Schizophrenia  8. Postop ileus    Plan:         Heart rate well controlled on current regimen. Getting TPN. Labs noted and are stable today no chest discomfort or shortness of air. Was not on  anticoagulation at home unclear why. Note from 2019 was concerned about his reliability. He had been in and out of [facility] and he had a CHADSVASC score of 1. I think it still is 1. He does not really have hypertension. He is on metoprolol and diltiazem for his afib. I agree that his reliability to comply with anticoag is limited. Will need to be addressed before discharge.  CARIDAD Kong  02/16/22  11:16 EST  Electronically signed by CARIDAD Kong, 02/16/22, 11:16 AM EST.

## 2022-02-16 NOTE — PROGRESS NOTES
Dedicated to Hospital Care    [phone]   LOS: 13 days     Name: Carlos Kraus  Age/Sex: 67 y.o. male  :  1955        PCP: Provider, No Known  Chief Complaint   Patient presents with   • Abdominal Pain      Subjective   The patient is resting in bed.  Per RN no acute events overnight.  He remains on full liquid diet per surgery, he denies any nausea/vomiting.    General: No Fever or Chills, Cardiac: No Chest Pain or Palpitations, Resp: No Cough or SOA and Other: No bleeding    aspirin, 81 mg, Oral, Daily  digoxin, 250 mcg, Oral, Daily  dilTIAZem CD, 360 mg, Oral, Q24H  enoxaparin, 40 mg, Subcutaneous, Nightly  famotidine, 20 mg, Intravenous, Q12H  insulin regular, 0-7 Units, Subcutaneous, Q6H  ipratropium-albuterol, 3 mL, Nebulization, Q6H  metoprolol tartrate, 50 mg, Oral, Q12H  nystatin, 5 mL, Swish & Spit, 4x Daily  polyethylene glycol, 17 g, Oral, Daily  risperiDONE, 5 mg, Oral, Nightly  sodium chloride, 10 mL, Intravenous, Q12H  sodium chloride, 10 mL, Intravenous, Q12H  sodium chloride, 10 mL, Intravenous, Q12H      Adult Central 2-in-1 TPN, , Last Rate: 75 mL/hr at 02/15/22 1743  Pharmacy to Dose TPN,   sodium chloride, 50 mL/hr, Last Rate: 50 mL/hr (22 2330)        Objective   Vital Signs  Temp:  [97.6 °F (36.4 °C)-98.8 °F (37.1 °C)] 98.8 °F (37.1 °C)  Heart Rate:  [70-97] 83  Resp:  [16-18] 18  BP: (100-111)/(66-86) 111/86  Body mass index is 28.27 kg/m².    Intake/Output Summary (Last 24 hours) at 2022 0950  Last data filed at 2022 0655  Gross per 24 hour   Intake 2271.9 ml   Output 850 ml   Net 1421.9 ml       Physical Exam  Vitals and nursing note reviewed.   Constitutional:       Appearance: Normal appearance.   HENT:      Head: Normocephalic and atraumatic.      Mouth/Throat:      Mouth: Mucous membranes are moist.      Pharynx: Oropharynx is clear.   Eyes:      Extraocular Movements: Extraocular movements intact.      Conjunctiva/sclera: Conjunctivae normal.      Pupils:  Pupils are equal, round, and reactive to light.   Cardiovascular:      Rate and Rhythm: Normal rate and regular rhythm.   Pulmonary:      Effort: Pulmonary effort is normal. No respiratory distress.      Breath sounds: Normal breath sounds.   Abdominal:      General: Abdomen is flat. There is no distension.      Palpations: Abdomen is soft.      Tenderness: There is no abdominal tenderness.   Musculoskeletal:      Right lower leg: No edema.      Left lower leg: No edema.   Skin:     General: Skin is warm and dry.   Neurological:      General: No focal deficit present.      Mental Status: He is alert and oriented to person, place, and time.         Results Review:       I reviewed the patient's new clinical results.  Results from last 7 days   Lab Units 02/16/22  0615 02/15/22  0614 02/14/22  0646 02/13/22  0548 02/12/22  0546 02/11/22  0631 02/10/22  0628   WBC 10*3/mm3 10.23 10.41 12.00* 10.89* 13.82* 14.61* 13.57*   HEMOGLOBIN g/dL 8.7* 9.7* 11.3* 10.9* 11.7* 11.7* 12.5*   PLATELETS 10*3/mm3 261 248 279 206 220 200 164     Results from last 7 days   Lab Units 02/16/22  0615 02/15/22  0613 02/14/22  0646 02/13/22  0548 02/12/22  0546 02/11/22  0631 02/10/22  0628   SODIUM mmol/L 134* 132* 132* 133* 136 134* 143   POTASSIUM mmol/L 3.5 3.6 3.9 3.8 3.7 3.2* 3.6   CHLORIDE mmol/L 100 98 99 97* 96* 96* 102   CO2 mmol/L 29.8* 27.0 26.0 28.0 33.9* 31.7* 30.8*   BUN mg/dL 12 19 18 16 12 12 13   CREATININE mg/dL 0.62* 0.64* 0.61* 0.71* 0.75* 0.54* 0.54*   CALCIUM mg/dL 8.0* 7.7* 8.3* 7.9* 8.5* 8.2* 8.0*   MAGNESIUM mg/dL 1.9 2.0 2.0 2.0 2.3 2.2 2.4   PHOSPHORUS mg/dL 2.9 2.5 2.4* 2.7 3.4 2.8 2.2*   Estimated Creatinine Clearance: 116.3 mL/min (A) (by C-G formula based on SCr of 0.62 mg/dL (L)).      Assessment/Plan   Active Hospital Problems    Diagnosis  POA   • **SBO (small bowel obstruction) (HCC) [K56.609]  Unknown   • Acute pancreatitis [K85.90]  Unknown   • GERD (gastroesophageal reflux disease) [K21.9]  Unknown   •  Hypothyroidism [E03.9]  Unknown   • Hyperlipidemia [E78.5]  Unknown   • Hypophosphatemia [E83.39]  Unknown   • Generalized abdominal pain [R10.84]  Yes   • Hypertension [I10]  Yes   • Atrial fibrillation (HCC) [I48.91]  Yes   • Schizophrenia (HCC) [F20.9]  Yes      Resolved Hospital Problems   No resolved problems to display.       PLAN  This is a 67-year-old gentleman with history of A. fib schizophrenia hypertension hyperlipidemia hypothyroidism presented to the hospital with abdominal pain nausea and vomiting was found to have a small bowel obstruction that required exploratory laparotomy and lysis of adhesions.  Postoperatively we are dealing with a postoperative ileus and now pancreatitis    -Given persistent ileus he had repeat CT abdomen/pelvis which revealed the development of pancreatitis  -Diet advanced to full liquid per surgery, continue TPN and pain medication-hopefully diet can be advanced today to be decided by surgery.  -Atrial fibrillationwith cardiology following, on metop, dilt, and digoxin; metoprolol dose was increased with improvement in rates; cardiology to determine if AC is appropriate  -Heart rate is stable for now but may require IV meds for further coverage given NPO and poor absorption  -Full code  -Lovenox for DVT prophylaxis; cardiology leaning towards no long-term anticoagulation given his comorbidities  -Worked  with physical therapy, walking in hallways    Disposition  To be determined    Roz Chacko MD  [facility]  02/16/22  09:50 EST

## 2022-02-16 NOTE — PLAN OF CARE
Goal Outcome Evaluation:  Plan of Care Reviewed With: patient        Progress: no change  Outcome Summary: picc line continues to be problem, very postional, med for pain x 1 with some relief stated, taking only water in , full liquid offered, declined, denies nausea, abdomen distended, passing flatus, small smears of bm, Eyes closed at short intervals, resting quietly in room, will continue to monitor

## 2022-02-16 NOTE — PROGRESS NOTES
[facility] Clinical Pharmacy Services: TPN Daily Progress Note    TPN Day # 7  Indication: prolonged paralytic ileus  Route: central  Type: standard    Subjective/Objective  Results from last 7 days   Lab Units 22  0615 02/15/22  0613 02/15/22  0613   SODIUM mmol/L 134*   < > 132*   POTASSIUM mmol/L 3.5   < > 3.6   CHLORIDE mmol/L 100   < > 98   CO2 mmol/L 29.8*   < > 27.0   BUN mg/dL 12   < > 19   CREATININE mg/dL 0.62*   < > 0.64*   CALCIUM mg/dL 8.0*   < > 7.7*   ALBUMIN g/dL 2.60*   < > 2.70*   BILIRUBIN mg/dL 0.3   < > 0.3   ALK PHOS U/L 58   < > 64   ALT (SGPT) U/L 57*   < > 66*   AST (SGOT) U/L 36   < > 43*   GLUCOSE mg/dL 123*   < > 129*   MAGNESIUM mg/dL 1.9   < > 2.0   PHOSPHORUS mg/dL 2.9   < > 2.5   TRIGLYCERIDES mg/dL  --   --  140    < > = values in this interval not displayed.        Diet Orders (active) (From admission, onward)       Start     Ordered    02/10/22 1013  NPO Diet NPO Except: Ice Chips, Sips With Meds  Diet Effective Now         02/10/22 1012                  Additional insulin administration while previous TPN infusin units  Additional electrolyte administration while previous TPN infusing: none  Acid suppression: Famotidine 20 mg Q12    RD TPN Formula Recommendations  Dextrose (Kcal): 1900  Amino Acid (gm): 100  Lipid Concentration (%): 20%  Lipid Volume (mL): 100 mL  Lipid Frequency: Daily  Kcal/Day: 2500 Kcal/day     Current TPN Formula: 100g/1600kcal/0 mL AA/Dex/Lipids     Assessment/Plan    Na improved slightly.  phos improved. LFTS down further. Full liquid diet however per nursing notes, not taking anything in. I think we need to increase protein slightly to 120g and keep dextrose at 1600kcals. Will need to decrease potassium acetate(CO2 elevated) and use another form of potassium since it is borderline low. Can use KCL protocol to replace since KCL IV is on national backorder and can not be added to tpn formula     1. Macronutrients: 120 g AA and dextrose to 1600  kcal. Likely  another dose of lipids tomorrow.   2. Electrolytes/Additives:   Sodium Chloride = 90 mEq   Sodium Phosphate= 25 mEq  Potassium Acetate = 35 mEq  Potassium Phosphate = 35 mEq   Magnesium Chloride = 10 mEq  Calcium Gluconate = 9 mEq  Trace 1mL  MVI: qMWF    3. Labs: CMP, Magnesium, Phos in am labs      Agustin Ely, PharmD  2/16  Clinical Pharmacist

## 2022-02-16 NOTE — PROGRESS NOTES
"General Surgery Progress Note    Summary: Mr. Carlos Kraus is a 67 y.o. year old gentleman POD12 s/p exploratory laparotomy and lysis of adhesions for high-grade small bowel obstruction, with a resolving ileus and pancreatitis. Increase PO intake, ok to advance diet. OOB and ambulate as tolerated.  On Lovenox for DVT prophylaxis.    Interval Events: No acute events overnight. Pain controlled. Having bowel function. Eating very little PO.    Vitals: /86 (BP Location: Right arm, Patient Position: Lying)   Pulse 83   Temp 98.8 °F (37.1 °C) (Oral)   Resp 18   Ht 188 cm (74.02\")   Wt 99.9 kg (220 lb 4.8 oz)   SpO2 95%   BMI 28.27 kg/m²     GENERAL: Alert, well appearing, laying comfortably in bed, and in no distress  HEENT: normocephalic, atraumatic, moist mucous membranes, clear sclerae  CHEST: no increased work of breathing, on room air, symmetric air entry  CARDIAC: regular rate and rhythm    ABDOMEN: Soft, distended, appropriately tender to palpation, incision clean and dry with staples intact  EXTREMITIES: no cyanosis, clubbing, or edema   SKIN: Warm and moist, no rashes    Labs:  Results from last 7 days   Lab Units 02/16/22  0615 02/15/22  0614 02/14/22  0646   WBC 10*3/mm3 10.23 10.41 12.00*   HEMOGLOBIN g/dL 8.7* 9.7* 11.3*   HEMATOCRIT % 26.0* 29.7* 34.6*   PLATELETS 10*3/mm3 261 248 279     Results from last 7 days   Lab Units 02/15/22  0613 02/14/22  0646 02/13/22  0548   SODIUM mmol/L 132* 132* 133*   POTASSIUM mmol/L 3.6 3.9 3.8   CHLORIDE mmol/L 98 99 97*   CO2 mmol/L 27.0 26.0 28.0   BUN mg/dL 19 18 16   CREATININE mg/dL 0.64* 0.61* 0.71*   CALCIUM mg/dL 7.7* 8.3* 7.9*   BILIRUBIN mg/dL 0.3 0.4 0.6   ALK PHOS U/L 64 72 74   ALT (SGPT) U/L 66* 80* 87*   AST (SGOT) U/L 43* 47* 62*   GLUCOSE mg/dL 129* 102* 117*           PERLA HERNDON MD  General and Endoscopic Surgery  [facility]    [address], [address]  [place], [place], [address]  P: [phone]  F: [phone]     "

## 2022-02-17 LAB
ALBUMIN SERPL-MCNC: 2.5 G/DL (ref 3.5–5.2)
ALBUMIN/GLOB SERPL: 0.8 G/DL
ALP SERPL-CCNC: 61 U/L (ref 39–117)
ALT SERPL W P-5'-P-CCNC: 61 U/L (ref 1–41)
ANION GAP SERPL CALCULATED.3IONS-SCNC: 4.8 MMOL/L (ref 5–15)
AST SERPL-CCNC: 40 U/L (ref 1–40)
BASOPHILS # BLD AUTO: 0.03 10*3/MM3 (ref 0–0.2)
BASOPHILS NFR BLD AUTO: 0.3 % (ref 0–1.5)
BILIRUB SERPL-MCNC: 0.3 MG/DL (ref 0–1.2)
BUN SERPL-MCNC: 10 MG/DL (ref 8–23)
BUN/CREAT SERPL: 13.5 (ref 7–25)
CALCIUM SPEC-SCNC: 8.2 MG/DL (ref 8.6–10.5)
CHLORIDE SERPL-SCNC: 102 MMOL/L (ref 98–107)
CO2 SERPL-SCNC: 28.2 MMOL/L (ref 22–29)
CREAT SERPL-MCNC: 0.74 MG/DL (ref 0.76–1.27)
DEPRECATED RDW RBC AUTO: 43 FL (ref 37–54)
EOSINOPHIL # BLD AUTO: 0.08 10*3/MM3 (ref 0–0.4)
EOSINOPHIL NFR BLD AUTO: 0.8 % (ref 0.3–6.2)
ERYTHROCYTE [DISTWIDTH] IN BLOOD BY AUTOMATED COUNT: 12.9 % (ref 12.3–15.4)
GFR SERPL CREATININE-BSD FRML MDRD: 106 ML/MIN/1.73
GLOBULIN UR ELPH-MCNC: 3.2 GM/DL
GLUCOSE BLDC GLUCOMTR-MCNC: 107 MG/DL (ref 70–130)
GLUCOSE BLDC GLUCOMTR-MCNC: 93 MG/DL (ref 70–130)
GLUCOSE BLDC GLUCOMTR-MCNC: 97 MG/DL (ref 70–130)
GLUCOSE BLDC GLUCOMTR-MCNC: 98 MG/DL (ref 70–130)
GLUCOSE SERPL-MCNC: 102 MG/DL (ref 65–99)
HCT VFR BLD AUTO: 25.5 % (ref 37.5–51)
HGB BLD-MCNC: 8.6 G/DL (ref 13–17.7)
IMM GRANULOCYTES # BLD AUTO: 0.36 10*3/MM3 (ref 0–0.05)
IMM GRANULOCYTES NFR BLD AUTO: 3.5 % (ref 0–0.5)
LYMPHOCYTES # BLD AUTO: 1.02 10*3/MM3 (ref 0.7–3.1)
LYMPHOCYTES NFR BLD AUTO: 10 % (ref 19.6–45.3)
MAGNESIUM SERPL-MCNC: 1.9 MG/DL (ref 1.6–2.4)
MCH RBC QN AUTO: 31.4 PG (ref 26.6–33)
MCHC RBC AUTO-ENTMCNC: 33.7 G/DL (ref 31.5–35.7)
MCV RBC AUTO: 93.1 FL (ref 79–97)
MONOCYTES # BLD AUTO: 1.07 10*3/MM3 (ref 0.1–0.9)
MONOCYTES NFR BLD AUTO: 10.5 % (ref 5–12)
NEUTROPHILS NFR BLD AUTO: 7.61 10*3/MM3 (ref 1.7–7)
NEUTROPHILS NFR BLD AUTO: 74.9 % (ref 42.7–76)
NRBC BLD AUTO-RTO: 0.3 /100 WBC (ref 0–0.2)
PHOSPHATE SERPL-MCNC: 2.7 MG/DL (ref 2.5–4.5)
PLATELET # BLD AUTO: 306 10*3/MM3 (ref 140–450)
PMV BLD AUTO: 10.8 FL (ref 6–12)
POTASSIUM SERPL-SCNC: 4.1 MMOL/L (ref 3.5–5.2)
PROT SERPL-MCNC: 5.7 G/DL (ref 6–8.5)
RBC # BLD AUTO: 2.74 10*6/MM3 (ref 4.14–5.8)
SODIUM SERPL-SCNC: 135 MMOL/L (ref 136–145)
WBC NRBC COR # BLD: 10.17 10*3/MM3 (ref 3.4–10.8)

## 2022-02-17 PROCEDURE — 99232 SBSQ HOSP IP/OBS MODERATE 35: CPT | Performed by: INTERNAL MEDICINE

## 2022-02-17 PROCEDURE — 80053 COMPREHEN METABOLIC PANEL: CPT | Performed by: STUDENT IN AN ORGANIZED HEALTH CARE EDUCATION/TRAINING PROGRAM

## 2022-02-17 PROCEDURE — 94799 UNLISTED PULMONARY SVC/PX: CPT

## 2022-02-17 PROCEDURE — 25010000002 ONDANSETRON PER 1 MG: Performed by: STUDENT IN AN ORGANIZED HEALTH CARE EDUCATION/TRAINING PROGRAM

## 2022-02-17 PROCEDURE — 97110 THERAPEUTIC EXERCISES: CPT

## 2022-02-17 PROCEDURE — 94761 N-INVAS EAR/PLS OXIMETRY MLT: CPT

## 2022-02-17 PROCEDURE — 25010000002 ALTEPLASE 2 MG RECONSTITUTED SOLUTION: Performed by: STUDENT IN AN ORGANIZED HEALTH CARE EDUCATION/TRAINING PROGRAM

## 2022-02-17 PROCEDURE — 82962 GLUCOSE BLOOD TEST: CPT

## 2022-02-17 PROCEDURE — 85025 COMPLETE CBC W/AUTO DIFF WBC: CPT | Performed by: STUDENT IN AN ORGANIZED HEALTH CARE EDUCATION/TRAINING PROGRAM

## 2022-02-17 PROCEDURE — 83735 ASSAY OF MAGNESIUM: CPT | Performed by: STUDENT IN AN ORGANIZED HEALTH CARE EDUCATION/TRAINING PROGRAM

## 2022-02-17 PROCEDURE — 84100 ASSAY OF PHOSPHORUS: CPT | Performed by: STUDENT IN AN ORGANIZED HEALTH CARE EDUCATION/TRAINING PROGRAM

## 2022-02-17 PROCEDURE — 25010000002 ENOXAPARIN PER 10 MG: Performed by: STUDENT IN AN ORGANIZED HEALTH CARE EDUCATION/TRAINING PROGRAM

## 2022-02-17 RX ADMIN — NYSTATIN 500000 UNITS: 100000 SUSPENSION ORAL at 12:46

## 2022-02-17 RX ADMIN — IPRATROPIUM BROMIDE AND ALBUTEROL SULFATE 3 ML: 2.5; .5 SOLUTION RESPIRATORY (INHALATION) at 08:08

## 2022-02-17 RX ADMIN — DILTIAZEM HYDROCHLORIDE 360 MG: 180 CAPSULE, COATED, EXTENDED RELEASE ORAL at 08:41

## 2022-02-17 RX ADMIN — DIGOXIN 250 MCG: 250 TABLET ORAL at 12:45

## 2022-02-17 RX ADMIN — HYDROCODONE BITARTRATE AND ACETAMINOPHEN 1 TABLET: 5; 325 TABLET ORAL at 12:46

## 2022-02-17 RX ADMIN — SODIUM CHLORIDE, PRESERVATIVE FREE 10 ML: 5 INJECTION INTRAVENOUS at 08:44

## 2022-02-17 RX ADMIN — HYDROCODONE BITARTRATE AND ACETAMINOPHEN 1 TABLET: 5; 325 TABLET ORAL at 18:36

## 2022-02-17 RX ADMIN — ALTEPLASE: 2.2 INJECTION, POWDER, LYOPHILIZED, FOR SOLUTION INTRAVENOUS at 00:26

## 2022-02-17 RX ADMIN — POLYETHYLENE GLYCOL 3350 17 G: 17 POWDER, FOR SOLUTION ORAL at 08:39

## 2022-02-17 RX ADMIN — IPRATROPIUM BROMIDE AND ALBUTEROL SULFATE 3 ML: 2.5; .5 SOLUTION RESPIRATORY (INHALATION) at 12:08

## 2022-02-17 RX ADMIN — METOPROLOL TARTRATE 50 MG: 50 TABLET, FILM COATED ORAL at 08:40

## 2022-02-17 RX ADMIN — RISPERIDONE 5 MG: 2 TABLET, FILM COATED ORAL at 20:30

## 2022-02-17 RX ADMIN — METOPROLOL TARTRATE 50 MG: 50 TABLET, FILM COATED ORAL at 20:29

## 2022-02-17 RX ADMIN — IPRATROPIUM BROMIDE AND ALBUTEROL SULFATE 3 ML: 2.5; .5 SOLUTION RESPIRATORY (INHALATION) at 23:39

## 2022-02-17 RX ADMIN — FAMOTIDINE 20 MG: 10 INJECTION INTRAVENOUS at 08:40

## 2022-02-17 RX ADMIN — HYDROCODONE BITARTRATE AND ACETAMINOPHEN 1 TABLET: 5; 325 TABLET ORAL at 04:21

## 2022-02-17 RX ADMIN — ASPIRIN 81 MG: 81 TABLET, COATED ORAL at 08:39

## 2022-02-17 RX ADMIN — ENOXAPARIN SODIUM 40 MG: 100 INJECTION SUBCUTANEOUS at 20:29

## 2022-02-17 RX ADMIN — NYSTATIN 500000 UNITS: 100000 SUSPENSION ORAL at 08:38

## 2022-02-17 RX ADMIN — ONDANSETRON 4 MG: 2 INJECTION INTRAMUSCULAR; INTRAVENOUS at 08:30

## 2022-02-17 NOTE — PROGRESS NOTES
Ded[facility] to Hospital Care    [phone]   LOS: 14 days     Name: Carlos Kraus  Age/Sex: 67 y.o. male  :  1955        PCP: Provider, No Known  Chief Complaint   Patient presents with   • Abdominal Pain      Subjective   The patient is resting in bed.  Per RN no acute events overnight.  He has been switched to regular diet and per RN he ate about 50% of his breakfast which included oranges, eggs and burrell.  To me he is denying nausea however to surgical provider he reported some nausea this morning.    General: No Fever or Chills, Cardiac: No Chest Pain or Palpitations, Resp: No Cough or SOA and Other: No bleeding    aspirin, 81 mg, Oral, Daily  digoxin, 250 mcg, Oral, Daily  dilTIAZem CD, 360 mg, Oral, Q24H  enoxaparin, 40 mg, Subcutaneous, Nightly  famotidine, 20 mg, Intravenous, Q12H  insulin regular, 0-7 Units, Subcutaneous, Q6H  ipratropium-albuterol, 3 mL, Nebulization, Q6H  metoprolol tartrate, 50 mg, Oral, Q12H  nystatin, 5 mL, Swish & Spit, 4x Daily  polyethylene glycol, 17 g, Oral, Daily  risperiDONE, 5 mg, Oral, Nightly  sodium chloride, 10 mL, Intravenous, Q12H  sodium chloride, 10 mL, Intravenous, Q12H  sodium chloride, 10 mL, Intravenous, Q12H      Adult Central 2-in-1 TPN, , Last Rate: 75 mL/hr at 22 1637  Pharmacy to Dose TPN,   sodium chloride, 50 mL/hr, Last Rate: 50 mL/hr (22 2330)        Objective   Vital Signs  Temp:  [97.7 °F (36.5 °C)-98.6 °F (37 °C)] 97.9 °F (36.6 °C)  Heart Rate:  [69-87] 87  Resp:  [18] 18  BP: ()/(55-79) 118/73  Body mass index is 28.35 kg/m².    Intake/Output Summary (Last 24 hours) at 2022 1020  Last data filed at 2022 0923  Gross per 24 hour   Intake 786 ml   Output 1750 ml   Net -964 ml       Physical Exam  Vitals and nursing note reviewed.   Constitutional:       Appearance: Normal appearance.   HENT:      Head: Normocephalic and atraumatic.      Mouth/Throat:      Mouth: Mucous membranes are moist.      Pharynx: Oropharynx is  clear.   Eyes:      Extraocular Movements: Extraocular movements intact.      Conjunctiva/sclera: Conjunctivae normal.      Pupils: Pupils are equal, round, and reactive to light.   Cardiovascular:      Rate and Rhythm: Normal rate and regular rhythm.   Pulmonary:      Effort: Pulmonary effort is normal. No respiratory distress.      Breath sounds: Normal breath sounds.   Abdominal:      General: Abdomen is flat. There is no distension.      Palpations: Abdomen is soft.      Tenderness: There is no abdominal tenderness.   Musculoskeletal:      Right lower leg: No edema.      Left lower leg: No edema.   Skin:     General: Skin is warm and dry.   Neurological:      General: No focal deficit present.      Mental Status: He is alert and oriented to person, place, and time.         Results Review:       I reviewed the patient's new clinical results.  Results from last 7 days   Lab Units 02/17/22  0549 02/16/22  0615 02/15/22  0614 02/14/22  0646 02/13/22  0548 02/12/22  0546 02/11/22  0631   WBC 10*3/mm3 10.17 10.23 10.41 12.00* 10.89* 13.82* 14.61*   HEMOGLOBIN g/dL 8.6* 8.7* 9.7* 11.3* 10.9* 11.7* 11.7*   PLATELETS 10*3/mm3 306 261 248 279 206 220 200     Results from last 7 days   Lab Units 02/17/22  0549 02/16/22 2048 02/16/22  0615 02/15/22  0613 02/14/22  0646 02/13/22  0548 02/12/22  0546 02/11/22  0631 02/11/22  0631   SODIUM mmol/L 135*  --  134* 132* 132* 133* 136  --  134*   POTASSIUM mmol/L 4.1 3.9 3.5 3.6 3.9 3.8 3.7   < > 3.2*   CHLORIDE mmol/L 102  --  100 98 99 97* 96*  --  96*   CO2 mmol/L 28.2  --  29.8* 27.0 26.0 28.0 33.9*  --  31.7*   BUN mg/dL 10  --  12 19 18 16 12  --  12   CREATININE mg/dL 0.74*  --  0.62* 0.64* 0.61* 0.71* 0.75*  --  0.54*   CALCIUM mg/dL 8.2*  --  8.0* 7.7* 8.3* 7.9* 8.5*  --  8.2*   MAGNESIUM mg/dL 1.9  --  1.9 2.0 2.0 2.0 2.3  --  2.2   PHOSPHORUS mg/dL 2.7  --  2.9 2.5 2.4* 2.7 3.4  --  2.8    < > = values in this interval not displayed.   Estimated Creatinine Clearance:  116.3 mL/min (A) (by C-G formula based on SCr of 0.74 mg/dL (L)).      Assessment/Plan   Active Hospital Problems    Diagnosis  POA   • **SBO (small bowel obstruction) (HCC) [K56.609]  Unknown   • Acute pancreatitis [K85.90]  Unknown   • GERD (gastroesophageal reflux disease) [K21.9]  Unknown   • Hypothyroidism [E03.9]  Unknown   • Hyperlipidemia [E78.5]  Unknown   • Hypophosphatemia [E83.39]  Unknown   • Generalized abdominal pain [R10.84]  Yes   • Hypertension [I10]  Yes   • Atrial fibrillation (HCC) [I48.91]  Yes   • Schizophrenia (HCC) [F20.9]  Yes      Resolved Hospital Problems   No resolved problems to display.       PLAN  This is a 67-year-old gentleman with history of A. fib schizophrenia hypertension hyperlipidemia hypothyroidism presented to the hospital with abdominal pain nausea and vomiting was found to have a small bowel obstruction that required exploratory laparotomy and lysis of adhesions.  Postoperatively he had postoperative ileus and pancreatitis    -Given persistent ileus he had repeat CT abdomen/pelvis which revealed the development of pancreatitis  -Diet advanced to regular per surgery, continue pain medication-this morning he ate 50% of his eggs, burrell, oranges, discussed with pharmacy will stop TPN  -Atrial fibrillation with cardiology following, on metop, dilt, and digoxin; metoprolol dose was increased with improvement in rates; cardiology to sign off    -Full code  -Lovenox for DVT prophylaxis; cardiology leaning towards no long-term anticoagulation given his comorbidities  -Worked  with physical therapy, walking in hallways    Disposition  To be determined-likely DC back to his facility tomorrow if he continues to tolerate a diet/eats well    Roz Chacko MD  [facility]  02/17/22  10:20 EST

## 2022-02-17 NOTE — CASE MANAGEMENT/SOCIAL WORK
Continued Stay Note  [facility]     Patient Name: Carlos Kraus  MRN: [id number]  Today's Date: 2/17/2022    Admit Date: 2/3/2022     Discharge Plan     Row Name 02/17/22 1003       Plan    Plan Plan return to [facility].   DENISE Robins RN    Plan Comments Spoke with pt at bedside.   Call and spoke with pt's friend ( Jimmie Vela [phone])  who states he may transport pt back to facility.  Jimmie to discuss with [facility].   Called and spoke with the  (Stephanie  [phone])  who states she will need discharge summary faxed to her at [phone].  Plan return to [facility]               Discharge Codes    No documentation.               Expected Discharge Date and Time     Expected Discharge Date Expected Discharge Time    Feb 18, 2022             Yoanna Robins RN

## 2022-02-17 NOTE — PROGRESS NOTES
"General Surgery Progress Note    Summary: Mr. Carlos Kraus is a 67 y.o. year old gentleman POD13 s/p exploratory laparotomy and lysis of adhesions for high-grade small bowel obstruction, with a resolving ileus and pancreatitis. PO intake as tolerated, continue TPN. OOB and ambulate as tolerated.  On Lovenox for DVT prophylaxis.    Interval Events: No acute events overnight. PICC line is no longer working, plan to replace today. Eating a few bites of each meal only. Has some nausea right now.    Vitals: /73 (BP Location: Right arm, Patient Position: Lying)   Pulse 87   Temp 97.9 °F (36.6 °C) (Oral)   Resp 18   Ht 188 cm (74.02\")   Wt 100 kg (220 lb 14.4 oz)   SpO2 95%   BMI 28.35 kg/m²     GENERAL: Alert, well appearing, laying comfortably in bed, and in no distress  HEENT: normocephalic, atraumatic, moist mucous membranes, clear sclerae  CHEST: no increased work of breathing, on room air, symmetric air entry  CARDIAC: regular rate and rhythm    ABDOMEN: Soft, nondistended, appropriately tender to palpation, incision clean and dry with staples intact  EXTREMITIES: no cyanosis, clubbing, or edema   SKIN: Warm and moist, no rashes    Labs:  Results from last 7 days   Lab Units 02/17/22  0549 02/16/22  0615 02/15/22  0614   WBC 10*3/mm3 10.17 10.23 10.41   HEMOGLOBIN g/dL 8.6* 8.7* 9.7*   HEMATOCRIT % 25.5* 26.0* 29.7*   PLATELETS 10*3/mm3 306 261 248     Results from last 7 days   Lab Units 02/17/22  0549 02/16/22 2048 02/16/22  0615 02/15/22  0613 02/15/22  0613   SODIUM mmol/L 135*  --  134*  --  132*   POTASSIUM mmol/L 4.1 3.9 3.5   < > 3.6   CHLORIDE mmol/L 102  --  100  --  98   CO2 mmol/L 28.2  --  29.8*  --  27.0   BUN mg/dL 10  --  12  --  19   CREATININE mg/dL 0.74*  --  0.62*  --  0.64*   CALCIUM mg/dL 8.2*  --  8.0*  --  7.7*   BILIRUBIN mg/dL 0.3  --  0.3  --  0.3   ALK PHOS U/L 61  --  58  --  64   ALT (SGPT) U/L 61*  --  57*  --  66*   AST (SGOT) U/L 40  --  36  --  43*   GLUCOSE mg/dL 102*  " --  123*  --  129*    < > = values in this interval not displayed.           PERLA HERNDON MD  General and Endoscopic Surgery  [facility]    [address], [address]  [place], [place], [address]  P: [phone]  F: [phone]

## 2022-02-17 NOTE — PROGRESS NOTES
HOSPITAL FOLLOW UP NOTE    Patient Name: Carlos Kraus  Patient : 1955        Date of Service:22  Provider of Service: Miguelangel Benson MD  Place of Service: [facility]  Referral Provider: Yesenia Bauer, *          Follow Up: Atrial fibrillation    Interval Hx: Patient's rate is under good control.  He is not having any complaints.  Vital signs are stable.        OBJECTIVE  Temp:  [97.7 °F (36.5 °C)-98.6 °F (37 °C)] 97.9 °F (36.6 °C)  Heart Rate:  [69-85] 78  Resp:  [18] 18  BP: ()/(55-79) 118/73     Intake/Output Summary (Last 24 hours) at 2022 0842  Last data filed at 2022 0426  Gross per 24 hour   Intake 736 ml   Output 1650 ml   Net -914 ml     Body mass index is 28.35 kg/m².      02/15/22  0620 22  0500 22  0300   Weight: 101 kg (223 lb) 99.9 kg (220 lb 4.8 oz) 100 kg (220 lb 14.4 oz)         Physical Exam:   Vitals reviewed.   Constitutional:       Appearance: Healthy appearance. Well-developed and not in distress.   HENT:      Head: Normocephalic.   Neck:      Thyroid: No thyromegaly.      Vascular: No carotid bruit or JVD.   Pulmonary:      Effort: Pulmonary effort is normal.      Breath sounds: Normal breath sounds.   Cardiovascular:      Normal rate. Regular rhythm. Normal S1. Normal S2.      Murmurs: There is no murmur.      No gallop.   Pulses:     Intact distal pulses.   Edema:     Peripheral edema absent.   Musculoskeletal:      Cervical back: Normal range of motion. Skin:     General: Skin is warm and dry.      Findings: No erythema.   Neurological:      Mental Status: Alert and oriented to person, place, and time.           CURRENT MEDS    Scheduled Meds:aspirin, 81 mg, Oral, Daily  digoxin, 250 mcg, Oral, Daily  dilTIAZem CD, 360 mg, Oral, Q24H  enoxaparin, 40 mg, Subcutaneous, Nightly  famotidine, 20 mg, Intravenous, Q12H  insulin regular, 0-7 Units, Subcutaneous, Q6H  ipratropium-albuterol, 3 mL, Nebulization, Q6H  metoprolol  tartrate, 50 mg, Oral, Q12H  nystatin, 5 mL, Swish & Spit, 4x Daily  polyethylene glycol, 17 g, Oral, Daily  risperiDONE, 5 mg, Oral, Nightly  sodium chloride, 10 mL, Intravenous, Q12H  sodium chloride, 10 mL, Intravenous, Q12H  sodium chloride, 10 mL, Intravenous, Q12H      Continuous Infusions:Adult Central 2-in-1 TPN, , Last Rate: 75 mL/hr at 02/16/22 1637  Pharmacy to Dose TPN,   sodium chloride, 50 mL/hr, Last Rate: 50 mL/hr (02/14/22 7800)          Lab Review:   Results from last 7 days   Lab Units 02/17/22  0549 02/16/22 2048 02/16/22  0615 02/16/22  0615   SODIUM mmol/L 135*  --   --  134*   POTASSIUM mmol/L 4.1 3.9   < > 3.5   CHLORIDE mmol/L 102  --   --  100   CO2 mmol/L 28.2  --   --  29.8*   BUN mg/dL 10  --   --  12   CREATININE mg/dL 0.74*  --   --  0.62*   GLUCOSE mg/dL 102*  --   --  123*   CALCIUM mg/dL 8.2*  --   --  8.0*   AST (SGOT) U/L 40  --   --  36   ALT (SGPT) U/L 61*  --   --  57*    < > = values in this interval not displayed.         Results from last 7 days   Lab Units 02/17/22  0549 02/16/22  0615   WBC 10*3/mm3 10.17 10.23   HEMOGLOBIN g/dL 8.6* 8.7*   HEMATOCRIT % 25.5* 26.0*   PLATELETS 10*3/mm3 306 261         Results from last 7 days   Lab Units 02/17/22  0549 02/16/22  0615   MAGNESIUM mg/dL 1.9 1.9     Results from last 7 days   Lab Units 02/15/22  0613   TRIGLYCERIDES mg/dL 140               I personally reviewed the patient's ECG and telemetry data    ASSESSMENT & PLAN    SBO (small bowel obstruction) (HCC)    Schizophrenia (HCC)    Atrial fibrillation (HCC)    Hypertension    Generalized abdominal pain    GERD (gastroesophageal reflux disease)    Hypothyroidism    Hyperlipidemia    Hypophosphatemia    Acute pancreatitis      1.  Atrial fibrillation: The patient's Simone 2 vas score is 1.  At this point I believe the risk of long-term anticoagulation in this patient is high.  He has not had any follow-up in over 3 years.  I think we will just manage his rate with medications  above.  He appears to be well controlled.  We will sign off at this point and see him as needed.    Miguelangel Benson MD  02/17/22

## 2022-02-17 NOTE — PLAN OF CARE
hGoal Outcome Evaluation:  Plan of Care Reviewed With: patient        Progress: no change  Outcome Summary: Pt always declines PT first attempt, but if you mention just doing some activity and it will get him back home, he will agree; pt amb 40ft w/assist of 1, plus one for equipment ; pt still requires cues for step length posture and requires assist to guide rwx, plans DC back to facility where he resides    José Lua PT Tech present    Patient was wearing a face mask during this therapy encounter. Therapist used appropriate personal protective equipment including eye protection, mask, and gloves.  Mask used was standard procedure mask. Appropriate PPE was worn during the entire therapy session. Hand hygiene was completed before and after therapy session. Patient is not in enhanced droplet precautions.

## 2022-02-17 NOTE — PLAN OF CARE
"Goal Outcome Evaluation:  Plan of Care Reviewed With: patient        Progress: no change  Outcome Summary: potassium level up to 3.9,drinking water, snack offered, refused, states \"not hungry\" med for pain x 2 with some relief stated, B/P low, 87/55, sleeping at time, asymptomatic, continues to be in afib with controlled rate, PICC line not working, IV nurse notified and cathflo used, with no results. will notify MD this am, will continue to monitor  "

## 2022-02-17 NOTE — PLAN OF CARE
Goal Outcome Evaluation:           Progress: no change  Outcome Summary: Pt encourage to eat his meals today, so breakfast and lunch ate 50/40%, drinking adequately, c/o nausea this am, zofran iv given and was effective. Medicated for c/o abd pain with prn norco, little releif per pt. vss, remians afib on tele. PICC line pulled per order, devin well, site d/i, cath tip intact. Inc to mid abd, with staples intact, no s/sx of infection,amb with PT in HW,and walker, devin well.

## 2022-02-17 NOTE — THERAPY TREATMENT NOTE
Patient Name: Carlos Kraus  : 1955    MRN: [id number]                              Today's Date: 2022       Admit Date: 2/3/2022    Visit Dx:     ICD-10-CM ICD-9-CM   1. Generalized abdominal pain  R10.84 789.07   2. Acute pancreatitis, unspecified complication status, unspecified pancreatitis type  K85.90 577.0   3. Small bowel obstruction (HCC)  K56.609 560.9   4. Atrial fibrillation with RVR (HCC)  I48.91 427.31     Patient Active Problem List   Diagnosis   • Syncope and collapse   • Schizophrenia (HCC)   • Atrial fibrillation (HCC)   • Hypertension   • Orthostatic hypotension   • Hypokalemia   • Generalized abdominal pain   • GERD (gastroesophageal reflux disease)   • Hypothyroidism   • Hyperlipidemia   • Hypophosphatemia   • SBO (small bowel obstruction) (HCC)   • Acute pancreatitis     Past Medical History:   Diagnosis Date   • A-fib (HCC)    • Atrial fibrillation (HCC) 2019   • [place] esophagus    • GERD (gastroesophageal reflux disease)    • Hx of schizophrenia    • Hyperlipidemia    • Hypertension    • Hypokalemia 2019   • Hypophosphatemia    • Hypothyroidism    • Schizophrenia (HCC) 2019   • Syncope and collapse 2019   • Umbilical hernia      Past Surgical History:   Procedure Laterality Date   • EXPLORATORY LAPAROTOMY N/A 2022    Procedure: LAPAROTOMY EXPLORATORY LYSIS OF ADHESIONS;  Surgeon: Edyta Agosto MD;  Location: [facility];  Service: General;  Laterality: N/A;      General Information     Row Name 22 1518          Physical Therapy Time and Intention    Document Type therapy note (daily note)  -     Mode of Treatment individual therapy; physical therapy  -     Row Name 22 1518          General Information    Patient Profile Reviewed yes  -     Existing Precautions/Restrictions fall  -     Row Name 22 1518          Cognition    Orientation Status (Cognition) oriented to; person; place  -     Row Name 22 1518           Safety Issues, Functional Mobility    Impairments Affecting Function (Mobility) balance; endurance/activity tolerance; strength  -           User Key  (r) = Recorded By, (t) = Taken By, (c) = Cosigned By    Initials Name Provider Type    Lety Burks PTA Physical Therapy Assistant               Mobility     Row Name 02/17/22 1519          Bed Mobility    Supine-Sit [place] (Bed Mobility) contact guard; standby assist  -     Sit-Supine [place] (Bed Mobility) supervision  -     Assistive Device (Bed Mobility) bed rails  -     Comment (Bed Mobility) still needs encouragement to participate, but will agree if persistent  -     Row Name 02/17/22 1519          Sit-Stand Transfer    Sit-Stand [place] (Transfers) contact guard; verbal cues  -     Assistive Device (Sit-Stand Transfers) walker, front-wheeled  -     Row Name 02/17/22 1516          Gait/Stairs (Locomotion)    [place] Level (Gait) 1 person assist; contact guard; 1 person to manage equipment  -     Assistive Device (Gait) walker, front-wheeled  -     Distance in Feet (Gait) 40ft, pt declining further amb due to fatigue  -     Deviations/Abnormal Patterns (Gait) base of support, wide; festinating/shuffling; stride length decreased  -     Bilateral Gait Deviations forward flexed posture  can correct w/cues  -     Comment (Gait/Stairs) pt req assist to guide rwx around objects, pt just wanting to get done, not really focused on walking-suggest he use rwx if one available at facility  -           User Key  (r) = Recorded By, (t) = Taken By, (c) = Cosigned By    Initials Name Provider Type    Lety Burks PTA Physical Therapy Assistant               Obj/Interventions    No documentation.                Goals/Plan    No documentation.                Clinical Impression     Row Name 02/17/22 5455          Pain Scale: Numbers Pre/Post-Treatment    Pretreatment Pain Rating 0/10 - no pain  -     Posttreatment  Pain Rating 3/10  -HILLARY     Pain Location abdomen  -     Row Name 02/17/22 1524          Plan of Care Review    Plan of Care Reviewed With patient  -     Progress no change  -     Outcome Summary Pt always declines PT first attempt, but if you mention just doing some activity and it will get him back home, he will agree; pt amb 40ft w/assist of 1, plus one for equipment ; pt still requires cues for step length posture and requires assist to guide rwx, plans DC back to facility where he resides  -     Row Name 02/17/22 1524          Therapy Assessment/Plan (PT)    Rehab Potential (PT) good, to achieve stated therapy goals  -     Criteria for Skilled Interventions Met (PT) yes  -           User Key  (r) = Recorded By, (t) = Taken By, (c) = Cosigned By    Initials Name Provider Type    Lety Burks PTA Physical Therapy Assistant               Outcome Measures    No documentation.                              Physical Therapy Education                 Title: PT OT SLP Therapies (In Progress)     Topic: Physical Therapy (In Progress)     Point: Mobility training (In Progress)     Learning Progress Summary           Patient Acceptance, E,D, NR by HILLARY at 2/17/2022 1548   Other Acceptance, E, VU,NR by MARYCRUZ at 2/7/2022 1545      Show all documentation for this point (8)                 Point: Home exercise program (In Progress)     Learning Progress Summary           Patient Acceptance, E,D, NR by HILLARY at 2/17/2022 1548      Show all documentation for this point (6)                 Point: Body mechanics (In Progress)     Learning Progress Summary           Patient Acceptance, E,D, NR by HILLARY at 2/17/2022 1548   Other Acceptance, E, VU,NR by MARYCRUZ at 2/7/2022 1545      Show all documentation for this point (8)                 Point: Precautions (In Progress)     Learning Progress Summary           Patient Acceptance, E,D, NR by HILLARY at 2/17/2022 1548   Other Acceptance, E, VU,NR by MARYCRUZ at 2/7/2022 1545      Show all  documentation for this point (10)                             User Key     Initials Effective Dates Name Provider Type Discipline     03/07/18 -  Lety Villa PTA Physical Therapy Assistant PT    DJ 10/25/19 -  Pamela Otero PT Physical Therapist PT              PT Recommendation and Plan     Plan of Care Reviewed With: patient  Progress: no change  Outcome Summary: Pt always declines PT first attempt, but if you mention just doing some activity and it will get him back home, he will agree; pt amb 40ft w/assist of 1, plus one for equipment ; pt still requires cues for step length posture and requires assist to guide rwx, plans DC back to facility where he resides     Time Calculation:    PT Charges     Row Name 02/17/22 1546             Time Calculation    Start Time 1530  -      Stop Time 1541  -      Time Calculation (min) 11 min  -      PT Received On 02/17/22  -HILLARY      PT - Next Appointment 02/18/22  -            User Key  (r) = Recorded By, (t) = Taken By, (c) = Cosigned By    Initials Name Provider Type     Lety Villa PTA Physical Therapy Assistant              Therapy Charges for Today     Code Description Service Date Service Provider Modifiers Qty    77727875481 HC PT THER PROC EA 15 MIN 2/17/2022 Lety Villa PTA GP 1    75143249530 HC PT THER SUPP EA 15 MIN 2/17/2022 Lety Villa PTA GP 1          PT G-Codes  Outcome Measure Options: AM-PAC 6 Clicks Basic Mobility (PT)  AM-PAC 6 Clicks Score (PT): 18  AM-PAC 6 Clicks Score (OT): 24    Lety Villa PTA  2/17/2022

## 2022-02-18 LAB
ALBUMIN SERPL-MCNC: 2.5 G/DL (ref 3.5–5.2)
ALBUMIN/GLOB SERPL: 0.7 G/DL
ALP SERPL-CCNC: 62 U/L (ref 39–117)
ALT SERPL W P-5'-P-CCNC: 55 U/L (ref 1–41)
ANION GAP SERPL CALCULATED.3IONS-SCNC: 9.6 MMOL/L (ref 5–15)
AST SERPL-CCNC: 35 U/L (ref 1–40)
BASOPHILS # BLD AUTO: 0.03 10*3/MM3 (ref 0–0.2)
BASOPHILS NFR BLD AUTO: 0.3 % (ref 0–1.5)
BILIRUB SERPL-MCNC: 0.4 MG/DL (ref 0–1.2)
BUN SERPL-MCNC: 9 MG/DL (ref 8–23)
BUN/CREAT SERPL: 12.7 (ref 7–25)
CALCIUM SPEC-SCNC: 8.3 MG/DL (ref 8.6–10.5)
CHLORIDE SERPL-SCNC: 97 MMOL/L (ref 98–107)
CO2 SERPL-SCNC: 26.4 MMOL/L (ref 22–29)
CREAT SERPL-MCNC: 0.71 MG/DL (ref 0.76–1.27)
DEPRECATED RDW RBC AUTO: 44.7 FL (ref 37–54)
EOSINOPHIL # BLD AUTO: 0.06 10*3/MM3 (ref 0–0.4)
EOSINOPHIL NFR BLD AUTO: 0.6 % (ref 0.3–6.2)
ERYTHROCYTE [DISTWIDTH] IN BLOOD BY AUTOMATED COUNT: 13 % (ref 12.3–15.4)
GFR SERPL CREATININE-BSD FRML MDRD: 111 ML/MIN/1.73
GLOBULIN UR ELPH-MCNC: 3.6 GM/DL
GLUCOSE BLDC GLUCOMTR-MCNC: 102 MG/DL (ref 70–130)
GLUCOSE BLDC GLUCOMTR-MCNC: 106 MG/DL (ref 70–130)
GLUCOSE BLDC GLUCOMTR-MCNC: 109 MG/DL (ref 70–130)
GLUCOSE BLDC GLUCOMTR-MCNC: 92 MG/DL (ref 70–130)
GLUCOSE BLDC GLUCOMTR-MCNC: 97 MG/DL (ref 70–130)
GLUCOSE SERPL-MCNC: 96 MG/DL (ref 65–99)
HCT VFR BLD AUTO: 26.6 % (ref 37.5–51)
HGB BLD-MCNC: 8.7 G/DL (ref 13–17.7)
IMM GRANULOCYTES # BLD AUTO: 0.28 10*3/MM3 (ref 0–0.05)
IMM GRANULOCYTES NFR BLD AUTO: 2.7 % (ref 0–0.5)
LYMPHOCYTES # BLD AUTO: 1.17 10*3/MM3 (ref 0.7–3.1)
LYMPHOCYTES NFR BLD AUTO: 11.2 % (ref 19.6–45.3)
MAGNESIUM SERPL-MCNC: 1.9 MG/DL (ref 1.6–2.4)
MCH RBC QN AUTO: 30.6 PG (ref 26.6–33)
MCHC RBC AUTO-ENTMCNC: 32.7 G/DL (ref 31.5–35.7)
MCV RBC AUTO: 93.7 FL (ref 79–97)
MONOCYTES # BLD AUTO: 1.05 10*3/MM3 (ref 0.1–0.9)
MONOCYTES NFR BLD AUTO: 10.1 % (ref 5–12)
NEUTROPHILS NFR BLD AUTO: 7.85 10*3/MM3 (ref 1.7–7)
NEUTROPHILS NFR BLD AUTO: 75.1 % (ref 42.7–76)
NRBC BLD AUTO-RTO: 0.2 /100 WBC (ref 0–0.2)
PHOSPHATE SERPL-MCNC: 3.4 MG/DL (ref 2.5–4.5)
PLATELET # BLD AUTO: 344 10*3/MM3 (ref 140–450)
PMV BLD AUTO: 11.1 FL (ref 6–12)
POTASSIUM SERPL-SCNC: 3.9 MMOL/L (ref 3.5–5.2)
PROT SERPL-MCNC: 6.1 G/DL (ref 6–8.5)
RBC # BLD AUTO: 2.84 10*6/MM3 (ref 4.14–5.8)
SODIUM SERPL-SCNC: 133 MMOL/L (ref 136–145)
WBC NRBC COR # BLD: 10.44 10*3/MM3 (ref 3.4–10.8)

## 2022-02-18 PROCEDURE — 82962 GLUCOSE BLOOD TEST: CPT

## 2022-02-18 PROCEDURE — 25010000002 ENOXAPARIN PER 10 MG: Performed by: STUDENT IN AN ORGANIZED HEALTH CARE EDUCATION/TRAINING PROGRAM

## 2022-02-18 PROCEDURE — 94799 UNLISTED PULMONARY SVC/PX: CPT

## 2022-02-18 PROCEDURE — 80053 COMPREHEN METABOLIC PANEL: CPT | Performed by: STUDENT IN AN ORGANIZED HEALTH CARE EDUCATION/TRAINING PROGRAM

## 2022-02-18 PROCEDURE — 83735 ASSAY OF MAGNESIUM: CPT | Performed by: STUDENT IN AN ORGANIZED HEALTH CARE EDUCATION/TRAINING PROGRAM

## 2022-02-18 PROCEDURE — 85025 COMPLETE CBC W/AUTO DIFF WBC: CPT | Performed by: STUDENT IN AN ORGANIZED HEALTH CARE EDUCATION/TRAINING PROGRAM

## 2022-02-18 PROCEDURE — 84100 ASSAY OF PHOSPHORUS: CPT | Performed by: STUDENT IN AN ORGANIZED HEALTH CARE EDUCATION/TRAINING PROGRAM

## 2022-02-18 RX ORDER — FAMOTIDINE 20 MG/1
20 TABLET, FILM COATED ORAL
Status: DISCONTINUED | OUTPATIENT
Start: 2022-02-18 | End: 2022-02-19 | Stop reason: HOSPADM

## 2022-02-18 RX ADMIN — NYSTATIN 500000 UNITS: 100000 SUSPENSION ORAL at 17:37

## 2022-02-18 RX ADMIN — DIGOXIN 250 MCG: 250 TABLET ORAL at 12:53

## 2022-02-18 RX ADMIN — NYSTATIN 500000 UNITS: 100000 SUSPENSION ORAL at 20:37

## 2022-02-18 RX ADMIN — IPRATROPIUM BROMIDE AND ALBUTEROL SULFATE 3 ML: 2.5; .5 SOLUTION RESPIRATORY (INHALATION) at 19:44

## 2022-02-18 RX ADMIN — ASPIRIN 81 MG: 81 TABLET, COATED ORAL at 08:29

## 2022-02-18 RX ADMIN — RISPERIDONE 5 MG: 2 TABLET, FILM COATED ORAL at 20:36

## 2022-02-18 RX ADMIN — FAMOTIDINE 20 MG: 20 TABLET, FILM COATED ORAL at 17:37

## 2022-02-18 RX ADMIN — NYSTATIN 500000 UNITS: 100000 SUSPENSION ORAL at 13:11

## 2022-02-18 RX ADMIN — HYDROCODONE BITARTRATE AND ACETAMINOPHEN 1 TABLET: 5; 325 TABLET ORAL at 16:03

## 2022-02-18 RX ADMIN — FAMOTIDINE 20 MG: 20 TABLET, FILM COATED ORAL at 08:29

## 2022-02-18 RX ADMIN — HYDROCODONE BITARTRATE AND ACETAMINOPHEN 1 TABLET: 5; 325 TABLET ORAL at 00:33

## 2022-02-18 RX ADMIN — ENOXAPARIN SODIUM 40 MG: 100 INJECTION SUBCUTANEOUS at 20:36

## 2022-02-18 RX ADMIN — DILTIAZEM HYDROCHLORIDE 360 MG: 180 CAPSULE, COATED, EXTENDED RELEASE ORAL at 08:29

## 2022-02-18 RX ADMIN — METOPROLOL TARTRATE 50 MG: 50 TABLET, FILM COATED ORAL at 20:36

## 2022-02-18 RX ADMIN — METOPROLOL TARTRATE 50 MG: 50 TABLET, FILM COATED ORAL at 08:29

## 2022-02-18 NOTE — PLAN OF CARE
Goal Outcome Evaluation:     Patient incontinent of bowel, had several small BM's throughout day. Treated for pain PRNx1. Tolerating diet well, eating more. Confused at times. VSS. No acute distress noted.

## 2022-02-18 NOTE — PROGRESS NOTES
"General Surgery Progress Note    Summary: Mr. Carlos Kraus is a 67 y.o. year old gentleman POD14 s/p exploratory laparotomy and lysis of adhesions for high-grade small bowel obstruction, with a resolving ileus and pancreatitis. PO intake as tolerated, has increased to 50% of meals. OOB and ambulate as tolerated.  On Lovenox for DVT prophylaxis.  Okay for discharge tomorrow from general surgery standpoint.    Interval Events: No acute events overnight.  Began eating much more food yesterday.  Having bowel function.  States he has no pain.  He has no complaints.    Vitals: BP 97/60 (BP Location: Right arm, Patient Position: Lying)   Pulse 95   Temp 97.6 °F (36.4 °C) (Oral)   Resp 20   Ht 188 cm (74.02\")   Wt 101 kg (222 lb 4.8 oz)   SpO2 97%   BMI 28.53 kg/m²     GENERAL: Alert, well appearing, sitting up on the side of the bed, and in no distress  HEENT: normocephalic, atraumatic, moist mucous membranes, clear sclerae  CHEST: no increased work of breathing, on room air, symmetric air entry  CARDIAC: regular rate and rhythm    ABDOMEN: Soft, nondistended, appropriately tender to palpation, incision clean and dry with staples intact  EXTREMITIES: no cyanosis, clubbing, or edema   SKIN: Warm and moist, no rashes    Labs:  Results from last 7 days   Lab Units 02/18/22  0532 02/17/22  0549 02/16/22  0615   WBC 10*3/mm3 10.44 10.17 10.23   HEMOGLOBIN g/dL 8.7* 8.6* 8.7*   HEMATOCRIT % 26.6* 25.5* 26.0*   PLATELETS 10*3/mm3 344 306 261     Results from last 7 days   Lab Units 02/18/22  0532 02/17/22  0549 02/16/22 2048 02/16/22  0615 02/16/22  0615   SODIUM mmol/L 133* 135*  --   --  134*   POTASSIUM mmol/L 3.9 4.1 3.9   < > 3.5   CHLORIDE mmol/L 97* 102  --   --  100   CO2 mmol/L 26.4 28.2  --   --  29.8*   BUN mg/dL 9 10  --   --  12   CREATININE mg/dL 0.71* 0.74*  --   --  0.62*   CALCIUM mg/dL 8.3* 8.2*  --   --  8.0*   BILIRUBIN mg/dL 0.4 0.3  --   --  0.3   ALK PHOS U/L 62 61  --   --  58   ALT (SGPT) U/L 55* " 61*  --   --  57*   AST (SGOT) U/L 35 40  --   --  36   GLUCOSE mg/dL 96 102*  --   --  123*    < > = values in this interval not displayed.           PERLA HERNDON MD  General and Endoscopic Surgery  [facility]    [address], [address]  [place], [place], [address]  P: [phone]  F: [phone]

## 2022-02-18 NOTE — PLAN OF CARE
Goal Outcome Evaluation:         Pt encouraged to drink PO water/fluids; midline abdominal incision clean, dry and intact with staples and OLIVERIO.  Pt turns self, pain treated with PRN pain medication, distended and taut abdomen but pt denies feeling distended or gassy and had large BM yesterday.  No IV access, dayshift RN advised MD already aware.  Lovenox for VTE prevention, SCDs refused and on-call provider notified as well as a sticky note to physicians.  Eating 40-50% of meals on dayshift per dayshift RN.  Afib controlled.  Will CTM.        Problem: Adult Inpatient Plan of Care  Goal: Plan of Care Review  Outcome: Ongoing, Progressing     Problem: Parenteral Nutrition  Goal: Effective Intravenous Nutrition Therapy Delivery  Outcome: Met  Intervention: Optimize Intravenous Nutrition Delivery  Description: Verify vascular access site placement; routinely assess securement and stabilization.  Maintain closed system; avoid and minimize tubing manipulation and interruption; label and secure all tubing and connections; trace all tubing back to origin.  Confirm patency; assess integrity of access device and tubing (e.g., leak, tear, crack, kink, obstruction or cracked hub); flush vascular access device regularly.  Implement infection control measures (e.g., aseptic technique, dressing care, tubing and filter change).  Verify parenteral nutrition formulation label against the prescribed order (e.g., formula composition, route, rate).  Inspect formulation before administration; continue to assess for changes in the composition during infusion.  Review medications for drug-nutrient incompatibility.  Avoid sudden disruption of parenteral nutrition during routine care or adjustment. If discontinuation of infusion occurs, monitor for blood glucose and electrolyte imbalances.  Monitor skin integrity at access site and assess for extravasation; if present, anticipate vascular access device replacement, pharmacologic therapy,  debridement and/or cold therapy.  Advocate for shortest duration of therapy possible; promote early enteral and oral feedings.  Recent Flowsheet Documentation  Taken 2/18/2022 0400 by Rocio Lewis RN  Medication Review/Management: medications reviewed  Taken 2/18/2022 0200 by Rocio Lewis RN  Medication Review/Management: medications reviewed  Taken 2/18/2022 0000 by Rocio Lewis RN  Medication Review/Management: medications reviewed  Taken 2/17/2022 2200 by Rocio Lewis RN  Medication Review/Management: medications reviewed  Taken 2/17/2022 2000 by Rocio Lewis RN  Medication Review/Management:   medications reviewed   high-risk medications identified  Intervention: Optimize Nutrition, Fluid and Electrolyte Intake  Description: Determine nutrition and fluid requirements; calories, protein, vitamins and minerals; use indirect calorimetry when available.  Keep accurate intake, output and daily weight; monitor trends.  Monitor and assess for electrolyte and fluid imbalances; advocate for and adjust infusion composition, rate or volume based on clinical status and laboratory values.  Monitor blood glucose levels; advocate for and provide insulin therapy for elevated blood glucose levels above target range.  Recent Flowsheet Documentation  Taken 2/18/2022 0000 by Rocio Lewis, RN  Glycemic Management: oral hydration promoted  Fluid/Electrolyte Management: fluids provided  Taken 2/17/2022 2000 by Rocio Lewis RN  Glycemic Management: oral hydration promoted  Fluid/Electrolyte Management: fluids provided

## 2022-02-18 NOTE — PROGRESS NOTES
Dedicated to Hospital Care    [phone]   LOS: 15 days     Name: Carlos Kraus  Age/Sex: 67 y.o. male  :  1955        PCP: Provider, No Known  Chief Complaint   Patient presents with   • Abdominal Pain      Subjective   The patient is resting in bed.  Per RN no acute events overnight.  He is eating well and eating 50 to 100% of his meals.  This morning the patient is refusing to speak to me although maintains eye contact as I move around the room.    General: No Fever or Chills, Cardiac: No Chest Pain or Palpitations, Resp: No Cough or SOA and Other: No bleeding    aspirin, 81 mg, Oral, Daily  digoxin, 250 mcg, Oral, Daily  dilTIAZem CD, 360 mg, Oral, Q24H  enoxaparin, 40 mg, Subcutaneous, Nightly  famotidine, 20 mg, Nasogastric, BID AC  insulin regular, 0-7 Units, Subcutaneous, Q6H  ipratropium-albuterol, 3 mL, Nebulization, Q6H  metoprolol tartrate, 50 mg, Oral, Q12H  polyethylene glycol, 17 g, Oral, Daily  risperiDONE, 5 mg, Oral, Nightly  sodium chloride, 10 mL, Intravenous, Q12H  sodium chloride, 10 mL, Intravenous, Q12H  sodium chloride, 10 mL, Intravenous, Q12H           Objective   Vital Signs  Temp:  [97.6 °F (36.4 °C)-98.7 °F (37.1 °C)] 97.6 °F (36.4 °C)  Heart Rate:  [73-95] 95  Resp:  [18-20] 20  BP: ()/(60-86) 97/60  Body mass index is 28.53 kg/m².    Intake/Output Summary (Last 24 hours) at 2022 1254  Last data filed at 2022 0726  Gross per 24 hour   Intake 840 ml   Output 900 ml   Net -60 ml       Physical Exam  Vitals and nursing note reviewed.   Constitutional:       Appearance: Normal appearance.   HENT:      Head: Normocephalic and atraumatic.      Mouth/Throat:      Mouth: Mucous membranes are moist.      Pharynx: Oropharyngeal exudate present.      Comments: Oral thrush  Eyes:      Extraocular Movements: Extraocular movements intact.      Conjunctiva/sclera: Conjunctivae normal.      Pupils: Pupils are equal, round, and reactive to light.   Cardiovascular:      Rate  and Rhythm: Normal rate and regular rhythm.   Pulmonary:      Effort: Pulmonary effort is normal. No respiratory distress.      Breath sounds: Normal breath sounds.   Abdominal:      General: Abdomen is flat. There is no distension.      Palpations: Abdomen is soft.      Tenderness: There is no abdominal tenderness.   Musculoskeletal:      Right lower leg: No edema.      Left lower leg: No edema.   Skin:     General: Skin is warm and dry.   Neurological:      General: No focal deficit present.      Mental Status: He is alert and oriented to person, place, and time.         Results Review:       I reviewed the patient's new clinical results.  Results from last 7 days   Lab Units 02/18/22  0532 02/17/22  0549 02/16/22  0615 02/15/22  0614 02/14/22  0646 02/13/22  0548 02/12/22  0546   WBC 10*3/mm3 10.44 10.17 10.23 10.41 12.00* 10.89* 13.82*   HEMOGLOBIN g/dL 8.7* 8.6* 8.7* 9.7* 11.3* 10.9* 11.7*   PLATELETS 10*3/mm3 344 306 261 248 279 206 220     Results from last 7 days   Lab Units 02/18/22  0532 02/17/22  0549 02/16/22  2048 02/16/22  0615 02/15/22  0613 02/14/22  0646 02/13/22  0548 02/12/22  0546 02/12/22  0546   SODIUM mmol/L 133* 135*  --  134* 132* 132* 133*  --  136   POTASSIUM mmol/L 3.9 4.1 3.9 3.5 3.6 3.9 3.8   < > 3.7   CHLORIDE mmol/L 97* 102  --  100 98 99 97*  --  96*   CO2 mmol/L 26.4 28.2  --  29.8* 27.0 26.0 28.0  --  33.9*   BUN mg/dL 9 10  --  12 19 18 16  --  12   CREATININE mg/dL 0.71* 0.74*  --  0.62* 0.64* 0.61* 0.71*  --  0.75*   CALCIUM mg/dL 8.3* 8.2*  --  8.0* 7.7* 8.3* 7.9*  --  8.5*   MAGNESIUM mg/dL 1.9 1.9  --  1.9 2.0 2.0 2.0  --  2.3   PHOSPHORUS mg/dL 3.4 2.7  --  2.9 2.5 2.4* 2.7  --  3.4    < > = values in this interval not displayed.   Estimated Creatinine Clearance: 116.8 mL/min (A) (by C-G formula based on SCr of 0.71 mg/dL (L)).      Assessment/Plan   Active Hospital Problems    Diagnosis  POA   • **SBO (small bowel obstruction) (HCC) [K56.609]  Unknown   • Acute pancreatitis  [K85.90]  Unknown   • GERD (gastroesophageal reflux disease) [K21.9]  Unknown   • Hypothyroidism [E03.9]  Unknown   • Hyperlipidemia [E78.5]  Unknown   • Hypophosphatemia [E83.39]  Unknown   • Generalized abdominal pain [R10.84]  Yes   • Hypertension [I10]  Yes   • Atrial fibrillation (HCC) [I48.91]  Yes   • Schizophrenia (HCC) [F20.9]  Yes      Resolved Hospital Problems   No resolved problems to display.       PLAN  This is a 67-year-old gentleman with history of A. fib schizophrenia hypertension hyperlipidemia hypothyroidism presented to the hospital with abdominal pain nausea and vomiting was found to have a small bowel obstruction that required exploratory laparotomy and lysis of adhesions.  Postoperatively he had postoperative ileus and pancreatitis    -Given persistent ileus he had repeat CT abdomen/pelvis which revealed the development of pancreatitis  -Diet advanced to regular per surgery, continue pain medication-patient has been eating % of his meals, TPN stopped 2/17  -Atrial fibrillation with cardiology following, on metop, dilt, and digoxin; metoprolol dose was increased with improvement in rates; cardiology to sign off  Oral thrush- start nystatin swish and spit    -Full code  -Lovenox for DVT prophylaxis; cardiology leaning towards no long-term anticoagulation given his comorbidities  -Worked  with physical therapy, walking in hallways    Disposition  To be determined-likely DC back to his facility tomorrow if he continues to tolerate a diet/eats well and OK with consultants    Roz Chacko MD  [facility]  02/18/22  12:54 EST

## 2022-02-19 VITALS
WEIGHT: 215 LBS | DIASTOLIC BLOOD PRESSURE: 99 MMHG | HEIGHT: 74 IN | BODY MASS INDEX: 27.59 KG/M2 | SYSTOLIC BLOOD PRESSURE: 110 MMHG | RESPIRATION RATE: 18 BRPM | OXYGEN SATURATION: 97 % | TEMPERATURE: 98.2 F | HEART RATE: 111 BPM

## 2022-02-19 LAB
ALBUMIN SERPL-MCNC: 2.5 G/DL (ref 3.5–5.2)
ALBUMIN/GLOB SERPL: 0.8 G/DL
ALP SERPL-CCNC: 53 U/L (ref 39–117)
ALT SERPL W P-5'-P-CCNC: 44 U/L (ref 1–41)
ANION GAP SERPL CALCULATED.3IONS-SCNC: 7.3 MMOL/L (ref 5–15)
AST SERPL-CCNC: 28 U/L (ref 1–40)
BASOPHILS # BLD AUTO: 0.02 10*3/MM3 (ref 0–0.2)
BASOPHILS NFR BLD AUTO: 0.2 % (ref 0–1.5)
BILIRUB SERPL-MCNC: 0.3 MG/DL (ref 0–1.2)
BUN SERPL-MCNC: 13 MG/DL (ref 8–23)
BUN/CREAT SERPL: 20 (ref 7–25)
CALCIUM SPEC-SCNC: 8.2 MG/DL (ref 8.6–10.5)
CHLORIDE SERPL-SCNC: 99 MMOL/L (ref 98–107)
CO2 SERPL-SCNC: 26.7 MMOL/L (ref 22–29)
CREAT SERPL-MCNC: 0.65 MG/DL (ref 0.76–1.27)
DEPRECATED RDW RBC AUTO: 43.9 FL (ref 37–54)
EOSINOPHIL # BLD AUTO: 0.03 10*3/MM3 (ref 0–0.4)
EOSINOPHIL NFR BLD AUTO: 0.3 % (ref 0.3–6.2)
ERYTHROCYTE [DISTWIDTH] IN BLOOD BY AUTOMATED COUNT: 13.1 % (ref 12.3–15.4)
GFR SERPL CREATININE-BSD FRML MDRD: 123 ML/MIN/1.73
GLOBULIN UR ELPH-MCNC: 3.2 GM/DL
GLUCOSE BLDC GLUCOMTR-MCNC: 116 MG/DL (ref 70–130)
GLUCOSE BLDC GLUCOMTR-MCNC: 99 MG/DL (ref 70–130)
GLUCOSE SERPL-MCNC: 94 MG/DL (ref 65–99)
HCT VFR BLD AUTO: 23.1 % (ref 37.5–51)
HCT VFR BLD AUTO: 23.6 % (ref 37.5–51)
HGB BLD-MCNC: 7.7 G/DL (ref 13–17.7)
HGB BLD-MCNC: 7.7 G/DL (ref 13–17.7)
IMM GRANULOCYTES # BLD AUTO: 0.25 10*3/MM3 (ref 0–0.05)
IMM GRANULOCYTES NFR BLD AUTO: 2.6 % (ref 0–0.5)
LYMPHOCYTES # BLD AUTO: 1.05 10*3/MM3 (ref 0.7–3.1)
LYMPHOCYTES NFR BLD AUTO: 10.7 % (ref 19.6–45.3)
MAGNESIUM SERPL-MCNC: 2 MG/DL (ref 1.6–2.4)
MCH RBC QN AUTO: 30.6 PG (ref 26.6–33)
MCHC RBC AUTO-ENTMCNC: 32.6 G/DL (ref 31.5–35.7)
MCV RBC AUTO: 93.7 FL (ref 79–97)
MONOCYTES # BLD AUTO: 0.9 10*3/MM3 (ref 0.1–0.9)
MONOCYTES NFR BLD AUTO: 9.2 % (ref 5–12)
NEUTROPHILS NFR BLD AUTO: 7.54 10*3/MM3 (ref 1.7–7)
NEUTROPHILS NFR BLD AUTO: 77 % (ref 42.7–76)
NRBC BLD AUTO-RTO: 0.5 /100 WBC (ref 0–0.2)
PHOSPHATE SERPL-MCNC: 3.1 MG/DL (ref 2.5–4.5)
PLATELET # BLD AUTO: 383 10*3/MM3 (ref 140–450)
PMV BLD AUTO: 10.6 FL (ref 6–12)
POTASSIUM SERPL-SCNC: 4 MMOL/L (ref 3.5–5.2)
PROT SERPL-MCNC: 5.7 G/DL (ref 6–8.5)
RBC # BLD AUTO: 2.52 10*6/MM3 (ref 4.14–5.8)
SODIUM SERPL-SCNC: 133 MMOL/L (ref 136–145)
WBC NRBC COR # BLD: 9.79 10*3/MM3 (ref 3.4–10.8)

## 2022-02-19 PROCEDURE — 99024 POSTOP FOLLOW-UP VISIT: CPT | Performed by: SURGERY

## 2022-02-19 PROCEDURE — 83735 ASSAY OF MAGNESIUM: CPT | Performed by: STUDENT IN AN ORGANIZED HEALTH CARE EDUCATION/TRAINING PROGRAM

## 2022-02-19 PROCEDURE — 80053 COMPREHEN METABOLIC PANEL: CPT | Performed by: STUDENT IN AN ORGANIZED HEALTH CARE EDUCATION/TRAINING PROGRAM

## 2022-02-19 PROCEDURE — 84100 ASSAY OF PHOSPHORUS: CPT | Performed by: STUDENT IN AN ORGANIZED HEALTH CARE EDUCATION/TRAINING PROGRAM

## 2022-02-19 PROCEDURE — 85025 COMPLETE CBC W/AUTO DIFF WBC: CPT | Performed by: STUDENT IN AN ORGANIZED HEALTH CARE EDUCATION/TRAINING PROGRAM

## 2022-02-19 PROCEDURE — 94761 N-INVAS EAR/PLS OXIMETRY MLT: CPT

## 2022-02-19 PROCEDURE — 94799 UNLISTED PULMONARY SVC/PX: CPT

## 2022-02-19 PROCEDURE — 85014 HEMATOCRIT: CPT | Performed by: SURGERY

## 2022-02-19 PROCEDURE — 82962 GLUCOSE BLOOD TEST: CPT

## 2022-02-19 PROCEDURE — 85018 HEMOGLOBIN: CPT | Performed by: SURGERY

## 2022-02-19 RX ORDER — DILTIAZEM HYDROCHLORIDE 180 MG/1
360 CAPSULE, COATED, EXTENDED RELEASE ORAL
Qty: 60 CAPSULE | Refills: 0 | Status: SHIPPED | OUTPATIENT
Start: 2022-02-19

## 2022-02-19 RX ORDER — HYDROCODONE BITARTRATE AND ACETAMINOPHEN 5; 325 MG/1; MG/1
1 TABLET ORAL EVERY 6 HOURS PRN
Qty: 12 TABLET | Refills: 0 | Status: SHIPPED | OUTPATIENT
Start: 2022-02-19 | End: 2022-02-22

## 2022-02-19 RX ORDER — LANOLIN ALCOHOL/MO/W.PET/CERES
3 CREAM (GRAM) TOPICAL NIGHTLY PRN
Qty: 30 TABLET | Refills: 0 | Status: SHIPPED | OUTPATIENT
Start: 2022-02-19

## 2022-02-19 RX ORDER — POLYETHYLENE GLYCOL 3350 17 G/17G
17 POWDER, FOR SOLUTION ORAL DAILY
Qty: 510 G | Refills: 0 | Status: SHIPPED | OUTPATIENT
Start: 2022-02-19

## 2022-02-19 RX ORDER — METOPROLOL TARTRATE 50 MG/1
50 TABLET, FILM COATED ORAL EVERY 12 HOURS SCHEDULED
Qty: 60 TABLET | Refills: 0 | Status: SHIPPED | OUTPATIENT
Start: 2022-02-19

## 2022-02-19 RX ADMIN — ASPIRIN 81 MG: 81 TABLET, COATED ORAL at 09:59

## 2022-02-19 RX ADMIN — NYSTATIN 500000 UNITS: 100000 SUSPENSION ORAL at 09:58

## 2022-02-19 RX ADMIN — METOPROLOL TARTRATE 50 MG: 50 TABLET, FILM COATED ORAL at 09:59

## 2022-02-19 RX ADMIN — FAMOTIDINE 20 MG: 20 TABLET, FILM COATED ORAL at 06:35

## 2022-02-19 RX ADMIN — IPRATROPIUM BROMIDE AND ALBUTEROL SULFATE 3 ML: 2.5; .5 SOLUTION RESPIRATORY (INHALATION) at 07:45

## 2022-02-19 RX ADMIN — DIGOXIN 250 MCG: 250 TABLET ORAL at 12:18

## 2022-02-19 RX ADMIN — POLYETHYLENE GLYCOL 3350 17 G: 17 POWDER, FOR SOLUTION ORAL at 09:59

## 2022-02-19 RX ADMIN — HYDROCODONE BITARTRATE AND ACETAMINOPHEN 1 TABLET: 5; 325 TABLET ORAL at 10:08

## 2022-02-19 RX ADMIN — DILTIAZEM HYDROCHLORIDE 360 MG: 180 CAPSULE, COATED, EXTENDED RELEASE ORAL at 09:59

## 2022-02-19 RX ADMIN — NYSTATIN 500000 UNITS: 100000 SUSPENSION ORAL at 12:18

## 2022-02-19 RX ADMIN — IPRATROPIUM BROMIDE AND ALBUTEROL SULFATE 3 ML: 2.5; .5 SOLUTION RESPIRATORY (INHALATION) at 12:26

## 2022-02-19 NOTE — PLAN OF CARE
Goal Outcome Evaluation:         Pt has still been incontinent of bowel but able to use urinal to void.  Emerald urine, turns self.  No c/o pain this shift.  Abdomen still distended and hyperactive, tinkling bowel sounds.  VSS, will CTM.        Problem: Adult Inpatient Plan of Care  Goal: Plan of Care Review  Outcome: Ongoing, Progressing     Problem: Skin Injury Risk Increased  Goal: Skin Health and Integrity  Outcome: Ongoing, Progressing  Intervention: Optimize Skin Protection  Description: Reassess skin injury risk and inspect skin frequently (e.g., scheduled interval, with turning, with change in condition) to provide optimal prevention.  Maintain adequate tissue perfusion (e.g., encourage fluid balance, avoid crossing legs, constrictive clothing or devices) to promote tissue oxygenation.  Maintain head of bed at lowest degree of elevation tolerated, considering medical condition and other restrictions. Limit amount of time head of bed is elevated greater than 30 degrees to prevent friction/shear injury.  Avoid positioning onto an area that remains reddened.  Minimize incontinence and moisture (e.g., toileting schedule; moisture-wicking pad, diaper or incontinence collection device, skin moisture barrier).  Cleanse skin promptly and gently when soiled utilizing a pH-balanced cleanser.  Relieve and redistribute pressure (e.g., schedule position changes; utilize higher specification foam mattress, chair cushion, constant low-pressure or alternating-pressure support surface; medical device repositioning; protective dressing applicatio  Support increased progressive functional activity (e.g., therapeutic exercise) to decrease risk associated with immobility. Balance rest with activity.  Recent Flowsheet Documentation  Taken 2/19/2022 0000 by Rocio Lewis, RN  Pressure Reduction Techniques: frequent weight shift encouraged  Head of Bed (HOB): HOB lowered  Pressure Reduction Devices:   alternating pressure pump  (ADD)   pressure-redistributing mattress utilized   positioning supports utilized  Skin Protection:   adhesive use limited   incontinence pads utilized   tubing/devices free from skin contact   transparent dressing maintained  Taken 2/18/2022 2000 by Rocio Lewis RN  Pressure Reduction Techniques: frequent weight shift encouraged  Head of Bed (HOB): HOB lowered  Pressure Reduction Devices:   alternating pressure pump (ADD)   positioning supports utilized   pressure-redistributing mattress utilized  Skin Protection:   adhesive use limited   incontinence pads utilized   transparent dressing maintained  Intervention: Promote and Optimize Oral Intake  Description: Assess need and provide assistance with meal set-up and feeding.  Adjust diet or meal schedule based on preferences and tolerance.  Minimize unnecessary dietary restrictions to increase oral intake.  Offer oral supplemental foods or drinks to enhance calorie and protein intake.  Establish bowel elimination program to increase comfort and appetite.  Provide and encourage oral hygiene to enhance desire to eat.  Consider nutrition support if oral intake remains inadequate.  Recent Flowsheet Documentation  Taken 2/19/2022 0000 by Rocio Lewis, RN  Oral Nutrition Promotion:   rest periods promoted   safe use of adaptive equipment encouraged  Taken 2/18/2022 2000 by Rocio Lewis, RN  Oral Nutrition Promotion:   rest periods promoted   safe use of adaptive equipment encouraged

## 2022-02-19 NOTE — CASE MANAGEMENT/SOCIAL WORK
Case Management Discharge Note      Final Note: Pt discharged home to [facility]- personal care.   DENISE Robins RN         Selected Continued Care - Discharged on 2/19/2022 Admission date: 2/3/2022 - Discharge disposition: Long Term Care (DC - External)    Destination    No services have been selected for the patient.              Durable Medical Equipment    No services have been selected for the patient.              Dialysis/Infusion    No services have been selected for the patient.              Home Medical Care    No services have been selected for the patient.              Therapy    No services have been selected for the patient.              Community Resources    No services have been selected for the patient.              Community & DME    No services have been selected for the patient.                  Transportation Services  Private: Car    Final Discharge Disposition Code: 01 - home or self-care

## 2022-02-19 NOTE — NURSING NOTE
Patient with discharge orders. All paperwork completed and ready. Patient has Meds to beds. Nurse has attempted to call several times- no answer. Nurse will try again later. [facility] will be called to transport patient when medications arrive to the unit.   Report called to [facility]- nurse spoke with Diana Shields.

## 2022-02-19 NOTE — DISCHARGE SUMMARY
Patient Name: Carlos Kraus  : 1955  MRN: [id number]    Date of Admission: 2/3/2022  Date of Discharge:  2022  Primary Care Physician: Provider, No Known      Chief Complaint:   Abdominal Pain      Discharge Diagnoses     Active Hospital Problems    Diagnosis  POA   • **SBO (small bowel obstruction) (HCC) [K56.609]  Unknown   • Acute pancreatitis [K85.90]  Unknown   • GERD (gastroesophageal reflux disease) [K21.9]  Unknown   • Hypothyroidism [E03.9]  Unknown   • Hyperlipidemia [E78.5]  Unknown   • Hypophosphatemia [E83.39]  Unknown   • Generalized abdominal pain [R10.84]  Yes   • Hypertension [I10]  Yes   • Atrial fibrillation (HCC) [I48.91]  Yes   • Schizophrenia (HCC) [F20.9]  Yes      Resolved Hospital Problems   No resolved problems to display.        Hospital Course     Mr. Kraus is a 67 y.o. male with a history of fibrillation, schizophrenia, hypertension, hypothyroidism who presented to [facility] initially complaining of abdominal pain with nausea and vomiting.  Please see the admitting history and physical for further details.  He was found to have small bowel obstruction and was admitted to the hospital for further evaluation and treatment.  General surgery consulted on admission, he underwent exploratory laparotomy with lysis of adhesions on .  Postoperatively the patient developed an ileus and pancreatitis, he was treated with IV fluids and bowel rest.  Due to the patient's prolonged n.p.o. status he was started on TPN.  Ileus and pancreatitis resolved, the patient's diet was advanced and he is eating well prior to discharge.  Cardiology was also consulted during admission for the patient's history of atrial fibrillation, and rate control medications were adjusted.  The patient worked well with physical therapy and is stable for discharge back to his facility.    Day of Discharge     Subjective:  Patient is resting in bed, he is comfortable and has no acute complaints.   Per RN no acute events overnight.  He has been tolerating a diet well.    Review of Systems   Constitutional: Negative for fatigue and fever.   HENT: Negative for sinus pressure and sneezing.    Eyes: Negative for pain and redness.   Respiratory: Negative for cough and shortness of breath.    Cardiovascular: Negative for chest pain and leg swelling.   Gastrointestinal: Negative for abdominal pain, nausea and vomiting.   Skin: Negative for rash and wound.   Neurological: Negative for seizures and headaches.       Physical Exam:  Temp:  [97.9 °F (36.6 °C)-98.5 °F (36.9 °C)] 97.9 °F (36.6 °C)  Heart Rate:  [] 91  Resp:  [16-20] 16  BP: (105-115)/(48-83) 105/83  Body mass index is 27.59 kg/m².  Physical Exam  Vitals and nursing note reviewed.   Constitutional:       Appearance: Normal appearance.   HENT:      Head: Normocephalic and atraumatic.      Mouth/Throat:      Mouth: Mucous membranes are moist.      Pharynx: Oropharyngeal exudate present.      Comments: Oral thrush  Eyes:      Extraocular Movements: Extraocular movements intact.      Conjunctiva/sclera: Conjunctivae normal.      Pupils: Pupils are equal, round, and reactive to light.   Cardiovascular:      Rate and Rhythm: Normal rate and regular rhythm.   Pulmonary:      Effort: Pulmonary effort is normal. No respiratory distress.      Breath sounds: Normal breath sounds.   Abdominal:      General: Abdomen is flat. There is no distension.      Palpations: Abdomen is soft.      Tenderness: There is no abdominal tenderness.   Musculoskeletal:      Right lower leg: No edema.      Left lower leg: No edema.   Skin:     General: Skin is warm and dry.   Neurological:      General: No focal deficit present.      Mental Status: He is alert and oriented to person, place, and time.         Consultants     Consult Orders (all) (From admission, onward)     Start     Ordered    02/09/22 1115  IV TEAM - Consult for PICC Line (IV Team to Determine Number of Lumens)  Once         Provider:  (Not yet assigned)    02/09/22 1115    02/09/22 1115  Inpatient Consult to Nutrition Services  Once        Provider:  (Not yet assigned)    02/09/22 1115    02/03/22 2001  Inpatient Cardiology Consult  Once        Specialty:  Cardiology  Provider:  Lety Boateng MD    02/03/22 2000 02/03/22 1506  Pulmonology (on-call MD unless specified)  Once        Specialty:  Pulmonary Disease  Provider:  David Perkins MD    02/03/22 1505    02/03/22 1352  [facility] (on-call MD unless specified) Details  Once        Specialty:  Hospitalist  Provider:  Yesenia Bauer MD    02/03/22 1351    02/03/22 1352  Surgery (on-call MD unless specified)  Once        Specialty:  General Surgery  Provider:  Edyta Agosto MD    02/03/22 1351              Procedures     Imaging Results (All)     Procedure Component Value Units Date/Time    XR Abdomen KUB [[id number]] Collected: 02/13/22 2107     Updated: 02/13/22 2112    Narrative:      ABDOMEN KUB     CLINICAL HISTORY: Acute pancreatitis. Abdominal distention and pain.     Compared to the previous CT scan dated 02/12/2022.     There is moderate gaseous distention of the stomach and also multiple  segments of small bowel throughout the abdomen and pelvis. The degree of  gaseous distention of small bowel has increased somewhat since the CT  scan. This is likely due to worsening adynamic ileus because of the  pancreatitis. An element of small bowel obstruction cannot be excluded.     This report was finalized on 2/13/2022 9:09 PM by Dr. Babatunde Self M.D.       XR Chest Post CVA Port [[id number]] Collected: 02/12/22 2053     Updated: 02/12/22 2059    Narrative:      Portable chest radiograph     HISTORY:PICC line placement     TECHNIQUE: Single AP portable radiograph of the chest     COMPARISON:Chest radiograph 02/06/2022       Impression:      FINDINGS AND IMPRESSION:  Right PICC line terminates over the superior vena cava.     Lungs are hypoinflated.  Previously seen opacification within the right  base has improved. No new pulmonary consolidation is seen. No  pneumothorax or pleural effusion is visualized. Cardiac silhouette is  accentuated by low lung volumes.     This report was finalized on 2/12/2022 8:56 PM by Dr. Timo Dobbins M.D.       CT Abdomen Pelvis With Contrast [[id number]] Collected: 02/12/22 1432     Updated: 02/12/22 1445    Narrative:      CT ABDOMEN PELVIS W CONTRAST-     INDICATIONS: Abdominal pain, acute pancreatitis     TECHNIQUE: Radiation dose reduction techniques were utilized, including  automated exposure control and exposure modulation based on body size.  And enhanced ABDOMEN AND PELVIS CT     COMPARISON: 02/03/2022     FINDINGS:     Conspicuous interval worsening of inflammatory changes and fluid around  the pancreas is noted. No definite pancreatic necrosis is identified.     Anterior to the right kidney, slight marginal enhancement that  accumulated fluid is apparent, such as axial image 66, possibility of  developing abscess is not excluded, although no gas bubbles are noted,  follow-up recommended.     The spleen is enlarged, 14.5 cm. The liver is also enlarged. The  gallbladder is surgically absent.                 Otherwise unremarkable appearance of the liver, spleen, adrenal glands,  pancreas, kidneys, bladder. The prostate is enlarged, 5.9 cm, recommend  PSA correlation as indicated.           Proximal to mid small bowel loops show mildly prominent caliber, with  gradual tapering to normal caliber of distal small bowel without a  discrete transition point identified. This appearance may reflect  regional ileus, possibility of early or partial small bowel obstruction  is not excluded, follow-up recommended.     No free intraperitoneal gas or free fluid.     Scattered small mesenteric and para-aortic lymph nodes are seen that are  not significant by size criteria.     Abdominal aorta is not aneurysmal. Aortic and other  arterial  calcifications are present.     The lung bases show mildly decreased infiltrate at the right lower lobe.  Minimal right pleural effusion     Degenerative changes are seen in the spine. No acute fracture is  identified.             Impression:            1. Marked interval worsening of inflammatory changes and fluid around  the pancreas. Slight marginal enhancement apparent at fluid accumulation  anterior to the right kidney, possibility of abscess is not excluded.  2. Proximal to mid small bowel loops show mildly prominent caliber, with  gradual tapering to normal caliber of distal small bowel without a  discrete transition point identified. This appearance may reflect  regional ileus, possibility of early or partial small bowel obstruction  is not excluded.     Continued follow-up recommended.     This report was finalized on 2/12/2022 2:42 PM by Dr. Russell Ag M.D.       XR Abdomen KUB [[id number]] Collected: 02/11/22 1029     Updated: 02/11/22 1111    Narrative:      KUB     HISTORY: Ileus.     COMPARISON: 02/10/2022.     Supine views of the abdomen demonstrates a nasogastric tube in place. It  has been advanced slightly. The tip is in the stomach. The degree of  gaseous distention has decreased slightly. There is persistent gaseous  distention of the colon. Air is present within loops of small bowel  similar to slightly less prominent as compared to the prior examination.  No obvious free air is identified on this supine examination. Further  evaluation could be performed with a CT examination of the abdomen and  pelvis as indicated.     The above information was called to the patient's nurse at time of  dictation.     This report was finalized on 2/11/2022 11:08 AM by Dr. Clemente Mccann M.D.       XR Abdomen KUB [[id number]] Collected: 02/10/22 0814     Updated: 02/10/22 0821    Narrative:      XR ABDOMEN KUB-  02/10/2022     HISTORY: Follow-up abnormal bowel gas pattern.     2 images are  submitted. There are multiple segments of moderately  dilated small bowel. The overall severity of the small bowel dilatation  appears slightly improved from yesterday's study.     NG tube courses into the stomach with its tip just distal to the GE  junction. There is mild to moderate gaseous distention of the mid to  distal stomach which appears slightly more severe than on yesterday's  study. There are some air within the colon.     No free air is seen on these supine images.       Impression:      1. Slight interval improvement in small bowel dilatation since  yesterday's study.  2. Moderate gaseous distention of the stomach.  3. The NG tube tip is seen just distal to the GE junction.     This report was finalized on 2/10/2022 8:18 AM by Dr. Gucci Hines M.D.       XR Abdomen KUB [[id number]] Collected: 02/09/22 0824     Updated: 02/09/22 0830    Narrative:      PROCEDURE:  XR ABDOMEN KUB-     HISTORY: Ileus, follow-up.     COMPARISON: Abdominal radiograph 02/07/2022     FINDINGS:    2 views of the abdomen/pelvis were obtained. There is a new enteric tube  with the tip projecting over the gastric cardia. The sidehole projects  over the site of location of the distal esophagus. Advancement is  recommended.   There are surgical clips projecting over the right upper quadrant.  Previously noted gaseous distention of the stomach has significantly  improved. There are dilated air-filled loops of small bowel measuring up  to 5.5 cm in the left hemiabdomen, which are similar to slightly  improved from 02/07/2022 and are concerning for small bowel obstruction  versus ileus. Findings would be better assessed with CT, if clinically  indicated.   There are surgical staples projecting over the midline lower abdomen and  upper pelvis. There is multilevel degenerative disc disease.     This report was finalized on 2/9/2022 8:27 AM by Dr. Sonali Samuel M.D.       XR Abdomen KUB [[id number]] Collected: 02/07/22 1052      Updated: 02/07/22 1104    Narrative:      XR ABDOMEN KUB-  02/07/2022     HISTORY: Abdominal pain. Possible bowel obstruction.     There is moderately severe gaseous distention of the stomach. There are  multiple segments of dilated small bowel as well measuring up to 5.1 cm  to 5.6 and meter in diameter. There appears be a small amount of air in  the right colon.     No free air is seen.     The NG tube has been removed since the 02/04/2022 study.       Impression:      1. There is moderately severe gaseous distention of the stomach.  2. Subtle segments of moderately severely dilated small bowel. Small  bowel dilatation appears similar or slightly worse than on the  02/04/2022 study and findings are consistent with a partial small bowel  obstruction.  3. The NG tube has been removed since 02/04/2022.     This report was finalized on 2/7/2022 11:01 AM by Dr. Gucci Hines M.D.       XR Chest 1 View [[id number]] Collected: 02/06/22 0936     Updated: 02/06/22 0940    Narrative:      PORTABLE CHEST 02/06/2022 AT 0910 HOURS     CLINICAL HISTORY: Hypoxia. Wheezing. Dyspnea.     Compared to the previous chest dated 02/04/2022.     The lungs are somewhat poorly inflated. There is mild elevation of the  right hemidiaphragm that appears essentially unchanged. Focal  atelectasis of the right lung base appears somewhat improved. No acute  focal infiltrates are identified. There are no pleural effusions. The  heart is enlarged and unchanged.     IMPRESSIONS: Poor lung expansion. No evidence of acute disease within  the chest.     This report was finalized on 2/6/2022 9:37 AM by Dr. Babatunde Self M.D.       XR Chest 1 View [[id number]] Collected: 02/04/22 1551     Updated: 02/04/22 1555    Narrative:      PORTABLE CHEST 02/04/2022 AT 3:36 PM     CLINICAL HISTORY: Preop chest x-ray     Compared to the previous chest dated 02/03/2022.     The lungs are somewhat poorly inflated. There is mild atelectasis at the  right lung  base. No definite acute focal infiltrates are identified.  There are no pleural effusions. The heart is moderately enlarged and  unchanged. The pulmonary vasculature is limited.     IMPRESSIONS: Poor lung expansion. No evidence of acute disease within  the chest.     This report was finalized on 2/4/2022 3:52 PM by Dr. Babatunde Self M.D.       XR Abdomen KUB [[id number]] Collected: 02/04/22 1019     Updated: 02/04/22 1023    Narrative:      KUB     HISTORY: Abdominal distention follow-up. Small bowel obstruction.     TECHNIQUE: Two x-rays of the abdomen and pelvis are provided. These are  correlated with other recent abdominal imaging.     FINDINGS: There is a nasogastric tube with its proximal port near the  level of the gastroesophageal junction. Mild atelectasis is observed in  the lung bases.     Gaseous dilatation of small bowel loops to almost 6 cm is observed. No  intraperitoneal free air is identified.       Impression:      Persistent small bowel obstruction.     This report was finalized on 2/4/2022 10:20 AM by Dr. Christopher Muñoz M.D.       CT Abdomen Pelvis With Contrast [[id number]] Collected: 02/03/22 1410     Updated: 02/04/22 0956    Narrative:      CT ABDOMEN AND PELVIS WITH CONTRAST     HISTORY: Abdominal pain with elevated lipase.     TECHNIQUE: Axial CT images of the abdomen and pelvis were obtained  following administration of intravenous contrast. The patient was not  given oral contrast Coronal and sagittal reformats were obtained.     COMPARISON: None.     FINDINGS: There is marked distention of the stomach with an air-fluid  level. There is distention of small bowel loop air-fluid levels  measuring up to 5.5 cm. These demonstrate a somewhat abrupt transition  within the distal ileal loops just proximal to the terminal ileum within  the right lower quadrant. This is best demonstrated on image 131/132  axial. The terminal ileum is completely collapsed. The colon is  completely collapsed. The  dilated duodenum demonstrates peripheral foci  of air favored to represent trapped air and pseudopneumatosis.     The liver demonstrates normal attenuation. There is mild intrahepatic  and extrahepatic biliary dilatation status post cholecystectomy. Diffuse  peripancreatic stranding and fluid are present and most suggestive of  pancreatitis. The splenoportal confluence is patent. The spleen is  normal. Bilateral adrenal glands and kidneys are normal. Thickening of  Gerota's fascia and fluid are seen within bilateral paracolic gutters.  The prostate gland is mildly enlarged. Bilateral adrenal glands and  kidneys are normal. No pathological retroperitoneal lymphadenopathy.  Moderate calcified atherosclerotic plaque is seen within the abdominal  aorta and its branches.     There are bilateral small layering pleural effusions with subsegmental  lower lobe atelectasis. Small pericardial effusion is present       Impression:      1. Complete distal small bowel obstruction. This is favored to be  secondary to adhesive disease. There is massive distention of the  stomach and duodenum and insertion of NG tube is recommended.  2. CT findings suggesting pancreatitis.     These findings were discussed with Dr. Marr by telephone at the time  of dictation.     Radiation dose reduction techniques were utilized, including automated  exposure control and exposure modulation based on body size.     This report was finalized on 2/4/2022 9:52 AM by Dr. Mica Ratliff M.D.       CT Soft Tissue Neck With Contrast [[id number]] Collected: 02/03/22 1520     Updated: 02/04/22 0824    Narrative:      CT NECK WITH CONTRAST     HISTORY: Stridor, status post nasogastric tube insertion attempt.     COMPARISON: None.     TECHNIQUE: A CT examination of the neck was performed after the  intravenous administration of contrast. The study was hampered  significantly by patient motion. The patient was scanned twice and there  was patient motion on  both scans.     FINDINGS:  There is narrowing of the distal trachea on both scans. This  is likely related to breathing. Clinical correlation is recommended.  When able, further evaluation could be performed with a dedicated CT  examination of the chest.     The parotid, submandibular and thyroid glands appear unremarkable. There  is no evidence of pneumomediastinum or pathologic adenopathy.       Impression:      1.  This study is degraded by patient motion. There is no evidence of  pneumomediastinum or of obvious esophageal injury. A mucosal injury or  esophageal injury cannot be excluded.  2.  Narrowing of the distal trachea is appreciated which likely is  related to respiration. Clinical correlation is recommended. When able,  further evaluation could be performed with a dedicated CT examination of  the chest with a full breath-hold as indicated.     The above information was called to and discussed with Dr. Marr.      Radiation dose reduction techniques were utilized, including automated  exposure control and exposure modulation based on body size.     This report was finalized on 2/4/2022 8:21 AM by Dr. Clemente Mccann M.D.       XR Abdomen KUB [[id number]] Collected: 02/03/22 1608     Updated: 02/03/22 1612    Narrative:      XR ABDOMEN KUB-     Clinical: Nasogastric tube placement     FINDINGS: Nasogastric tube tip superimposes the proximal gastric body.  This position is satisfactory. For optimal positioning in the mid to  distal stomach it can be advanced 6 to 8 cm. Gastric and bowel  distention again demonstrated.     This report was finalized on 2/3/2022 4:09 PM by Dr. Cuco Geiger M.D.       XR Chest 1 View [[id number]] Collected: 02/03/22 1530     Updated: 02/03/22 1539    Narrative:      XR CHEST 1 VW-     Clinical: Shortness of breath, possible aspiration     COMPARISON 8/14/2019     FINDINGS: There is a very shallow inspiratory effort with bibasilar  atelectasis/infiltrates. There is cardiac  enlargement, no pulmonary  edema. No gross pleural effusion is demonstrated. Along the lateral mid  lung zone, there is again a curvilinear density again seen which could  represent pleural thickening, small loculated pleural effusion,  peripheral parenchymal scarring and/or focal persistent consolidation.  This appears slightly less conspicuous compared to the previous  examination. No mediastinal abnormality is demonstrated.     Partially within the field-of-view is a moderately distended segment of  hepatic flexure and considerable amount of gas demonstrated within the  gastric lumen. There are monitoring leads superimposing the chest.     CONCLUSION:  1. Very low inspiratory effort with bibasilar infiltrate/atelectasis.  2. Chronic pleural-parenchymal change along the lateral left mid lung  zone as described above.  3. Cardiomegaly.  4. Moderately distended segment of hepatic flexure and gaseous  distention of the stomach.     This report was finalized on 2/3/2022 3:36 PM by Dr. Cuco Geiger M.D.             Pertinent Labs     Results from last 7 days   Lab Units 02/19/22 0456 02/18/22  0532 02/17/22  0549 02/16/22  0615   WBC 10*3/mm3 9.79 10.44 10.17 10.23   HEMOGLOBIN g/dL 7.7* 8.7* 8.6* 8.7*   PLATELETS 10*3/mm3 383 344 306 261     Results from last 7 days   Lab Units 02/19/22 0456 02/18/22  0532 02/17/22  0549 02/16/22 2048 02/16/22  0615 02/16/22  0615   SODIUM mmol/L 133* 133* 135*  --   --  134*   POTASSIUM mmol/L 4.0 3.9 4.1 3.9   < > 3.5   CHLORIDE mmol/L 99 97* 102  --   --  100   CO2 mmol/L 26.7 26.4 28.2  --   --  29.8*   BUN mg/dL 13 9 10  --   --  12   CREATININE mg/dL 0.65* 0.71* 0.74*  --   --  0.62*   GLUCOSE mg/dL 94 96 102*  --   --  123*    < > = values in this interval not displayed.   Estimated Creatinine Clearance: 127 mL/min (A) (by C-G formula based on SCr of 0.65 mg/dL (L)).  Results from last 7 days   Lab Units 02/19/22 0456 02/18/22  0532 02/17/22  0549 02/16/22  0615   ALBUMIN  g/dL 2.50* 2.50* 2.50* 2.60*   BILIRUBIN mg/dL 0.3 0.4 0.3 0.3   ALK PHOS U/L 53 62 61 58   AST (SGOT) U/L 28 35 40 36   ALT (SGPT) U/L 44* 55* 61* 57*     Results from last 7 days   Lab Units 02/19/22  0456 02/18/22  0532 02/17/22  0549 02/16/22  0615   CALCIUM mg/dL 8.2* 8.3* 8.2* 8.0*   ALBUMIN g/dL 2.50* 2.50* 2.50* 2.60*   MAGNESIUM mg/dL 2.0 1.9 1.9 1.9   PHOSPHORUS mg/dL 3.1 3.4 2.7 2.9           Results from last 7 days   Lab Units 02/15/22  0613   TRIGLYCERIDES mg/dL 140           Test Results Pending at Discharge       Discharge Details        Discharge Medications      New Medications      Instructions Start Date   dilTIAZem  MG 24 hr capsule  Commonly known as: CARDIZEM CD   360 mg, Oral, Every 24 Hours Scheduled      HYDROcodone-acetaminophen 5-325 MG per tablet  Commonly known as: NORCO   1 tablet, Oral, Every 6 Hours PRN      melatonin 3 MG tablet   3 mg, Oral, Nightly PRN      metoprolol tartrate 50 MG tablet  Commonly known as: LOPRESSOR   50 mg, Oral, Every 12 Hours Scheduled      nystatin 100,000 unit/mL suspension  Commonly known as: MYCOSTATIN   500,000 Units, Swish & Spit, 4 Times Daily      polyethylene glycol 17 GM/SCOOP powder  Commonly known as: MIRALAX   17 g, Oral, Daily         Continue These Medications      Instructions Start Date   acetaminophen 650 MG 8 hr tablet  Commonly known as: TYLENOL   650 mg, Oral, Every 8 Hours PRN      aspirin 81 MG EC tablet   81 mg, Oral, Daily      cetirizine 10 MG tablet  Commonly known as: zyrTEC   10 mg, Oral, Daily      digoxin 250 MCG tablet  Commonly known as: LANOXIN   250 mcg, Oral, Daily Digoxin      multivitamin with minerals tablet tablet   1 tablet, Oral, Daily      pantoprazole 40 MG EC tablet  Commonly known as: PROTONIX   40 mg, Oral, Daily      risperiDONE 3 MG tablet  Commonly known as: risperDAL   5 mg, Oral, Nightly      simvastatin 20 MG tablet  Commonly known as: ZOCOR   20 mg, Oral, Nightly             Allergies   Allergen  Reactions   • Penicillins Anaphylaxis   • Latex Hives         Discharge Disposition:  Long Term Care (DC - External)    Discharge Diet:  Diet Order   Procedures   • Diet Regular       Discharge Activity:   Activity Instructions     Activity as Tolerated            CODE STATUS:    Code Status and Medical Interventions:   Ordered at: 02/03/22 1751     Code Status (Patient has no pulse and is not breathing):    CPR (Attempt to Resuscitate)     Medical Interventions (Patient has pulse or is breathing):    Full       No future appointments.   Follow-up Information     Provider, No Known .    Contact information:  [facility] [address] [phone]                         Time Spent on Discharge:  Greater than 30 minutes      Roz Chacko MD  [facility]  02/19/22  08:05 EST

## 2022-02-19 NOTE — PLAN OF CARE
Goal Outcome Evaluation:               Patient with discharge orders. Jimmie- friend- will be transporting and taking patient home. Jimmie plans on being here shortly.

## 2022-02-19 NOTE — PROGRESS NOTES
Postoperative day 15 s/p ex lap lysis of adhesions, pancreatitis    S: No events overnight.  Tolerating diet.  No nausea no vomiting.  Having bowel function.  Appropriate abdominal pain    O:   Vitals:    02/19/22 0520 02/19/22 0700 02/19/22 0745 02/19/22 0750   BP:  105/83     BP Location:  Left arm     Patient Position:  Lying     Pulse:  98 102 91   Resp:  20 16 16   Temp:  97.9 °F (36.6 °C)     TempSrc:  Oral     SpO2:   96% 98%   Weight: 97.5 kg (215 lb)      Height:         AOx3 ,NAD  Breathing comfortable  A. fib, slightly tachycardic  Abdomen soft, appropriate tender, nondistended, midline incision healing well with staples    Hemoglobin 7.7 from 8.7, otherwise normal CBC  BMP stable, mild hyponatremia 133    Assessment and plan    Postop day 15 status post ex lap and lysis of adhesions, pancreatitis, drop in hemoglobin likely dilutional, will need to have repeat H&H before being discharged today to ensure no significant drop in hemoglobin  Otherwise, if hemoglobin stable I think he will be ready to discharge home  Continue regular diet  Remove staplers and place Steri-Strips  Follow-up with Dr. Agosto in 1 week

## 2022-02-19 NOTE — NURSING NOTE
Medication arrived to room. Nurse called Jimmie- he is in the parking lot and headed to the unit at this time. Patient appears to be resting in room comfortable. No acute distress noted at this time.

## 2022-03-09 ENCOUNTER — OFFICE VISIT (OUTPATIENT)
Dept: SURGERY | Facility: CLINIC | Age: 67
End: 2022-03-09

## 2022-03-09 DIAGNOSIS — K56.52 INTESTINAL ADHESIONS WITH COMPLETE OBSTRUCTION: Primary | ICD-10-CM

## 2022-03-09 PROCEDURE — 99024 POSTOP FOLLOW-UP VISIT: CPT | Performed by: STUDENT IN AN ORGANIZED HEALTH CARE EDUCATION/TRAINING PROGRAM

## 2022-03-13 NOTE — PROGRESS NOTES
General Surgery Post-Operative Follow Up Note     Summary:    Mr. Carlos Kraus is a 67 y.o. year old gentleman here for post-operative follow up from exploratory laparotomy and lysis of adhesions. He is doing well with no issues. No restrictions, follow up as needed.     History of Present Illness:    Mr. Carlos Kraus is a 67 y.o. year old gentleman here for post-operative follow up. He is doing well since surgery. His staples are removed. He has no pain, just a few twinges. He is eating and moving his bowels.     Procedure:    2/4/2022 Exploratory laparotomy, lysis of adhesions    Physical Exam:   • Constitutional: Well-developed well-nourished, no acute distress  • Eyes: Conjunctiva normal, sclera nonicteric  • ENMT: Hearing grossly normal, oral mucosa moist  • Respiratory: No increased work of breathing, normal inspiratory effort  • Cardiovascular: Regular rate, no peripheral edema, no jugular venous distention  • Gastrointestinal: Soft, nontender, incision well healed  • Skin:  Warm, dry, no rash on visualized skin surfaces  • Musculoskeletal: Symmetric strength, normal gait  • Psychiatric: Alert and oriented ×3, normal affect       Edyta Agosto M.D.  General and Endoscopic Surgery  [facility]    [address], [address]  [place], [place], [address]  P: [phone]  F: [phone]

## 2023-09-29 ENCOUNTER — OFFICE VISIT (OUTPATIENT)
Dept: INTERNAL MEDICINE | Facility: CLINIC | Age: 68
End: 2023-09-29
Payer: MEDICARE

## 2023-09-29 ENCOUNTER — TELEPHONE (OUTPATIENT)
Dept: INTERNAL MEDICINE | Facility: CLINIC | Age: 68
End: 2023-09-29

## 2023-09-29 VITALS
OXYGEN SATURATION: 93 % | HEART RATE: 91 BPM | BODY MASS INDEX: 27.26 KG/M2 | WEIGHT: 212.4 LBS | SYSTOLIC BLOOD PRESSURE: 148 MMHG | HEIGHT: 74 IN | DIASTOLIC BLOOD PRESSURE: 86 MMHG

## 2023-09-29 DIAGNOSIS — K21.9 GASTROESOPHAGEAL REFLUX DISEASE WITHOUT ESOPHAGITIS: Chronic | ICD-10-CM

## 2023-09-29 DIAGNOSIS — Z00.00 ENCOUNTER FOR MEDICAL EXAMINATION TO ESTABLISH CARE: Primary | ICD-10-CM

## 2023-09-29 DIAGNOSIS — R05.8 ALLERGIC COUGH: ICD-10-CM

## 2023-09-29 DIAGNOSIS — R10.84 GENERALIZED ABDOMINAL PAIN: ICD-10-CM

## 2023-09-29 DIAGNOSIS — I10 HYPERTENSION, UNSPECIFIED TYPE: Chronic | ICD-10-CM

## 2023-09-29 DIAGNOSIS — F20.9 SCHIZOPHRENIA, UNSPECIFIED TYPE: Chronic | ICD-10-CM

## 2023-09-29 DIAGNOSIS — E78.5 HYPERLIPIDEMIA, UNSPECIFIED HYPERLIPIDEMIA TYPE: Chronic | ICD-10-CM

## 2023-09-29 DIAGNOSIS — I48.91 ATRIAL FIBRILLATION, UNSPECIFIED TYPE: Chronic | ICD-10-CM

## 2023-09-29 RX ORDER — POLYETHYLENE GLYCOL 3350 17 G/17G
17 POWDER, FOR SOLUTION ORAL DAILY
Qty: 510 G | Refills: 1 | Status: SHIPPED | OUTPATIENT
Start: 2023-09-29

## 2023-09-29 RX ORDER — CETIRIZINE HYDROCHLORIDE 10 MG/1
10 TABLET ORAL DAILY
Qty: 90 TABLET | Refills: 1 | Status: SHIPPED | OUTPATIENT
Start: 2023-09-29

## 2023-09-29 RX ORDER — DIGOXIN 250 MCG
250 TABLET ORAL
Qty: 90 TABLET | Refills: 1 | Status: SHIPPED | OUTPATIENT
Start: 2023-09-29

## 2023-09-29 RX ORDER — GUAIFENESIN/DEXTROMETHORPHAN 100-10MG/5
10 SYRUP ORAL 4 TIMES DAILY PRN
Qty: 237 ML | Refills: 2 | Status: SHIPPED | OUTPATIENT
Start: 2023-09-29

## 2023-09-29 RX ORDER — ASPIRIN 81 MG/1
81 TABLET ORAL DAILY
Qty: 90 TABLET | Refills: 1 | Status: SHIPPED | OUTPATIENT
Start: 2023-09-29

## 2023-09-29 RX ORDER — PANTOPRAZOLE SODIUM 40 MG/1
40 TABLET, DELAYED RELEASE ORAL DAILY
Qty: 90 TABLET | Refills: 1 | Status: SHIPPED | OUTPATIENT
Start: 2023-09-29

## 2023-09-29 RX ORDER — LEVETIRACETAM 250 MG/1
250 TABLET ORAL 2 TIMES DAILY
Qty: 60 TABLET | Refills: 2 | Status: SHIPPED | OUTPATIENT
Start: 2023-09-29

## 2023-09-29 RX ORDER — RISPERIDONE 1 MG/1
1 TABLET ORAL
COMMUNITY
Start: 2023-09-18 | End: 2023-10-02

## 2023-09-29 RX ORDER — LEVETIRACETAM 250 MG/1
TABLET ORAL
COMMUNITY
Start: 2023-08-24 | End: 2023-09-29 | Stop reason: SDUPTHER

## 2023-09-29 RX ORDER — MULTIPLE VITAMINS W/ MINERALS TAB 9MG-400MCG
1 TAB ORAL DAILY
Qty: 90 TABLET | Refills: 1 | Status: SHIPPED | OUTPATIENT
Start: 2023-09-29

## 2023-09-29 RX ORDER — SIMVASTATIN 20 MG
20 TABLET ORAL NIGHTLY
Qty: 90 TABLET | Refills: 1 | Status: SHIPPED | OUTPATIENT
Start: 2023-09-29

## 2023-09-29 NOTE — TELEPHONE ENCOUNTER
Caller: JESUS AUSTIN    Relationship to patient: Emergency Contact    Best call back number: [phone]    JESUS STATES THAT PATIENT IS TAKING 5 MG OF THE risperiDONE (risperDAL)  NIGHTLY. ONE 2 MG TABLET AND ONE 3 MG TABLET.     PATIENT WILL BE OUT OF MEDICATION AFTER TUESDAY 10/3/2023.      [facility] [address] [facility] [place] [phone]  - [phone]  [phone]

## 2023-09-29 NOTE — PATIENT INSTRUCTIONS
Continue medications as prescribed. Get settled on a routine. Stay active. Stay hydrated with water. Notify office with correct dosage of risperidone (4 mg or 5 mg) nightly? Follow-up in 3 months for annual exam and fasting labs.

## 2023-09-29 NOTE — PROGRESS NOTES
"Chief Complaint  Establish Care    Subjective        Carlos Kraus presents to [facility]  History of Present Illness  68-year-old male presenting to establish care.  Previously seen by Dr. Smith while living in [facility] assisted living.  Has moved out on 9/27 and staying with partner.  Mental health medications are previously managed by [facility].  Needs assistance with organization of medications.  Ambulates well.  Swallows well.  Can perform majority of activities of daily living independently.  Has no issues with sleeping.      Medications reviewed. Documentation from [facility] shows that risperidone dosage previously taking 5 mg nightly was reduced to 4 mg nightly about 7 days ago.  Has not had any mental health issues since being on the 4 mg.  Patient and partner unable to explain why and were unaware that dosage was decreased.    Objective   Vital Signs:  /86   Pulse 91   Ht 188 cm (74.02\")   Wt 96.3 kg (212 lb 6.4 oz)   SpO2 93%   BMI 27.26 kg/m²   Estimated body mass index is 27.26 kg/m² as calculated from the following:    Height as of this encounter: 188 cm (74.02\").    Weight as of this encounter: 96.3 kg (212 lb 6.4 oz).       BMI is >= 25 and <30. (Overweight) The following options were offered after discussion;: exercise counseling/recommendations and nutrition counseling/recommendations      Physical Exam  Vitals reviewed.   Constitutional:       Appearance: Normal appearance.   Musculoskeletal:         General: Normal range of motion.      Cervical back: Normal range of motion.   Skin:     General: Skin is warm and dry.      Capillary Refill: Capillary refill takes less than 2 seconds.   Neurological:      Mental Status: He is alert. Mental status is at baseline.   Psychiatric:         Mood and Affect: Mood normal.         Behavior: Behavior normal.         Thought Content: Thought content normal.         Judgment: Judgment normal.      Result " Review :        Current Outpatient Medications on File Prior to Visit   Medication Sig Dispense Refill    acetaminophen (TYLENOL) 650 MG 8 hr tablet Take 1 tablet by mouth Every 8 (Eight) Hours As Needed for Mild Pain.      [DISCONTINUED] risperiDONE (risperDAL) 1 MG tablet 1 tablet.      [DISCONTINUED] risperiDONE (risperDAL) 3 MG tablet Take 5 mg by mouth Every Night.       No current facility-administered medications on file prior to visit.                Assessment and Plan   Diagnoses and all orders for this visit:    1. Encounter for medical examination to establish care (Primary)    2. Hypertension, unspecified type    3. Gastroesophageal reflux disease without esophagitis  -     pantoprazole (PROTONIX) 40 MG EC tablet; Take 1 tablet by mouth Daily.  Dispense: 90 tablet; Refill: 1    4. Schizophrenia, unspecified type  -     levETIRAcetam (KEPPRA) 250 MG tablet; Take 1 tablet by mouth 2 (Two) Times a Day.  Dispense: 60 tablet; Refill: 2    5. Atrial fibrillation, unspecified type  -     aspirin 81 MG EC tablet; Take 1 tablet by mouth Daily.  Dispense: 90 tablet; Refill: 1  -     digoxin (LANOXIN) 250 MCG tablet; Take 1 tablet by mouth Daily.  Dispense: 90 tablet; Refill: 1    6. Hyperlipidemia, unspecified hyperlipidemia type  -     simvastatin (ZOCOR) 20 MG tablet; Take 1 tablet by mouth Every Night.  Dispense: 90 tablet; Refill: 1    7. Generalized abdominal pain  -     cetirizine (zyrTEC) 10 MG tablet; Take 1 tablet by mouth Daily.  Dispense: 90 tablet; Refill: 1  -     multivitamin with minerals tablet tablet; Take 1 tablet by mouth Daily.  Dispense: 90 tablet; Refill: 1  -     polyethylene glycol (MIRALAX) 17 GM/SCOOP powder; Take 17 g by mouth Daily.  Dispense: 510 g; Refill: 1    8. Allergic cough  -     guaiFENesin-dextromethorphan (ROBITUSSIN DM) 100-10 MG/5ML syrup; Take 10 mL by mouth 4 (Four) Times a Day As Needed for Cough.  Dispense: 237 mL; Refill: 2      Patient Instructions   Continue  medications as prescribed. Get settled on a routine. Stay active. Stay hydrated with water. Notify office with correct dosage of risperidone (4 mg or 5 mg) nightly? Follow-up in 3 months for annual exam and fasting labs.            Follow Up   Return in about 3 months (around 12/29/2023) for Annual physical.  Patient was given instructions and counseling regarding his condition or for health maintenance advice. Please see specific information pulled into the AVS if appropriate.

## 2023-10-02 DIAGNOSIS — F20.9 SCHIZOPHRENIA, UNSPECIFIED TYPE: Primary | ICD-10-CM

## 2023-10-02 RX ORDER — RISPERIDONE 2 MG/1
TABLET ORAL
Qty: 90 TABLET | Refills: 1 | Status: SHIPPED | OUTPATIENT
Start: 2023-10-02

## 2023-10-02 RX ORDER — RISPERIDONE 3 MG/1
TABLET ORAL
Qty: 90 TABLET | Refills: 1 | Status: SHIPPED | OUTPATIENT
Start: 2023-10-02

## 2023-12-08 DIAGNOSIS — F20.9 SCHIZOPHRENIA, UNSPECIFIED TYPE: Chronic | ICD-10-CM

## 2023-12-08 RX ORDER — LEVETIRACETAM 250 MG/1
250 TABLET ORAL 2 TIMES DAILY
Qty: 60 TABLET | Refills: 2 | Status: SHIPPED | OUTPATIENT
Start: 2023-12-08

## 2023-12-16 ENCOUNTER — APPOINTMENT (OUTPATIENT)
Dept: GENERAL RADIOLOGY | Facility: HOSPITAL | Age: 68
End: 2023-12-16
Payer: MEDICARE

## 2023-12-16 ENCOUNTER — HOSPITAL ENCOUNTER (EMERGENCY)
Facility: HOSPITAL | Age: 68
Discharge: HOME | End: 2023-12-16
Attending: EMERGENCY MEDICINE
Payer: MEDICARE

## 2023-12-16 VITALS
DIASTOLIC BLOOD PRESSURE: 90 MMHG | RESPIRATION RATE: 16 BRPM | OXYGEN SATURATION: 100 % | HEART RATE: 68 BPM | SYSTOLIC BLOOD PRESSURE: 133 MMHG | BODY MASS INDEX: 31.7 KG/M2 | HEIGHT: 74 IN | TEMPERATURE: 97 F | WEIGHT: 247 LBS

## 2023-12-16 DIAGNOSIS — B96.89 ACUTE BACTERIAL BRONCHITIS: Primary | ICD-10-CM

## 2023-12-16 DIAGNOSIS — J20.8 ACUTE BACTERIAL BRONCHITIS: Primary | ICD-10-CM

## 2023-12-16 PROCEDURE — 94640 AIRWAY INHALATION TREATMENT: CPT

## 2023-12-16 PROCEDURE — 71046 X-RAY EXAM CHEST 2 VIEWS: CPT

## 2023-12-16 PROCEDURE — 99283 EMERGENCY DEPT VISIT LOW MDM: CPT

## 2023-12-16 PROCEDURE — 0202U NFCT DS 22 TRGT SARS-COV-2: CPT | Performed by: EMERGENCY MEDICINE

## 2023-12-16 PROCEDURE — 63710000001 PREDNISONE PER 1 MG: Performed by: EMERGENCY MEDICINE

## 2023-12-16 RX ORDER — ALBUTEROL SULFATE 90 UG/1
2 AEROSOL, METERED RESPIRATORY (INHALATION) ONCE
Status: COMPLETED | OUTPATIENT
Start: 2023-12-16 | End: 2023-12-16

## 2023-12-16 RX ORDER — PREDNISONE 20 MG/1
20 TABLET ORAL ONCE
Status: COMPLETED | OUTPATIENT
Start: 2023-12-16 | End: 2023-12-16

## 2023-12-16 RX ORDER — DOXYCYCLINE 100 MG/1
100 CAPSULE ORAL 2 TIMES DAILY
Qty: 14 CAPSULE | Refills: 0 | Status: SHIPPED | OUTPATIENT
Start: 2023-12-16

## 2023-12-16 RX ORDER — DOXYCYCLINE 100 MG/1
100 CAPSULE ORAL ONCE
Status: COMPLETED | OUTPATIENT
Start: 2023-12-16 | End: 2023-12-16

## 2023-12-16 RX ORDER — PREDNISONE 20 MG/1
20 TABLET ORAL 2 TIMES DAILY
Qty: 9 TABLET | Refills: 0 | Status: SHIPPED | OUTPATIENT
Start: 2023-12-16

## 2023-12-16 RX ADMIN — ALBUTEROL SULFATE 2 PUFF: 90 AEROSOL, METERED RESPIRATORY (INHALATION) at 20:04

## 2023-12-16 RX ADMIN — PREDNISONE 20 MG: 20 TABLET ORAL at 19:50

## 2023-12-16 RX ADMIN — DOXYCYCLINE 100 MG: 100 CAPSULE ORAL at 19:50

## 2023-12-16 NOTE — ED PROVIDER NOTES
EMERGENCY DEPARTMENT ENCOUNTER    Room Number:  24/24  PCP: Babatunde Ferrell APRN  Patient Care Team:  Babatunde Ferrell APRN as PCP - General (Nurse Practitioner)   Independent Historians: Patient and friend    HPI:  Chief Complaint: Cough    A complete HPI/ROS/PMH/PSH/SH/FH are unobtainable due to: Patient defers to friend to answer some questions as patient is a poor historian    Chronic or social conditions impacting patient care (Social Determinants of Health): None  (Financial Resource Strain / Food Insecurity / Transportation Needs / Physical Activity / Stress / Social Connections / Intimate Partner Violence / Housing Stability)    Context: Carlos Kraus is a 68 y.o. male with history of schizophrenia, A-fib, hypertension, seizures, hypothyroidism who presents to the ED c/o ongoing cough for 2 weeks.  Patient has no history of pneumonia in the past.  No specific sick contacts.  Patient denies fever, vomiting, diarrhea.  Patient has had productive cough off and on for 2 weeks with some wheezing    Review of prior external notes (non-ED) -and- Review of prior external test results outside of this encounter: Patient last seen by primary provider on September 29 for ongoing care of schizophrenia, seizures, A-fib, hyperlipidemia, hypertension.    Prescription drug monitoring program review:         PAST MEDICAL HISTORY  Active Ambulatory Problems     Diagnosis Date Noted    Syncope and collapse 08/14/2019    Schizophrenia 08/14/2019    Atrial fibrillation 08/14/2019    Hypertension 08/14/2019    Orthostatic hypotension 08/14/2019    Hypokalemia 08/16/2019    Generalized abdominal pain 02/03/2022    GERD (gastroesophageal reflux disease)     Hypothyroidism     Hyperlipidemia     Hypophosphatemia     SBO (small bowel obstruction)     Acute pancreatitis 02/13/2022     Resolved Ambulatory Problems     Diagnosis Date Noted    No Resolved Ambulatory Problems     Past Medical History:   Diagnosis Date    A-fib     [place]  esophagus     Hx of schizophrenia     Umbilical hernia          PAST SURGICAL HISTORY  Past Surgical History:   Procedure Laterality Date    EXPLORATORY LAPAROTOMY N/A 2/4/2022    Procedure: LAPAROTOMY EXPLORATORY LYSIS OF ADHESIONS;  Surgeon: Edyta Agosto MD;  [facility] MAIN OR;  Service: General;  Laterality: N/A;         FAMILY HISTORY  No family history on file.      SOCIAL HISTORY  Social History     Socioeconomic History    Marital status: Single   Tobacco Use    Smoking status: Former     Types: Cigarettes    Smokeless tobacco: Never    Tobacco comments:     quit 30-40 years ago    Substance and Sexual Activity    Sexual activity: Defer         ALLERGIES  Penicillins and Latex        REVIEW OF SYSTEMS  Review of Systems  Included in HPI  All systems reviewed and negative except for those discussed in HPI.      PHYSICAL EXAM    I have reviewed the triage vital signs and nursing notes.    ED Triage Vitals   Temp Heart Rate Resp BP SpO2   12/16/23 1705 12/16/23 1705 12/16/23 1705 12/16/23 1739 12/16/23 1705   97 °F (36.1 °C) 109 14 131/96 96 %      Temp src Heart Rate Source Patient Position BP Location FiO2 (%)   12/16/23 1705 -- -- -- --   Tympanic           Physical Exam  GENERAL: Pleasant cooperative and conversant male, alert, no acute distress, no cough on exam  SKIN: Warm, dry  HENT: Normocephalic, atraumatic  EYES: no scleral icterus  CV: regular rhythm, regular rate  RESPIRATORY: normal effort, lungs clear, no rhonchi, no wheezing  ABDOMEN: soft, nontender, nondistended  MUSCULOSKELETAL: no deformity  NEURO: alert, moves all extremities, follows commands                                                               LAB RESULTS  Recent Results (from the past 24 hour(s))   Respiratory Panel PCR w/COVID-19(SARS-CoV-2) JAGDEEP/BRITTANY/JESUS/PAD/COR/OLAMDIE In-House, NP Swab in UTM/VTM, 2 HR TAT - Swab, Nasopharynx    Collection Time: 12/16/23  5:46 PM    Specimen: Nasopharynx; Swab   Result Value Ref Range     ADENOVIRUS, PCR Not Detected Not Detected    Coronavirus 229E Not Detected Not Detected    Coronavirus HKU1 Not Detected Not Detected    Coronavirus NL63 Not Detected Not Detected    Coronavirus OC43 Not Detected Not Detected    COVID19 Not Detected Not Detected - Ref. Range    Human Metapneumovirus Not Detected Not Detected    Human Rhinovirus/Enterovirus Not Detected Not Detected    Influenza A PCR Not Detected Not Detected    Influenza B PCR Not Detected Not Detected    Parainfluenza Virus 1 Not Detected Not Detected    Parainfluenza Virus 2 Not Detected Not Detected    Parainfluenza Virus 3 Not Detected Not Detected    Parainfluenza Virus 4 Not Detected Not Detected    RSV, PCR Not Detected Not Detected    Bordetella pertussis pcr Not Detected Not Detected    Bordetella parapertussis PCR Not Detected Not Detected    Chlamydophila pneumoniae PCR Not Detected Not Detected    Mycoplasma pneumo by PCR Not Detected Not Detected       I ordered the above labs and independently reviewed the results.        RADIOLOGY  XR Chest 2 View    Result Date: 12/16/2023  CHEST: 2 VIEWS  HISTORY: Cough, shortness of air.  COMPARISON: Portable chest 02/12/2022, AP chest 02/06/2022, AP chest 08/14/2019, CT abdomen and pelvis 02/12/2022.  FINDINGS: There are multiple old healed left rib fractures with adjacent pleural parenchymal scarring. There is no evidence for pulmonary edema or pleural effusion. Heart size is enlarged. Mediastinal structures appear within normal limits.      Multiple old healed left rib fractures with adjacent pleural parenchymal scarring that appears chronic. No evidence for active disease in the chest.  This report was finalized on 12/16/2023 7:48 PM by Dr. Devyn Sharma M.D on Workstation: [id number]       I ordered the above noted radiological studies. Reviewed by me. See dictation for official radiology interpretation.      PROCEDURES    Procedures      MEDICATIONS GIVEN IN ER    Medications   predniSONE  (DELTASONE) tablet 20 mg (20 mg Oral Given 12/16/23 1950)   albuterol sulfate HFA (PROVENTIL HFA;VENTOLIN HFA;PROAIR HFA) inhaler 2 puff (2 puffs Inhalation Given 12/16/23 2004)   doxycycline (MONODOX) capsule 100 mg (100 mg Oral Given 12/16/23 1950)         ORDERS PLACED DURING THIS VISIT:  Orders Placed This Encounter   Procedures    Respiratory Panel PCR w/COVID-19(SARS-CoV-2) JAGDEEP/BRITTANY/JESUS/PAD/COR/OLAMIDE In-House, NP Swab in UTM/VTM, 2 HR TAT - Swab, Nasopharynx    XR Chest 2 View         PROGRESS, DATA ANALYSIS, CONSULTS, AND MEDICAL DECISION MAKING    All labs have been independently interpreted by me.  All radiology studies have been reviewed by me.   EKG's independently viewed and interpreted by me.  Discussion below represents my analysis of pertinent findings related to patient's condition, differential diagnosis, treatment plan and final disposition.    MDM this patient presents with a constellation of symptoms likely representing viral upper respiratory infection or bronchitis as characterized by: Cough and wheezing for 2 weeks. No chest pain nor shortness of breath. The patient´s presentation is not consistent with other acute cardiopulmonary emergencies like ACS, CHF, or pericardial effusion. Possible COVID-19 (coronavirus), flu, rhinovirus, RSV as all are community wide right now but no specific sick contacts, no recent travel or participation in large gatherings, no GI symptoms, no respiratory compromise, nontoxic appearance.   Other diagnoses were considered in the differential diagnosis for this patient. Considered: CNS infection, bacterial sinusitis, and pneumonia but low likelihood given documented exam and history. .    ED Course as of 12/17/23 0254   Sat Dec 16, 2023   1955 I viewed patient's chest x-ray and my interpretation patient has clear lungs and normal heart size [AR]   2002 I discussed all results with patient and answered all questions to the best of my ability. The patient was encouraged  to follow up appropriately.      Routine discharge counseling was given, and the patient was instructed to promptly call or followup with their primary physician or even return to the ER if any worsening, changing or persistent symptoms.         [AR]      ED Course User Index  [AR] Rand Richard MD       PPE: I wore and adhered to appropriate PPE per hospital protocols for specific patient presentation. (For respiratory patients with suspected Covid-19 or other infectious etiology suspected for patient's symptoms, the patient wore a mask and I wore an N95 mask throughout the entire patient encounter.) Proper hand hygiene both before and after patient encounter was performed as well.         AS OF 02:54 EST VITALS:    BP - 133/90  HR - 68  TEMP - 97 °F (36.1 °C) (Tympanic)  O2 SATS - 100%        DIAGNOSIS  Final diagnoses:   Acute bacterial bronchitis         DISPOSITION  ED Disposition       ED Disposition   Discharge    Condition   Stable    Comment   --                  Note Disclaimer: At [facility], we believe that sharing information builds trust and better relationships. You are receiving this note because you recently visited [facility]. It is possible you will see health information before a provider has talked with you about it. This kind of information can be easy to misunderstand. To help you fully understand what it means for your health, we urge you to discuss this note with your provider.         Rand Richard MD  12/17/23 0254

## 2023-12-18 DIAGNOSIS — F20.9 SCHIZOPHRENIA, UNSPECIFIED TYPE: Chronic | ICD-10-CM

## 2023-12-18 RX ORDER — LEVETIRACETAM 250 MG/1
250 TABLET ORAL 2 TIMES DAILY
Qty: 180 TABLET | OUTPATIENT
Start: 2023-12-18

## 2023-12-29 ENCOUNTER — OFFICE VISIT (OUTPATIENT)
Dept: INTERNAL MEDICINE | Facility: CLINIC | Age: 68
End: 2023-12-29
Payer: MEDICARE

## 2023-12-29 VITALS
HEIGHT: 74 IN | DIASTOLIC BLOOD PRESSURE: 84 MMHG | SYSTOLIC BLOOD PRESSURE: 136 MMHG | WEIGHT: 248 LBS | OXYGEN SATURATION: 98 % | BODY MASS INDEX: 31.83 KG/M2 | HEART RATE: 88 BPM

## 2023-12-29 DIAGNOSIS — E03.9 HYPOTHYROIDISM, UNSPECIFIED TYPE: ICD-10-CM

## 2023-12-29 DIAGNOSIS — E78.5 HYPERLIPIDEMIA, UNSPECIFIED HYPERLIPIDEMIA TYPE: Chronic | ICD-10-CM

## 2023-12-29 DIAGNOSIS — F20.9 SCHIZOPHRENIA, UNSPECIFIED TYPE: Chronic | ICD-10-CM

## 2023-12-29 DIAGNOSIS — I48.91 ATRIAL FIBRILLATION, UNSPECIFIED TYPE: ICD-10-CM

## 2023-12-29 DIAGNOSIS — G40.909 SEIZURE DISORDER: Chronic | ICD-10-CM

## 2023-12-29 DIAGNOSIS — I10 HYPERTENSION, UNSPECIFIED TYPE: Primary | Chronic | ICD-10-CM

## 2023-12-29 DIAGNOSIS — K21.9 GASTROESOPHAGEAL REFLUX DISEASE WITHOUT ESOPHAGITIS: ICD-10-CM

## 2023-12-29 DIAGNOSIS — Z00.00 ANNUAL PHYSICAL EXAM: ICD-10-CM

## 2023-12-29 NOTE — PROGRESS NOTES
"Chief Complaint  Annual Exam    Subjective        Carlos Kraus presents to [facility]  History of Present Illness  68-year-old male presenting for healthcare maintenance, schizophrenia, seizure disorder, hypothyroidism and hypertension.  Patient is getting over a recent bout of bronchitis.  Completed rounds of antibiotics, steroids.  States he is feeling much better since completing antibiotics.      Friend states that witnessed seizure activity twice in the past month.  Most recent episode was about a week ago.  Notices patient's shaking uncontrollably and then stares off into the distance with a glass look.  Episode lasted 4 to 5 minutes and then patient returned to baseline.  States been taking medication as prescribed including Keppra 250 mg twice a day.      Objective   Vital Signs:  /84 (BP Location: Right arm, Patient Position: Sitting, Cuff Size: Adult)   Pulse 88   Ht 188 cm (74\")   Wt 112 kg (248 lb)   SpO2 98%   BMI 31.84 kg/m²   Estimated body mass index is 31.84 kg/m² as calculated from the following:    Height as of this encounter: 188 cm (74\").    Weight as of this encounter: 112 kg (248 lb).               Physical Exam  Vitals reviewed.   Constitutional:       Appearance: Normal appearance.   HENT:      Head: Normocephalic.      Right Ear: Tympanic membrane normal.      Left Ear: Tympanic membrane normal.      Nose: Nose normal.      Mouth/Throat:      Mouth: Mucous membranes are moist.      Pharynx: Oropharynx is clear.   Eyes:      Pupils: Pupils are equal, round, and reactive to light.   Cardiovascular:      Rate and Rhythm: Normal rate.      Pulses: Normal pulses.      Heart sounds: Normal heart sounds.   Pulmonary:      Effort: Pulmonary effort is normal.      Breath sounds: Normal breath sounds.   Abdominal:      General: Bowel sounds are normal.      Palpations: Abdomen is soft.   Musculoskeletal:         General: Normal range of motion.      Cervical " back: Normal range of motion.   Skin:     General: Skin is warm and dry.      Capillary Refill: Capillary refill takes less than 2 seconds.   Neurological:      Mental Status: He is alert. Mental status is at baseline.   Psychiatric:         Mood and Affect: Mood normal. Affect is flat.         Behavior: Behavior normal.         Thought Content: Thought content normal.         Judgment: Judgment normal.        Result Review :        Current Outpatient Medications on File Prior to Visit   Medication Sig Dispense Refill    acetaminophen (TYLENOL) 650 MG 8 hr tablet Take 1 tablet by mouth Every 8 (Eight) Hours As Needed for Mild Pain.      aspirin 81 MG EC tablet Take 1 tablet by mouth Daily. 90 tablet 1    cetirizine (zyrTEC) 10 MG tablet Take 1 tablet by mouth Daily. 90 tablet 1    digoxin (LANOXIN) 250 MCG tablet Take 1 tablet by mouth Daily. 90 tablet 1    guaiFENesin-dextromethorphan (ROBITUSSIN DM) 100-10 MG/5ML syrup Take 10 mL by mouth 4 (Four) Times a Day As Needed for Cough. 237 mL 2    levETIRAcetam (KEPPRA) 250 MG tablet Take 1 tablet by mouth 2 (Two) Times a Day. 60 tablet 2    multivitamin with minerals tablet tablet Take 1 tablet by mouth Daily. 90 tablet 1    pantoprazole (PROTONIX) 40 MG EC tablet Take 1 tablet by mouth Daily. 90 tablet 1    polyethylene glycol (MIRALAX) 17 GM/SCOOP powder Take 17 g by mouth Daily. 510 g 1    risperiDONE (RisperDAL) 2 MG tablet Take one 2 mg tablet and one 3 mg tablet for a total of 5 mg by mouth nightly. 90 tablet 1    risperiDONE (risperDAL) 3 MG tablet Take one 2 mg tablet and one 3 mg tablet for a total of 5 mg by mouth nightly. 90 tablet 1    simvastatin (ZOCOR) 20 MG tablet Take 1 tablet by mouth Every Night. 90 tablet 1    [DISCONTINUED] doxycycline (MONODOX) 100 MG capsule Take 1 capsule by mouth 2 (Two) Times a Day. 14 capsule 0    [DISCONTINUED] predniSONE (DELTASONE) 20 MG tablet Take 1 tablet by mouth 2 (Two) Times a Day. 9 tablet 0     No current  facility-administered medications on file prior to visit.                Assessment and Plan   Diagnoses and all orders for this visit:    1. Hypertension, unspecified type (Primary)  -     Comprehensive Metabolic Panel  -     Urinalysis With Microscopic If Indicated (No Culture) - Urine, Clean Catch  -     Lipid Panel With / Chol / HDL Ratio  -     CBC & Differential    2. Atrial fibrillation, unspecified type    3. Hyperlipidemia, unspecified hyperlipidemia type  -     Comprehensive Metabolic Panel  -     Urinalysis With Microscopic If Indicated (No Culture) - Urine, Clean Catch  -     Lipid Panel With / Chol / HDL Ratio  -     CBC & Differential    4. Gastroesophageal reflux disease without esophagitis    5. Schizophrenia, unspecified type    6. Annual physical exam    7. Hypothyroidism, unspecified type  -     TSH Rfx On Abnormal To Free T4    8. Seizure disorder  -     Ambulatory Referral to Neurology  -     Levetiracetam Level (Keppra)      Patient Instructions   Continue medications as prescribed. Debrox weekly to both ears. Increase physical activity as tolerated. Stay hydrated with water.  Referral to neurology placed. Scheduling center to call with appointment. Labs today. Follow-up in 6 months for medicare wellness and fasting labs.    The patient was counseled regarding nutrition, physical activity, healthy weight, injury prevention, misuse of tobacco, alcohol and illicit drugs, mental health, immunizations, and screenings.           Follow Up   Return in about 6 months (around 6/29/2024) for Medicare Wellness.  Patient was given instructions and counseling regarding his condition or for health maintenance advice. Please see specific information pulled into the AVS if appropriate.

## 2023-12-29 NOTE — PATIENT INSTRUCTIONS
Continue medications as prescribed. Debrox weekly to both ears. Increase physical activity as tolerated. Stay hydrated with water.  Referral to neurology placed. Scheduling center to call with appointment. Labs today. Follow-up in 6 months for medicare wellness and fasting labs.

## 2024-01-01 LAB
ALBUMIN SERPL-MCNC: 4.4 G/DL (ref 3.9–4.9)
ALBUMIN/GLOB SERPL: 2.1 {RATIO} (ref 1.2–2.2)
ALP SERPL-CCNC: 40 IU/L (ref 44–121)
ALT SERPL-CCNC: 19 IU/L (ref 0–44)
APPEARANCE UR: CLEAR
AST SERPL-CCNC: 17 IU/L (ref 0–40)
BASOPHILS # BLD AUTO: 0 X10E3/UL (ref 0–0.2)
BASOPHILS NFR BLD AUTO: 1 %
BILIRUB SERPL-MCNC: 0.4 MG/DL (ref 0–1.2)
BILIRUB UR QL STRIP: NEGATIVE
BUN SERPL-MCNC: 8 MG/DL (ref 8–27)
BUN/CREAT SERPL: 9 (ref 10–24)
CALCIUM SERPL-MCNC: 9 MG/DL (ref 8.6–10.2)
CHLORIDE SERPL-SCNC: 102 MMOL/L (ref 96–106)
CHOLEST SERPL-MCNC: 180 MG/DL (ref 100–199)
CHOLEST/HDLC SERPL: 4.9 RATIO (ref 0–5)
CO2 SERPL-SCNC: 23 MMOL/L (ref 20–29)
COLOR UR: YELLOW
CREAT SERPL-MCNC: 0.92 MG/DL (ref 0.76–1.27)
EGFRCR SERPLBLD CKD-EPI 2021: 92 ML/MIN/1.73
EOSINOPHIL # BLD AUTO: 0.1 X10E3/UL (ref 0–0.4)
EOSINOPHIL NFR BLD AUTO: 2 %
ERYTHROCYTE [DISTWIDTH] IN BLOOD BY AUTOMATED COUNT: 12.3 % (ref 11.6–15.4)
GLOBULIN SER CALC-MCNC: 2.1 G/DL (ref 1.5–4.5)
GLUCOSE SERPL-MCNC: 103 MG/DL (ref 70–99)
GLUCOSE UR QL STRIP: NEGATIVE
HCT VFR BLD AUTO: 45.2 % (ref 37.5–51)
HDLC SERPL-MCNC: 37 MG/DL
HGB BLD-MCNC: 15.3 G/DL (ref 13–17.7)
HGB UR QL STRIP: NEGATIVE
IMM GRANULOCYTES # BLD AUTO: 0 X10E3/UL (ref 0–0.1)
IMM GRANULOCYTES NFR BLD AUTO: 0 %
KETONES UR QL STRIP: NEGATIVE
LDLC SERPL CALC-MCNC: 100 MG/DL (ref 0–99)
LEUKOCYTE ESTERASE UR QL STRIP: NEGATIVE
LEVETIRACETAM SERPL-MCNC: 5.5 UG/ML (ref 10–40)
LYMPHOCYTES # BLD AUTO: 1.5 X10E3/UL (ref 0.7–3.1)
LYMPHOCYTES NFR BLD AUTO: 17 %
MCH RBC QN AUTO: 31.9 PG (ref 26.6–33)
MCHC RBC AUTO-ENTMCNC: 33.8 G/DL (ref 31.5–35.7)
MCV RBC AUTO: 94 FL (ref 79–97)
MICRO URNS: NORMAL
MONOCYTES # BLD AUTO: 0.6 X10E3/UL (ref 0.1–0.9)
MONOCYTES NFR BLD AUTO: 7 %
NEUTROPHILS # BLD AUTO: 6.5 X10E3/UL (ref 1.4–7)
NEUTROPHILS NFR BLD AUTO: 73 %
NITRITE UR QL STRIP: NEGATIVE
PH UR STRIP: 6 [PH] (ref 5–7.5)
PLATELET # BLD AUTO: 164 X10E3/UL (ref 150–450)
POTASSIUM SERPL-SCNC: 4.2 MMOL/L (ref 3.5–5.2)
PROT SERPL-MCNC: 6.5 G/DL (ref 6–8.5)
PROT UR QL STRIP: NEGATIVE
RBC # BLD AUTO: 4.8 X10E6/UL (ref 4.14–5.8)
SODIUM SERPL-SCNC: 139 MMOL/L (ref 134–144)
SP GR UR STRIP: 1.01 (ref 1–1.03)
TRIGL SERPL-MCNC: 251 MG/DL (ref 0–149)
TSH SERPL DL<=0.005 MIU/L-ACNC: 2.07 UIU/ML (ref 0.45–4.5)
UROBILINOGEN UR STRIP-MCNC: 0.2 MG/DL (ref 0.2–1)
VLDLC SERPL CALC-MCNC: 43 MG/DL (ref 5–40)
WBC # BLD AUTO: 8.8 X10E3/UL (ref 3.4–10.8)

## 2024-01-02 DIAGNOSIS — G40.909 SEIZURE DISORDER: Primary | ICD-10-CM

## 2024-01-02 DIAGNOSIS — F20.9 SCHIZOPHRENIA, UNSPECIFIED TYPE: Chronic | ICD-10-CM

## 2024-01-02 RX ORDER — LEVETIRACETAM 500 MG/1
500 TABLET ORAL 2 TIMES DAILY
Qty: 60 TABLET | Refills: 2 | Status: SHIPPED | OUTPATIENT
Start: 2024-01-02

## 2024-03-24 DIAGNOSIS — E78.5 HYPERLIPIDEMIA, UNSPECIFIED HYPERLIPIDEMIA TYPE: Chronic | ICD-10-CM

## 2024-03-25 DIAGNOSIS — I48.91 ATRIAL FIBRILLATION, UNSPECIFIED TYPE: Chronic | ICD-10-CM

## 2024-03-25 RX ORDER — SIMVASTATIN 20 MG
20 TABLET ORAL
Qty: 90 TABLET | Refills: 1 | Status: SHIPPED | OUTPATIENT
Start: 2024-03-25

## 2024-03-25 RX ORDER — DIGOXIN 250 MCG
250 TABLET ORAL DAILY
Qty: 90 TABLET | Refills: 1 | Status: SHIPPED | OUTPATIENT
Start: 2024-03-25

## 2024-03-28 DIAGNOSIS — G40.909 SEIZURE DISORDER: ICD-10-CM

## 2024-03-28 DIAGNOSIS — K21.9 GASTROESOPHAGEAL REFLUX DISEASE WITHOUT ESOPHAGITIS: Chronic | ICD-10-CM

## 2024-03-28 DIAGNOSIS — F20.9 SCHIZOPHRENIA, UNSPECIFIED TYPE: Chronic | ICD-10-CM

## 2024-03-28 RX ORDER — LEVETIRACETAM 500 MG/1
500 TABLET ORAL 2 TIMES DAILY
Qty: 60 TABLET | Refills: 3 | Status: SHIPPED | OUTPATIENT
Start: 2024-03-28

## 2024-03-28 RX ORDER — LEVETIRACETAM 250 MG/1
250 TABLET ORAL 2 TIMES DAILY
Qty: 180 TABLET | OUTPATIENT
Start: 2024-03-28

## 2024-03-28 RX ORDER — PANTOPRAZOLE SODIUM 40 MG/1
40 TABLET, DELAYED RELEASE ORAL DAILY
Qty: 90 TABLET | Refills: 1 | Status: SHIPPED | OUTPATIENT
Start: 2024-03-28

## 2024-03-28 RX ORDER — RISPERIDONE 3 MG/1
TABLET ORAL
Qty: 90 TABLET | Refills: 1 | Status: SHIPPED | OUTPATIENT
Start: 2024-03-28

## 2024-03-31 DIAGNOSIS — F20.9 SCHIZOPHRENIA, UNSPECIFIED TYPE: ICD-10-CM

## 2024-04-01 RX ORDER — RISPERIDONE 2 MG/1
TABLET ORAL
Qty: 90 TABLET | Refills: 1 | OUTPATIENT
Start: 2024-04-01

## 2024-04-09 DIAGNOSIS — F20.9 SCHIZOPHRENIA, UNSPECIFIED TYPE: ICD-10-CM

## 2024-04-09 RX ORDER — RISPERIDONE 2 MG/1
TABLET ORAL
Qty: 90 TABLET | Refills: 1 | OUTPATIENT
Start: 2024-04-09

## 2024-04-10 DIAGNOSIS — F20.9 SCHIZOPHRENIA, UNSPECIFIED TYPE: ICD-10-CM

## 2024-04-11 RX ORDER — RISPERIDONE 2 MG/1
TABLET ORAL
Qty: 90 TABLET | Refills: 1 | Status: SHIPPED | OUTPATIENT
Start: 2024-04-11

## 2024-06-19 DIAGNOSIS — F20.9 SCHIZOPHRENIA, UNSPECIFIED TYPE: Chronic | ICD-10-CM

## 2024-06-19 DIAGNOSIS — G40.909 SEIZURE DISORDER: ICD-10-CM

## 2024-06-20 RX ORDER — LEVETIRACETAM 500 MG/1
500 TABLET ORAL 2 TIMES DAILY
Qty: 180 TABLET | Refills: 1 | Status: SHIPPED | OUTPATIENT
Start: 2024-06-20

## 2024-06-21 DIAGNOSIS — E78.5 HYPERLIPIDEMIA, UNSPECIFIED HYPERLIPIDEMIA TYPE: Chronic | ICD-10-CM

## 2024-06-21 RX ORDER — SIMVASTATIN 20 MG
20 TABLET ORAL
Qty: 90 TABLET | Refills: 1 | Status: SHIPPED | OUTPATIENT
Start: 2024-06-21

## 2024-06-26 ENCOUNTER — TELEPHONE (OUTPATIENT)
Dept: NEUROLOGY | Facility: CLINIC | Age: 69
End: 2024-06-26
Payer: MEDICARE

## 2024-07-02 ENCOUNTER — OFFICE VISIT (OUTPATIENT)
Dept: NEUROLOGY | Facility: CLINIC | Age: 69
End: 2024-07-02
Payer: MEDICARE

## 2024-07-02 VITALS
HEIGHT: 74 IN | DIASTOLIC BLOOD PRESSURE: 68 MMHG | OXYGEN SATURATION: 96 % | RESPIRATION RATE: 20 BRPM | BODY MASS INDEX: 30.93 KG/M2 | HEART RATE: 99 BPM | WEIGHT: 241 LBS | SYSTOLIC BLOOD PRESSURE: 108 MMHG

## 2024-07-02 DIAGNOSIS — G40.909 SEIZURE DISORDER: Primary | ICD-10-CM

## 2024-07-02 PROCEDURE — 3078F DIAST BP <80 MM HG: CPT | Performed by: STUDENT IN AN ORGANIZED HEALTH CARE EDUCATION/TRAINING PROGRAM

## 2024-07-02 PROCEDURE — 3074F SYST BP LT 130 MM HG: CPT | Performed by: STUDENT IN AN ORGANIZED HEALTH CARE EDUCATION/TRAINING PROGRAM

## 2024-07-02 PROCEDURE — 99204 OFFICE O/P NEW MOD 45 MIN: CPT | Performed by: STUDENT IN AN ORGANIZED HEALTH CARE EDUCATION/TRAINING PROGRAM

## 2024-07-02 RX ORDER — LEVETIRACETAM 750 MG/1
750 TABLET ORAL 2 TIMES DAILY
Qty: 180 TABLET | Refills: 2 | Status: SHIPPED | OUTPATIENT
Start: 2024-07-02
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
## 2024-07-03 ENCOUNTER — PATIENT ROUNDING (BHMG ONLY) (OUTPATIENT)
Dept: NEUROLOGY | Facility: CLINIC | Age: 69
End: 2024-07-03
Payer: MEDICARE

## 2024-07-11 ENCOUNTER — OFFICE VISIT (OUTPATIENT)
Dept: INTERNAL MEDICINE | Facility: CLINIC | Age: 69
End: 2024-07-11
Payer: MEDICARE

## 2024-07-11 VITALS
DIASTOLIC BLOOD PRESSURE: 84 MMHG | BODY MASS INDEX: 30.75 KG/M2 | WEIGHT: 239.6 LBS | OXYGEN SATURATION: 98 % | HEIGHT: 74 IN | SYSTOLIC BLOOD PRESSURE: 110 MMHG | HEART RATE: 72 BPM

## 2024-07-11 DIAGNOSIS — Z12.11 SCREENING FOR COLON CANCER: ICD-10-CM

## 2024-07-11 DIAGNOSIS — F20.9 SCHIZOPHRENIA, UNSPECIFIED TYPE: ICD-10-CM

## 2024-07-11 DIAGNOSIS — E78.5 HYPERLIPIDEMIA, UNSPECIFIED HYPERLIPIDEMIA TYPE: Primary | ICD-10-CM

## 2024-07-11 DIAGNOSIS — I48.91 ATRIAL FIBRILLATION, UNSPECIFIED TYPE: ICD-10-CM

## 2024-07-11 DIAGNOSIS — I10 HYPERTENSION, UNSPECIFIED TYPE: Chronic | ICD-10-CM

## 2024-07-11 DIAGNOSIS — Z00.00 MEDICARE ANNUAL WELLNESS VISIT, SUBSEQUENT: ICD-10-CM

## 2024-07-11 DIAGNOSIS — G40.909 SEIZURE DISORDER: ICD-10-CM

## 2024-07-11 PROCEDURE — 3079F DIAST BP 80-89 MM HG: CPT

## 2024-07-11 PROCEDURE — 3074F SYST BP LT 130 MM HG: CPT

## 2024-07-11 PROCEDURE — 1160F RVW MEDS BY RX/DR IN RCRD: CPT

## 2024-07-11 PROCEDURE — G0439 PPPS, SUBSEQ VISIT: HCPCS

## 2024-07-11 PROCEDURE — 1159F MED LIST DOCD IN RCRD: CPT

## 2024-07-11 NOTE — PATIENT INSTRUCTIONS
Continue medications as prescribed. Stay hydrated with water. Referral to cardiology placed. Scheduling center to call with appointment. Complete Cologuard as instructed. Labs future. Follow-up in 6 months for recheck.     Medicare Wellness  Personal Prevention Plan of Service     Date of Office Visit:    Encounter Provider:  CARIDAD Knowles  Place of Service:  [facility]  Patient Name: Carlos Kraus  :  1955    As part of the Medicare Wellness portion of your visit today, we are providing you with this personalized preventive plan of services (PPPS). This plan is based upon recommendations of the [place] Preventive Services Task Force (USPSTF) and the Advisory Committee on Immunization Practices (ACIP).    This lists the preventive care services that should be considered, and provides dates of when you are due. Items listed as completed are up-to-date and do not require any further intervention.    Health Maintenance   Topic Date Due    COLORECTAL CANCER SCREENING  Never done    ZOSTER VACCINE (1 of 2) Never done    Pneumococcal Vaccine 65+ (1 of 1 - PCV) Never done    COVID-19 Vaccine (2023- season) 2024    INFLUENZA VACCINE  2024    BMI FOLLOWUP  2024    LIPID PANEL  2024    ANNUAL WELLNESS VISIT  2025    TDAP/TD VACCINES (3 - Td or Tdap) 10/12/2027    HEPATITIS C SCREENING  Completed    Hepatitis B  Completed    AAA SCREEN (ONE-TIME)  Completed       Orders Placed This Encounter   Procedures    Comprehensive Metabolic Panel     Standing Status:   Future     Standing Expiration Date:   2024     Order Specific Question:   Release to patient     Answer:   Routine Release [[id number]]    Lipid Panel With / Chol / HDL Ratio     Standing Status:   Future     Standing Expiration Date:   2024     Order Specific Question:   Release to patient     Answer:   Routine Release [[id number]]    Cologuard - Stool, Per Rectum     Standing  Status:   Future     Standing Expiration Date:   7/11/2025     Order Specific Question:   Release to patient     Answer:   Routine Release [[id number]]       Return in about 6 months (around 1/11/2025) for Recheck.

## 2024-07-15 ENCOUNTER — HOSPITAL ENCOUNTER (OUTPATIENT)
Dept: SLEEP MEDICINE | Facility: HOSPITAL | Age: 69
Discharge: HOME | End: 2024-07-15
Admitting: STUDENT IN AN ORGANIZED HEALTH CARE EDUCATION/TRAINING PROGRAM
Payer: MEDICARE

## 2024-07-15 DIAGNOSIS — G40.909 SEIZURE DISORDER: ICD-10-CM

## 2024-07-15 PROCEDURE — 95813 EEG EXTND MNTR 61-119 MIN: CPT

## 2024-07-17 NOTE — PROGRESS NOTES
Subjective   The ABCs of the Annual Wellness Visit  Medicare Wellness Visit      Carlos Kraus is a 69 y.o. patient who presents for a Medicare Wellness Visit.    The following portions of the patient's history were reviewed and   updated as appropriate: allergies, current medications, past family history, past medical history, past social history, past surgical history, and problem list.    Compared to one year ago, the patient's physical   health is better.  Compared to one year ago, the patient's mental   health is better.    Recent Hospitalizations:  He was not admitted to the hospital during the last year.     Current Medical Providers:  Patient Care Team:  Babatunde Ferrell APRN as PCP - General (Nurse Practitioner)    Outpatient Medications Prior to Visit   Medication Sig Dispense Refill    acetaminophen (TYLENOL) 650 MG 8 hr tablet Take 1 tablet by mouth Every 8 (Eight) Hours As Needed for Mild Pain.      aspirin 81 MG EC tablet Take 1 tablet by mouth Daily. 90 tablet 1    digoxin (LANOXIN) 250 MCG tablet TAKE 1 TABLET BY MOUTH EVERY DAY 90 tablet 1    levETIRAcetam (KEPPRA) 750 MG tablet Take 1 tablet by mouth 2 (Two) Times a Day. 180 tablet 2    multivitamin with minerals tablet tablet Take 1 tablet by mouth Daily. 90 tablet 1    risperiDONE (risperDAL) 2 MG tablet TAKE ONE 2 MG TABLET AND ONE 3 MG TABLET FOR A TOTAL OF 5 MG BY MOUTH NIGHTLY. 90 tablet 1    risperiDONE (risperDAL) 3 MG tablet TAKE ONE 2 MG TABLET AND ONE 3 MG TABLET FOR A TOTAL OF 5 MG BY MOUTH NIGHTLY. 90 tablet 1    simvastatin (ZOCOR) 20 MG tablet TAKE 1 TABLET BY MOUTH EVERY DAY AT NIGHT 90 tablet 1    cetirizine (zyrTEC) 10 MG tablet Take 1 tablet by mouth Daily. (Patient not taking: Reported on 7/11/2024) 90 tablet 1    guaiFENesin-dextromethorphan (ROBITUSSIN DM) 100-10 MG/5ML syrup Take 10 mL by mouth 4 (Four) Times a Day As Needed for Cough. (Patient not taking: Reported on 7/11/2024) 237 mL 2    pantoprazole (PROTONIX) 40 MG EC  "tablet TAKE 1 TABLET BY MOUTH EVERY DAY (Patient not taking: Reported on 7/11/2024) 90 tablet 1    polyethylene glycol (MIRALAX) 17 GM/SCOOP powder Take 17 g by mouth Daily. (Patient not taking: Reported on 7/11/2024) 510 g 1     No facility-administered medications prior to visit.     No opioid medication identified on active medication list. I have reviewed chart for other potential  high risk medication/s and harmful drug interactions in the elderly.      Aspirin is on active medication list. Aspirin use is indicated based on review of current medical condition/s. Pros and cons of this therapy have been discussed today. Benefits of this medication outweigh potential harm.  Patient has been encouraged to continue taking this medication.  .      Patient Active Problem List   Diagnosis    Syncope and collapse    Schizophrenia    Atrial fibrillation    Hypertension    Orthostatic hypotension    Hypokalemia    Generalized abdominal pain    GERD (gastroesophageal reflux disease)    Hypothyroidism    Hyperlipidemia    Hypophosphatemia    SBO (small bowel obstruction)    Acute pancreatitis     Advance Care Planning (Click this link to access ACP Navigator)  Advance Directive is not on file.  ACP discussion was declined by the patient. Patient does not have an advance directive, declines further assistance.        Objective   Vitals:    07/11/24 1316   BP: 110/84   BP Location: Left arm   Patient Position: Sitting   Cuff Size: Large Adult   Pulse: 72   SpO2: 98%   Weight: 109 kg (239 lb 9.6 oz)   Height: 188 cm (74.02\")       Estimated body mass index is 30.75 kg/m² as calculated from the following:    Height as of this encounter: 188 cm (74.02\").    Weight as of this encounter: 109 kg (239 lb 9.6 oz).             Does the patient have evidence of cognitive impairment? Yes                                                                                                Health  Risk Assessment    Smoking Status:  Social " History     Tobacco Use   Smoking Status Never    Passive exposure: Past   Smokeless Tobacco Never     Alcohol Consumption:  Social History     Substance and Sexual Activity   Alcohol Use Yes    Alcohol/week: 12.0 standard drinks of alcohol    Types: 12 Cans of beer per week     Fall Risk Screen:  ULISES Fall Risk Assessment was completed, and patient is at LOW risk for falls.Assessment completed on:2024    Depression Screenin/11/2024     1:32 PM   PHQ-2/PHQ-9 Depression Screening   Little Interest or Pleasure in Doing Things 0-->not at all   Feeling Down, Depressed or Hopeless 0-->not at all   PHQ-9: Brief Depression Severity Measure Score 0     Health Habits and Functional and Cognitive Screenin/11/2024     1:28 PM   Functional & Cognitive Status   Do you have difficulty preparing food and eating? No   Do you have difficulty bathing yourself, getting dressed or grooming yourself? No   Do you have difficulty using the toilet? No   Do you have difficulty moving around from place to place? No   Do you have trouble with steps or getting out of a bed or a chair? No   Current Diet Well Balanced Diet   Dental Exam Not up to date   Eye Exam Not up to date   Exercise (times per week) 7 times per week   Current Exercises Include Walking   Do you need help using the phone?  Yes   Are you deaf or do you have serious difficulty hearing?  No   Do you need help to go to places out of walking distance? Yes   Do you need help shopping? No   Do you need help preparing meals?  No   Do you need help with housework?  No   Do you need help with laundry? No   Do you need help taking your medications? No   Do you need help managing money? Yes   Do you ever drive or ride in a car without wearing a seat belt? No   Have you felt unusual stress, anger or loneliness in the last month? No   Who do you live with? Other   If you need help, do you have trouble finding someone available to you? Yes   Have you been bothered  in the last four weeks by sexual problems? No   Do you have difficulty concentrating, remembering or making decisions? No             Age-appropriate Screening Schedule:  Refer to the list below for future screening recommendations based on patient's age, sex and/or medical conditions. Orders for these recommended tests are listed in the plan section. The patient has been provided with a written plan.    Health Maintenance List  Health Maintenance   Topic Date Due    COLORECTAL CANCER SCREENING  Never done    ZOSTER VACCINE (1 of 2) Never done    Pneumococcal Vaccine 65+ (1 of 1 - PCV) Never done    COVID-19 Vaccine (6 - 2023-24 season) 07/29/2024    INFLUENZA VACCINE  08/01/2024    BMI FOLLOWUP  09/29/2024    LIPID PANEL  12/29/2024    ANNUAL WELLNESS VISIT  07/11/2025    TDAP/TD VACCINES (3 - Td or Tdap) 10/12/2027    HEPATITIS C SCREENING  Completed    Hepatitis B  Completed    AAA SCREEN (ONE-TIME)  Completed                                                                                                                                                CMS Preventative Services Quick Reference  Risk Factors Identified During Encounter  Fall Risk-High or Moderate: Discussed Fall Prevention in the home  Inactivity/Sedentary: Patient was advised to exercise at least 150 minutes a week per CDC recommendations.    The above risks/problems have been discussed with the patient.  Pertinent information has been shared with the patient in the After Visit Summary.  An After Visit Summary and PPPS were made available to the patient.    Follow Up:   Next Medicare Wellness visit to be scheduled in 1 year.         Additional E&M Note during same encounter follows:  Patient has additional, significant, and separately identifiable condition(s)/problem(s) that require work above and beyond the Medicare Wellness Visit     Chief Complaint  Medicare Wellness-subsequent and Seizures    Subjective   69-year-old male presenting with  "seizures, hyperlipidemia, schizophrenia, hypertension and Medicare wellness.  Is taking medications as prescribed.  Recently seen by neurology and Keppra dose has been increased.  Is tolerating well.  Seizure activity has decreased slightly increase in medication.  He is feeling significantly better than he did this time last year.  No other health concerns at this time.    Peng Gonzalez is also being seen today for additional medical problem/s.    Review of Systems   Neurological:  Positive for seizures.   All other systems reviewed and are negative.             Objective   Vital Signs:  /84 (BP Location: Left arm, Patient Position: Sitting, Cuff Size: Large Adult)   Pulse 72   Ht 188 cm (74.02\")   Wt 109 kg (239 lb 9.6 oz)   SpO2 98%   BMI 30.75 kg/m²   Physical Exam  Vitals reviewed.   Constitutional:       Appearance: Normal appearance.   Cardiovascular:      Rate and Rhythm: Normal rate and regular rhythm.      Pulses: Normal pulses.      Heart sounds: Normal heart sounds.   Pulmonary:      Effort: Pulmonary effort is normal.      Breath sounds: Normal breath sounds.   Musculoskeletal:         General: Normal range of motion.      Cervical back: Normal range of motion.   Skin:     General: Skin is warm and dry.      Capillary Refill: Capillary refill takes less than 2 seconds.   Neurological:      Mental Status: He is alert and oriented to person, place, and time. Mental status is at baseline.   Psychiatric:         Mood and Affect: Mood normal.         Behavior: Behavior normal.         Thought Content: Thought content normal.         Judgment: Judgment normal.                 Assessment and Plan Additional age appropriate preventative wellness advice topics were discussed during today's preventative wellness exam(some topics already addressed during AWV portion of the note above):    Physical Activity: Advised cardiovascular activity 150 minutes per week as tolerated. (example brisk walk " for 30 minutes, 5 days a week).     Nutrition: Discussed nutrition plan with patient. Information shared in after visit summary. Goal is for a well balanced diet to enhance overall health.              Hyperlipidemia, unspecified hyperlipidemia type     Seizure disorder    Schizophrenia, unspecified type    Hypertension, unspecified type    Medicare annual wellness visit, subsequent    Screening for colon cancer    Atrial fibrillation, unspecified type      Orders Placed This Encounter   Procedures    Comprehensive Metabolic Panel     Standing Status:   Future     Number of Occurrences:   1     Standing Expiration Date:   2024     Order Specific Question:   Release to patient     Answer:   Routine Release [[id number]]    Lipid Panel With / Chol / HDL Ratio     Standing Status:   Future     Number of Occurrences:   1     Standing Expiration Date:   2024     Order Specific Question:   Release to patient     Answer:   Routine Release [[id number]]    Cologuard - Stool, Per Rectum     Standing Status:   Future     Number of Occurrences:   1     Standing Expiration Date:   2025     Order Specific Question:   Release to patient     Answer:   Routine Release [[id number]]    Ambulatory Referral to Cardiology     Referral Priority:   Routine     Referral Type:   Consultation     Referral Reason:   Specialty Services Required     Referred to Provider:   Lety Fu APRN     Requested Specialty:   Cardiology     Number of Visits Requested:   1     Patient Instructions   Continue medications as prescribed. Stay hydrated with water. Referral to cardiology placed. Scheduling center to call with appointment. Complete Cologuard as instructed. Labs future. Follow-up in 6 months for recheck.     Medicare Wellness  Personal Prevention Plan of Service     Date of Office Visit:    Encounter Provider:  CARIDAD Knowles  Place of Service:  [facility]  Patient Name: Carlos Kraus  :   1955    As part of the Medicare Wellness portion of your visit today, we are providing you with this personalized preventive plan of services (PPPS). This plan is based upon recommendations of the [place] Preventive Services Task Force (USPSTF) and the Advisory Committee on Immunization Practices (ACIP).    This lists the preventive care services that should be considered, and provides dates of when you are due. Items listed as completed are up-to-date and do not require any further intervention.    Health Maintenance   Topic Date Due    COLORECTAL CANCER SCREENING  Never done    ZOSTER VACCINE (1 of 2) Never done    Pneumococcal Vaccine 65+ (1 of 1 - PCV) Never done    COVID-19 Vaccine (6 - 2023-24 season) 07/29/2024    INFLUENZA VACCINE  08/01/2024    BMI FOLLOWUP  09/29/2024    LIPID PANEL  12/29/2024    ANNUAL WELLNESS VISIT  07/11/2025    TDAP/TD VACCINES (3 - Td or Tdap) 10/12/2027    HEPATITIS C SCREENING  Completed    Hepatitis B  Completed    AAA SCREEN (ONE-TIME)  Completed       Orders Placed This Encounter   Procedures    Comprehensive Metabolic Panel     Standing Status:   Future     Standing Expiration Date:   9/6/2024     Order Specific Question:   Release to patient     Answer:   Routine Release [[id number]]    Lipid Panel With / Chol / HDL Ratio     Standing Status:   Future     Standing Expiration Date:   9/6/2024     Order Specific Question:   Release to patient     Answer:   Routine Release [[id number]]    Cologuard - Stool, Per Rectum     Standing Status:   Future     Standing Expiration Date:   7/11/2025     Order Specific Question:   Release to patient     Answer:   Routine Release [[id number]]       Return in about 6 months (around 1/11/2025) for Recheck.                    Follow Up   Return in about 6 months (around 1/11/2025) for Recheck.  Patient was given instructions and counseling regarding his condition or for health maintenance advice. Please see specific information  pulled into the AVS if appropriate.

## 2024-07-30 ENCOUNTER — LAB (OUTPATIENT)
Dept: LAB | Facility: HOSPITAL | Age: 69
End: 2024-07-30
Payer: MEDICARE

## 2024-07-30 ENCOUNTER — OFFICE VISIT (OUTPATIENT)
Age: 69
End: 2024-07-30
Payer: MEDICARE

## 2024-07-30 VITALS
HEART RATE: 75 BPM | SYSTOLIC BLOOD PRESSURE: 126 MMHG | BODY MASS INDEX: 30.67 KG/M2 | OXYGEN SATURATION: 99 % | WEIGHT: 239 LBS | DIASTOLIC BLOOD PRESSURE: 80 MMHG | HEIGHT: 74 IN

## 2024-07-30 DIAGNOSIS — I48.0 AF (PAROXYSMAL ATRIAL FIBRILLATION): ICD-10-CM

## 2024-07-30 DIAGNOSIS — I48.0 AF (PAROXYSMAL ATRIAL FIBRILLATION): Primary | ICD-10-CM

## 2024-07-30 LAB — DIGOXIN SERPL-MCNC: 1.2 NG/ML (ref 0.6–1.2)

## 2024-07-30 PROCEDURE — 36415 COLL VENOUS BLD VENIPUNCTURE: CPT

## 2024-07-30 PROCEDURE — 80162 ASSAY OF DIGOXIN TOTAL: CPT

## 2024-07-30 NOTE — PROGRESS NOTES
Subjective:     Encounter Date:07/30/2024      Patient ID: Carlos Kraus is a 69 y.o. male.    Chief Complaint: Atrial fibrillation  HPI:   This is a 69-year-old man with schizophrenia. In 2022 he was admitted to the hospital with a small bowel obstruction and was in atrial fibrillation.  He was originally diagnosed with A-fib in 2019 while hospitalized at [facility].  At times he has been 57% mild LVH on echo with mild to moderate dilation of the left atrium.  At time he was treated with diltiazem and digoxin.  He was not anticoagulated due to his inconsistent follow-up/medical compliance.  Assessment history of hypertension and seizure disorder on [facility]    After 2022 he was lost to follow-up.  It seems he was living in a nursing facility or institution out of town.  He recently left there and is staying with a close friend.  His friend is not exactly his POA but seems to make most of his decisions.  The patient does not really provide much history but he is pleasant and smiling.  The friend and the patient deny any symptoms of tachycardia syncope pressure or shortness of breath.  The following portions of the patient's history were reviewed and updated as appropriate: allergies, current medications, past family history, past medical history, past social history, past surgical history and problem list.     REVIEW OF SYSTEMS:   All systems reviewed.  Pertinent positives identified in HPI.  All other systems are negative.    Past Medical History:   Diagnosis Date    A-fib     Atrial fibrillation 08/14/2019    [place] esophagus     GERD (gastroesophageal reflux disease)     Hx of schizophrenia     Hyperlipidemia     Hypertension     Hypokalemia 08/16/2019    Hypophosphatemia     Hypothyroidism     Schizophrenia 08/14/2019    Seizures Sept. 2023    Syncope and collapse 08/14/2019    Umbilical hernia        History reviewed. No pertinent family history.    Social History     Socioeconomic History     Marital status: Single   Tobacco Use    Smoking status: Never     Passive exposure: Past    Smokeless tobacco: Never   Vaping Use    Vaping status: Never Used   Substance and Sexual Activity    Alcohol use: Yes     Alcohol/week: 12.0 standard drinks of alcohol     Types: 12 Cans of beer per week    Drug use: Never    Sexual activity: Not Currently     Partners: Male     Birth control/protection: None, Same-sex partner       Allergies   Allergen Reactions    Penicillins Anaphylaxis    Latex Hives       Past Surgical History:   Procedure Laterality Date    EXPLORATORY LAPAROTOMY N/A 02/04/2022    Procedure: LAPAROTOMY EXPLORATORY LYSIS OF ADHESIONS;  Surgeon: Edyta Agosto MD;  Location: [facility] OR;  Service: General;  Laterality: N/A;         ECG 12 Lead    Date/Time: 7/30/2024 4:35 PM  Performed by: Sue Winters MD    Authorized by: Sue Winters MD  Comparison: compared with previous ECG from 2/3/2022  Similar to previous ECG  Rhythm: atrial fibrillation  Rate: normal  QRS axis: normal  Other findings: non-specific ST-T wave changes           Objective:         PHYSICAL EXAM:  GEN: VSS, no distress,   Eyes: normal sclera, normal lids and lashes  HENT: moist mucus membranes,   Respiratory: CTAB, no rales or wheezes  CV: RRR, no murmurs, , +2 DP and 2+ carotid pulses b/l  GI: NABS, soft,  Nontender, nondistended  MSK: no edema, no scoliosis or kyphosis  Skin: no rash, warm, dry  Heme/Lymph: no bruising or bleeding  Psych: organized thought, normal behavior and affect  Neuro: Cranial nerves grossly intact, Alert and Oriented x 3.         Assessment:          Diagnosis Plan   1. AF (paroxysmal atrial fibrillation)  Digoxin Level         Plan:       1.  Atrial fibrillation: CHADSVASC is 2 for age and hypertension.  He is not anticoagulated due to inconsistent medical compliance and follow-up.  We could adjust this depending on how reduced moving forward.  He is not on a beta-blocker or calcium channel blocker  but has been maintained on digoxin 250mcg daily.  Will get a digoxin level today.  Recent BMP was in normal limits.    Babatunde, thank you very much for referring this kind patient to me. Please call me with any questions or concerns. I will see the patient again in the office in 6 months.          Sue Winters MD  07/30/24  [facility]    Outpatient Encounter Medications as of 7/30/2024   Medication Sig Dispense Refill    acetaminophen (TYLENOL) 650 MG 8 hr tablet Take 1 tablet by mouth Every 8 (Eight) Hours As Needed for Mild Pain.      aspirin 81 MG EC tablet Take 1 tablet by mouth Daily. 90 tablet 1    cetirizine (zyrTEC) 10 MG tablet Take 1 tablet by mouth Daily. 90 tablet 1    digoxin (LANOXIN) 250 MCG tablet TAKE 1 TABLET BY MOUTH EVERY DAY 90 tablet 1    guaiFENesin-dextromethorphan (ROBITUSSIN DM) 100-10 MG/5ML syrup Take 10 mL by mouth 4 (Four) Times a Day As Needed for Cough. 237 mL 2    levETIRAcetam (KEPPRA) 750 MG tablet Take 1 tablet by mouth 2 (Two) Times a Day. 180 tablet 2    multivitamin with minerals tablet tablet Take 1 tablet by mouth Daily. 90 tablet 1    pantoprazole (PROTONIX) 40 MG EC tablet TAKE 1 TABLET BY MOUTH EVERY DAY 90 tablet 1    polyethylene glycol (MIRALAX) 17 GM/SCOOP powder Take 17 g by mouth Daily. 510 g 1    risperiDONE (risperDAL) 2 MG tablet TAKE ONE 2 MG TABLET AND ONE 3 MG TABLET FOR A TOTAL OF 5 MG BY MOUTH NIGHTLY. 90 tablet 1    risperiDONE (risperDAL) 3 MG tablet TAKE ONE 2 MG TABLET AND ONE 3 MG TABLET FOR A TOTAL OF 5 MG BY MOUTH NIGHTLY. 90 tablet 1    simvastatin (ZOCOR) 20 MG tablet TAKE 1 TABLET BY MOUTH EVERY DAY AT NIGHT 90 tablet 1     No facility-administered encounter medications on file as of 7/30/2024.

## 2024-07-31 NOTE — PROGRESS NOTES
Reviewed results and recommendations with Carlos Kraus.  Patient verbalized understanding of results and recommendations.    Thank you,  Giulia HE RN  Triage Nurse [facility] [place]   09:20 EDT

## 2024-08-20 ENCOUNTER — HOSPITAL ENCOUNTER (OUTPATIENT)
Facility: HOSPITAL | Age: 69
Discharge: HOME | End: 2024-08-20
Admitting: STUDENT IN AN ORGANIZED HEALTH CARE EDUCATION/TRAINING PROGRAM
Payer: MEDICARE

## 2024-08-20 DIAGNOSIS — G40.909 SEIZURE DISORDER: ICD-10-CM

## 2024-08-20 PROCEDURE — 0 GADOBENATE DIMEGLUMINE 529 MG/ML SOLUTION: Performed by: STUDENT IN AN ORGANIZED HEALTH CARE EDUCATION/TRAINING PROGRAM

## 2024-08-20 PROCEDURE — A9577 INJ MULTIHANCE: HCPCS | Performed by: STUDENT IN AN ORGANIZED HEALTH CARE EDUCATION/TRAINING PROGRAM

## 2024-08-20 PROCEDURE — 70553 MRI BRAIN STEM W/O & W/DYE: CPT

## 2024-08-20 RX ADMIN — GADOBENATE DIMEGLUMINE 20 ML: 529 INJECTION, SOLUTION INTRAVENOUS at 15:21

## 2024-08-26 DIAGNOSIS — F20.9 SCHIZOPHRENIA, UNSPECIFIED TYPE: ICD-10-CM

## 2024-08-26 RX ORDER — RISPERIDONE 2 MG/1
TABLET ORAL
Qty: 90 TABLET | Refills: 1 | Status: SHIPPED | OUTPATIENT
Start: 2024-08-26

## 2024-08-26 NOTE — TELEPHONE ENCOUNTER
Rx Refill Note  Requested Prescriptions     Pending Prescriptions Disp Refills    risperiDONE (risperDAL) 2 MG tablet [Pharmacy Med Name: RISPERIDONE 2 MG TABLET] 90 tablet 1     Sig: TAKE ONE 2 MG TABLET AND ONE 3 MG TABLET FOR A TOTAL OF 5 MG BY MOUTH NIGHTLY.      Last office visit with prescribing clinician: 7/11/2024   Last telemedicine visit with prescribing clinician: Visit date not found   Next office visit with prescribing clinician: 1/15/2025

## 2024-09-12 RX ORDER — RISPERIDONE 3 MG/1
TABLET ORAL
Qty: 87 TABLET | Refills: 2 | Status: SHIPPED | OUTPATIENT
Start: 2024-09-12

## 2024-09-12 NOTE — TELEPHONE ENCOUNTER
Rx Refill Note  Requested Prescriptions     Pending Prescriptions Disp Refills    risperiDONE (risperDAL) 3 MG tablet [Pharmacy Med Name: RISPERIDONE 3 MG TABLET] 87 tablet 2     Sig: TAKE ONE 2 MG TABLET AND ONE 3 MG TABLET FOR A TOTAL OF 5 MG BY MOUTH NIGHTLY.      Last office visit with prescribing clinician: 7/11/2024   Last telemedicine visit with prescribing clinician: Visit date not found   Next office visit with prescribing clinician: 1/15/2025                         Would you like a call back once the refill request has been completed: [] Yes [] No    If the office needs to give you a call back, can they leave a voicemail: [] Yes [] No    Dionicio Addison MA  09/12/24, 08:17 EDT

## 2024-09-14 DIAGNOSIS — K21.9 GASTROESOPHAGEAL REFLUX DISEASE WITHOUT ESOPHAGITIS: Chronic | ICD-10-CM

## 2024-09-15 DIAGNOSIS — I48.91 ATRIAL FIBRILLATION, UNSPECIFIED TYPE: Chronic | ICD-10-CM

## 2024-09-16 RX ORDER — PANTOPRAZOLE SODIUM 40 MG/1
40 TABLET, DELAYED RELEASE ORAL DAILY
Qty: 90 TABLET | Refills: 1 | Status: SHIPPED | OUTPATIENT
Start: 2024-09-16

## 2024-09-16 RX ORDER — DIGOXIN 250 MCG
250 TABLET ORAL DAILY
Qty: 90 TABLET | Refills: 1 | Status: SHIPPED | OUTPATIENT
Start: 2024-09-16

## 2025-01-15 ENCOUNTER — OFFICE VISIT (OUTPATIENT)
Dept: INTERNAL MEDICINE | Facility: CLINIC | Age: 70
End: 2025-01-15
Payer: MEDICARE

## 2025-01-15 VITALS
WEIGHT: 237.4 LBS | HEIGHT: 74 IN | TEMPERATURE: 98.4 F | SYSTOLIC BLOOD PRESSURE: 125 MMHG | BODY MASS INDEX: 30.47 KG/M2 | DIASTOLIC BLOOD PRESSURE: 87 MMHG | OXYGEN SATURATION: 98 % | HEART RATE: 83 BPM

## 2025-01-15 DIAGNOSIS — F20.9 SCHIZOPHRENIA, UNSPECIFIED TYPE: ICD-10-CM

## 2025-01-15 DIAGNOSIS — I10 HYPERTENSION, UNSPECIFIED TYPE: Chronic | ICD-10-CM

## 2025-01-15 DIAGNOSIS — E78.5 HYPERLIPIDEMIA, UNSPECIFIED HYPERLIPIDEMIA TYPE: Primary | ICD-10-CM

## 2025-01-15 LAB
ALBUMIN SERPL-MCNC: 4 G/DL (ref 3.5–5.2)
ALBUMIN/GLOB SERPL: 1.6 G/DL
ALP SERPL-CCNC: 43 U/L (ref 39–117)
ALT SERPL-CCNC: 13 U/L (ref 1–41)
AST SERPL-CCNC: 20 U/L (ref 1–40)
BILIRUB SERPL-MCNC: 0.2 MG/DL (ref 0–1.2)
BUN SERPL-MCNC: 3 MG/DL (ref 8–23)
BUN/CREAT SERPL: 3.3 (ref 7–25)
CALCIUM SERPL-MCNC: 9.2 MG/DL (ref 8.6–10.5)
CHLORIDE SERPL-SCNC: 102 MMOL/L (ref 98–107)
CHOLEST SERPL-MCNC: 166 MG/DL (ref 0–200)
CHOLEST/HDLC SERPL: 4.15 {RATIO}
CO2 SERPL-SCNC: 28.8 MMOL/L (ref 22–29)
CREAT SERPL-MCNC: 0.9 MG/DL (ref 0.76–1.27)
EGFRCR SERPLBLD CKD-EPI 2021: 93 ML/MIN/1.73
GLOBULIN SER CALC-MCNC: 2.5 GM/DL
GLUCOSE SERPL-MCNC: 100 MG/DL (ref 65–99)
HDLC SERPL-MCNC: 40 MG/DL (ref 40–60)
LDLC SERPL CALC-MCNC: 94 MG/DL (ref 0–100)
POTASSIUM SERPL-SCNC: 4.3 MMOL/L (ref 3.5–5.2)
PROT SERPL-MCNC: 6.5 G/DL (ref 6–8.5)
SODIUM SERPL-SCNC: 142 MMOL/L (ref 136–145)
TRIGL SERPL-MCNC: 186 MG/DL (ref 0–150)
VLDLC SERPL CALC-MCNC: 32 MG/DL (ref 5–40)

## 2025-01-15 PROCEDURE — 1159F MED LIST DOCD IN RCRD: CPT

## 2025-01-15 PROCEDURE — 3074F SYST BP LT 130 MM HG: CPT

## 2025-01-15 PROCEDURE — 1126F AMNT PAIN NOTED NONE PRSNT: CPT

## 2025-01-15 PROCEDURE — 3079F DIAST BP 80-89 MM HG: CPT

## 2025-01-15 PROCEDURE — 1160F RVW MEDS BY RX/DR IN RCRD: CPT

## 2025-01-15 PROCEDURE — 99213 OFFICE O/P EST LOW 20 MIN: CPT

## 2025-01-15 NOTE — PATIENT INSTRUCTIONS
Continue medications as prescribed. Recommend humidifier at bedside. Stay active. Stay hydrated with water. Contact Dr. Winters, cardiology for follow-up for appointment, [phone]. Follow-up with neurology as scheduled. Labs today. Follow-up in 6 months for medicare wellness and fasting labs.

## 2025-01-15 NOTE — PROGRESS NOTES
"Chief Complaint  Follow-up    Subjective        Carlos Kraus presents to [facility]  History of Present Illness  70-year-old male presenting with hyperlipidemia, hypertension, A-fib and schizophrenia follow-up.  Taking all medications as prescribed.  Scheduled to see neurology tomorrow for follow-up of seizure activity.  States he has not had any seizures since previous visit.  Has tolerated higher dose of Keppra very well.    Has a chronic dry cough for the past couple months.  Denies any fever, body aches, sinus pressure or sinus drainage.  Most likely due to dry air during winter months.    Tolerating 5 mg risperidone daily very well.  General health has been much improved in the past year.      Objective   Vital Signs:  /87 (BP Location: Left arm, Patient Position: Sitting, Cuff Size: Adult)   Pulse 83   Temp 98.4 °F (36.9 °C) (Temporal)   Ht 188 cm (74\")   Wt 108 kg (237 lb 6.4 oz)   SpO2 98%   BMI 30.48 kg/m²   Estimated body mass index is 30.48 kg/m² as calculated from the following:    Height as of this encounter: 188 cm (74\").    Weight as of this encounter: 108 kg (237 lb 6.4 oz).       BMI is >= 30 and <35. (Class 1 Obesity). The following options were offered after discussion;: exercise counseling/recommendations and nutrition counseling/recommendations      Physical Exam  Vitals reviewed.   Constitutional:       Appearance: Normal appearance.   Cardiovascular:      Rate and Rhythm: Normal rate and regular rhythm.      Pulses: Normal pulses.      Heart sounds: Normal heart sounds.   Pulmonary:      Effort: Pulmonary effort is normal.      Breath sounds: Normal breath sounds.   Musculoskeletal:         General: Normal range of motion.      Cervical back: Normal range of motion.   Skin:     General: Skin is warm and dry.      Capillary Refill: Capillary refill takes less than 2 seconds.   Neurological:      General: No focal deficit present.      Mental Status: He " is alert and oriented to person, place, and time.   Psychiatric:         Mood and Affect: Mood normal.         Behavior: Behavior normal.         Thought Content: Thought content normal.         Judgment: Judgment normal.        Result Review :      Common labs          7/24/2024    11:17   Common Labs   Glucose 115    BUN 6    Creatinine 0.93    Sodium 139    Potassium 3.8    Chloride 101    Calcium 9.6    Total Protein 6.6    Albumin 4.5    Total Bilirubin 0.4    Alkaline Phosphatase 45    AST (SGOT) 17    ALT (SGPT) 14    Total Cholesterol 162    Triglycerides 233    HDL Cholesterol 33    LDL Cholesterol  90          Current Outpatient Medications on File Prior to Visit   Medication Sig Dispense Refill    acetaminophen (TYLENOL) 650 MG 8 hr tablet Take 1 tablet by mouth Every 8 (Eight) Hours As Needed for Mild Pain.      aspirin 81 MG EC tablet Take 1 tablet by mouth Daily. 90 tablet 1    cetirizine (zyrTEC) 10 MG tablet Take 1 tablet by mouth Daily. 90 tablet 1    digoxin (LANOXIN) 250 MCG tablet TAKE 1 TABLET BY MOUTH EVERY DAY 90 tablet 1    guaiFENesin-dextromethorphan (ROBITUSSIN DM) 100-10 MG/5ML syrup Take 10 mL by mouth 4 (Four) Times a Day As Needed for Cough. 237 mL 2    levETIRAcetam (KEPPRA) 750 MG tablet Take 1 tablet by mouth 2 (Two) Times a Day. 180 tablet 2    multivitamin with minerals tablet tablet Take 1 tablet by mouth Daily. 90 tablet 1    pantoprazole (PROTONIX) 40 MG EC tablet TAKE 1 TABLET BY MOUTH EVERY DAY 90 tablet 1    risperiDONE (risperDAL) 2 MG tablet TAKE ONE 2 MG TABLET AND ONE 3 MG TABLET FOR A TOTAL OF 5 MG BY MOUTH NIGHTLY. 90 tablet 1    risperiDONE (risperDAL) 3 MG tablet TAKE ONE 2 MG TABLET AND ONE 3 MG TABLET FOR A TOTAL OF 5 MG BY MOUTH NIGHTLY. 87 tablet 2    simvastatin (ZOCOR) 20 MG tablet TAKE 1 TABLET BY MOUTH EVERY DAY AT NIGHT 90 tablet 1    polyethylene glycol (MIRALAX) 17 GM/SCOOP powder Take 17 g by mouth Daily. (Patient not taking: Reported on 1/15/2025) 510 g  1     No current facility-administered medications on file prior to visit.                Assessment and Plan     Diagnoses and all orders for this visit:    1. Hyperlipidemia, unspecified hyperlipidemia type (Primary)  -     Comprehensive Metabolic Panel  -     Lipid Panel With / Chol / HDL Ratio    2. Hypertension, unspecified type    3. Schizophrenia, unspecified type        Patient Instructions   Continue medications as prescribed. Recommend humidifier at bedside. Stay active. Stay hydrated with water. Contact Dr. Winters, cardiology for follow-up for appointment, [phone]. Follow-up with neurology as scheduled. Labs today. Follow-up in 6 months for medicare wellness and fasting labs.               Follow Up     Return in about 6 months (around 7/15/2025) for Medicare Wellness.  Patient was given instructions and counseling regarding his condition or for health maintenance advice. Please see specific information pulled into the AVS if appropriate.

## 2025-01-16 ENCOUNTER — OFFICE VISIT (OUTPATIENT)
Dept: NEUROLOGY | Facility: CLINIC | Age: 70
End: 2025-01-16
Payer: MEDICARE

## 2025-01-16 DIAGNOSIS — G40.909 SEIZURE DISORDER: ICD-10-CM

## 2025-01-16 PROCEDURE — 99214 OFFICE O/P EST MOD 30 MIN: CPT | Performed by: STUDENT IN AN ORGANIZED HEALTH CARE EDUCATION/TRAINING PROGRAM

## 2025-01-16 PROCEDURE — 1160F RVW MEDS BY RX/DR IN RCRD: CPT | Performed by: STUDENT IN AN ORGANIZED HEALTH CARE EDUCATION/TRAINING PROGRAM

## 2025-01-16 PROCEDURE — 1159F MED LIST DOCD IN RCRD: CPT | Performed by: STUDENT IN AN ORGANIZED HEALTH CARE EDUCATION/TRAINING PROGRAM

## 2025-01-16 RX ORDER — LEVETIRACETAM 750 MG/1
750 TABLET ORAL 2 TIMES DAILY
Qty: 180 TABLET | Refills: 3 | Status: SHIPPED | OUTPATIENT
Start: 2025-01-16

## 2025-01-16 NOTE — LETTER
January 16, 2025     No Recipients    Patient: Carlos Kraus   YOB: 1955   Date of Visit: 1/16/2025     Dear CARIDAD Knowles:       Thank you for referring Carlos Kraus to me for evaluation. Below are the relevant portions of my assessment and plan of care.    If you have questions, please do not hesitate to call me. I look forward to following Carlos along with you.         Sincerely,        Luis Miller MD        CC: No Recipients

## 2025-01-16 NOTE — PROGRESS NOTES
Cholesterol levels have improved slightly cholesterol levels remain stable.  Continue simvastatin as prescribed.    Kidney function and liver function are stable.  Continue medications as prescribed.
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
## 2025-01-17 ENCOUNTER — TELEPHONE (OUTPATIENT)
Dept: INTERNAL MEDICINE | Facility: CLINIC | Age: 70
End: 2025-01-17

## 2025-01-17 NOTE — TELEPHONE ENCOUNTER
----- Message from Babatunde Ferrell sent at 1/16/2025 12:36 PM EST -----  Cholesterol levels have improved slightly cholesterol levels remain stable.  Continue simvastatin as prescribed.    Kidney function and liver function are stable.  Continue medications as prescribed.

## 2025-02-18 DIAGNOSIS — F20.9 SCHIZOPHRENIA, UNSPECIFIED TYPE: ICD-10-CM

## 2025-02-18 DIAGNOSIS — K21.9 GASTROESOPHAGEAL REFLUX DISEASE WITHOUT ESOPHAGITIS: Chronic | ICD-10-CM

## 2025-02-18 RX ORDER — PANTOPRAZOLE SODIUM 40 MG/1
40 TABLET, DELAYED RELEASE ORAL DAILY
Qty: 90 TABLET | Refills: 1 | Status: SHIPPED | OUTPATIENT
Start: 2025-02-18

## 2025-02-18 RX ORDER — RISPERIDONE 2 MG/1
TABLET ORAL
Qty: 90 TABLET | Refills: 1 | Status: SHIPPED | OUTPATIENT
Start: 2025-02-18

## 2025-02-26 DIAGNOSIS — E78.5 HYPERLIPIDEMIA, UNSPECIFIED HYPERLIPIDEMIA TYPE: Chronic | ICD-10-CM

## 2025-02-26 RX ORDER — SIMVASTATIN 20 MG
20 TABLET ORAL
Qty: 90 TABLET | Refills: 1 | Status: SHIPPED | OUTPATIENT
Start: 2025-02-26

## 2025-03-21 DIAGNOSIS — I48.91 ATRIAL FIBRILLATION, UNSPECIFIED TYPE: Chronic | ICD-10-CM

## 2025-03-21 RX ORDER — DIGOXIN 250 MCG
250 TABLET ORAL DAILY
Qty: 90 TABLET | Refills: 1 | Status: SHIPPED | OUTPATIENT
Start: 2025-03-21

## 2025-03-21 NOTE — TELEPHONE ENCOUNTER
Rx Refill Note  Requested Prescriptions     Pending Prescriptions Disp Refills    digoxin (LANOXIN) 250 MCG tablet [Pharmacy Med Name: DIGOXIN 250 MCG TABLET] 90 tablet 1     Sig: TAKE 1 TABLET BY MOUTH EVERY DAY      Last office visit with prescribing clinician: 1/15/2025   Last telemedicine visit with prescribing clinician: Visit date not found   Next office visit with prescribing clinician: 7/24/2025                         Would you like a call back once the refill request has been completed: [] Yes [] No    If the office needs to give you a call back, can they leave a voicemail: [] Yes [] No    Joan Hart MA  03/21/25, 14:52 EDT

## 2025-06-16 RX ORDER — RISPERIDONE 3 MG/1
TABLET ORAL
Qty: 87 TABLET | Refills: 2 | Status: SHIPPED | OUTPATIENT
Start: 2025-06-16

## 2025-07-24 ENCOUNTER — OFFICE VISIT (OUTPATIENT)
Dept: INTERNAL MEDICINE | Facility: CLINIC | Age: 70
End: 2025-07-24
Payer: MEDICARE

## 2025-07-24 VITALS
HEIGHT: 74 IN | OXYGEN SATURATION: 99 % | WEIGHT: 226.6 LBS | HEART RATE: 83 BPM | SYSTOLIC BLOOD PRESSURE: 127 MMHG | DIASTOLIC BLOOD PRESSURE: 81 MMHG | BODY MASS INDEX: 29.08 KG/M2 | TEMPERATURE: 98.6 F

## 2025-07-24 DIAGNOSIS — E78.5 HYPERLIPIDEMIA, UNSPECIFIED HYPERLIPIDEMIA TYPE: Primary | ICD-10-CM

## 2025-07-24 DIAGNOSIS — Z00.00 MEDICARE ANNUAL WELLNESS VISIT, SUBSEQUENT: ICD-10-CM

## 2025-07-24 DIAGNOSIS — I10 HYPERTENSION, UNSPECIFIED TYPE: ICD-10-CM

## 2025-07-24 DIAGNOSIS — F20.9 SCHIZOPHRENIA, UNSPECIFIED TYPE: ICD-10-CM

## 2025-07-24 DIAGNOSIS — J45.20 MILD INTERMITTENT ASTHMA, UNSPECIFIED WHETHER COMPLICATED: ICD-10-CM

## 2025-07-24 LAB
ALBUMIN SERPL-MCNC: 4.6 G/DL (ref 3.5–5.2)
ALBUMIN/GLOB SERPL: 1.8 G/DL
ALP SERPL-CCNC: 43 U/L (ref 39–117)
ALT SERPL-CCNC: 23 U/L (ref 1–41)
APPEARANCE UR: CLEAR
AST SERPL-CCNC: 22 U/L (ref 1–40)
BILIRUB SERPL-MCNC: 0.5 MG/DL (ref 0–1.2)
BILIRUB UR QL STRIP: NEGATIVE
BUN SERPL-MCNC: 5 MG/DL (ref 8–23)
BUN/CREAT SERPL: 5.6 (ref 7–25)
CALCIUM SERPL-MCNC: 10 MG/DL (ref 8.6–10.5)
CHLORIDE SERPL-SCNC: 101 MMOL/L (ref 98–107)
CHOLEST SERPL-MCNC: 160 MG/DL (ref 0–200)
CHOLEST/HDLC SERPL: 3.4 {RATIO}
CO2 SERPL-SCNC: 27.5 MMOL/L (ref 22–29)
COLOR UR: YELLOW
CREAT SERPL-MCNC: 0.89 MG/DL (ref 0.76–1.27)
EGFRCR SERPLBLD CKD-EPI 2021: 93.3 ML/MIN/1.73
GLOBULIN SER CALC-MCNC: 2.6 GM/DL
GLUCOSE SERPL-MCNC: 102 MG/DL (ref 65–99)
GLUCOSE UR QL STRIP: NEGATIVE
HDLC SERPL-MCNC: 47 MG/DL (ref 40–60)
HGB UR QL STRIP: NEGATIVE
KETONES UR QL STRIP: NEGATIVE
LDLC SERPL CALC-MCNC: 81 MG/DL (ref 0–100)
LEUKOCYTE ESTERASE UR QL STRIP: NEGATIVE
NITRITE UR QL STRIP: NEGATIVE
PH UR STRIP: 6 [PH] (ref 5–8)
POTASSIUM SERPL-SCNC: 4.5 MMOL/L (ref 3.5–5.2)
PROT SERPL-MCNC: 7.2 G/DL (ref 6–8.5)
PROT UR QL STRIP: NEGATIVE
SODIUM SERPL-SCNC: 141 MMOL/L (ref 136–145)
SP GR UR STRIP: 1.01 (ref 1–1.03)
TRIGL SERPL-MCNC: 192 MG/DL (ref 0–150)
UROBILINOGEN UR STRIP-MCNC: NORMAL MG/DL
VLDLC SERPL CALC-MCNC: 32 MG/DL (ref 5–40)

## 2025-07-24 PROCEDURE — 1159F MED LIST DOCD IN RCRD: CPT

## 2025-07-24 PROCEDURE — 3074F SYST BP LT 130 MM HG: CPT

## 2025-07-24 PROCEDURE — 1160F RVW MEDS BY RX/DR IN RCRD: CPT

## 2025-07-24 PROCEDURE — G0439 PPPS, SUBSEQ VISIT: HCPCS

## 2025-07-24 PROCEDURE — 1126F AMNT PAIN NOTED NONE PRSNT: CPT

## 2025-07-24 PROCEDURE — 3079F DIAST BP 80-89 MM HG: CPT

## 2025-07-24 PROCEDURE — 1170F FXNL STATUS ASSESSED: CPT

## 2025-07-24 RX ORDER — ALBUTEROL SULFATE 90 UG/1
2 INHALANT RESPIRATORY (INHALATION) EVERY 4 HOURS PRN
Qty: 8 G | Refills: 1 | Status: SHIPPED | OUTPATIENT
Start: 2025-07-24

## 2025-07-24 NOTE — ASSESSMENT & PLAN NOTE
{Hypertension is (optional):[id number]}    Orders:    Comprehensive Metabolic Panel    Urinalysis With Microscopic If Indicated (No Culture) - Urine, Clean Catch    Lipid Panel With / Chol / HDL Ratio

## 2025-07-24 NOTE — PATIENT INSTRUCTIONS
Take albuterol inhaler every 4 hours as needed for coughing. Recommend switching to Claritin and stopping Zyrtec. Continue medications as prescribed. Stay active. Stay hydrated with water. Labs today. Follow-up in 6 months for recheck.       Medicare Wellness  Personal Prevention Plan of Service     Date of Office Visit:    Encounter Provider:  CARIDAD Knowles  Place of Service:  [facility]  Patient Name: Carlos Kraus  :  1955    As part of the Medicare Wellness portion of your visit today, we are providing you with this personalized preventive plan of services (PPPS). This plan is based upon recommendations of the [place] Preventive Services Task Force (USPSTF) and the Advisory Committee on Immunization Practices (ACIP).    This lists the preventive care services that should be considered, and provides dates of when you are due. Items listed as completed are up-to-date and do not require any further intervention.    Health Maintenance   Topic Date Due    Pneumococcal Vaccine 50+ (1 of 1 - PCV) Never done    ZOSTER VACCINE (1 of 2) Never done    COVID-19 Vaccine (2024- season) 2024    INFLUENZA VACCINE  10/01/2025    LIPID PANEL  01/15/2026    ANNUAL WELLNESS VISIT  2026    COLORECTAL CANCER SCREENING  2027    TDAP/TD VACCINES (3 - Td or Tdap) 10/12/2027    HEPATITIS C SCREENING  Completed    Hepatitis B  Completed       No orders of the defined types were placed in this encounter.      No follow-ups on file.

## 2025-07-24 NOTE — PROGRESS NOTES
Subjective   The ABCs of the Annual Wellness Visit  Medicare Wellness Visit      Carlos Kraus is a 70 y.o. patient who presents for a Medicare Wellness Visit.    The following portions of the patient's history were reviewed and   updated as appropriate: allergies, current medications, past family history, past medical history, past social history, past surgical history, and problem list.    Compared to one year ago, the patient's physical   health is better.  Compared to one year ago, the patient's mental   health is better.    Recent Hospitalizations:  He was not admitted to the hospital during the last year.     Current Medical Providers:  Patient Care Team:  Babatunde Ferrell APRN as PCP - General (Nurse Practitioner)    Outpatient Medications Prior to Visit   Medication Sig Dispense Refill    acetaminophen (TYLENOL) 650 MG 8 hr tablet Take 1 tablet by mouth Every 8 (Eight) Hours As Needed for Mild Pain.      aspirin 81 MG EC tablet Take 1 tablet by mouth Daily. 90 tablet 1    cetirizine (zyrTEC) 10 MG tablet Take 1 tablet by mouth Daily. 90 tablet 1    digoxin (LANOXIN) 250 MCG tablet TAKE 1 TABLET BY MOUTH EVERY DAY 90 tablet 1    guaiFENesin-dextromethorphan (ROBITUSSIN DM) 100-10 MG/5ML syrup Take 10 mL by mouth 4 (Four) Times a Day As Needed for Cough. 237 mL 2    levETIRAcetam (KEPPRA) 750 MG tablet Take 1 tablet by mouth 2 (Two) Times a Day. 180 tablet 3    multivitamin with minerals tablet tablet Take 1 tablet by mouth Daily. 90 tablet 1    pantoprazole (PROTONIX) 40 MG EC tablet TAKE 1 TABLET BY MOUTH EVERY DAY 90 tablet 1    risperiDONE (risperDAL) 2 MG tablet TAKE ONE 2 MG TABLET AND ONE 3 MG TABLET FOR A TOTAL OF 5 MG BY MOUTH NIGHTLY. 90 tablet 1    risperiDONE (risperDAL) 3 MG tablet TAKE ONE 2 MG TABLET AND ONE 3 MG TABLET FOR A TOTAL OF 5 MG BY MOUTH NIGHTLY. 87 tablet 2    simvastatin (ZOCOR) 20 MG tablet TAKE 1 TABLET BY MOUTH EVERY DAY AT NIGHT 90 tablet 1    polyethylene glycol (MIRALAX) 17  "GM/SCOOP powder Take 17 g by mouth Daily. (Patient not taking: Reported on 7/24/2025) 510 g 1     No facility-administered medications prior to visit.     No opioid medication identified on active medication list. I have reviewed chart for other potential  high risk medication/s and harmful drug interactions in the elderly.      Aspirin is on active medication list. Aspirin use is indicated based on review of current medical condition/s. Pros and cons of this therapy have been discussed today. Benefits of this medication outweigh potential harm.  Patient has been encouraged to continue taking this medication.  .      Patient Active Problem List   Diagnosis    Syncope and collapse    Schizophrenia    Atrial fibrillation    Hypertension    Orthostatic hypotension    Hypokalemia    Generalized abdominal pain    GERD (gastroesophageal reflux disease)    Hypothyroidism    Hyperlipidemia    Hypophosphatemia    SBO (small bowel obstruction)    Acute pancreatitis     Advance Care Planning Advance Directive is not on file.  ACP discussion was declined by the patient. Patient does not have an advance directive, declines further assistance.            Objective   Vitals:    07/24/25 1321   BP: 127/81   BP Location: Left arm   Patient Position: Sitting   Cuff Size: Large Adult   Pulse: 83   Temp: 98.6 °F (37 °C)   TempSrc: Temporal   SpO2: 99%   Weight: 103 kg (226 lb 9.6 oz)   Height: 188 cm (74.02\")   PainSc: 0-No pain       Estimated body mass index is 29.08 kg/m² as calculated from the following:    Height as of this encounter: 188 cm (74.02\").    Weight as of this encounter: 103 kg (226 lb 9.6 oz).                Does the patient have evidence of cognitive impairment? Yes                                                                                                Health  Risk Assessment    Smoking Status:  Social History     Tobacco Use   Smoking Status Never    Passive exposure: Past   Smokeless Tobacco Never "     Alcohol Consumption:  Social History     Substance and Sexual Activity   Alcohol Use Yes    Alcohol/week: 12.0 standard drinks of alcohol    Types: 12 Cans of beer per week       Fall Risk Screen  STEADI Fall Risk Assessment was completed, and patient is at LOW risk for falls.Assessment completed on:2025    Depression Screening   Little interest or pleasure in doing things? Not at all   Feeling down, depressed, or hopeless? Not at all   PHQ-2 Total Score 0      Health Habits and Functional and Cognitive Screenin/24/2025     1:34 PM   Functional & Cognitive Status   Do you have difficulty preparing food and eating? No   Do you have difficulty bathing yourself, getting dressed or grooming yourself? No   Do you have difficulty using the toilet? No   Do you have difficulty moving around from place to place? No   Do you have trouble with steps or getting out of a bed or a chair? No   Current Diet Well Balanced Diet   Dental Exam Not up to date   Eye Exam Not up to date   Exercise (times per week) 0 times per week   Current Exercises Include No Regular Exercise   Do you need help using the phone?  No   Are you deaf or do you have serious difficulty hearing?  No   Do you need help to go to places out of walking distance? No   Do you need help shopping? No   Do you need help preparing meals?  No   Do you need help with housework?  No   Do you need help with laundry? No   Do you need help taking your medications? No   Do you need help managing money? No   Do you ever drive or ride in a car without wearing a seat belt? No   Have you felt unusual fatigue (could be tiredness), stress, anger or loneliness in the last month? No   Who do you live with? Other   If you need help, do you have trouble finding someone available to you? No   Have you been bothered in the last four weeks by sexual problems? No   Do you have difficulty concentrating, remembering or making decisions? Yes           Age-appropriate  Screening Schedule:  Refer to the list below for future screening recommendations based on patient's age, sex and/or medical conditions. Orders for these recommended tests are listed in the plan section. The patient has been provided with a written plan.    Health Maintenance List  Health Maintenance   Topic Date Due    Pneumococcal Vaccine 50+ (1 of 1 - PCV) Never done    ZOSTER VACCINE (1 of 2) Never done    COVID-19 Vaccine (6 - 2024-25 season) 09/01/2024    ANNUAL WELLNESS VISIT  07/11/2025    INFLUENZA VACCINE  10/01/2025    LIPID PANEL  01/15/2026    COLORECTAL CANCER SCREENING  07/14/2027    TDAP/TD VACCINES (3 - Td or Tdap) 10/12/2027    HEPATITIS C SCREENING  Completed    Hepatitis B  Completed                                                                                                                                                CMS Preventative Services Quick Reference  Risk Factors Identified During Encounter  Depression/Dysphoria: Current medication is effective, no change recommended    The above risks/problems have been discussed with the patient.  Pertinent information has been shared with the patient in the After Visit Summary.  An After Visit Summary and PPPS were made available to the patient.    Follow Up:   Next Medicare Wellness visit to be scheduled in 1 year.         Additional E&M Note during same encounter follows:  Patient has additional, significant, and separately identifiable condition(s)/problem(s) that require work above and beyond the Medicare Wellness Visit     Chief Complaint  Medicare Wellness-subsequent    Subjective   70-year-old male presenting for cough, schizophrenia, hyperlipidemia and Medicare wellness.  Is taking medications as prescribed and tolerating well.  Denies any seizure activity since last visit.  Patient has more energy.  He is eating and drinking well.  Has lost a few pounds since January.  Denies chest pain, difficulty breathing, swelling of hands or feet,  "constipation, dysuria and changes in vision or hearing.    Caregiver has signs of burnout.  Offered social work to see but he states that he has a phone number to call for assistance.      Carlos is also being seen today for additional medical problem/s.    Review of Systems   All other systems reviewed and are negative.             Objective   Vital Signs:  /81 (BP Location: Left arm, Patient Position: Sitting, Cuff Size: Large Adult)   Pulse 83   Temp 98.6 °F (37 °C) (Temporal)   Ht 188 cm (74.02\")   Wt 103 kg (226 lb 9.6 oz)   SpO2 99%   BMI 29.08 kg/m²   Physical Exam  Vitals reviewed.   Constitutional:       Appearance: Normal appearance.   Cardiovascular:      Rate and Rhythm: Normal rate. Rhythm irregular.      Pulses: Normal pulses.      Heart sounds: Normal heart sounds.   Pulmonary:      Effort: Pulmonary effort is normal.      Breath sounds: Normal breath sounds.   Musculoskeletal:         General: Normal range of motion.      Cervical back: Normal range of motion.   Skin:     General: Skin is warm and dry.      Capillary Refill: Capillary refill takes less than 2 seconds.   Neurological:      General: No focal deficit present.      Mental Status: He is alert and oriented to person, place, and time.   Psychiatric:         Mood and Affect: Mood normal.         Behavior: Behavior normal.         Thought Content: Thought content normal.         Judgment: Judgment normal.             Common labs          1/15/2025    12:33   Common Labs   Glucose 100    BUN 3    Creatinine 0.90    Sodium 142    Potassium 4.3    Chloride 102    Calcium 9.2    Albumin 4.0    Total Bilirubin 0.2    Alkaline Phosphatase 43    AST (SGOT) 20    ALT (SGPT) 13    Total Cholesterol 166    Triglycerides 186    HDL Cholesterol 40    LDL Cholesterol  94           Assessment and Plan      Hyperlipidemia, unspecified hyperlipidemia type       Orders:    Comprehensive Metabolic Panel    Urinalysis With Microscopic If Indicated " (No Culture) - Urine, Clean Catch    Lipid Panel With / Chol / HDL Ratio    Schizophrenia, unspecified type      Orders:    Comprehensive Metabolic Panel    Urinalysis With Microscopic If Indicated (No Culture) - Urine, Clean Catch    Lipid Panel With / Chol / HDL Ratio    Hypertension, unspecified type      Orders:    Comprehensive Metabolic Panel    Urinalysis With Microscopic If Indicated (No Culture) - Urine, Clean Catch    Lipid Panel With / Chol / HDL Ratio    Medicare annual wellness visit, subsequent    Orders:    Comprehensive Metabolic Panel    Urinalysis With Microscopic If Indicated (No Culture) - Urine, Clean Catch    Lipid Panel With / Chol / HDL Ratio    Mild intermittent asthma, unspecified whether complicated            Orders:    albuterol sulfate  (90 Base) MCG/ACT inhaler; Inhale 2 puffs Every 4 (Four) Hours As Needed for Wheezing.       Patient Instructions   Take albuterol inhaler every 4 hours as needed for coughing. Recommend switching to Claritin and stopping Zyrtec. Continue medications as prescribed. Stay active. Stay hydrated with water. Labs today. Follow-up in 6 months for recheck.       Medicare Wellness  Personal Prevention Plan of Service     Date of Office Visit:    Encounter Provider:  CARIDAD Knowles  Place of Service:  [facility]  Patient Name: Carlos Kraus  :  1955    As part of the Medicare Wellness portion of your visit today, we are providing you with this personalized preventive plan of services (PPPS). This plan is based upon recommendations of the [place] Preventive Services Task Force (USPSTF) and the Advisory Committee on Immunization Practices (ACIP).    This lists the preventive care services that should be considered, and provides dates of when you are due. Items listed as completed are up-to-date and do not require any further intervention.    Health Maintenance   Topic Date Due    Pneumococcal Vaccine 50+ (1 of  1 - PCV) Never done    ZOSTER VACCINE (1 of 2) Never done    COVID-19 Vaccine (6 - 2024-25 season) 09/01/2024    INFLUENZA VACCINE  10/01/2025    LIPID PANEL  01/15/2026    ANNUAL WELLNESS VISIT  07/24/2026    COLORECTAL CANCER SCREENING  07/14/2027    TDAP/TD VACCINES (3 - Td or Tdap) 10/12/2027    HEPATITIS C SCREENING  Completed    Hepatitis B  Completed       No orders of the defined types were placed in this encounter.      No follow-ups on file.                 Follow Up   No follow-ups on file.  Patient was given instructions and counseling regarding his condition or for health maintenance advice. Please see specific information pulled into the AVS if appropriate.

## 2025-07-24 NOTE — ASSESSMENT & PLAN NOTE
{Hyperlipidemia A/P Block (Optional):[id number]}    Orders:    Comprehensive Metabolic Panel    Urinalysis With Microscopic If Indicated (No Culture) - Urine, Clean Catch    Lipid Panel With / Chol / HDL Ratio

## 2025-07-24 NOTE — ASSESSMENT & PLAN NOTE
{Psychiatric illness (Optional):13779}    Orders:    Comprehensive Metabolic Panel    Urinalysis With Microscopic If Indicated (No Culture) - Urine, Clean Catch    Lipid Panel With / Chol / HDL Ratio

## 2025-08-15 DIAGNOSIS — E78.5 HYPERLIPIDEMIA, UNSPECIFIED HYPERLIPIDEMIA TYPE: Chronic | ICD-10-CM

## 2025-08-18 RX ORDER — SIMVASTATIN 20 MG
20 TABLET ORAL
Qty: 90 TABLET | Refills: 0 | Status: SHIPPED | OUTPATIENT
Start: 2025-08-18

## (undated) DEVICE — VIOLET BRAIDED (POLYGLACTIN 910), SYNTHETIC ABSORBABLE SUTURE: Brand: COATED VICRYL

## (undated) DEVICE — ANTIBACTERIAL UNDYED BRAIDED (POLYGLACTIN 910), SYNTHETIC ABSORBABLE SUTURE: Brand: COATED VICRYL

## (undated) DEVICE — SYR LUERLOK 20CC BX/50

## (undated) DEVICE — POOLE SUCTION HANDLE: Brand: CARDINAL HEALTH

## (undated) DEVICE — TOTAL TRAY, 16FR 10ML SIL FOLEY, URN: Brand: MEDLINE

## (undated) DEVICE — SUT VIC 2/0 TIES 18IN J111T

## (undated) DEVICE — GLV SURG BIOGEL LTX PF 6 1/2

## (undated) DEVICE — APPL CHLORAPREP HI/LITE 26ML ORNG

## (undated) DEVICE — PENCL E/S ULTRAVAC TELESCP NOSE HOLSTR 10FT

## (undated) DEVICE — SUT PDS 0 CT 36IN DYED Z358T

## (undated) DEVICE — PK PROC MAJ 40

## (undated) DEVICE — SUT VIC 3/0 TIES 18IN J110T

## (undated) DEVICE — PENCL ES MEGADINE EZ/CLEAN BUTN W/HOLSTR 10FT

## (undated) DEVICE — DRSNG WND BORDR/ADHS NONADHR/GZ LF 4X14IN STRL

## (undated) DEVICE — NDL HYPO ECLPS SFTY 22G 1 1/2IN

## (undated) DEVICE — TRAP FLD MINIVAC MEGADYNE 100ML

## (undated) DEVICE — STPLR SKIN VISISTAT WD 35CT